# Patient Record
Sex: FEMALE | Race: WHITE | Employment: OTHER | URBAN - METROPOLITAN AREA
[De-identification: names, ages, dates, MRNs, and addresses within clinical notes are randomized per-mention and may not be internally consistent; named-entity substitution may affect disease eponyms.]

---

## 2022-05-13 ENCOUNTER — OFFICE VISIT (OUTPATIENT)
Dept: ORTHOPEDIC SURGERY | Age: 87
End: 2022-05-13
Payer: MEDICARE

## 2022-05-13 DIAGNOSIS — M25.561 RIGHT KNEE PAIN, UNSPECIFIED CHRONICITY: ICD-10-CM

## 2022-05-13 DIAGNOSIS — M25.562 LEFT KNEE PAIN, UNSPECIFIED CHRONICITY: ICD-10-CM

## 2022-05-13 DIAGNOSIS — M17.0 BILATERAL PRIMARY OSTEOARTHRITIS OF KNEE: Primary | ICD-10-CM

## 2022-05-13 PROCEDURE — 20610 DRAIN/INJ JOINT/BURSA W/O US: CPT | Performed by: ORTHOPAEDIC SURGERY

## 2022-05-13 RX ORDER — TRIAMCINOLONE ACETONIDE 40 MG/ML
40 INJECTION, SUSPENSION INTRA-ARTICULAR; INTRAMUSCULAR ONCE
Status: COMPLETED | OUTPATIENT
Start: 2022-05-13 | End: 2022-05-13

## 2022-05-13 RX ORDER — LIDOCAINE HYDROCHLORIDE 10 MG/ML
2 INJECTION INFILTRATION; PERINEURAL ONCE
Status: COMPLETED | OUTPATIENT
Start: 2022-05-13 | End: 2022-05-13

## 2022-05-13 RX ADMIN — LIDOCAINE HYDROCHLORIDE 2 ML: 10 INJECTION INFILTRATION; PERINEURAL at 15:00

## 2022-05-13 RX ADMIN — TRIAMCINOLONE ACETONIDE 40 MG: 40 INJECTION, SUSPENSION INTRA-ARTICULAR; INTRAMUSCULAR at 15:01

## 2022-05-13 RX ADMIN — TRIAMCINOLONE ACETONIDE 40 MG: 40 INJECTION, SUSPENSION INTRA-ARTICULAR; INTRAMUSCULAR at 15:00

## 2022-05-13 NOTE — PROGRESS NOTES
Preeti Kessler (: 1932) is a 80 y.o. female, patient, here for evaluation of the following chief complaint(s):  Knee Pain (bilateral knee pain)       HPI:    Here for DJD both knees bilateral knee injections    Not on File    No current outpatient medications on file. Current Facility-Administered Medications   Medication    hyaluronate sodium, cross-linked (GEL-ONE) injection syrg 30 mg    triamcinolone acetonide (KENALOG-40) 40 mg/mL injection 40 mg    lidocaine (XYLOCAINE) 10 mg/mL (1 %) injection 2 mL    hyaluronate sodium, cross-linked (GEL-ONE) injection syrg 30 mg    triamcinolone acetonide (KENALOG-40) 40 mg/mL injection 40 mg    lidocaine (XYLOCAINE) 10 mg/mL (1 %) injection 2 mL       History reviewed. No pertinent past medical history. History reviewed. No pertinent surgical history. History reviewed. No pertinent family history. Social History     Socioeconomic History    Marital status:      Spouse name: Not on file    Number of children: Not on file    Years of education: Not on file    Highest education level: Not on file   Occupational History    Not on file   Tobacco Use    Smoking status: Current Every Day Smoker    Smokeless tobacco: Never Used   Substance and Sexual Activity    Alcohol use: Not on file    Drug use: Not on file    Sexual activity: Not on file   Other Topics Concern    Not on file   Social History Narrative    Not on file     Social Determinants of Health     Financial Resource Strain:     Difficulty of Paying Living Expenses: Not on file   Food Insecurity:     Worried About Running Out of Food in the Last Year: Not on file    Radha of Food in the Last Year: Not on file   Transportation Needs:     Lack of Transportation (Medical): Not on file    Lack of Transportation (Non-Medical):  Not on file   Physical Activity:     Days of Exercise per Week: Not on file    Minutes of Exercise per Session: Not on file   Stress:     Feeling of Stress : Not on file   Social Connections:     Frequency of Communication with Friends and Family: Not on file    Frequency of Social Gatherings with Friends and Family: Not on file    Attends [employer] Services: Not on file    Active Member of Clubs or Organizations: Not on file    Attends Club or Organization Meetings: Not on file    Marital Status: Not on file   Intimate Partner Violence:     Fear of Current or Ex-Partner: Not on file    Emotionally Abused: Not on file    Physically Abused: Not on file    Sexually Abused: Not on file   Housing Stability:     Unable to Pay for Housing in the Last Year: Not on file    Number of [place] in the Last Year: Not on file    Unstable Housing in the Last Year: Not on file             Vitals: There were no vitals taken for this visit. There is no height or weight on file to calculate BMI. PHYSICAL EXAM:  Knees are benign-appearing though the right knee has a meniscal cyst medially in the left knee has 1 laterally. IMAGING:  None    ASSESSMENT/PLAN:  1. Bilateral primary osteoarthritis of knee  -     hyaluronate sodium, cross-linked (GEL-ONE) injection syrg 30 mg; 30 mg, Intra artICUlar, ONCE, 1 dose, On Fri 5/13/22 at 1500  -     triamcinolone acetonide (KENALOG-40) 40 mg/mL injection 40 mg; 40 mg, Intra artICUlar, ONCE, 1 dose, On Fri 5/13/22 at 1500  -     lidocaine (XYLOCAINE) 10 mg/mL (1 %) injection 2 mL; 2 mL, Intra artICUlar, ONCE, 1 dose, On Fri 5/13/22 at 1500  -     hyaluronate sodium, cross-linked (GEL-ONE) injection syrg 30 mg; 30 mg, Intra artICUlar, ONCE, 1 dose, On Fri 5/13/22 at 1500  -     triamcinolone acetonide (KENALOG-40) 40 mg/mL injection 40 mg; 40 mg, Intra artICUlar, ONCE, 1 dose, On Fri 5/13/22 at 1500  -     lidocaine (XYLOCAINE) 10 mg/mL (1 %) injection 2 mL; 2 mL, Intra artICUlar, ONCE, 1 dose, On Fri 5/13/22 at 1500  2.  Left knee pain, unspecified chronicity  -     hyaluronate sodium, cross-linked (GEL-ONE) injection syrg 30 mg; 30 mg, Intra artICUlar, ONCE, 1 dose, On Fri 5/13/22 at 1500  -     triamcinolone acetonide (KENALOG-40) 40 mg/mL injection 40 mg; 40 mg, Intra artICUlar, ONCE, 1 dose, On Fri 5/13/22 at 1500  -     lidocaine (XYLOCAINE) 10 mg/mL (1 %) injection 2 mL; 2 mL, Intra artICUlar, ONCE, 1 dose, On Fri 5/13/22 at 1500  -     hyaluronate sodium, cross-linked (GEL-ONE) injection syrg 30 mg; 30 mg, Intra artICUlar, ONCE, 1 dose, On Fri 5/13/22 at 1500  -     triamcinolone acetonide (KENALOG-40) 40 mg/mL injection 40 mg; 40 mg, Intra artICUlar, ONCE, 1 dose, On Fri 5/13/22 at 1500  -     lidocaine (XYLOCAINE) 10 mg/mL (1 %) injection 2 mL; 2 mL, Intra artICUlar, ONCE, 1 dose, On Fri 5/13/22 at 1500  3. Right knee pain, unspecified chronicity  -     hyaluronate sodium, cross-linked (GEL-ONE) injection syrg 30 mg; 30 mg, Intra artICUlar, ONCE, 1 dose, On Fri 5/13/22 at 1500  -     triamcinolone acetonide (KENALOG-40) 40 mg/mL injection 40 mg; 40 mg, Intra artICUlar, ONCE, 1 dose, On Fri 5/13/22 at 1500  -     lidocaine (XYLOCAINE) 10 mg/mL (1 %) injection 2 mL; 2 mL, Intra artICUlar, ONCE, 1 dose, On Fri 5/13/22 at 1500  -     hyaluronate sodium, cross-linked (GEL-ONE) injection syrg 30 mg; 30 mg, Intra artICUlar, ONCE, 1 dose, On Fri 5/13/22 at 1500  -     triamcinolone acetonide (KENALOG-40) 40 mg/mL injection 40 mg; 40 mg, Intra artICUlar, ONCE, 1 dose, On Fri 5/13/22 at 1500  -     lidocaine (XYLOCAINE) 10 mg/mL (1 %) injection 2 mL; 2 mL, Intra artICUlar, ONCE, 1 dose, On Fri 5/13/22 at 1500    Discussed risks/benefits of cortisone injection and patient gave verbal consent. Under sterile conditions, the bilateral knees were injected with  2cc 1% Lidocaine and 1 cc 40 mg/cc Kenalog and 1 unit of gel 1 intra-articularly, tolerated the procedure well. An electronic signature was used to authenticate this note.   --Bib Cochran MD

## 2022-07-20 ENCOUNTER — HOSPITAL ENCOUNTER (INPATIENT)
Age: 87
LOS: 14 days | Discharge: NURSING, REHAB OR LONG-TERM CARE | DRG: 193 | End: 2022-08-03
Attending: EMERGENCY MEDICINE | Admitting: FAMILY MEDICINE
Payer: MEDICARE

## 2022-07-20 ENCOUNTER — APPOINTMENT (OUTPATIENT)
Dept: NON INVASIVE DIAGNOSTICS | Age: 87
DRG: 193 | End: 2022-07-20
Attending: INTERNAL MEDICINE
Payer: MEDICARE

## 2022-07-20 ENCOUNTER — APPOINTMENT (OUTPATIENT)
Dept: GENERAL RADIOLOGY | Age: 87
DRG: 193 | End: 2022-07-20
Attending: EMERGENCY MEDICINE
Payer: MEDICARE

## 2022-07-20 ENCOUNTER — APPOINTMENT (OUTPATIENT)
Dept: CT IMAGING | Age: 87
DRG: 193 | End: 2022-07-20
Attending: FAMILY MEDICINE
Payer: MEDICARE

## 2022-07-20 DIAGNOSIS — Z71.89 GOALS OF CARE, COUNSELING/DISCUSSION: ICD-10-CM

## 2022-07-20 DIAGNOSIS — I48.0 PAF (PAROXYSMAL ATRIAL FIBRILLATION) (HCC): ICD-10-CM

## 2022-07-20 DIAGNOSIS — Z51.5 PALLIATIVE CARE ENCOUNTER: ICD-10-CM

## 2022-07-20 DIAGNOSIS — Z71.89 ADVANCED CARE PLANNING/COUNSELING DISCUSSION: ICD-10-CM

## 2022-07-20 DIAGNOSIS — I50.9 ACUTE HEART FAILURE, UNSPECIFIED HEART FAILURE TYPE (HCC): ICD-10-CM

## 2022-07-20 DIAGNOSIS — J18.9 PNEUMONIA DUE TO INFECTIOUS ORGANISM, UNSPECIFIED LATERALITY, UNSPECIFIED PART OF LUNG: ICD-10-CM

## 2022-07-20 DIAGNOSIS — A41.9 SEPSIS, DUE TO UNSPECIFIED ORGANISM, UNSPECIFIED WHETHER ACUTE ORGAN DYSFUNCTION PRESENT (HCC): Primary | ICD-10-CM

## 2022-07-20 DIAGNOSIS — I50.31 ACUTE DIASTOLIC CHF (CONGESTIVE HEART FAILURE) (HCC): ICD-10-CM

## 2022-07-20 DIAGNOSIS — R41.82 ALTERED MENTAL STATUS, UNSPECIFIED ALTERED MENTAL STATUS TYPE: ICD-10-CM

## 2022-07-20 DIAGNOSIS — D72.829 LEUKOCYTOSIS, UNSPECIFIED TYPE: ICD-10-CM

## 2022-07-20 DIAGNOSIS — R09.02 HYPOXIA: ICD-10-CM

## 2022-07-20 LAB
ALBUMIN SERPL-MCNC: 2.1 G/DL (ref 3.5–5)
ALBUMIN/GLOB SERPL: 0.4 {RATIO} (ref 1.1–2.2)
ALP SERPL-CCNC: 98 U/L (ref 45–117)
ALT SERPL-CCNC: 17 U/L (ref 12–78)
AMORPH CRY URNS QL MICRO: ABNORMAL
ANION GAP SERPL CALC-SCNC: 7 MMOL/L (ref 5–15)
APPEARANCE UR: ABNORMAL
ARTERIAL PATENCY WRIST A: POSITIVE
AST SERPL-CCNC: 10 U/L (ref 15–37)
BACTERIA URNS QL MICRO: NEGATIVE /HPF
BASE DEFICIT BLD-SCNC: 0.4 MMOL/L
BASOPHILS # BLD: 0 K/UL (ref 0–0.1)
BASOPHILS NFR BLD: 0 % (ref 0–1)
BDY SITE: ABNORMAL
BILIRUB SERPL-MCNC: 0.9 MG/DL (ref 0.2–1)
BILIRUB UR QL: NEGATIVE
BNP SERPL-MCNC: ABNORMAL PG/ML
BUN SERPL-MCNC: 28 MG/DL (ref 6–20)
BUN/CREAT SERPL: 20 (ref 12–20)
CALCIUM SERPL-MCNC: 10.1 MG/DL (ref 8.5–10.1)
CHLORIDE SERPL-SCNC: 99 MMOL/L (ref 97–108)
CO2 SERPL-SCNC: 29 MMOL/L (ref 21–32)
COLOR UR: ABNORMAL
COMMENT, HOLDF: NORMAL
COVID-19 RAPID TEST, COVR: NOT DETECTED
CREAT SERPL-MCNC: 1.42 MG/DL (ref 0.55–1.02)
DIFFERENTIAL METHOD BLD: ABNORMAL
ECHO AV AREA PEAK VELOCITY: 1.5 CM2
ECHO AV AREA/BSA PEAK VELOCITY: 0.9 CM2/M2
ECHO AV PEAK GRADIENT: 15 MMHG
ECHO AV PEAK VELOCITY: 1.9 M/S
ECHO AV VELOCITY RATIO: 0.68
ECHO LA DIAMETER INDEX: 1.94 CM/M2
ECHO LA DIAMETER: 3.3 CM
ECHO LV E' LATERAL VELOCITY: 5 CM/S
ECHO LV E' SEPTAL VELOCITY: 3 CM/S
ECHO LV FRACTIONAL SHORTENING: 28 % (ref 28–44)
ECHO LV INTERNAL DIMENSION DIASTOLE INDEX: 2.35 CM/M2
ECHO LV INTERNAL DIMENSION DIASTOLIC: 4 CM (ref 3.9–5.3)
ECHO LV INTERNAL DIMENSION SYSTOLIC INDEX: 1.71 CM/M2
ECHO LV INTERNAL DIMENSION SYSTOLIC: 2.9 CM
ECHO LV IVSD: 1.5 CM (ref 0.6–0.9)
ECHO LV MASS 2D: 186.5 G (ref 67–162)
ECHO LV MASS INDEX 2D: 109.7 G/M2 (ref 43–95)
ECHO LV POSTERIOR WALL DIASTOLIC: 1.1 CM (ref 0.6–0.9)
ECHO LV RELATIVE WALL THICKNESS RATIO: 0.55
ECHO LVOT AREA: 2.3 CM2
ECHO LVOT DIAM: 1.7 CM
ECHO LVOT MEAN GRADIENT: 3 MMHG
ECHO LVOT PEAK GRADIENT: 6 MMHG
ECHO LVOT PEAK VELOCITY: 1.3 M/S
ECHO LVOT STROKE VOLUME INDEX: 32.4 ML/M2
ECHO LVOT SV: 55.1 ML
ECHO LVOT VTI: 24.3 CM
ECHO MV A VELOCITY: 1.49 M/S
ECHO MV AREA PHT: 4.6 CM2
ECHO MV E DECELERATION TIME (DT): 164 MS
ECHO MV E VELOCITY: 1.13 M/S
ECHO MV E/A RATIO: 0.76
ECHO MV E/E' LATERAL: 22.6
ECHO MV E/E' RATIO (AVERAGED): 30.13
ECHO MV E/E' SEPTAL: 37.67
ECHO MV PRESSURE HALF TIME (PHT): 47.6 MS
ECHO PV MAX VELOCITY: 0.9 M/S
ECHO PV PEAK GRADIENT: 3 MMHG
ECHO RV FREE WALL PEAK S': 18 CM/S
ECHO RV INTERNAL DIMENSION: 3.1 CM
ECHO RV TAPSE: 1.7 CM (ref 1.7–?)
ECHO TV REGURGITANT MAX VELOCITY: 3.07 M/S
ECHO TV REGURGITANT PEAK GRADIENT: 38 MMHG
EOSINOPHIL # BLD: 0 K/UL (ref 0–0.4)
EOSINOPHIL NFR BLD: 0 % (ref 0–7)
EPITH CASTS URNS QL MICRO: ABNORMAL /LPF
ERYTHROCYTE [DISTWIDTH] IN BLOOD BY AUTOMATED COUNT: 15.4 % (ref 11.5–14.5)
FLUAV AG NPH QL IA: NEGATIVE
FLUBV AG NOSE QL IA: NEGATIVE
GAS FLOW.O2 O2 DELIVERY SYS: ABNORMAL L/MIN
GLOBULIN SER CALC-MCNC: 4.8 G/DL (ref 2–4)
GLUCOSE SERPL-MCNC: 85 MG/DL (ref 65–100)
GLUCOSE UR STRIP.AUTO-MCNC: NEGATIVE MG/DL
HCO3 BLD-SCNC: 24.1 MMOL/L (ref 22–26)
HCT VFR BLD AUTO: 36.1 % (ref 35–47)
HGB BLD-MCNC: 11.5 G/DL (ref 11.5–16)
HGB UR QL STRIP: ABNORMAL
IMM GRANULOCYTES # BLD AUTO: 0.8 K/UL (ref 0–0.04)
IMM GRANULOCYTES NFR BLD AUTO: 2 % (ref 0–0.5)
KETONES UR QL STRIP.AUTO: ABNORMAL MG/DL
LACTATE SERPL-SCNC: 1.7 MMOL/L (ref 0.4–2)
LEUKOCYTE ESTERASE UR QL STRIP.AUTO: ABNORMAL
LYMPHOCYTES # BLD: 1.1 K/UL (ref 0.8–3.5)
LYMPHOCYTES NFR BLD: 3 % (ref 12–49)
MCH RBC QN AUTO: 29.8 PG (ref 26–34)
MCHC RBC AUTO-ENTMCNC: 31.9 G/DL (ref 30–36.5)
MCV RBC AUTO: 93.5 FL (ref 80–99)
MONOCYTES # BLD: 1.1 K/UL (ref 0–1)
MONOCYTES NFR BLD: 3 % (ref 5–13)
NEUTS SEG # BLD: 35 K/UL (ref 1.8–8)
NEUTS SEG NFR BLD: 92 % (ref 32–75)
NITRITE UR QL STRIP.AUTO: NEGATIVE
NRBC # BLD: 0 K/UL (ref 0–0.01)
NRBC BLD-RTO: 0 PER 100 WBC
PCO2 BLD: 37.9 MMHG (ref 35–45)
PH BLD: 7.41 [PH] (ref 7.35–7.45)
PH UR STRIP: 5 [PH] (ref 5–8)
PLATELET # BLD AUTO: 391 K/UL (ref 150–400)
PMV BLD AUTO: 9.1 FL (ref 8.9–12.9)
PO2 BLD: 72 MMHG (ref 80–100)
POTASSIUM SERPL-SCNC: 4.6 MMOL/L (ref 3.5–5.1)
PROT SERPL-MCNC: 6.9 G/DL (ref 6.4–8.2)
PROT UR STRIP-MCNC: 30 MG/DL
RBC # BLD AUTO: 3.86 M/UL (ref 3.8–5.2)
RBC #/AREA URNS HPF: ABNORMAL /HPF (ref 0–5)
RBC MORPH BLD: ABNORMAL
SAMPLES BEING HELD,HOLD: NORMAL
SAO2 % BLD: 94.5 % (ref 92–97)
SODIUM SERPL-SCNC: 135 MMOL/L (ref 136–145)
SOURCE, COVRS: NORMAL
SP GR UR REFRACTOMETRY: 1.02 (ref 1–1.03)
SPECIMEN TYPE: ABNORMAL
TROPONIN-HIGH SENSITIVITY: 348 NG/L (ref 0–51)
TROPONIN-HIGH SENSITIVITY: 439 NG/L (ref 0–51)
TROPONIN-HIGH SENSITIVITY: 464 NG/L (ref 0–51)
UA: UC IF INDICATED,UAUC: ABNORMAL
UROBILINOGEN UR QL STRIP.AUTO: 0.2 EU/DL (ref 0.2–1)
WBC # BLD AUTO: 38 K/UL (ref 3.6–11)
WBC URNS QL MICRO: ABNORMAL /HPF (ref 0–4)

## 2022-07-20 PROCEDURE — 87635 SARS-COV-2 COVID-19 AMP PRB: CPT

## 2022-07-20 PROCEDURE — 99223 1ST HOSP IP/OBS HIGH 75: CPT | Performed by: INTERNAL MEDICINE

## 2022-07-20 PROCEDURE — 84484 ASSAY OF TROPONIN QUANT: CPT

## 2022-07-20 PROCEDURE — 36600 WITHDRAWAL OF ARTERIAL BLOOD: CPT

## 2022-07-20 PROCEDURE — 80053 COMPREHEN METABOLIC PANEL: CPT

## 2022-07-20 PROCEDURE — 71250 CT THORAX DX C-: CPT

## 2022-07-20 PROCEDURE — 74011000250 HC RX REV CODE- 250: Performed by: FAMILY MEDICINE

## 2022-07-20 PROCEDURE — 93005 ELECTROCARDIOGRAM TRACING: CPT

## 2022-07-20 PROCEDURE — 85025 COMPLETE CBC W/AUTO DIFF WBC: CPT

## 2022-07-20 PROCEDURE — 65660000001 HC RM ICU INTERMED STEPDOWN

## 2022-07-20 PROCEDURE — 96361 HYDRATE IV INFUSION ADD-ON: CPT

## 2022-07-20 PROCEDURE — 36415 COLL VENOUS BLD VENIPUNCTURE: CPT

## 2022-07-20 PROCEDURE — 94664 DEMO&/EVAL PT USE INHALER: CPT

## 2022-07-20 PROCEDURE — 96365 THER/PROPH/DIAG IV INF INIT: CPT

## 2022-07-20 PROCEDURE — 71045 X-RAY EXAM CHEST 1 VIEW: CPT

## 2022-07-20 PROCEDURE — C8929 TTE W OR WO FOL WCON,DOPPLER: HCPCS

## 2022-07-20 PROCEDURE — 83605 ASSAY OF LACTIC ACID: CPT

## 2022-07-20 PROCEDURE — 87040 BLOOD CULTURE FOR BACTERIA: CPT

## 2022-07-20 PROCEDURE — 99285 EMERGENCY DEPT VISIT HI MDM: CPT

## 2022-07-20 PROCEDURE — 94640 AIRWAY INHALATION TREATMENT: CPT

## 2022-07-20 PROCEDURE — 74011250636 HC RX REV CODE- 250/636: Performed by: FAMILY MEDICINE

## 2022-07-20 PROCEDURE — 82803 BLOOD GASES ANY COMBINATION: CPT

## 2022-07-20 PROCEDURE — 93306 TTE W/DOPPLER COMPLETE: CPT | Performed by: INTERNAL MEDICINE

## 2022-07-20 PROCEDURE — 83880 ASSAY OF NATRIURETIC PEPTIDE: CPT

## 2022-07-20 PROCEDURE — 96368 THER/DIAG CONCURRENT INF: CPT

## 2022-07-20 PROCEDURE — 74011000250 HC RX REV CODE- 250: Performed by: EMERGENCY MEDICINE

## 2022-07-20 PROCEDURE — 81001 URINALYSIS AUTO W/SCOPE: CPT

## 2022-07-20 PROCEDURE — 94762 N-INVAS EAR/PLS OXIMTRY CONT: CPT

## 2022-07-20 PROCEDURE — 87804 INFLUENZA ASSAY W/OPTIC: CPT

## 2022-07-20 PROCEDURE — 74011250636 HC RX REV CODE- 250/636: Performed by: EMERGENCY MEDICINE

## 2022-07-20 RX ORDER — SODIUM CHLORIDE 0.9 % (FLUSH) 0.9 %
5-10 SYRINGE (ML) INJECTION AS NEEDED
Status: DISCONTINUED | OUTPATIENT
Start: 2022-07-20 | End: 2022-08-03 | Stop reason: HOSPADM

## 2022-07-20 RX ORDER — POLYETHYLENE GLYCOL 3350 17 G/17G
17 POWDER, FOR SOLUTION ORAL DAILY
Status: DISCONTINUED | OUTPATIENT
Start: 2022-07-21 | End: 2022-08-03 | Stop reason: HOSPADM

## 2022-07-20 RX ORDER — NYSTATIN 100000 U/G
OINTMENT TOPICAL 2 TIMES DAILY
COMMUNITY

## 2022-07-20 RX ORDER — OXYCODONE HYDROCHLORIDE 5 MG/1
5 TABLET ORAL
COMMUNITY
End: 2022-08-03

## 2022-07-20 RX ORDER — SODIUM CHLORIDE 9 MG/ML
50 INJECTION, SOLUTION INTRAVENOUS CONTINUOUS
Status: DISCONTINUED | OUTPATIENT
Start: 2022-07-20 | End: 2022-07-22

## 2022-07-20 RX ORDER — CALCIUM CARBONATE 600 MG
600 TABLET ORAL 2 TIMES DAILY
COMMUNITY

## 2022-07-20 RX ORDER — IPRATROPIUM BROMIDE AND ALBUTEROL SULFATE 2.5; .5 MG/3ML; MG/3ML
3 SOLUTION RESPIRATORY (INHALATION)
Status: DISCONTINUED | OUTPATIENT
Start: 2022-07-20 | End: 2022-07-21

## 2022-07-20 RX ORDER — LEVOFLOXACIN 5 MG/ML
750 INJECTION, SOLUTION INTRAVENOUS ONCE
Status: COMPLETED | OUTPATIENT
Start: 2022-07-20 | End: 2022-07-20

## 2022-07-20 RX ORDER — ALBUTEROL SULFATE 90 UG/1
1 AEROSOL, METERED RESPIRATORY (INHALATION)
COMMUNITY

## 2022-07-20 RX ORDER — SIMETHICONE 80 MG
80 TABLET,CHEWABLE ORAL
Status: DISCONTINUED | OUTPATIENT
Start: 2022-07-20 | End: 2022-08-03 | Stop reason: HOSPADM

## 2022-07-20 RX ORDER — DEXTROMETHORPHAN HYDROBROMIDE, GUAIFENESIN 5; 100 MG/5ML; MG/5ML
650 LIQUID ORAL EVERY 8 HOURS
COMMUNITY

## 2022-07-20 RX ORDER — OXYCODONE HYDROCHLORIDE 5 MG/1
5 TABLET ORAL
Status: DISCONTINUED | OUTPATIENT
Start: 2022-07-20 | End: 2022-08-03 | Stop reason: HOSPADM

## 2022-07-20 RX ORDER — FACIAL-BODY WIPES
10 EACH TOPICAL
COMMUNITY

## 2022-07-20 RX ORDER — VANCOMYCIN 1.75 GRAM/500 ML IN 0.9 % SODIUM CHLORIDE INTRAVENOUS
1750
Status: COMPLETED | OUTPATIENT
Start: 2022-07-20 | End: 2022-07-20

## 2022-07-20 RX ORDER — GUAIFENESIN 100 MG/5ML
81 LIQUID (ML) ORAL DAILY
COMMUNITY
End: 2022-10-18

## 2022-07-20 RX ORDER — DILTIAZEM HYDROCHLORIDE 120 MG/1
120 CAPSULE, COATED, EXTENDED RELEASE ORAL DAILY
Status: DISCONTINUED | OUTPATIENT
Start: 2022-07-21 | End: 2022-07-23

## 2022-07-20 RX ORDER — HEPARIN SODIUM 5000 [USP'U]/ML
5000 INJECTION, SOLUTION INTRAVENOUS; SUBCUTANEOUS EVERY 8 HOURS
Status: DISCONTINUED | OUTPATIENT
Start: 2022-07-20 | End: 2022-07-20

## 2022-07-20 RX ORDER — LANOLIN ALCOHOL/MO/W.PET/CERES
3 CREAM (GRAM) TOPICAL
COMMUNITY

## 2022-07-20 RX ORDER — DILTIAZEM HYDROCHLORIDE 120 MG/1
120 CAPSULE, EXTENDED RELEASE ORAL DAILY
COMMUNITY
End: 2022-08-03

## 2022-07-20 RX ORDER — GUAIFENESIN 100 MG/5ML
81 LIQUID (ML) ORAL DAILY
Status: DISCONTINUED | OUTPATIENT
Start: 2022-07-21 | End: 2022-08-03 | Stop reason: HOSPADM

## 2022-07-20 RX ORDER — CEFDINIR 300 MG/1
300 CAPSULE ORAL 2 TIMES DAILY
COMMUNITY
End: 2022-08-03

## 2022-07-20 RX ORDER — SODIUM CHLORIDE 0.9 % (FLUSH) 0.9 %
5-40 SYRINGE (ML) INJECTION AS NEEDED
Status: DISCONTINUED | OUTPATIENT
Start: 2022-07-20 | End: 2022-08-03 | Stop reason: HOSPADM

## 2022-07-20 RX ORDER — ONDANSETRON 4 MG/1
4 TABLET, ORALLY DISINTEGRATING ORAL
COMMUNITY

## 2022-07-20 RX ORDER — IPRATROPIUM BROMIDE AND ALBUTEROL SULFATE 2.5; .5 MG/3ML; MG/3ML
3 SOLUTION RESPIRATORY (INHALATION) 2 TIMES DAILY
COMMUNITY

## 2022-07-20 RX ORDER — FUROSEMIDE 10 MG/ML
40 INJECTION INTRAMUSCULAR; INTRAVENOUS ONCE
Status: COMPLETED | OUTPATIENT
Start: 2022-07-20 | End: 2022-07-20

## 2022-07-20 RX ORDER — SIMETHICONE 125 MG
125 TABLET,CHEWABLE ORAL
COMMUNITY

## 2022-07-20 RX ORDER — ACETAMINOPHEN 325 MG/1
650 TABLET ORAL
Status: DISCONTINUED | OUTPATIENT
Start: 2022-07-20 | End: 2022-08-03 | Stop reason: HOSPADM

## 2022-07-20 RX ORDER — FAMOTIDINE 10 MG/1
10 TABLET ORAL 2 TIMES DAILY
COMMUNITY

## 2022-07-20 RX ORDER — FAMOTIDINE 20 MG/1
10 TABLET, FILM COATED ORAL 2 TIMES DAILY
Status: DISCONTINUED | OUTPATIENT
Start: 2022-07-20 | End: 2022-08-03 | Stop reason: HOSPADM

## 2022-07-20 RX ORDER — POLYETHYLENE GLYCOL 3350 17 G/17G
17 POWDER, FOR SOLUTION ORAL DAILY
COMMUNITY

## 2022-07-20 RX ORDER — GUAIFENESIN 200 MG/1
600 TABLET ORAL
COMMUNITY

## 2022-07-20 RX ORDER — SODIUM CHLORIDE 0.9 % (FLUSH) 0.9 %
5-40 SYRINGE (ML) INJECTION EVERY 8 HOURS
Status: DISCONTINUED | OUTPATIENT
Start: 2022-07-20 | End: 2022-08-03 | Stop reason: HOSPADM

## 2022-07-20 RX ADMIN — WATER 2 G: 1 INJECTION INTRAMUSCULAR; INTRAVENOUS; SUBCUTANEOUS at 10:39

## 2022-07-20 RX ADMIN — IPRATROPIUM BROMIDE AND ALBUTEROL SULFATE 3 ML: .5; 3 SOLUTION RESPIRATORY (INHALATION) at 15:55

## 2022-07-20 RX ADMIN — FUROSEMIDE 40 MG: 10 INJECTION, SOLUTION INTRAVENOUS at 13:54

## 2022-07-20 RX ADMIN — IPRATROPIUM BROMIDE AND ALBUTEROL SULFATE 3 ML: .5; 3 SOLUTION RESPIRATORY (INHALATION) at 21:35

## 2022-07-20 RX ADMIN — VANCOMYCIN HYDROCHLORIDE 1750 MG: 10 INJECTION, POWDER, LYOPHILIZED, FOR SOLUTION INTRAVENOUS at 13:59

## 2022-07-20 RX ADMIN — SODIUM CHLORIDE 75 ML/HR: 9 INJECTION, SOLUTION INTRAVENOUS at 17:05

## 2022-07-20 RX ADMIN — PERFLUTREN 1 ML: 6.52 INJECTION, SUSPENSION INTRAVENOUS at 17:56

## 2022-07-20 RX ADMIN — LEVOFLOXACIN 750 MG: 750 INJECTION, SOLUTION INTRAVENOUS at 10:45

## 2022-07-20 RX ADMIN — SODIUM CHLORIDE, PRESERVATIVE FREE 10 ML: 5 INJECTION INTRAVENOUS at 17:05

## 2022-07-20 RX ADMIN — SODIUM CHLORIDE 1000 ML: 9 INJECTION, SOLUTION INTRAVENOUS at 09:35

## 2022-07-20 NOTE — ED NOTES
Bedside shift change report given to [address] (oncoming nurse) by [facility] (offgoing nurse). Report included the following information SBAR, ED Summary and Intake/Output.

## 2022-07-20 NOTE — ED TRIAGE NOTES
Pt arrives from [facility]. Pt has persistent pneumonia Hospitalized previously on 7/7. Pt arrives coughing of brown/pink sputum that she is able to clear her self. Pt is oriented x4 per EMS vitals in route O2: 90% on 4L NC, BP: 105/64, HR: 112, BS: 95. Per EMS WBC has recently tripled at [facility].

## 2022-07-20 NOTE — PROGRESS NOTES
Apixaban Order Review  Indication: PEs  Dose: 5 mg  Current Labs:  Recent Labs     07/20/22  0923   CREA 1.42*   HGB 11.5        Est CrCL: 23.9 ml/min    Drug Interactions: ASA-increased risk of bleeding  Plan: Continue current regimen    [address] DOAC Dosing Guide

## 2022-07-20 NOTE — H&P
[address] [facility] Adult  Hospitalist Group  History and Physical    Date of Service:  7/20/2022  Primary Care Provider: Devan Negron MD  Source of information: The patient and Chart review    Chief Complaint: Shortness of Breath      History of Presenting Illness:   Leisa Sharma is a 80 y.o. female who presents with shortness of breath and cough    Patient is a poor historian, patient is at [facility] rehab, patient had a ground-level fall, was found to have PEs, was also found to have pneumonia, was treated with Flagyl and Rocephin, was transitioned to oral cephalosporins, has chronic hypoxic respiratory failure and uses 4 L of oxygen at baseline, was sent over here with concerns for impending sepsis, patient was found to have a white count of 38,000 and was found to have mild hypotension associated with her symptoms, came to the ER and was requested to be admitted to the hospitalist service, currently patient is resting in bed, alert x1, is coughing but denies any other complaints or problems           REVIEW OF SYSTEMS:  A comprehensive review of systems was negative except for that written in the History of Present Illness. History reviewed. No pertinent past medical history. No past surgical history on file. Prior to Admission medications    Not on File     Allergies   Allergen Reactions    Amoxicillin Unknown (comments)    Penicillins Unknown (comments)    Sulfa (Sulfonamide Antibiotics) Unknown (comments)      History reviewed. No pertinent family history. Social History:  reports that she has been smoking. She has never used smokeless tobacco.     Family and social history were personally reviewed, all pertinent and relevant details are outlined as above.   Reports family as well as social history could not be obtained as patient appears to be confused  Objective:   Visit Vitals  BP (!) 105/50   Pulse (!) 104   Temp 98.7 °F (37.1 °C)   Resp 27   SpO2 94%    O2 Flow Rate (L/min): 6 l/min O2 Device: Nasal cannula    PHYSICAL EXAM:   General: Alert x 1, slightly confused  HEENT: PEERL, moist mucus membranes  Neck: Supple,   Chest: Coarse breath sounds bilateral lung bases  CVS: RRR, S1 S2 heard, no murmurs/rubs/gallops  Abd: Soft, non-tender, non-distended, +bowel sounds   Ext: No clubbing, no cyanosis, no edema  Neuro/Psych: Limited exam, patient not very cooperative, sensory appears to be grossly within normal limits, moves all 4 but strength could not be tested  Cap refill: Brisk, less than 3 seconds  Pulses: 2+, symmetric in all extremities  Skin: Warm, dry, without rashes or lesions    Data Review: All diagnostic labs and studies have been reviewed. Abnormal Labs Reviewed   METABOLIC PANEL, COMPREHENSIVE - Abnormal; Notable for the following components:       Result Value    Sodium 135 (*)     BUN 28 (*)     Creatinine 1.42 (*)     GFR est AA 42 (*)     GFR est non-AA 35 (*)     AST (SGOT) 10 (*)     Albumin 2.1 (*)     Globulin 4.8 (*)     A-G Ratio 0.4 (*)     All other components within normal limits   CBC WITH AUTOMATED DIFF - Abnormal; Notable for the following components:    WBC 38.0 (*)     RDW 15.4 (*)     NEUTROPHILS 92 (*)     LYMPHOCYTES 3 (*)     MONOCYTES 3 (*)     IMMATURE GRANULOCYTES 2 (*)     ABS. NEUTROPHILS 35.0 (*)     ABS. MONOCYTES 1.1 (*)     ABS. IMM.  GRANS. 0.8 (*)     All other components within normal limits   TROPONIN-HIGH SENSITIVITY - Abnormal; Notable for the following components:    Troponin-High Sensitivity 348 (*)     All other components within normal limits       All Micro Results       Procedure Component Value Units Date/Time    CULTURE, BLOOD, PAIRED [[id number]]     Order Status: Sent Specimen: Blood     COVID-19 RAPID TEST [[id number]] Collected: 07/20/22 1049    Order Status: Completed Specimen: Nasopharyngeal Updated: 07/20/22 1126     Specimen source Nasopharyngeal        COVID-19 rapid test Not detected        Comment: Rapid Abbott ID Now       Rapid NAAT:  The specimen is NEGATIVE for SARS-CoV-2, the novel coronavirus associated with COVID-19. Negative results should be treated as presumptive and, if inconsistent with clinical signs and symptoms or necessary for patient management, should be tested with an alternative molecular assay. Negative results do not preclude SARS-CoV-2 infection and should not be used as the sole basis for patient management decisions. This test has been authorized by the FDA under an Emergency Use Authorization (EUA) for use by authorized laboratories. Fact sheet for Healthcare Providers: [employer]  Fact sheet for Patients: [employer]       Methodology: Isothermal Nucleic Acid Amplification         CULTURE, BLOOD [[id number]] Collected: 07/20/22 0923    Order Status: Sent Specimen: Blood Updated: 07/20/22 0949    CULTURE, BLOOD [[id number]] Collected: 07/20/22 0915    Order Status: Sent Specimen: Blood Updated: 07/20/22 0949    RESPIRATORY CULTURE [[id number]]     Order Status: Sent Specimen: Sputum             IMAGING:   XR CHEST PORT   Final Result   Interstitial edema or atypical pneumonia.            ECG/ECHO:    Results for orders placed or performed during the hospital encounter of 07/20/22   EKG, 12 LEAD, INITIAL   Result Value Ref Range    Ventricular Rate 111 BPM    Atrial Rate 111 BPM    P-R Interval 142 ms    QRS Duration 82 ms    Q-T Interval 326 ms    QTC Calculation (Bezet) 443 ms    Calculated P Axis 77 degrees    Calculated R Axis 40 degrees    Calculated T Axis 81 degrees    Diagnosis        Critical Test Result: STEMI  Sinus tachycardia  Cannot rule out Anterior infarct , new  Inferior injury pattern  ** ** ACUTE MI / STEMI ** **  Consider right ventricular involvement in acute inferior infarct  Abnormal ECG  When compared with ECG of 12-SEP-2006 11:48,  Previous ECG has undetermined rhythm, needs review  Acute Anterior infarct is now present          Assessment:   Given the patient's current clinical presentation, there is a high level of concern for decompensation if discharged from the emergency department. Complex decision making was performed, which includes reviewing the patient's available past medical records, laboratory results, and imaging studies. Acute on chronic hypoxic respiratory failure  SIRS (patient does not meet sepsis criteria)  Healthcare associated pneumonia  Acute kidney injury  Pulmonary edema  History of pulmonary embolism  Elevated troponin  Plan:     Patient will be admitted on a telemetry bed, hypoxic respiratory failure appears to be multifactorial, likely pulmonary edema with possible underlying pneumonia, broad-spectrum IV antibiotics, duo nebs, oxygen support, pulse ox monitoring, Lasix IV, echocardiogram, strict I's and O's, aspiration precautions, patient does not meet sepsis criteria thus sepsis protocol has been initiated, patient with lactate of 1.7 maintaining blood pressure, close monitoring and further intervention per hospital course  Avoid nephrotoxic medication, renally dose all medication, trend creatinine, nephrology consult, close monitoring, further intervention per hospital course  IV Lasix, strict I's and O's, CT of the chest, oxygen support, ABG, pulse ox monitoring, cycle troponins, echocardiogram, further intervention per hospital course  Continue anticoagulation  Reviewed EKG with ER physician, does not appear to be STEMI, likely demand, cycle troponins, on anticoagulation, cardiology consult, echocardiogram and continue to monitor      DIET: ADULT DIET Regular; 4 carb choices (60 gm/meal);  Low Fat/Low Chol/High Fiber/PARISH; Low Sodium (2 gm)   ISOLATION PRECAUTIONS: There are currently no Active Isolations  CODE STATUS: Full Code   DVT PROPHYLAXIS: Eliquis  FUNCTIONAL STATUS PRIOR TO HOSPITALIZATION: Capable of only limited self-care; confined to bed or chair likely more than 50% of waking hours. EARLY MOBILITY ASSESSMENT: Recommend a consult to the Mobility Team  ANTICIPATED DISCHARGE: Greater than 48 hours. CRITICAL CARE WAS PERFORMED FOR THIS ENCOUNTER: YES. I had a face to face encounter with the patient, reviewed and interpreted patient data including clinical events, labs, images, vital signs, I/O's, and examined patient. I have discussed the case and the plan and management of the patient's care with the consulting services, the bedside nurses and necessary ancillary providers. This patient has a high probability of imminent, clinically significant deterioration, which requires the highest level of preparedness to intervene urgently. I participated in the decision-making and personally managed or directed the management of the following life and organ supporting interventions that required my frequent assessment to treat or prevent imminent deterioration. I personally spent 50 minutes of critical care time. This is time spent at this critically ill patient's bedside actively involved in patient care as well as the coordination of care and discussions with the patient's family. This does not include any procedural time which has been billed separately. Signed By: Hellen Echols MD     July 20, 2022         Please note that this dictation may have been completed with Dragon, the [employer] voice recognition software. Quite often unanticipated grammatical, syntax, homophones, and other interpretive errors are inadvertently transcribed by the computer software. Please disregard these errors. Please excuse any errors that have escaped final proofreading.

## 2022-07-20 NOTE — CONSULTS
[facility]  Cardiology Care Note                  [x]Initial visit     []Established visit     Patient Name: Maribeth Martinez - XDM:[id number]  Primary Cardiologist: none  Consulting Cardiologist: Missael Forde MD     Reason for consult: elevated troponin    Patient seen and examined by me with the above nurse practitioner. I personally performed all components of the history, physical, and medical decision making and agree with the assessment and plan with minor modifications as noted. Today the patient presents with sepsis due to pneumonia, mild hypotension, and suspected component of interstitial edema on chest x-ray    General PE  Gen:  NAD, appears pale and slightly gray  Mental Status - Alert. General Appearance - Not in acute distress. HEENT:  PERRL, no carotid bruits or JVD  Chest and Lung Exam   Inspection: Accessory muscles - No use of accessory muscles in breathing. Auscultation:   Breath sounds: -Diminished bilaterally  Cardiovascular   Inspection: Jugular vein - Bilateral - Inspection Normal.   Palpation/Percussion:   Apical Impulse: - Normal.   Auscultation: Rhythm - Regular, tachycardic. Heart Sounds - S1 WNL and S2 WNL. No S3 or S4. Murmurs & Other Heart Sounds: Auscultation of the heart reveals - No Murmurs. Peripheral Vascular   Upper Extremity: Inspection - Bilateral - No Cyanotic nailbeds or Digital clubbing. Lower Extremity:   Palpation: Edema - Bilateral - No edema. Abdomen:   Soft, non-tender, bowel sounds are active. Neuro: A&O times 3, CN and motor grossly WNL    Agree with treatment of sepsis and pneumonia as you are doing. Received 1 dose of IV Lasix-we will monitor ins and outs and creatinine with diuresis, eventually repeat chest x-ray. Minimal troponin elevation expected in the setting of this severe illness, and not due to an ACS.     25 minutes spent speaking with the patient, reviewing records, history, physical, and medical decision making    HPI:   Patient is a 80year old with a past med hx of HTN, COPD, Atrial flutter some time ago on Diltiazem and ASA. She was recently at [facility] after GLF sustaining metatarsal fx. Diagnosed with PNA and PE while inpt. She was started on ABX and Eliquis. D/C to [facility] for rehab on 7/17. She apparently developed worsening SOB and was sent to hospital today via EMS. She was found to have a WBC 38.0, COVID/Flu neg, Creatinine 1.42, K 4.6. Troponin 348, ProBNP [id number]. EKG interpretation was STEMI, but on review was not an actual STEMI. She denies hx of CHF or CAD. She denies recent recurrence of aflutter. SUBJECTIVE/OBJECTIVE:    She is currently reporting SOB, denies CP, palpitations, or edema. She is mildly tachypneic on O2 at 6L via NC. She is mildly tachycardic with HR hovering around 100. BP trending lower ranging from 105/50 to 114/56. Assessment and Plan     Troponin elevation: presenting with worsening SOB. Recent PNA/ PE which may be contributing to elevation. Denies CP, no prior hx of CAD. Was on Diltiazem for hx of AFL. As BP allows could consider Metoprolol for both HR control and prevention of microvascular angina. Cont ASA. 2. Elevated ProBNP: may be related to recent PNA, CXR showing interstitial edema or atypical PNA. Agree with IV Diuresis at this time. Ordering stat echo. 3. HTN: see above  4. Atrial flutter: on Diltiazem and ASA at home, now on Eliquis.  Possible BB is BP allows         I have spent a total of 20 min on this encounter; reviewing results/tele/notes and time spent with patient.      ____________________________________________________________    Cardiac testing      Most recent HS troponins:  Recent Labs     07/20/22  1322 07/20/22  0923   TROPHS 439* 348*     Review of Systems    [x]All other systems reviewed and all negative except as written in HPI    [] Patient unable to provide secondary to condition         History reviewed. No pertinent past medical history. No past surgical history on file. Social Hx:  reports that she has been smoking. She has never used smokeless tobacco.  Family Hx: family history is not on file. Allergies   Allergen Reactions    Amoxicillin Unknown (comments)    Penicillins Unknown (comments)    Sulfa (Sulfonamide Antibiotics) Unknown (comments)          OBJECTIVE:  Wt Readings from Last 3 Encounters:   07/20/22 72 kg (158 lb 9.9 oz)       Intake/Output Summary (Last 24 hours) at 7/20/2022 1442  Last data filed at 7/20/2022 1333  Gross per 24 hour   Intake 1160 ml   Output --   Net 1160 ml         Physical Exam    Vitals:   Vitals:    07/20/22 1315 07/20/22 1330 07/20/22 1343 07/20/22 1345   BP: 109/62 (!) 116/55  (!) 114/56   Pulse: (!) 105 (!) 102  (!) 102   Resp: 25 28  30   Temp:       SpO2: 93% 91%  93%   Weight:   72 kg (158 lb 9.9 oz)    Height:    5' 1\" (1.549 m)     Telemetry:  sinus tachycardia      General:    Alert, cooperative, mild respiratory distress, appears stated age. Neck:   Supple, no carotid bruit and no JVD. Back:     Symmetric   Lungs:     diminished to auscultation bilaterally. O2 via NC at 6L   Heart[de-identified]     Tachycardic rate and rhythm, S1, S2 normal, no murmur, click, rub or gallop. Abdomen:     Soft, non-tender. Bowel sounds normal.    Extremities:   Extremities normal, atraumatic, no cyanosis or edema. Vascular:   Pulses - riddhi UE/LE equal   Skin:   Skin color normal. Bruising around eyes/nasal area. No rashes or lesions on visible areas   Neurologic:   Alert, Moves all extremities. Data Review:     Radiology:   XR Results (most recent):  Results from Hospital Encounter encounter on 07/20/22    XR CHEST PORT    Narrative  PORTABLE CHEST RADIOGRAPH/S: 7/20/2022 11:12 AM    INDICATION: Pneumonia. COMPARISON: 9/12/2006.     TECHNIQUE: Portable frontal upright radiograph/s of the chest.    FINDINGS:  Patchy interstitial opacity may represent pulmonary edema or atypical pneumonia. No airspace consolidation with bronchograms to clearly suggest bacterial  pneumonia. There is trace left pleural fluid or costophrenic angle blunting. The  central airways are patent. No pneumothorax. Impression  Interstitial edema or atypical pneumonia. CT Results (most recent):  Results from Hospital Encounter encounter on 06/19/09    CT ABDOMEN W/ CONTRAST    Narrative  *Final Report*      ICD Codes / Adm. Diagnosis:    /   RT LOWER QUAD PAIN  Examination:  Buddy Pascual  - [id number] - Jun 19 2009  9:12AM    Accession No:  [id number]  Reason:  RLQ ABD PAIN    REPORT:  INDICATION: Right lower quadrant pain. COMPARISON: None. CONTRAST: 92 mL of Optiray- 350. TECHNIQUE:  Multislice helical CT was performed from the diaphragm to the symphysis  pubis during uneventful rapid bolus intravenous contrast administration. Oral contrast was also administered. Delayed images of the abdomen were  acquired. Contiguous 5 mm axial images were reconstructed and lung and soft  tissue windows were generated. Coronal reformations were generated. CT dose  reduction was achieved through use of a standardized protocol tailored for  this examination and automatic exposure control for dose modulation. A  bismuth breast shield was used for this examination. FINDINGS:  There is a calcified granuloma near the right hemidiaphragm. The visualized  portions of the lung bases are clear. There is a small 8 to 9 mm left renal cyst. There are no focal abnormalities  within the liver, spleen, pancreas, adrenal glands or kidneys. The aorta is atherosclerotic and tapers without aneurysm. There is no  retroperitoneal adenopathy or mass. The bowel is normal.  The appendix is normal. There is no ascites or free  intraperitoneal air. The uterus is small and the bladder is normal.   There is no pelvic mass or  adenopathy.     The delayed images demonstrate good bilateral excretion of contrast into the  renal collecting systems. IMPRESSION:  1. Calcified right lower lobe granuloma. 2. Small left renal cyst.  3. Atherosclerotic aorta without aneurysm. 4. No acute abdominal or pelvic process identified. Interpreting/Reading Doctor: Merissa Vieira ([id number])  Transcribed: n/a on 06/19/2009  Approved: Merissa Vieira ([id number])  06/19/2009        Distribution:  Attending Doctor: Yuliana Sanchez Doctor: Josef George    MRI Results (most recent):  No results found for this or any previous visit. No results for input(s): CPK, TROIQ in the last 72 hours. No lab exists for component: CKQMB, CPKMB, BMPP  Recent Labs     07/20/22  0923   *   K 4.6   CL 99   CO2 29   BUN 28*   CREA 1.42*   GLU 85   CA 10.1     Recent Labs     07/20/22  0923   WBC 38.0*   HGB 11.5   HCT 36.1        Recent Labs     07/20/22  0923   AP 98     No results for input(s): CHOL, LDLC in the last 72 hours. No lab exists for component: TGL, HDLC,  HBA1C  No results for input(s): CRP, TSH, TSHEXT in the last 72 hours.     No lab exists for component: ESR        Current meds:    Current Facility-Administered Medications:     sodium chloride (NS) flush 5-10 mL, 5-10 mL, IntraVENous, PRN, Win Quinteros MD    sodium chloride (NS) flush 5-40 mL, 5-40 mL, IntraVENous, Q8H, Mony Marquez MD    sodium chloride (NS) flush 5-40 mL, 5-40 mL, IntraVENous, PRN, Kai Smart MD    0.9% sodium chloride infusion, 75 mL/hr, IntraVENous, CONTINUOUS, Mony Marquez MD    acetaminophen (TYLENOL) tablet 650 mg, 650 mg, Oral, Q4H PRN, Mony Dewitt MD    cefepime (MAXIPIME) 1 g in 0.9% sodium chloride (MBP/ADV) 50 mL MBP, 1 g, IntraVENous, Q12H, Mony Marquez MD    vancomycin (VANCOCIN) 1750 mg in  ml infusion, 1,750 mg, IntraVENous, NOW, Mony Marquez MD    albuterol-ipratropium (DUO-NEB) 2.5 MG-0.5 MG/3 ML, 3 mL, Nebulization, Q4H RT, Kai Smart MD    Vancomycin - pharmacy to dose, , Other, Rx Dosing/Monitoring, Kelsy Rocha MD    Current Outpatient Medications:     nystatin (MYCOSTATIN) 100,000 unit/gram ointment, Apply  to affected area two (2) times a day., Disp: , Rfl:     albuterol (PROVENTIL HFA, VENTOLIN HFA, PROAIR HFA) 90 mcg/actuation inhaler, Take 1 Puff by inhalation every four (4) hours as needed. , Disp: , Rfl:     albuterol-ipratropium (DUO-NEB) 2.5 mg-0.5 mg/3 ml nebu, 3 mL by Nebulization route two (2) times a day., Disp: , Rfl:     oxyCODONE IR (ROXICODONE) 5 mg immediate release tablet, Take 5 mg by mouth every four (4) hours as needed for Pain., Disp: , Rfl:     acetaminophen (Tylenol Arthritis Pain) 650 mg TbER, Take 650 mg by mouth every eight (8) hours. , Disp: , Rfl:     apixaban (ELIQUIS) 5 mg tablet, Take 5 mg by mouth two (2) times a day., Disp: , Rfl:     cefdinir (OMNICEF) 300 mg capsule, Take 300 mg by mouth two (2) times a day., Disp: , Rfl:     guaiFENesin (ORGANIDIN) 200 mg tablet, Take 600 mg by mouth every four (4) hours as needed for Congestion. , Disp: , Rfl:     dilTIAZem ER (DILACOR XR) 120 mg capsule, Take 120 mg by mouth in the morning., Disp: , Rfl:     calcium carbonate (CALTREX) 600 mg calcium (1,500 mg) tablet, Take 600 mg by mouth two (2) times a day., Disp: , Rfl:     aspirin 81 mg chewable tablet, Take 81 mg by mouth in the morning., Disp: , Rfl:     polyethylene glycol (Miralax) 17 gram packet, Take 17 g by mouth in the morning., Disp: , Rfl:     famotidine (PEPCID) 10 mg tablet, Take 10 mg by mouth two (2) times a day., Disp: , Rfl:     melatonin 3 mg tablet, Take 3 mg by mouth nightly as needed for Insomnia., Disp: , Rfl:     simethicone (GAS-X) 125 mg chewable tablet, Take 125 mg by mouth every six (6) hours as needed for Flatulence. , Disp: , Rfl:     ondansetron (ZOFRAN ODT) 4 mg disintegrating tablet, Take 4 mg by mouth every eight (8) hours as needed for Nausea or Vomiting., Disp: , Rfl:     bisacodyL (Dulcolax, bisacodyl,) 10 mg supp, Insert 10 mg into rectum daily as needed for Constipation. , Disp: , Rfl:     Rayna Goldmann, NP  [facility] [address] [address]  [address]  [phone]      CC: Anna Marie Jeffers MD

## 2022-07-20 NOTE — PROGRESS NOTES
Admission Medication Reconciliation:      PTA med list compiled from [address] transfer documents, which were located in the media section of the eMR. Administration times updated via bedside RN. Attempting to contact Dr. Diogo Aguayo via [address] text to update regarding completion of/changes to PTA med list. Provided recommendations re: heparin, which he accepted. Notes:  ASA: held while at facility but was home regimen  Apixaban: for PEs  Omnicef and Diflucan: recent therapy at facility    Medication changes (since last review): Added:  Tramadol  Simethicone  Dulcolax  Pepcid  Ondansetron  Melatonin    Revised:  Nebulizer treatments to include frequency  ASA: noted as taken in past month    Thank you for allowing me to participate in the care of your patient. Felix Kellogg PharmD, RN # [phone]       [facility] pharmacy benefit data reflects medications filled and processed through the patient's insurance, however   this data does NOT capture whether the medication was picked up or is currently being taken by the patient. Allergies:  Amoxicillin, Penicillins, and Sulfa (sulfonamide antibiotics)    Significant PMH/Disease States: History reviewed. No pertinent past medical history. Chief Complaint for this Admission:    Chief Complaint   Patient presents with    Shortness of Breath     Prior to Admission Medications:   Prior to Admission Medications   Prescriptions Last Dose Informant Taking?   acetaminophen (Tylenol Arthritis Pain) 650 mg TbER 7/13/2022  Yes   Sig: Take 650 mg by mouth every eight (8) hours. albuterol (PROVENTIL HFA, VENTOLIN HFA, PROAIR HFA) 90 mcg/actuation inhaler 7/13/2022  Yes   Sig: Take 1 Puff by inhalation every four (4) hours as needed. albuterol-ipratropium (DUO-NEB) 2.5 mg-0.5 mg/3 ml nebu 7/13/2022  Yes   Sig: 3 mL by Nebulization route two (2) times a day. apixaban (ELIQUIS) 5 mg tablet 7/19/2022  Yes   Sig: Take 5 mg by mouth two (2) times a day.    aspirin 81 mg chewable tablet 6/20/2022  Yes   Sig: Take 81 mg by mouth in the morning. bisacodyL (Dulcolax, bisacodyl,) 10 mg supp   Yes   Sig: Insert 10 mg into rectum daily as needed for Constipation. calcium carbonate (CALTREX) 600 mg calcium (1,500 mg) tablet 7/19/2022  Yes   Sig: Take 600 mg by mouth two (2) times a day. cefdinir (OMNICEF) 300 mg capsule 7/19/2022  Yes   Sig: Take 300 mg by mouth two (2) times a day. dilTIAZem ER (DILACOR XR) 120 mg capsule 7/19/2022  Yes   Sig: Take 120 mg by mouth in the morning. famotidine (PEPCID) 10 mg tablet   Yes   Sig: Take 10 mg by mouth two (2) times a day. guaiFENesin (ORGANIDIN) 200 mg tablet   Yes   Sig: Take 600 mg by mouth every four (4) hours as needed for Congestion. melatonin 3 mg tablet   Yes   Sig: Take 3 mg by mouth nightly as needed for Insomnia. nystatin (MYCOSTATIN) 100,000 unit/gram ointment 7/19/2022  Yes   Sig: Apply  to affected area two (2) times a day. ondansetron (ZOFRAN ODT) 4 mg disintegrating tablet   Yes   Sig: Take 4 mg by mouth every eight (8) hours as needed for Nausea or Vomiting. oxyCODONE IR (ROXICODONE) 5 mg immediate release tablet 7/13/2022  Yes   Sig: Take 5 mg by mouth every four (4) hours as needed for Pain.   polyethylene glycol (Miralax) 17 gram packet   Yes   Sig: Take 17 g by mouth in the morning. simethicone (GAS-X) 125 mg chewable tablet   Yes   Sig: Take 125 mg by mouth every six (6) hours as needed for Flatulence. Facility-Administered Medications: None     Please contact the main inpatient pharmacy with any questions or concerns at [phone] and we will direct you to the clinical pharmacist covering this patient's care while in-house.    Marlon Agustin [place]

## 2022-07-20 NOTE — ED PROVIDER NOTES
Patient is a 19-year-old female with past medical history significant for hypertension and COPD who [facility] emergency [facility] for white count of 38,000 and hypotension concerning for impending sepsis. Physician from rehab facility denies that patient has any history of congestive heart failure. EMS reports that she has documented allergies to penicillin, amoxicillin and sulfa. Per report patient had been treated with Flagyl and Rocephin at hospital where she had been admitted after a ground-level fall for PEs and pneumonia and then transition to oral cephalosporin. Patient on 4 L nasal cannula O2 at baseline and per report has not required increased amount of oxygen support. Patient awake alert oriented and coughing up profuse purulent sputum. Per report patient is DNR/DNI       No past medical history on file. No past surgical history on file. No family history on file. Social History     Socioeconomic History    Marital status:      Spouse name: Not on file    Number of children: Not on file    Years of education: Not on file    Highest education level: Not on file   Occupational History    Not on file   Tobacco Use    Smoking status: Every Day    Smokeless tobacco: Never   Substance and Sexual Activity    Alcohol use: Not on file    Drug use: Not on file    Sexual activity: Not on file   Other Topics Concern    Not on file   Social History Narrative    Not on file     Social Determinants of Health     Financial Resource Strain: Not on file   Food Insecurity: Not on file   Transportation Needs: Not on file   Physical Activity: Not on file   Stress: Not on file   Social Connections: Not on file   Intimate Partner Violence: Not on file   Housing Stability: Not on file         ALLERGIES: Patient has no allergy information on record. Review of Systems   Constitutional:  Positive for fatigue and fever. HENT:  Negative for drooling. Respiratory:  Positive for cough and shortness of breath. Cardiovascular:  Negative for leg swelling. Gastrointestinal:  Negative for abdominal pain. Musculoskeletal:  Positive for myalgias. Skin:  Negative for rash. Neurological:  Negative for seizures and syncope. Psychiatric/Behavioral: Negative. There were no vitals filed for this visit. Physical Exam  Vitals and nursing note reviewed. Constitutional:       Appearance: She is ill-appearing. HENT:      Head: Normocephalic. Comments: Ecchymosis noted to bilateral infraorbital areas and nose     Right Ear: External ear normal.      Left Ear: External ear normal.      Nose: Nose normal.      Mouth/Throat:      Mouth: Mucous membranes are dry. Eyes:      General: No scleral icterus. Cardiovascular:      Pulses: Normal pulses. Pulmonary:      Effort: Pulmonary effort is normal.      Breath sounds: Rhonchi present. Abdominal:      Palpations: Abdomen is soft. Musculoskeletal:      Cervical back: Neck supple. Skin:     General: Skin is warm and dry. Neurological:      Mental Status: She is alert and oriented to person, place, and time. Psychiatric:         Mood and Affect: Mood normal.         Behavior: Behavior normal.         Thought Content: Thought content normal.        [facility]         Procedures      Perfect Serve Consult for Admission  11:32 AM    ED Room Number: ER04/04  Patient Name and age:  Cesar Alegre 80 y.o.  female  Working Diagnosis:   1. Sepsis, due to unspecified organism, unspecified whether acute organ dysfunction present ([address]    2. Pneumonia due to infectious organism, unspecified laterality, unspecified part of lung    3.  Leukocytosis, unspecified type        COVID-19 Suspicion:  no  Sepsis present:  yes  Reassessment needed: no  Code Status:  Do Not Resuscitate  Readmission: no  Isolation Requirements:  no  Recommended Level of Care:  step down  Department:[facility] Adult ED - 21   Other: Patient is a 80-year-old female who is DNR/DNI from [facility] rehab for evaluation of current sepsis. Patient was sent there after a fall and treated at outside facility where she was found to have PEs and started on blood thinners. Was also treated for possible pneumonia and covered with Rocephin and Flagyl per report. Transition to oral antibiotics but then today with subjective fevers and white count in the 30,000 range. Also coughing up large amount of purulent sputum. Imaging shows atypical pneumonia versus pulmonary edema. Blood pressures improved with fluids. Patient and rehab facility denies history of congestive heart failure.   MAP is currently 72

## 2022-07-20 NOTE — CONSULTS
[facility] ([facility])         Nephrology and Hypertension         Patient seen and examined. Full consult [id number]    ASSESSMENT:  PAULO- likely pre-renal in the setting of SIRS/Low nl BP/Aflutter; UA is bland. Suspicion for obstruction is low    SIRS  Pulm edema  A on C Resp failure/COPD on home O2  Recent PE/PNA- Covid/flu negative  Aflutter    PLAN:  O2  Gentle IV hydration  IV lasix prn for worsening hypoxia; got 1 dose earlier  Nebs  Rate control as able  AC for Aflutter  Avoid nephrotoxins  Blood Cx  Empiric ABx      D/W pt  Thanks for Renal consult. We will follow patient with you.     Signed by:  Lisa Dickinson MD  Nephrology and HTN

## 2022-07-20 NOTE — PROGRESS NOTES
Pharmacist Note - Vancomycin Dosing    Consult provided for this [address] female for indication of pneumonia. Antibiotic regimen(s): Vanc + Cefepime  Patient on vancomycin PTA? NO     Recent Labs     22  0923   WBC 38.0*   CREA 1.42*   BUN 28*     Frequency of BMP: daily x 3  Height: 154.9 cm  Weight: 72 kg  Est CrCl: 23.9 ml/min; UO: -- ml/kg/hr  Temp (24hrs), Av.7 °F (37.1 °C), Min:98.7 °F (37.1 °C), Max:98.7 °F (37.1 °C)    Cultures:   blood - pending    MRSA Swab ordered (if applicable)? YES    The plan below is expected to result in a target range of Trough 10-15 mcg/mL    Therapy will be initiated with a loading dose of 1750 mg IV x 1 and await AM labs to determine regimen due to PAULO. Pharmacy to follow patient daily and order levels / make dose adjustments as appropriate.

## 2022-07-21 ENCOUNTER — APPOINTMENT (OUTPATIENT)
Dept: GENERAL RADIOLOGY | Age: 87
DRG: 193 | End: 2022-07-21
Attending: HOSPITALIST
Payer: MEDICARE

## 2022-07-21 LAB
ALBUMIN SERPL-MCNC: 1.7 G/DL (ref 3.5–5)
ALBUMIN/GLOB SERPL: 0.4 {RATIO} (ref 1.1–2.2)
ALP SERPL-CCNC: 95 U/L (ref 45–117)
ALT SERPL-CCNC: 14 U/L (ref 12–78)
ANION GAP SERPL CALC-SCNC: 14 MMOL/L (ref 5–15)
AST SERPL-CCNC: 13 U/L (ref 15–37)
BASOPHILS # BLD: 0 K/UL (ref 0–0.1)
BASOPHILS NFR BLD: 0 % (ref 0–1)
BILIRUB SERPL-MCNC: 0.4 MG/DL (ref 0.2–1)
BUN SERPL-MCNC: 37 MG/DL (ref 6–20)
BUN/CREAT SERPL: 28 (ref 12–20)
CALCIUM SERPL-MCNC: 9 MG/DL (ref 8.5–10.1)
CALCULATED R AXIS, ECG10: -60 DEGREES
CALCULATED T AXIS, ECG11: 101 DEGREES
CHLORIDE SERPL-SCNC: 104 MMOL/L (ref 97–108)
CO2 SERPL-SCNC: 20 MMOL/L (ref 21–32)
COMMENT, HOLDF: NORMAL
CREAT SERPL-MCNC: 1.3 MG/DL (ref 0.55–1.02)
DIAGNOSIS, 93000: NORMAL
DIFFERENTIAL METHOD BLD: ABNORMAL
EOSINOPHIL # BLD: 0 K/UL (ref 0–0.4)
EOSINOPHIL NFR BLD: 0 % (ref 0–7)
ERYTHROCYTE [DISTWIDTH] IN BLOOD BY AUTOMATED COUNT: 15.5 % (ref 11.5–14.5)
GLOBULIN SER CALC-MCNC: 4.7 G/DL (ref 2–4)
GLUCOSE SERPL-MCNC: 84 MG/DL (ref 65–100)
HCT VFR BLD AUTO: 31.4 % (ref 35–47)
HGB BLD-MCNC: 10.2 G/DL (ref 11.5–16)
IMM GRANULOCYTES # BLD AUTO: 0.5 K/UL (ref 0–0.04)
IMM GRANULOCYTES NFR BLD AUTO: 2 % (ref 0–0.5)
LYMPHOCYTES # BLD: 0.7 K/UL (ref 0.8–3.5)
LYMPHOCYTES NFR BLD: 3 % (ref 12–49)
MAGNESIUM SERPL-MCNC: 1.6 MG/DL (ref 1.6–2.4)
MCH RBC QN AUTO: 30.4 PG (ref 26–34)
MCHC RBC AUTO-ENTMCNC: 32.5 G/DL (ref 30–36.5)
MCV RBC AUTO: 93.7 FL (ref 80–99)
MONOCYTES # BLD: 0.7 K/UL (ref 0–1)
MONOCYTES NFR BLD: 3 % (ref 5–13)
NEUTS SEG # BLD: 20.7 K/UL (ref 1.8–8)
NEUTS SEG NFR BLD: 92 % (ref 32–75)
NRBC # BLD: 0 K/UL (ref 0–0.01)
NRBC BLD-RTO: 0 PER 100 WBC
PLATELET # BLD AUTO: 328 K/UL (ref 150–400)
PMV BLD AUTO: 9 FL (ref 8.9–12.9)
POTASSIUM SERPL-SCNC: 4.3 MMOL/L (ref 3.5–5.1)
PROT SERPL-MCNC: 6.4 G/DL (ref 6.4–8.2)
Q-T INTERVAL, ECG07: 334 MS
QRS DURATION, ECG06: 130 MS
QTC CALCULATION (BEZET), ECG08: 543 MS
RBC # BLD AUTO: 3.35 M/UL (ref 3.8–5.2)
RBC MORPH BLD: ABNORMAL
SAMPLES BEING HELD,HOLD: NORMAL
SODIUM SERPL-SCNC: 138 MMOL/L (ref 136–145)
TROPONIN-HIGH SENSITIVITY: 157 NG/L (ref 0–51)
VENTRICULAR RATE, ECG03: 159 BPM
WBC # BLD AUTO: 22.6 K/UL (ref 3.6–11)

## 2022-07-21 PROCEDURE — 74011250636 HC RX REV CODE- 250/636: Performed by: STUDENT IN AN ORGANIZED HEALTH CARE EDUCATION/TRAINING PROGRAM

## 2022-07-21 PROCEDURE — 72170 X-RAY EXAM OF PELVIS: CPT

## 2022-07-21 PROCEDURE — 74011000258 HC RX REV CODE- 258: Performed by: HOSPITALIST

## 2022-07-21 PROCEDURE — 74011000250 HC RX REV CODE- 250: Performed by: HOSPITALIST

## 2022-07-21 PROCEDURE — 97165 OT EVAL LOW COMPLEX 30 MIN: CPT

## 2022-07-21 PROCEDURE — 94640 AIRWAY INHALATION TREATMENT: CPT

## 2022-07-21 PROCEDURE — 74011000258 HC RX REV CODE- 258: Performed by: NURSE PRACTITIONER

## 2022-07-21 PROCEDURE — 84484 ASSAY OF TROPONIN QUANT: CPT

## 2022-07-21 PROCEDURE — 74011250636 HC RX REV CODE- 250/636: Performed by: NURSE PRACTITIONER

## 2022-07-21 PROCEDURE — 74011000258 HC RX REV CODE- 258: Performed by: STUDENT IN AN ORGANIZED HEALTH CARE EDUCATION/TRAINING PROGRAM

## 2022-07-21 PROCEDURE — 77010033678 HC OXYGEN DAILY

## 2022-07-21 PROCEDURE — P9047 ALBUMIN (HUMAN), 25%, 50ML: HCPCS | Performed by: STUDENT IN AN ORGANIZED HEALTH CARE EDUCATION/TRAINING PROGRAM

## 2022-07-21 PROCEDURE — 51798 US URINE CAPACITY MEASURE: CPT

## 2022-07-21 PROCEDURE — 71045 X-RAY EXAM CHEST 1 VIEW: CPT

## 2022-07-21 PROCEDURE — 65660000001 HC RM ICU INTERMED STEPDOWN

## 2022-07-21 PROCEDURE — 93005 ELECTROCARDIOGRAM TRACING: CPT

## 2022-07-21 PROCEDURE — 99233 SBSQ HOSP IP/OBS HIGH 50: CPT | Performed by: INTERNAL MEDICINE

## 2022-07-21 PROCEDURE — 99222 1ST HOSP IP/OBS MODERATE 55: CPT | Performed by: NURSE PRACTITIONER

## 2022-07-21 PROCEDURE — 97530 THERAPEUTIC ACTIVITIES: CPT

## 2022-07-21 PROCEDURE — 36415 COLL VENOUS BLD VENIPUNCTURE: CPT

## 2022-07-21 PROCEDURE — 92610 EVALUATE SWALLOWING FUNCTION: CPT

## 2022-07-21 PROCEDURE — 83735 ASSAY OF MAGNESIUM: CPT

## 2022-07-21 PROCEDURE — 74011250636 HC RX REV CODE- 250/636: Performed by: HOSPITALIST

## 2022-07-21 PROCEDURE — 85025 COMPLETE CBC W/AUTO DIFF WBC: CPT

## 2022-07-21 PROCEDURE — 80053 COMPREHEN METABOLIC PANEL: CPT

## 2022-07-21 PROCEDURE — 94760 N-INVAS EAR/PLS OXIMETRY 1: CPT

## 2022-07-21 PROCEDURE — 97161 PT EVAL LOW COMPLEX 20 MIN: CPT

## 2022-07-21 PROCEDURE — 74011000258 HC RX REV CODE- 258: Performed by: FAMILY MEDICINE

## 2022-07-21 PROCEDURE — 74011000250 HC RX REV CODE- 250: Performed by: FAMILY MEDICINE

## 2022-07-21 PROCEDURE — 74011250636 HC RX REV CODE- 250/636: Performed by: FAMILY MEDICINE

## 2022-07-21 RX ORDER — HYDROMORPHONE HYDROCHLORIDE 1 MG/ML
0.5 INJECTION, SOLUTION INTRAMUSCULAR; INTRAVENOUS; SUBCUTANEOUS
Status: DISCONTINUED | OUTPATIENT
Start: 2022-07-21 | End: 2022-08-03 | Stop reason: HOSPADM

## 2022-07-21 RX ORDER — METOPROLOL TARTRATE 5 MG/5ML
2.5 INJECTION INTRAVENOUS
Status: DISCONTINUED | OUTPATIENT
Start: 2022-07-21 | End: 2022-08-03 | Stop reason: HOSPADM

## 2022-07-21 RX ORDER — ALBUMIN HUMAN 250 G/1000ML
25 SOLUTION INTRAVENOUS EVERY 6 HOURS
Status: DISCONTINUED | OUTPATIENT
Start: 2022-07-21 | End: 2022-07-21

## 2022-07-21 RX ORDER — AMIODARONE HYDROCHLORIDE 150 MG/3ML
INJECTION, SOLUTION INTRAVENOUS
Status: DISCONTINUED
Start: 2022-07-21 | End: 2022-07-21

## 2022-07-21 RX ORDER — ALBUMIN HUMAN 250 G/1000ML
25 SOLUTION INTRAVENOUS EVERY 6 HOURS
Status: COMPLETED | OUTPATIENT
Start: 2022-07-21 | End: 2022-07-21

## 2022-07-21 RX ORDER — MIDODRINE HYDROCHLORIDE 5 MG/1
10 TABLET ORAL
Status: DISCONTINUED | OUTPATIENT
Start: 2022-07-21 | End: 2022-07-23

## 2022-07-21 RX ORDER — IPRATROPIUM BROMIDE AND ALBUTEROL SULFATE 2.5; .5 MG/3ML; MG/3ML
3 SOLUTION RESPIRATORY (INHALATION)
Status: DISCONTINUED | OUTPATIENT
Start: 2022-07-21 | End: 2022-07-23

## 2022-07-21 RX ORDER — HYDROMORPHONE HYDROCHLORIDE 1 MG/ML
0.2 INJECTION, SOLUTION INTRAMUSCULAR; INTRAVENOUS; SUBCUTANEOUS
Status: DISCONTINUED | OUTPATIENT
Start: 2022-07-21 | End: 2022-07-21

## 2022-07-21 RX ADMIN — VANCOMYCIN HYDROCHLORIDE 750 MG: 750 INJECTION, POWDER, LYOPHILIZED, FOR SOLUTION INTRAVENOUS at 13:41

## 2022-07-21 RX ADMIN — AMIODARONE HYDROCHLORIDE 1 MG/MIN: 50 INJECTION, SOLUTION INTRAVENOUS at 17:53

## 2022-07-21 RX ADMIN — SODIUM CHLORIDE, PRESERVATIVE FREE 10 ML: 5 INJECTION INTRAVENOUS at 13:55

## 2022-07-21 RX ADMIN — CEFEPIME HYDROCHLORIDE 1 G: 1 INJECTION, POWDER, FOR SOLUTION INTRAMUSCULAR; INTRAVENOUS at 01:00

## 2022-07-21 RX ADMIN — CEFEPIME HYDROCHLORIDE 1 G: 1 INJECTION, POWDER, FOR SOLUTION INTRAMUSCULAR; INTRAVENOUS at 13:41

## 2022-07-21 RX ADMIN — ALBUMIN (HUMAN) 25 G: 0.25 INJECTION, SOLUTION INTRAVENOUS at 13:41

## 2022-07-21 RX ADMIN — ALBUMIN (HUMAN) 25 G: 0.25 INJECTION, SOLUTION INTRAVENOUS at 04:09

## 2022-07-21 RX ADMIN — IPRATROPIUM BROMIDE AND ALBUTEROL SULFATE 3 ML: .5; 3 SOLUTION RESPIRATORY (INHALATION) at 20:14

## 2022-07-21 RX ADMIN — ALBUMIN (HUMAN) 25 G: 0.25 INJECTION, SOLUTION INTRAVENOUS at 17:02

## 2022-07-21 RX ADMIN — HYDROMORPHONE HYDROCHLORIDE 0.2 MG: 1 INJECTION, SOLUTION INTRAMUSCULAR; INTRAVENOUS; SUBCUTANEOUS at 17:02

## 2022-07-21 RX ADMIN — DEXTROSE MONOHYDRATE 150 MG: 5 INJECTION, SOLUTION INTRAVENOUS at 03:22

## 2022-07-21 RX ADMIN — IPRATROPIUM BROMIDE AND ALBUTEROL SULFATE 3 ML: .5; 3 SOLUTION RESPIRATORY (INHALATION) at 08:04

## 2022-07-21 RX ADMIN — SODIUM CHLORIDE, PRESERVATIVE FREE 10 ML: 5 INJECTION INTRAVENOUS at 05:53

## 2022-07-21 RX ADMIN — SODIUM CHLORIDE, PRESERVATIVE FREE 10 ML: 5 INJECTION INTRAVENOUS at 23:31

## 2022-07-21 RX ADMIN — IPRATROPIUM BROMIDE AND ALBUTEROL SULFATE 3 ML: .5; 3 SOLUTION RESPIRATORY (INHALATION) at 01:00

## 2022-07-21 RX ADMIN — AMIODARONE HYDROCHLORIDE 0.5 MG/MIN: 50 INJECTION, SOLUTION INTRAVENOUS at 23:27

## 2022-07-21 RX ADMIN — AMIODARONE HYDROCHLORIDE 1 MG/MIN: 50 INJECTION, SOLUTION INTRAVENOUS at 03:41

## 2022-07-21 RX ADMIN — SODIUM CHLORIDE, PRESERVATIVE FREE 10 ML: 5 INJECTION INTRAVENOUS at 04:11

## 2022-07-21 RX ADMIN — CEFEPIME HYDROCHLORIDE 1 G: 1 INJECTION, POWDER, FOR SOLUTION INTRAMUSCULAR; INTRAVENOUS at 23:27

## 2022-07-21 NOTE — PROGRESS NOTES
[facility] Adult  Hospitalist Group                                                                                          Hospitalist Progress Note  Frank Srivastava MD  Answering service: [phone] OR [phone] from in house phone        Date of Service:  2022  NAME:  Leisa Sharma  :  1932  MRN:  [id number]      Admission Summary:   Leisa Sharma is a 80 y.o. female who presents with shortness of breath and cough    Patient is a poor historian, patient is at [facility] rehab, patient had a ground-level fall, was found to have PEs, was also found to have pneumonia, was treated with Flagyl and Rocephin, was transitioned to oral cephalosporins, has chronic hypoxic respiratory failure and uses 4 L of oxygen at baseline, was sent over here with concerns for impending sepsis, patient was found to have a white count of 38,000 and was found to have mild hypotension associated with her symptoms, came to the ER and was requested to be admitted to the hospitalist service, currently patient is resting in bed, alert x1, is coughing but denies any other complaints or problems       Interval history / Subjective: Follow up SOB   Now on 4l NC (yesterday on 6l), now at baseline  Assessment & Plan:     Acute on chronic hypoxic respiratory failure, 4l NC at baseline  SIRS (patient does not meet sepsis criteria)  Healthcare associated pneumonia  Pulmonary edema  -I/O  -Daily weight  -Follow cultures  -Vanc/cefepime    Hypotension: started on midodrine    Elevated troponin-per cardiology sec to above, not ACS  Acute kidney injury : improving on IV fluids  History of pulmonary embolism: on Eliquis, continue  History of atrial flutter: on cardizem/ASA/Eliquis  Dysphagia:NPO, patient     PT/OT     Code status: DNR  Prophylaxis: Eliquis  PTA: [facility]    Plan: Continue antibiotics, awaiting palliative care  Care Plan discussed with: Patient  Anticipated Disposition: ?  [facility]  Date Reviewed: 5/13/2022            Codes Class Noted POA    Sepsis [facility]) ICD-10-CM: A41.9  ICD-9-CM: 038.9, 995.91  7/20/2022 Unknown             Review of Systems:   A comprehensive review of systems was negative except for that written in the HPI. Vital Signs:    Last 24hrs VS reviewed since prior progress note. Most recent are:  Visit Vitals  /65   Pulse 90   Temp 97.5 °F (36.4 °C)   Resp 28   Ht 5' 1\" (1.549 m)   Wt 71.2 kg (156 lb 14.4 oz)   SpO2 94%   BMI 29.65 kg/m²         Intake/Output Summary (Last 24 hours) at 7/21/2022 1423  Last data filed at 7/21/2022 0531  Gross per 24 hour   Intake 743.33 ml   Output 450 ml   Net 293.33 ml        Physical Examination:     I had a face to face encounter with this patient and independently examined them on 7/21/2022 as outlined below:          Constitutional:  No acute distress   ENT:  Oral mucosa moist, oropharynx benign. Resp:  CTA bilaterally. No wheezing/rhonchi/rales. No accessory muscle use. CV:  Regular rhythm, normal rate, no murmurs, gallops, rubs    GI:  Soft, non distended, non tender. normoactive bowel sounds, no hepatosplenomegaly     Musculoskeletal:  No edema, warm, 2+ pulses throughout    Neurologic:  Moves all extremities. AAOx3, CN II-XII reviewed            Data Review:    Review and/or order of clinical lab test      Labs:     Recent Labs     07/21/22 0319 07/20/22  0923   WBC 22.6* 38.0*   HGB 10.2* 11.5   HCT 31.4* 36.1    391     Recent Labs     07/21/22 0319 07/20/22  0923    135*   K 4.3 4.6    99   CO2 20* 29   BUN 37* 28*   CREA 1.30* 1.42*   GLU 84 85   CA 9.0 10.1     Recent Labs     07/21/22 0319 07/20/22  0923   ALT 14 17   AP 95 98   TBILI 0.4 0.9   TP 6.4 6.9   ALB 1.7* 2.1*   GLOB 4.7* 4.8*     No results for input(s): INR, PTP, APTT, INREXT in the last 72 hours. No results for input(s): FE, TIBC, PSAT, FERR in the last 72 hours.    No results found for: FOL, RBCF   No results for input(s): PH, PCO2, PO2 in the last 72 hours. No results for input(s): CPK, CKNDX, TROIQ in the last 72 hours.     No lab exists for component: CPKMB  No results found for: CHOL, CHOLX, CHLST, CHOLV, HDL, HDLP, LDL, LDLC, DLDLP, TGLX, TRIGL, TRIGP, CHHD, CHHDX  No results found for: [facility]  Lab Results   Component Value Date/Time    Color DARK YELLOW 07/20/2022 03:37 PM    Appearance CLOUDY (A) 07/20/2022 03:37 PM    Specific gravity 1.018 07/20/2022 03:37 PM    pH (UA) 5.0 07/20/2022 03:37 PM    Protein 30 (A) 07/20/2022 03:37 PM    Glucose Negative 07/20/2022 03:37 PM    Ketone TRACE (A) 07/20/2022 03:37 PM    Bilirubin Negative 07/20/2022 03:37 PM    Urobilinogen 0.2 07/20/2022 03:37 PM    Nitrites Negative 07/20/2022 03:37 PM    Leukocyte Esterase TRACE (A) 07/20/2022 03:37 PM    Epithelial cells FEW 07/20/2022 03:37 PM    Bacteria Negative 07/20/2022 03:37 PM    WBC 0-4 07/20/2022 03:37 PM    RBC 5-10 07/20/2022 03:37 PM         Medications Reviewed:     Current Facility-Administered Medications   Medication Dose Route Frequency    midodrine (PROAMATINE) tablet 10 mg  10 mg Oral TID WITH MEALS    albumin human 25% (BUMINATE) solution 25 g  25 g IntraVENous Q6H    albuterol-ipratropium (DUO-NEB) 2.5 MG-0.5 MG/3 ML  3 mL Nebulization BID RT    vancomycin (VANCOCIN) 750 mg in 0.9% sodium chloride 250 mL ([id number])  750 mg IntraVENous Q24H    metoprolol (LOPRESSOR) injection 2.5 mg  2.5 mg IntraVENous Q6H PRN    sodium chloride (NS) flush 5-10 mL  5-10 mL IntraVENous PRN    sodium chloride (NS) flush 5-40 mL  5-40 mL IntraVENous Q8H    sodium chloride (NS) flush 5-40 mL  5-40 mL IntraVENous PRN    0.9% sodium chloride infusion  50 mL/hr IntraVENous CONTINUOUS    acetaminophen (TYLENOL) tablet 650 mg  650 mg Oral Q4H PRN    cefepime (MAXIPIME) 1 g in 0.9% sodium chloride (MBP/ADV) 50 mL MBP  1 g IntraVENous Q12H    apixaban (ELIQUIS) tablet 5 mg  5 mg Oral BID    aspirin chewable tablet 81 mg  81 mg Oral DAILY famotidine (PEPCID) tablet 10 mg  10 mg Oral BID    oxyCODONE IR (ROXICODONE) tablet 5 mg  5 mg Oral Q4H PRN    polyethylene glycol (MIRALAX) packet 17 g  17 g Oral DAILY    simethicone (MYLICON) tablet 80 mg  80 mg Oral Q6H PRN    Vancomycin - pharmacy to dose   Other Rx Dosing/Monitoring    [Held by provider] dilTIAZem ER (CARDIZEM CD) capsule 120 mg  120 mg Oral DAILY     ______________________________________________________________________  EXPECTED LENGTH OF STAY: - - -  ACTUAL LENGTH OF STAY:          1                 Darrell Wooten MD

## 2022-07-21 NOTE — PROGRESS NOTES
Problem: Self Care Deficits Care Plan (Adult)  Goal: *Acute Goals and Plan of Care (Insert Text)  Description:   FUNCTIONAL STATUS PRIOR TO ADMISSION: Patient was active and independent without DME prior to fall ~July 2, 2022. Was admitted to [facility] for metatarsal fx and then to [facility] for rehab.  and patient reports not receiving much therapy there d/t decline in medical condition. HOME SUPPORT: Lives with     Occupational Therapy Goals  Initiated 7/21/2022  1. Patient will perform grooming with minimal assistance/contact guard assist within 7 day(s). 2.  Patient will perform upper body dressing with minimal assistance/contact guard assist within 7 day(s). 3.  Patient will perform lower body dressing with moderate assistance  within 7 day(s). 4.  Patient will perform all aspects of toileting with moderate assistance  within 7 day(s). 5.  Patient will utilize energy conservation techniques during functional activities with verbal cues within 7 day(s). Outcome: Not Met   OCCUPATIONAL THERAPY EVALUATION  Patient: Linda Daniel (16 y.o. female)  Date: 7/21/2022  Primary Diagnosis: Sepsis ([facility] [address] [A41.9]      Precautions:   Fall, DNR (baseline 4L/min)    ASSESSMENT  Based on the objective data described below, the patient presents with impaired balance, strength, and ability to complete ADL at baseline. Patietn received reclined in bed, amenable to session, A&Ox4. Patient with decline in function over the last month from hospitalizations, falls, and recent rehab stay. RR in 30s throughout session while on 6L o2 via NC, HR ~100s. Patient only able to tolerate rolling R<>L for pericare d/t pain. Repositioned for comfort and leftwith all needs in reach. Patient with potential for goals of care discussion with palliative care consult, at this time SNF likely more appropriate discharge recommendation.       Current Level of Function Impacting Discharge (ADLs/self-care): up to total A ADL    Functional Outcome Measure: The patient scored Total: 15/100 on the Barthel Index outcome measure which is indicative of 85% impaired ability to care for basic self needs/dependency on others; inferred 90% dependency on others for instrumental ADLs. Other factors to consider for discharge: below baseline, on 6L o2     Patient will benefit from skilled therapy intervention to address the above noted impairments. PLAN :  Recommendations and Planned Interventions: self care training, functional mobility training, therapeutic exercise, balance training, therapeutic activities, endurance activities, patient education, home safety training, and family training/education    Frequency/Duration: Patient will be followed by occupational therapy 3 times a week to address goals. Recommendation for discharge: (in order for the patient to meet his/her long term goals)  Return to [facility] vs SNF pending further progress    This discharge recommendation:  Has not yet been discussed the attending provider and/or case management    IF patient discharges home will need the following DME: TBD pending progress       SUBJECTIVE:   Patient stated I couldn't do much at rehab yet.     OBJECTIVE DATA SUMMARY:   HISTORY:   History reviewed. No pertinent past medical history. No past surgical history on file.     Expanded or extensive additional review of patient history:     Home Situation  Home Environment: Private residence  # Steps to Enter: 3  One/Two Story Residence: Split level (1 step from bedrooms and living spaces)  Living Alone: No  Support Systems: Spouse/Significant Other  Patient Expects to be Discharged to[de-identified] Rehab hospital/unit acute  Current DME Used/Available at Home: None  Tub or Shower Type: Shower    Hand dominance: Right    EXAMINATION OF PERFORMANCE DEFICITS:  Cognitive/Behavioral Status:  Neurologic State: Alert  Orientation Level: Appropriate for age  Cognition: Follows commands  Perception: Appears intact  Perseveration: No perseveration noted  Safety/Judgement: Awareness of environment    Skin: bruising throughout from prior falls    Edema: RUE, LLE ankle    Hearing:       Vision/Perceptual:                           Acuity: Impaired far vision; Impaired near vision    Corrective Lenses: Glasses    Range of Motion:  BUE  AROM: Grossly decreased, non-functional                         Strength:  BUE  Strength: Grossly decreased, non-functional                Coordination:  Coordination: Grossly decreased, non-functional  Fine Motor Skills-Upper: Left Impaired;Right Intact    Gross Motor Skills-Upper: Left Intact; Right Intact    Tone & Sensation:  BUE                            Balance:       Functional Mobility and Transfers for ADLs:  Bed Mobility:  Rolling: Maximum assistance;Assist x2  Supine to Sit: Total assistance  Scooting: Total assistance    Transfers:       ADL Assessment:  Feeding: Moderate assistance    Oral Facial Hygiene/Grooming: Moderate assistance    Bathing: Maximum assistance    Upper Body Dressing: Maximum assistance    Lower Body Dressing: Total assistance    Toileting: Maximum assistance                ADL Intervention and task modifications:                           Lower Body Dressing Assistance  Socks: Total assistance (dependent)  Position Performed: Supine    Toileting  Toileting Assistance: Maximum assistance  Bladder Hygiene: Maximum assistance  Cues: Verbal cues provided    Cognitive Retraining  Safety/Judgement: Awareness of environment    Functional Measure:  Barthel Index:    Bathin  Bladder: 5  Bowels: 5  Groomin  Dressin  Feedin  Mobility: 0  Stairs: 0  Toilet Use: 0  Transfer (Bed to Chair and Back): 0  Total: 15/100        The Barthel ADL Index: Guidelines  1. The index should be used as a record of what a patient does, not as a record of what a patient could do.   2. The main aim is to establish degree of independence from any help, physical or verbal, however minor and for whatever reason. 3. The need for supervision renders the patient not independent. 4. A patient's performance should be established using the best available evidence. Asking the patient, friends/relatives and nurses are the usual sources, but direct observation and common sense are also important. However direct testing is not needed. 5. Usually the patient's performance over the preceding 24-48 hours is important, but occasionally longer periods will be relevant. 6. Middle categories imply that the patient supplies over 50 per cent of the effort. 7. Use of aids to be independent is allowed. Fermin Umaña., Barthel, DLuis FernandoW. (9975). Functional evaluation: the Barthel Index. [facility] [address] [facility] (14)2. Jesus Shipman minnie XOCHILT Lund, Funmi Kamara., Lisandra Farah., [place], [address] [place] [address] (1999). Measuring the change indisability after inpatient rehabilitation; comparison of the responsiveness of the Barthel Index and Functional [place] Measure. Journal of Neurology, Neurosurgery, and Psychiatry, 66(4), 382-427. Annie Thompson, N.J.A, RYLEE Kenney, & Jose Leonard, M.A. (2004.) Assessment of post-stroke quality of life in cost-effectiveness studies: The usefulness of the Barthel Index and the EuroQoL-5D.  Quality of Life Research, 15, 145-93         Occupational Therapy Evaluation Charge Determination   History Examination Decision-Making   MEDIUM Complexity : Expanded review of history including physical, cognitive and psychosocial  history  MEDIUM Complexity : 3-5 performance deficits relating to physical, cognitive , or psychosocial skils that result in activity limitations and / or participation restrictions LOW Complexity : No comorbidities that affect functional and no verbal or physical assistance needed to complete eval tasks       Based on the above components, the patient evaluation is determined to be of the following complexity level: LOW   Pain Rating:  Generalized pain throughout, RN aware    Activity Tolerance:   Poor and requires rest breaks    After treatment patient left in no apparent distress:    Supine in bed, Heels elevated for pressure relief, Patient positioned in R sidelying for pressure relief, Call bell within reach, and Side rails x 3    COMMUNICATION/EDUCATION:   The patients plan of care was discussed with: Physical therapist and Registered nurse. Home safety education was provided and the patient/caregiver indicated understanding., Patient/family have participated as able in goal setting and plan of care. , and Patient/family agree to work toward stated goals and plan of care. This patients plan of care is appropriate for delegation to [place] [facility].     Thank you for this referral.  Rima Spicer OT  Time Calculation: 24 mins

## 2022-07-21 NOTE — PROGRESS NOTES
Problem: Dysphagia (Adult)  Goal: *Acute Goals and Plan of Care (Insert Text)  Description: Speech pathology goals initiated 7/21/2022   1. Patient will tolerate ice chips and bites of applesauce with meds crushed free of s/s aspiration  2. Patient will participate in instrumental testing pending goals of care  Outcome: Not Met   [address] EVALUATION  Patient: Jaimie Lancaster (15 y.o. female)  Date: 7/21/2022  Primary Diagnosis: Sepsis ([facility] [address] [A41.9]       Precautions: aspiration       ASSESSMENT :  Patient with COPD, currently on 3.5L NC with 02 sats in the low 90s. She denies being on 02 pta. Patient with fluctuating RR ranging from mid 20s- low 40s at rest; however, patient denies feeling short of breath and feels it is related to her frustration. WBC count 38 yesterday and 22.6 today and patient in with PNA. She denies dysphagia pta. SLP consult prompted by coughing with straw sips of thin in the ED. Based on the objective data described below, the patient presents with suspected at least mod-severe pharyngeal dysphagia likely directly related to respiratory status. Patient actively accepted ice chips and puree via spoon, cup sips with hand over hand assist and straw. Patient with suspected premature spillage of liquids. Mildly delayed bolus prep and posterior propulsion. Patient with overt cough and drop in 02 sats down to 88% with single straw sips. Inconsistent cough noted with small, single cup sips of thin and tsp amount of thin. No overt s/s aspiration following scant ice chips or puree. Discussed concern for aspiration based on bedside assessment. Patient understanding of this and is not interested in any further testing. She is frustrated and \"done\". Do not recommend thickening liquids unless there is objective data to support the need as they do not always reduce aspiration.     Patient will benefit from skilled intervention to address the above impairments. Patients rehabilitation potential is considered to be Guarded     PLAN :  Recommendations and Planned Interventions:  -- npo with scant ice chips, bites of applesauce and meds crushed when fully alert, upright and RR <30 vs comfort feedings pending GOC.  -- If opting for comfort feeds while in the hospital, suspect a full liquid diet would be most comfortable, at least initially given respiratory status   -- patient indicating she does not want any more tests. She is not interested in instrumental testing. Discussed concern for aspiration and patient refusal for testing with MD  -- Consider Palliative Care consult  -- slp to follow along to re-assess and assist as appropriate      Frequency/Duration: Patient will be followed by speech-language pathology 3 times a week to address goals. Discharge Recommendations: To Be Determined     SUBJECTIVE:   Patient stated I am so frustrated and done. OBJECTIVE:   History reviewed. No pertinent past medical history. No past surgical history on file. Prior Level of Function/Home Situation: =  Home Situation  Support Systems: Spouse/Significant Other  Patient Expects to be Discharged to[de-identified] Rehab hospital/unit acute  Diet prior to admission: reg/thin  Current Diet:  npo   Cognitive and Communication Status:  Neurologic State: Alert  Orientation Level: Appropriate for age  Cognition: Follows commands  Perception: Appears intact        Oral Assessment:  Oral Assessment  Labial: No impairment  Dentition: Natural  Oral Hygiene: dry  Lingual: No impairment  Velum: Unable to visualize  Mandible: No impairment  P.O. Trials:  Patient Position: up in bed  Vocal quality prior to P.O.: Fatigue  Consistency Presented: Thin liquid;Puree; Ice chips  How Presented: Self-fed/presented;SLP-fed/presented;Cup/sip;Straw;Spoon     Bolus Acceptance: No impairment  Bolus Formation/Control: Impaired  Type of Impairment: Delayed  Propulsion: Delayed (# of seconds)  Oral Residue: None  Initiation of Swallow:  (suspect delay)     Aspiration Signs/Symptoms: Strong cough; Increase in RR;Decrease in O2 saturations (after tsp, cup, straw of thins, 02 drop after straw)     Effective Modifications: None             Pharyngeal Phase Severity : Moderate-severe (suspected at least)    NOMS:   The NOMS functional outcome measure was used to quantify this patient's level of swallowing impairment. Based on the NOMS, the patient was determined to be at level 2 for swallow function       NOMS Swallowing Levels:  Level 1 (CN): NPO  Level 2 (CM): NPO but takes consistency in therapy  Level 3 (CL): Takes less than 50% of nutrition p.o. and continues with nonoral feedings; and/or safe with mod cues; and/or max diet restriction  Level 4 (CK): Safe swallow but needs mod cues; and/or mod diet restriction; and/or still requires some nonoral feeding/supplements  Level 5 (CJ): Safe swallow with min diet restriction; and/or needs min cues  Level 6 (CI): Independent with p.o.; rare cues; usually self cues; may need to avoid some foods or needs extra time  Level 7 ([address] [place] [address]): Independent for all p.o.  RUBA. (2003). National Outcomes Measurement System (NOMS): Adult Speech-Language Pathology User's Guide. Pain:  Pain Scale 1: Numeric (0 - 10)  Pain Intensity 1: 0       After treatment:   Patient left in no apparent distress in bed, Call bell within reach, and Nursing notified    COMMUNICATION/EDUCATION:     The patient's plan of care including recommendations, planned interventions, and recommended diet changes were discussed with: Registered nurse. Patient/family agree to work toward stated goals and plan of care.     Thank you for this referral.  Rafiq Vega M.S. CCC-SLP  Time Calculation: 23 mins

## 2022-07-21 NOTE — PROGRESS NOTES
Provided pastoral care visit to [facility] 5 patient. Did not include sacramental care    Fr. Сергей Toro

## 2022-07-21 NOTE — PROGRESS NOTES
1740: patient fluctuating between V-tach and a-flutter. Sustaining in v-tach between 2 beats and 30-40 seconds. Rapid response called. Patient awake and responsive during rapid. Doctor Gabriel Kim paged. 1888: Dr. Tunde Piña came to bedside. Advised starting amio drip, CXR, increasing dilaudid dosage, pelvic XR, and bladder scan. 2948: 622 ML shown on bladder scan.

## 2022-07-21 NOTE — PROGRESS NOTES
Rapid Response called overhead. RRT responding. RRT arrives to pt room to find pt with primary nurses and additional nurses. Pt presented with V-Tach that was shown on the monitor and EKG. Pt was A&Ox4 and blood pressure stable. Hospitalist at bedside. Amio bolus was given and amio drip was started after. Pt had a brief episode of 20 seconds conversion to sinus rhythm but returned back to V-Tach. Pt bp was soft, albumin administered. Pt remained A&Ox4 and stable condition in V-Tach on the amio drip. RRT will continue to follow.

## 2022-07-21 NOTE — PROGRESS NOTES
[facility]  Cardiology Care Note                  []Initial visit     [x]Established visit     Patient Name: Zulma Olivo - PYB:[id number]  Primary Cardiologist: none  Consulting Cardiologist: Richard Urbina MD     Reason for consult: elevated troponin    Patient seen and examined by me with the above nurse practitioner. I personally performed all components of the history, physical, and medical decision making and agree with the assessment and plan with minor modifications as noted. Today the patient appears more comfortable and less short of breath. Creatinine improved slightly even with Lasix given yesterday. General PE  Gen:  NAD lying flat, appears comfortable  Mental Status - Alert. General Appearance - Not in acute distress. HEENT:  PERRL, no carotid bruits or JVD  Chest and Lung Exam   Inspection: Accessory muscles - No use of accessory muscles in breathing. Auscultation:   Breath sounds: - Normal.   Cardiovascular   Inspection: Jugular vein - Bilateral - Inspection Normal.   Palpation/Percussion:   Apical Impulse: - Normal.   Auscultation: Rhythm - Regular. Heart Sounds - S1 WNL and S2 WNL. No S3 or S4. Murmurs & Other Heart Sounds: Auscultation of the heart reveals - No Murmurs. Peripheral Vascular   Upper Extremity: Inspection - Bilateral - No Cyanotic nailbeds or Digital clubbing. Lower Extremity:   Palpation: Edema - Bilateral - No edema. Abdomen:   Soft, non-tender, bowel sounds are active. Neuro: A&O times 3, CN and motor grossly WNL    Sepsis appears to be improving. White blood cell count down significantly. Breathing more comfortably, lying flat. Will not give any more Lasix today with borderline low blood pressure. Reassess tomorrow. No significant structural heart disease on echo.     20 minutes spent reviewing records and labs, seeing and examining patient, and performing all components of the history, physical, and medical decision making. HPI:   Patient is a 80year old with a past med hx of HTN, COPD, Atrial flutter some time ago on Diltiazem and ASA. She was recently at [facility] after GLF sustaining metatarsal fx. Diagnosed with PNA and PE while inpt. She was started on ABX and Eliquis. D/C to [facility] for rehab on 7/17. She apparently developed worsening SOB and was sent to hospital today via EMS. She was found to have a WBC 38.0, COVID/Flu neg, Creatinine 1.42, K 4.6. Troponin 348, ProBNP [id number]. EKG interpretation was STEMI, but on review was not an actual STEMI. She denies hx of CHF or CAD. She denies recent recurrence of aflutter. SUBJECTIVE/OBJECTIVE:    She denies worsening SOB, CP, edema, orthopnea, PND or palpitations. Assessment and Plan     Troponin elevation: presenting with worsening SOB. Recent PNA/ PE which may be contributing to elevation. Continues to deny CP, no prior hx of CAD. Troponin flat. Cont ASA. 2. Elevated ProBNP: may be related to recent PNA, CXR showing interstitial edema or atypical PNA. Echo showed EF 55-60%, no valvular disease of significance. She has received Lasix 40mg IV x1. Cont management of hypoxic respiratory failure/?PNA.  3. HTN: trending lower. Midodrine added 10mg TID. Diltiazem 120mg has been cont for her ST. Could consider changing to Lopressor which will not be as impactful on her BP.  4. Hx Atrial flutter distant past: on Diltiazem and ASA at home, now on Eliquis. Overnight given Amiodarone bolus and started on Amio gtt. No strips/EKG avail to indicate reason for initiation. All strips/EKG avail indicated ST. Stop Amiodarone. See recommendations above about possible HR management. Cont Eliquis. 5. Recent PE: cont Eliquis. Further plan per Dr Roopa Avendano       I have spent a total of 15 min on this encounter; reviewing results/tele/notes and time spent with patient. ____________________________________________________________    Cardiac testing      Most recent HS troponins:  Recent Labs     07/20/22  1918 07/20/22  1322 07/20/22  0923   TROPHS 464* 439* 348*     Review of Systems    [x]All other systems reviewed and all negative except as written in HPI    [] Patient unable to provide secondary to condition         History reviewed. No pertinent past medical history. No past surgical history on file. Social Hx:  reports that she has been smoking. She has never used smokeless tobacco.  Family Hx: family history is not on file. Allergies   Allergen Reactions    Amoxicillin Unknown (comments)    Penicillins Unknown (comments)    Sulfa (Sulfonamide Antibiotics) Unknown (comments)          OBJECTIVE:  Wt Readings from Last 3 Encounters:   07/21/22 71.2 kg (156 lb 14.4 oz)       Intake/Output Summary (Last 24 hours) at 7/21/2022 0910  Last data filed at 7/21/2022 0531  Gross per 24 hour   Intake 1903.33 ml   Output 450 ml   Net 1453.33 ml         Physical Exam    Vitals:   Vitals:    07/21/22 0531 07/21/22 0604 07/21/22 0736 07/21/22 0804   BP: (!) 112/57  112/60    Pulse: (!) 113 (!) 101 (!) 105    Resp: 20  (!) 42    Temp: 99.2 °F (37.3 °C)  98.6 °F (37 °C)    SpO2: 98%  94% 94%   Weight: 71.2 kg (156 lb 14.4 oz)      Height:         Telemetry:  sinus tachycardia      General:    Alert, cooperative, no acute distress, appears stated age. Neck:   Supple, no carotid bruit and no JVD. Back:     Symmetric   Lungs:     Faint course rhonchi to auscultation bilaterally. O2 via NC at 4L   Heart[de-identified]     Tachycardic rate and rhythm, S1, S2 normal, no murmur, click, rub or gallop. Abdomen:     Soft, non-tender. Bowel sounds normal.    Extremities:   Extremities normal, atraumatic, no cyanosis or edema. Vascular:   Pulses - riddhi UE/LE equal   Skin:   Skin color normal. Bruising around eyes/nasal area.  No rashes or lesions on visible areas   Neurologic:   Alert, Moves all extremities. Data Review:     Radiology:   XR Results (most recent):  Results from Hospital Encounter encounter on 07/20/22    XR CHEST PORT    Narrative  PORTABLE CHEST RADIOGRAPH/S: 7/20/2022 11:12 AM    INDICATION: Pneumonia. COMPARISON: 9/12/2006. TECHNIQUE: Portable frontal upright radiograph/s of the chest.    FINDINGS:  Patchy interstitial opacity may represent pulmonary edema or atypical pneumonia. No airspace consolidation with bronchograms to clearly suggest bacterial  pneumonia. There is trace left pleural fluid or costophrenic angle blunting. The  central airways are patent. No pneumothorax. Impression  Interstitial edema or atypical pneumonia. CT Results (most recent):  Results from Hospital Encounter encounter on 06/19/09    CT ABDOMEN W/ CONTRAST    Narrative  *Final Report*      ICD Codes / Adm. Diagnosis:    /   RT LOWER QUAD PAIN  Examination:  Georgia Blancas  - [id number] - Jun 19 2009  9:12AM    Accession No:  [id number]  Reason:  RLQ ABD PAIN    REPORT:  INDICATION: Right lower quadrant pain. COMPARISON: None. CONTRAST: 92 mL of Optiray- 350. TECHNIQUE:  Multislice helical CT was performed from the diaphragm to the symphysis  pubis during uneventful rapid bolus intravenous contrast administration. Oral contrast was also administered. Delayed images of the abdomen were  acquired. Contiguous 5 mm axial images were reconstructed and lung and soft  tissue windows were generated. Coronal reformations were generated. CT dose  reduction was achieved through use of a standardized protocol tailored for  this examination and automatic exposure control for dose modulation. A  bismuth breast shield was used for this examination. FINDINGS:  There is a calcified granuloma near the right hemidiaphragm. The visualized  portions of the lung bases are clear.     There is a small 8 to 9 mm left renal cyst. There are no focal abnormalities  within the liver, spleen, pancreas, adrenal glands or kidneys. The aorta is atherosclerotic and tapers without aneurysm. There is no  retroperitoneal adenopathy or mass. The bowel is normal.  The appendix is normal. There is no ascites or free  intraperitoneal air. The uterus is small and the bladder is normal.   There is no pelvic mass or  adenopathy. The delayed images demonstrate good bilateral excretion of contrast into the  renal collecting systems. IMPRESSION:  1. Calcified right lower lobe granuloma. 2. Small left renal cyst.  3. Atherosclerotic aorta without aneurysm. 4. No acute abdominal or pelvic process identified. Interpreting/Reading Doctor: Arin Hicks ([id number])  Transcribed: n/a on 06/19/2009  Approved: Arin Hicks ([id number])  06/19/2009        Distribution:  Attending Doctor: Shelby Desai Doctor: Cayetano Winters    MRI Results (most recent):  No results found for this or any previous visit. No results for input(s): CPK, TROIQ in the last 72 hours. No lab exists for component: CKQMB, CPKMB, BMPP  Recent Labs     07/21/22 0319 07/20/22  0923    135*   K 4.3 4.6    99   CO2 20* 29   BUN 37* 28*   CREA 1.30* 1.42*   GLU 84 85   CA 9.0 10.1     Recent Labs     07/21/22  0319 07/20/22  0923   WBC 22.6* 38.0*   HGB 10.2* 11.5   HCT 31.4* 36.1    391     Recent Labs     07/21/22  0319 07/20/22  0923   AP 95 98     No results for input(s): CHOL, LDLC in the last 72 hours. No lab exists for component: TGL, HDLC,  HBA1C  No results for input(s): CRP, TSH, TSHEXT, TSHEXT in the last 72 hours.     No lab exists for component: ESR        Current meds:    Current Facility-Administered Medications:     amiodarone (CORDARONE) 50 mg/mL injection, , , ,     midodrine (PROAMATINE) tablet 10 mg, 10 mg, Oral, TID WITH MEALS, Malu, Benson, DO    albumin human 25% (BUMINATE) solution 25 g, 25 g, IntraVENous, Q6H, Malu, Benson, DO, 25 g at 07/21/22 0409    sodium chloride (NS) flush 5-10 mL, 5-10 mL, IntraVENous, PRN, Micha Gomez MD    sodium chloride (NS) flush 5-40 mL, 5-40 mL, IntraVENous, Q8H, Mony Marquez MD, 10 mL at 07/21/22 0553    sodium chloride (NS) flush 5-40 mL, 5-40 mL, IntraVENous, PRN, Petey Pro MD    0.9% sodium chloride infusion, 75 mL/hr, IntraVENous, CONTINUOUS, Mony Marquez MD, Last Rate: 75 mL/hr at 07/20/22 1705, 75 mL/hr at 07/20/22 1705    acetaminophen (TYLENOL) tablet 650 mg, 650 mg, Oral, Q4H PRN, Petey Pro MD    cefepime (MAXIPIME) 1 g in 0.9% sodium chloride (MBP/ADV) 50 mL MBP, 1 g, IntraVENous, Q12H, Petey Pro MD, IV Completed at 07/21/22 0130    albuterol-ipratropium (DUO-NEB) 2.5 MG-0.5 MG/3 ML, 3 mL, Nebulization, Q4H RT, Mony Marquez MD, 3 mL at 07/21/22 0804    apixaban (ELIQUIS) tablet 5 mg, 5 mg, Oral, BID, Mony Marquez MD    aspirin chewable tablet 81 mg, 81 mg, Oral, DAILY, Mony Marquez MD    famotidine (PEPCID) tablet 10 mg, 10 mg, Oral, BID, Mony Marquez MD    oxyCODONE IR (ROXICODONE) tablet 5 mg, 5 mg, Oral, Q4H PRN, Teofilo AndreeMony MD    polyethylene glycol (MIRALAX) packet 17 g, 17 g, Oral, DAILY, Mony Marquez MD    simethicone (MYLICON) tablet 80 mg, 80 mg, Oral, Q6H PRN, Petey Pro MD    Vancomycin - pharmacy to dose, , Other, Rx Dosing/Monitoring, Petey Pro MD    dilTIAZem ER (CARDIZEM CD) capsule 120 mg, 120 mg, Oral, DAILY, MD Nora Medrano NP  [facility] [address] [place] [address] [address]  [facility], [facility]  [phone]      CC: Andre Hernández MD

## 2022-07-21 NOTE — PROGRESS NOTES
Name: Poonam Pugh   MRN: [id number]  : 1932          Assessment:  PAULO- likely pre-renal in the setting of SIRS/Low nl BP/Aflutter; UA is bland. Suspicion for obstruction is low     SIRS  Pulm edema  A on C Resp failure/COPD on home O2  Recent PE/PNA- Covid/flu negative  Aflutter    Cr down to 1.3. mild acidosis. Plan/Recommendations:  Reduce IVF rate to 50 ml/hr and d/c by tomm  O2  IV lasix prn for worsening hypoxia; got 1 dose in ED on   Nebs  Rate control as able  [facility] for Aflutter  Avoid nephrotoxins  Blood Cx  Empiric ABx    Subjective:  Resting.  No events overnight    ROS:   No nausea, no vomiting  No chest pain, +chronic  shortness of breath    Exam:  Visit Vitals  /60 (BP 1 Location: Right upper arm, BP Patient Position: At rest)   Pulse 93   Temp 98.6 °F (37 °C)   Resp (!) 42   Ht 5' 1\" (1.549 m)   Wt 71.2 kg (156 lb 14.4 oz)   SpO2 94%   BMI 29.65 kg/m²     NAD, frail  B/l Rhonchi+  Irregular, no tachy  Tr edema  Alert awake    Current Facility-Administered Medications   Medication Dose Route Frequency Last Admin    amiodarone (CORDARONE) 50 mg/mL injection          midodrine (PROAMATINE) tablet 10 mg  10 mg Oral TID WITH MEALS      albumin human 25% (BUMINATE) solution 25 g  25 g IntraVENous Q6H 25 g at 22 0409    albuterol-ipratropium (DUO-NEB) 2.5 MG-0.5 MG/3 ML  3 mL Nebulization BID RT      vancomycin (VANCOCIN) 750 mg in 0.9% sodium chloride 250 mL ([id number])  750 mg IntraVENous Q24H      sodium chloride (NS) flush 5-10 mL  5-10 mL IntraVENous PRN      sodium chloride (NS) flush 5-40 mL  5-40 mL IntraVENous Q8H 10 mL at 22 0553    sodium chloride (NS) flush 5-40 mL  5-40 mL IntraVENous PRN      0.9% sodium chloride infusion  50 mL/hr IntraVENous CONTINUOUS 75 mL/hr at 22 0941    acetaminophen (TYLENOL) tablet 650 mg  650 mg Oral Q4H PRN      cefepime (MAXIPIME) 1 g in 0.9% sodium chloride (MBP/ADV) 50 mL MBP  1 g IntraVENous Q12H IV Completed at 07/21/22 0130    apixaban (ELIQUIS) tablet 5 mg  5 mg Oral BID      aspirin chewable tablet 81 mg  81 mg Oral DAILY      famotidine (PEPCID) tablet 10 mg  10 mg Oral BID      oxyCODONE IR (ROXICODONE) tablet 5 mg  5 mg Oral Q4H PRN      polyethylene glycol (MIRALAX) packet 17 g  17 g Oral DAILY      simethicone (MYLICON) tablet 80 mg  80 mg Oral Q6H PRN      Vancomycin - pharmacy to dose   Other Rx Dosing/Monitoring      dilTIAZem ER (CARDIZEM CD) capsule 120 mg  120 mg Oral DAILY         Labs/Data:    Lab Results   Component Value Date/Time    WBC 22.6 (H) 07/21/2022 03:19 AM    HGB 10.2 (L) 07/21/2022 03:19 AM    HCT 31.4 (L) 07/21/2022 03:19 AM    PLATELET 237 91/08/3455 03:19 AM    MCV 93.7 07/21/2022 03:19 AM       Lab Results   Component Value Date/Time    Sodium 138 07/21/2022 03:19 AM    Potassium 4.3 07/21/2022 03:19 AM    Chloride 104 07/21/2022 03:19 AM    CO2 20 (L) 07/21/2022 03:19 AM    Anion gap 14 07/21/2022 03:19 AM    Glucose 84 07/21/2022 03:19 AM    BUN 37 (H) 07/21/2022 03:19 AM    Creatinine 1.30 (H) 07/21/2022 03:19 AM    BUN/Creatinine ratio 28 (H) 07/21/2022 03:19 AM    GFR est AA 47 (L) 07/21/2022 03:19 AM    GFR est non-AA 38 (L) 07/21/2022 03:19 AM    Calcium 9.0 07/21/2022 03:19 AM       Wt Readings from Last 3 Encounters:   07/21/22 71.2 kg (156 lb 14.4 oz)         Intake/Output Summary (Last 24 hours) at 7/21/2022 1048  Last data filed at 7/21/2022 0531  Gross per 24 hour   Intake 1903.33 ml   Output 450 ml   Net 1453.33 ml       Patient seen and examined. Chart reviewed. Labs, data and other pertinent notes reviewed in last 24 hrs.     PMH/SH/FH reviewed and unchanged compared to H&P    Discussed with pt/Dr Neto Bay MD

## 2022-07-21 NOTE — PROGRESS NOTES
Problem: Mobility Impaired (Adult and Pediatric)  Goal: *Acute Goals and Plan of Care (Insert Text)  Description: FUNCTIONAL STATUS PRIOR TO ADMISSION: Patient was independent without DME prior to July 3rd when she suffered a fall (sprained ankle). She was d/c'ed home with a CAM boot and had difficulty mobilizing within her home ( stated they were even calling the rescue squad to assist her to the bathroom) and then eventually suffered a fall again and led to admission at [facility] (PEs, PNA). DOES NOT wear O2 at home at baseline but was on O2 following her hospitalization due to PNA and PEs. Stated she had not participated with therapy much at [facility] (where she was d/c'ed after [facility] admission). HOME SUPPORT PRIOR TO ADMISSION: The patient lived with supportive  who is active and independent. Stated he rarely assisted her with care or mobility at baseline. Physical Therapy Goals  Initiated 7/21/2022  1. Patient will move from supine to sit and sit to supine , scoot up and down, and roll side to side in bed with moderate assistance within 7 day(s). 2.  Patient will tolerate bed in chair position 30 min BID within 7 days. 3.  Patient will sit EOB x5 min with max assist within 7 days. 4.  Patient will participate in supine HEP with AAROM (due to pain) within 7 days. Outcome: Progressing Towards Goal   PHYSICAL THERAPY EVALUATION  Patient: Xenia Jaeger (53 y.o. female)  Date: 7/21/2022  [facility] [A41.9]  Precautions:   Fall, DNR (baseline 4L/min)      ASSESSMENT  Based on the objective data described below, the patient presents with decreased endurance, increased WOB (RR up to 44 with very minimal activity), global grossly decreased AROM and strength, increased pain (B knees and R hand), facial bruising, and overall drastic decline from baseline over the last month following multiple falls, hospital admission/readmission and rehab stay.  Patient received supine in bed, RR in 30's and patient on 6L, SpO2 93% and  present. Overall total A-max A for rolling and repositioning in bed only. Unable to progress further due to pain and respiratory distress (RR in 40's). Patient A&O x4 but difficulty holding conversation due to fatigue, pain, and RR. Assisted to swab her mouth with wet sponge and apply vasaline to lips. Note palliative care consult placed and agree this is appropriate given her current medical complications, pain, and drastic decline over the last month. Current Level of Function Impacting Discharge (mobility/balance): max-total A    Functional Outcome Measure: The patient scored Total Score: 0/28 on the Tinetti outcome measure which is indicative of high fall risk. Other factors to consider for discharge: on 6l/min, from [facility]     Patient will benefit from skilled therapy intervention to address the above noted impairments. PLAN :  Recommendations and Planned Interventions: bed mobility training, transfer training, gait training, therapeutic exercises, neuromuscular re-education, edema management/control, patient and family training/education, and therapeutic activities      Frequency/Duration: Patient will be followed by physical therapy:  3 times a week to address goals. Recommendation for discharge: (in order for the patient to meet his/her long term goals)  To be determined: likely more appropriate for SNF-- will continue to assess and following along with GOC from palliative     This discharge recommendation:  Has not yet been discussed the attending provider and/or case management    IF patient discharges home will need the following DME: to be determined (TBD)         SUBJECTIVE:   Patient stated I hadn't been able to do much.  re: rehab    OBJECTIVE DATA SUMMARY:   HISTORY:    History reviewed. No pertinent past medical history. No past surgical history on file.     Personal factors and/or comorbidities impacting plan of care: [address] [place] [address] Dr TSE Situation  Home Environment: Private residence  # Steps to Enter: 3  One/Two Story Residence: Split level (1 step from bedrooms and living spaces)  Living Alone: No  Support Systems: Spouse/Significant Other  Patient Expects to be Discharged to[de-identified] Rehab hospital/unit acute  Current DME Used/Available at Home: None  Tub or Shower Type: Shower    EXAMINATION/PRESENTATION/DECISION MAKING:   Critical Behavior:  Neurologic State: Alert  Orientation Level: Appropriate for age  Cognition: Follows commands  Skin:  scattered bruising throughout including B knees, face, R hand thenar eminence   Range Of Motion:  AROM: Grossly decreased, non-functional  Strength:    Strength: Grossly decreased, non-functional   Coordination:  Coordination: Grossly decreased, non-functional  Functional Mobility:  Bed Mobility:  Rolling: Maximum assistance;Assist x2  Supine to Sit: Total assistance  Scooting: Total assistance  Functional Measure:  Tinetti test:    Sitting Balance: 0  Arises: 0  Attempts to Rise: 0  Immediate Standing Balance: 0  Standing Balance: 0  Nudged: 0  Eyes Closed: 0  Turn 360 Degrees - Continuous/Discontinuous: 0  Turn 360 Degrees - Steady/Unsteady: 0  Sitting Down: 0  Balance Score: 0 Balance total score  Indication of Gait: 0  R Step Length/Height: 0  L Step Length/Height: 0  R Foot Clearance: 0  L Foot Clearance: 0  Step Symmetry: 0  Step Continuity: 0  Path: 0  Trunk: 0  Walking Time: 0  Gait Score: 0 Gait total score  Total Score: 0/28 Overall total score         Tinetti Tool Score Risk of Falls  <19 = High Fall Risk  19-24 = Moderate Fall Risk  25-28 = Low Fall Risk  Tinetti ME. Performance-Oriented Assessment of Mobility Problems in Elderly Patients. Adams 66; [id number].  (Scoring Description: PT Bulletin Feb. 10, 1993)    Older adults: Ernesto Olmedo et al, 2009; n = [address] elderly evaluated with ABC, REJI, ADL, and IADL)  · Mean REJI score for males aged 69-68 years = 26.21(3.40)  · Mean REJI score for females age 65-79 years = 25.16(4.30)  · Mean REJI score for males over 80 years = 23.29(6.02)  · Mean REJI score for females over 80 years = 17.20(8.32)        Physical Therapy Evaluation Charge Determination   History Examination Presentation Decision-Making   HIGH Complexity :3+ comorbidities / personal factors will impact the outcome/ POC  HIGH Complexity : 4+ Standardized tests and measures addressing body structure, function, activity limitation and / or participation in recreation  LOW Complexity : Stable, uncomplicated  Other outcome measures Tinetti 0/28  HIGH       Based on the above components, the patient evaluation is determined to be of the following complexity level: LOW     Pain Rating:  Pain with all movement/rolling in bed    Activity Tolerance:   Poor, desaturates with exertion and requires oxygen, requires frequent rest breaks, and observed SOB with activity      After treatment patient left in no apparent distress:   Heels elevated for pressure relief, Patient positioned in left sidelying for pressure relief, Call bell within reach, and Side rails x 3    COMMUNICATION/EDUCATION:   The patients plan of care was discussed with: Occupational therapist and Registered nurse. Fall prevention education was provided and the patient/caregiver indicated understanding., Patient/family have participated as able in goal setting and plan of care. , and Patient/family agree to work toward stated goals and plan of care.     Thank you for this referral.  Isaura Logan, PT, DPT   Time Calculation: 27 mins

## 2022-07-21 NOTE — ED NOTES
Patient called out and stated she wanted something to drink, gave patient a couple sips of water through a straw and she immediately started to choke, explained to patient that she failed the swallow screening and she couldn't have water until evaluated by speech therapy and she requested to talked to a supervisor or doctor.

## 2022-07-21 NOTE — ED NOTES
Patient was noticed to be in V-Tach by another nurse, EKG confirmed, amiodarone bolus and drip started, patient converted to ST but then went back into V-Tach then back into ST, albumin administered

## 2022-07-21 NOTE — PROGRESS NOTES
Speech pathology  Consult received and appreciated, however order entered per protocol, which is a nursing order. SLP requires a physician's order to complete swallowing evaluation. If formal evaluation is desired, please re-consult with MD order. Note that RN gave patient \" a couple sips of water through a straw and she immediately started to choke\" in the ER. She was made npo. In this case of choking with straw use, would assess with cup use. Thanks.      Parveen Diop M.S. CCC-SLP

## 2022-07-21 NOTE — PROGRESS NOTES
Pharmacy consult note:  Day #2 of Vancomycin  Indication:  PNA  Current regimen:  per level  Abx regimen:  cefepime, vanc  ID Following ?: NO  Concomitant nephrotoxic drugs (requires more frequent monitoring): None  Frequency of BMP?: qd    Recent Labs     22  0319 22  0923   WBC 22.6* 38.0*   CREA 1.30* 1.42*   BUN 37* 28*     Est CrCl: 26 ml/min; UO: n/a ml/kg/hr  Temp (24hrs), Av.5 °F (36.9 °C), Min:97.6 °F (36.4 °C), Max:99.2 °F (37.3 °C)    Cultures:    blood x2: NGTD - pending   MRSA screen: pend    Goal target range AUC/TOMAS 400-600    Recent level history:  Date/Time Dose & Interval Measured Level (mcg/mL) Associated AUC/TOMAS Dose Adjustment                                                   Plan: begin 750 mg IV q24h. Random level with am labs 22. Pharmacy to follow patient daily and order levels / make dose adjustments as appropriate.

## 2022-07-21 NOTE — CONSULTS
Palliative Medicine Consult  [place]: [phone] ([phone])    Patient Name: Dmitry Keith  YOB: 1932    Date of Initial Consult: 7/21/2022  Reason for Consult: Care discussions  Requesting Provider: Stefani Gaston  Primary Care Physician: Tc Dozier MD     SUMMARY:   Dmitry Keith is a 80 y.o. with a past history of HTN, COPD, PE, OA bilateral knees, and tobacco use who presented from [facility] inpatient rehab due to concern for impending sepsis, with elevated WBC 38,000 and hypotension. Ms. Diego Lizarraga was admitted 7/20/2022  with a diagnosis of sepsis, PNA and PAULO. Mrs Diego Lizarraga had been discharged to [facility] rehab after recent hospitalization for GLF, PEs and PNA. Course of hospitalization: Initial CXR with interstitial edema or atypical PNA. CT chest significant for bibasilar PNA possibly due to aspiration given debris in trachea, small right and trace left pleural effusion and moderate emphysema. Cardiology consulted elevated troponin, agree treatment sepsis PNA, minimal troponin elevation expected with severe illness. SLP consulted, suspect at least moderate-severe pharyngeal dysphagia directly R/T respiratory status, patient refusing additional testing. Creatinine down to 1.3. Supplemental O2 back to baseline of 4 L nasal cannula, down from 6. Current medical issues leading to Palliative Medicine involvement include: Advanced age, confusion, multiple recent hospitalizations. PALLIATIVE DIAGNOSES:   Palliative care encounter  Altered mental status, unspecified  Advance care planning discussion  DNR discussion  Goals of care discussion       PLAN:   Prior to visit completed chart review and discussed with patient's nurse  I met with Ms. Galeano, no family at bedside. She is oriented x3, but with some confusion and has limited insight regarding hospitalization,   Advance care planning discussion-no AMD in the EMR.   Patient is ,  Karla Jones, is legal nok/primary medical decision maker. DNR discussion- She has a DNR code status  Goals of care discussion- Pending discussion with patients . Called , left message requesting call back. I was told that her  had been at bedside, but left shortly before I arrived. I will follow up tomorrow. Initial consult note routed to primary continuity provider and/or primary health care team members  Communicated plan of care with: Palliative IDTJessica 192 Team     GOALS OF CARE / TREATMENT PREFERENCES:     GOALS OF CARE: Pending discussion with patients        TREATMENT PREFERENCES:   Code Status: DNR    Patient and family's personal goals include:     Important upcoming milestones or family events: The patient identifies the following as important for living well:       Advance Care Planning:  [x] The [facility] Interdisciplinary Team has updated the ACP Navigator with [address] and Patient Capacity      Primary Decision Maker: Patricia Lyle - Spouse - [phone]  No flowsheet data found. Other:    As far as possible, the palliative care team has discussed with patient / health care proxy about goals of care / treatment preferences for patient.      HISTORY:     History obtained from: medical records/nurse/patient    CHIEF COMPLAINT: I dont feel good    HPI/SUBJECTIVE:    The patient is:   [] Verbal and participatory  [] Non-participatory due to:   Patient able to tell me that she does not feel good, but unable to provide details, she denies pain, denies shortness of breath    Clinical Pain Assessment (nonverbal scale for severity on nonverbal patients):              Duration: for how long has pt been experiencing pain (e.g., 2 days, 1 month, years)  Frequency: how often pain is an issue (e.g., several times per day, once every few days, constant)     FUNCTIONAL ASSESSMENT:     Palliative Performance Scale (PPS):30          PSYCHOSOCIAL/SPIRITUAL SCREENING:     Palliative IDT has assessed this patient for cultural preferences / practices and a referral made as appropriate to needs (Cultural Services, Patient Advocacy, Ethics, etc.)    Any spiritual / Baptism concerns:  [] Yes /  [x] No   If \"Yes\" to discuss with pastoral care during IDT     Does caregiver feel burdened by caring for their loved one:   [] Yes /  [] No /  [x] No Caregiver Present/Available [] No Caregiver [] Pt Lives at Facility  If \"Yes\" to discuss with social work during IDT    Anticipatory grief assessment:   [x] Normal  / [] Maladaptive     If \"Maladaptive\" to discuss with social work during IDT    ESAS Anxiety:      ESAS Depression:          REVIEW OF SYSTEMS:     Positive and pertinent negative findings in ROS are noted above in HPI. The following systems were [] reviewed / [x] unable to be reviewed as noted in HPI  Other findings are noted below. Systems: constitutional, ears/nose/mouth/throat, respiratory, gastrointestinal, genitourinary, musculoskeletal, integumentary, neurologic, psychiatric, endocrine. Positive findings noted below. Modified ESAS Completed by: provider                                   Stool Occurrence(s): 0        PHYSICAL EXAM:     From RN flowsheet:  Wt Readings from Last 3 Encounters:   07/21/22 156 lb 14.4 oz (71.2 kg)     Blood pressure 130/65, pulse 90, temperature 97.5 °F (36.4 °C), resp. rate 28, height 5' 1\" (1.549 m), weight 156 lb 14.4 oz (71.2 kg), SpO2 94 %.     Pain Scale 1: Numeric (0 - 10)  Pain Intensity 1: 0                 Last bowel movement, if known:     Constitutional: Appears stated age, frail, weak, sick appearing  Eyes: pupils equal, anicteric  ENMT: no nasal discharge, moist mucous membranes  Cardiovascular: regular rhythm, distal pulses intact  Respiratory: breathing mildly abored, symmetric  Gastrointestinal: soft non-tender, +bowel sounds  Musculoskeletal: no deformity, no tenderness to palpation  Skin: warm, dry  Neurologic: following simple commands, moving all extremities, confusion  Psychiatric: Appropriate affect, no hallucinations  Other:       HISTORY:     Active Problems:    Sepsis ([address] (7/20/2022)      History reviewed. No pertinent past medical history. No past surgical history on file. History reviewed. No pertinent family history. History reviewed, no pertinent family history.   Social History     Tobacco Use    Smoking status: Every Day    Smokeless tobacco: Never   Substance Use Topics    Alcohol use: Not on file     Allergies   Allergen Reactions    Amoxicillin Unknown (comments)    Penicillins Unknown (comments)    Sulfa (Sulfonamide Antibiotics) Unknown (comments)      Current Facility-Administered Medications   Medication Dose Route Frequency    midodrine (PROAMATINE) tablet 10 mg  10 mg Oral TID WITH MEALS    albumin human 25% (BUMINATE) solution 25 g  25 g IntraVENous Q6H    albuterol-ipratropium (DUO-NEB) 2.5 MG-0.5 MG/3 ML  3 mL Nebulization BID RT    vancomycin (VANCOCIN) 750 mg in 0.9% sodium chloride 250 mL ([id number])  750 mg IntraVENous Q24H    metoprolol (LOPRESSOR) injection 2.5 mg  2.5 mg IntraVENous Q6H PRN    sodium chloride (NS) flush 5-10 mL  5-10 mL IntraVENous PRN    sodium chloride (NS) flush 5-40 mL  5-40 mL IntraVENous Q8H    sodium chloride (NS) flush 5-40 mL  5-40 mL IntraVENous PRN    0.9% sodium chloride infusion  50 mL/hr IntraVENous CONTINUOUS    acetaminophen (TYLENOL) tablet 650 mg  650 mg Oral Q4H PRN    cefepime (MAXIPIME) 1 g in 0.9% sodium chloride (MBP/ADV) 50 mL MBP  1 g IntraVENous Q12H    apixaban (ELIQUIS) tablet 5 mg  5 mg Oral BID    aspirin chewable tablet 81 mg  81 mg Oral DAILY    famotidine (PEPCID) tablet 10 mg  10 mg Oral BID    oxyCODONE IR (ROXICODONE) tablet 5 mg  5 mg Oral Q4H PRN    polyethylene glycol (MIRALAX) packet 17 g  17 g Oral DAILY    simethicone (MYLICON) tablet 80 mg  80 mg Oral Q6H PRN    Vancomycin - pharmacy to dose   Other Rx Dosing/Monitoring [Held by provider] dilTIAZem ER (CARDIZEM CD) capsule 120 mg  120 mg Oral DAILY          LAB AND IMAGING FINDINGS:     Lab Results   Component Value Date/Time    WBC 22.6 (H) 07/21/2022 03:19 AM    HGB 10.2 (L) 07/21/2022 03:19 AM    PLATELET 794 68/75/0222 03:19 AM     Lab Results   Component Value Date/Time    Sodium 138 07/21/2022 03:19 AM    Potassium 4.3 07/21/2022 03:19 AM    Chloride 104 07/21/2022 03:19 AM    CO2 20 (L) 07/21/2022 03:19 AM    BUN 37 (H) 07/21/2022 03:19 AM    Creatinine 1.30 (H) 07/21/2022 03:19 AM    Calcium 9.0 07/21/2022 03:19 AM      Lab Results   Component Value Date/Time    Alk. phosphatase 95 07/21/2022 03:19 AM    Protein, total 6.4 07/21/2022 03:19 AM    Albumin 1.7 (L) 07/21/2022 03:19 AM    Globulin 4.7 (H) 07/21/2022 03:19 AM     No results found for: INR, PTMR, PTP, PT1, PT2, APTT, INREXT   No results found for: IRON, FE, TIBC, IBCT, PSAT, FERR   No results found for: PH, PCO2, PO2  No components found for: GLPOC   No results found for: CPK, CKMB             Total time: 50 minutes  Counseling / coordination time, spent as noted above: 30 minutes  > 50% counseling / coordination?:  Yes    Prolonged service was provided for  []30 min   []75 min in face to face time in the presence of the patient, spent as noted above. Time Start:   Time End:   Note: this can only be billed with 50956 (initial) or 82688 (follow up). If multiple start / stop times, list each separately.

## 2022-07-21 NOTE — PROGRESS NOTES
Transition of Care: Patient was admitted from [address] [place] [address] where she was in rehab prior to admission.  prefers patient return to [facility] for continued rehab on discharge. Referral pending with [facility] via [employer]. Will need PT/OT evals for [facility] to review. BLS transport at discharge. Noted nephrology and cardiology consults. Care Management Interventions  PCP Verified by CM: Yes  Mode of Transport at Discharge: BLS  Transition of Care Consult (CM Consult): Acute Rehab, Discharge Planning  MyChart Signup: No  Discharge Durable Medical Equipment: No  Physical Therapy Consult: No  Occupational Therapy Consult: No  Speech Therapy Consult: No  Support Systems: Spouse/Significant Other  Confirm Follow Up Transport: Other (see comment) (BLS)  The Patient and/or Patient Representative was Provided with a Choice of Provider and Agrees with the Discharge Plan?: Yes  Freedom of Choice List was Provided with Basic Dialogue that Supports the Patient's Individualized Plan of Care/Goals, Treatment Preferences and Shares the Quality Data Associated with the Providers?: Yes  Discharge Location  Patient Expects to be Discharged to[de-identified] Rehab hospital/unit acute      Reason for Admission:   Admitted from [facility] where she was in rehab prior to admission. Patient presented to ED on 7/20 for shortness of breath and cough                    RUR Score:     16%             PCP: First and Last name:   Rico Wilkes MD     Name of Practice:    Are you a current patient: Yes/No: yes   Approximate date of last visit: approx. 4 weeks ago    Can you participate in a virtual visit if needed:     Do you (patient/family) have any concerns for transition/discharge? Not at this time. Plan for utilizing home health:  family requesting patient return to [facility] on discharge.     Current Advanced Directive/Advance Care Plan:  DNR      Healthcare Decision Maker:   Click here to complete [address] including selection of the Healthcare Decision Maker Relationship (ie \"Primary\")            Primary Decision Maker: Don Palma - Spouse - [phone]    Transition of Care Plan:      return to [facility], referral pending. Chart reviewed. CM spoke with patient's  Mr. Ines Hauser #[phone].  stated that patient had a fall recently and was admitted to [facility]. Patient was then discharged to [address] [place] [address] for rehab. Prior to the fall,  stated patient was independent and did not use DME. There is one stair to enter the home.  is independent and drives.  stated they do not have any family that lives in the area. CM verified demographics on face sheet. Noted only insurance listed is Medicare A&B.  stated that they do have a secondary insurance, [address]. CM attempted to obtain secondary insurance info,  stated he is coming to the hospital later today and will provide a copy of the card. CM called [facility] liaison, Erik Amado [phone], who notified CM that the liaison with [facility] who is following this patient is [address] [place] [address] #[phone]. CM called [address] [place] [address] who provided CM with   Secondary insurance info:   Scar Ramos  ID: [id number]    CM sent e-mail to registration to update this information in the hospital system. CM notified attending of PT/OT orders needed for [facility] to review. Care management will continue to follow.     MARCO A Ham/LEATHA

## 2022-07-21 NOTE — PROGRESS NOTES
0530: Bedside and Verbal shift change report given to [address]  (oncoming nurse) by Elenita Hubbard RN (offgoing nurse). Report included the following information SBAR, Kardex, ED Summary, Procedure Summary, Intake/Output, MAR, Recent Results, and Cardiac Rhythm ST- v tach  .

## 2022-07-22 LAB
ANION GAP SERPL CALC-SCNC: 8 MMOL/L (ref 5–15)
ATRIAL RATE: 111 BPM
BACTERIA SPEC CULT: NORMAL
BACTERIA SPEC CULT: NORMAL
BASOPHILS # BLD: 0.1 K/UL (ref 0–0.1)
BASOPHILS NFR BLD: 0 % (ref 0–1)
BUN SERPL-MCNC: 41 MG/DL (ref 6–20)
BUN/CREAT SERPL: 38 (ref 12–20)
CALCIUM SERPL-MCNC: 8.9 MG/DL (ref 8.5–10.1)
CALCULATED P AXIS, ECG09: 77 DEGREES
CALCULATED R AXIS, ECG10: 40 DEGREES
CALCULATED T AXIS, ECG11: 81 DEGREES
CHLORIDE SERPL-SCNC: 107 MMOL/L (ref 97–108)
CO2 SERPL-SCNC: 23 MMOL/L (ref 21–32)
CREAT SERPL-MCNC: 1.09 MG/DL (ref 0.55–1.02)
DIAGNOSIS, 93000: NORMAL
DIFFERENTIAL METHOD BLD: ABNORMAL
EOSINOPHIL # BLD: 0.2 K/UL (ref 0–0.4)
EOSINOPHIL NFR BLD: 1 % (ref 0–7)
ERYTHROCYTE [DISTWIDTH] IN BLOOD BY AUTOMATED COUNT: 15.6 % (ref 11.5–14.5)
GLUCOSE SERPL-MCNC: 94 MG/DL (ref 65–100)
HCT VFR BLD AUTO: 30.2 % (ref 35–47)
HGB BLD-MCNC: 9.6 G/DL (ref 11.5–16)
IMM GRANULOCYTES # BLD AUTO: 0.2 K/UL (ref 0–0.04)
IMM GRANULOCYTES NFR BLD AUTO: 1 % (ref 0–0.5)
LYMPHOCYTES # BLD: 0.9 K/UL (ref 0.8–3.5)
LYMPHOCYTES NFR BLD: 6 % (ref 12–49)
MCH RBC QN AUTO: 29.7 PG (ref 26–34)
MCHC RBC AUTO-ENTMCNC: 31.8 G/DL (ref 30–36.5)
MCV RBC AUTO: 93.5 FL (ref 80–99)
MONOCYTES # BLD: 0.4 K/UL (ref 0–1)
MONOCYTES NFR BLD: 3 % (ref 5–13)
NEUTS SEG # BLD: 14.3 K/UL (ref 1.8–8)
NEUTS SEG NFR BLD: 89 % (ref 32–75)
NRBC # BLD: 0 K/UL (ref 0–0.01)
NRBC BLD-RTO: 0 PER 100 WBC
P-R INTERVAL, ECG05: 142 MS
PLATELET # BLD AUTO: 319 K/UL (ref 150–400)
PMV BLD AUTO: 9.4 FL (ref 8.9–12.9)
POTASSIUM SERPL-SCNC: 4 MMOL/L (ref 3.5–5.1)
Q-T INTERVAL, ECG07: 326 MS
QRS DURATION, ECG06: 82 MS
QTC CALCULATION (BEZET), ECG08: 443 MS
RBC # BLD AUTO: 3.23 M/UL (ref 3.8–5.2)
SERVICE CMNT-IMP: NORMAL
SODIUM SERPL-SCNC: 138 MMOL/L (ref 136–145)
VANCOMYCIN SERPL-MCNC: 15.8 UG/ML
VENTRICULAR RATE, ECG03: 111 BPM
WBC # BLD AUTO: 16 K/UL (ref 3.6–11)

## 2022-07-22 PROCEDURE — 94640 AIRWAY INHALATION TREATMENT: CPT

## 2022-07-22 PROCEDURE — 74011250637 HC RX REV CODE- 250/637: Performed by: STUDENT IN AN ORGANIZED HEALTH CARE EDUCATION/TRAINING PROGRAM

## 2022-07-22 PROCEDURE — 74011250637 HC RX REV CODE- 250/637: Performed by: NURSE PRACTITIONER

## 2022-07-22 PROCEDURE — 77030038269 HC DRN EXT URIN PURWCK BARD -A

## 2022-07-22 PROCEDURE — 74011250636 HC RX REV CODE- 250/636: Performed by: HOSPITALIST

## 2022-07-22 PROCEDURE — 65660000001 HC RM ICU INTERMED STEPDOWN

## 2022-07-22 PROCEDURE — 74011000250 HC RX REV CODE- 250: Performed by: HOSPITALIST

## 2022-07-22 PROCEDURE — 80202 ASSAY OF VANCOMYCIN: CPT

## 2022-07-22 PROCEDURE — 77010033678 HC OXYGEN DAILY

## 2022-07-22 PROCEDURE — 74011000250 HC RX REV CODE- 250: Performed by: FAMILY MEDICINE

## 2022-07-22 PROCEDURE — 74011250636 HC RX REV CODE- 250/636: Performed by: FAMILY MEDICINE

## 2022-07-22 PROCEDURE — 36415 COLL VENOUS BLD VENIPUNCTURE: CPT

## 2022-07-22 PROCEDURE — 74011250637 HC RX REV CODE- 250/637: Performed by: FAMILY MEDICINE

## 2022-07-22 PROCEDURE — 99233 SBSQ HOSP IP/OBS HIGH 50: CPT | Performed by: INTERNAL MEDICINE

## 2022-07-22 PROCEDURE — 92526 ORAL FUNCTION THERAPY: CPT

## 2022-07-22 PROCEDURE — 74011000258 HC RX REV CODE- 258: Performed by: HOSPITALIST

## 2022-07-22 PROCEDURE — 74011000258 HC RX REV CODE- 258: Performed by: FAMILY MEDICINE

## 2022-07-22 PROCEDURE — 80048 BASIC METABOLIC PNL TOTAL CA: CPT

## 2022-07-22 PROCEDURE — 85025 COMPLETE CBC W/AUTO DIFF WBC: CPT

## 2022-07-22 RX ORDER — FUROSEMIDE 40 MG/1
40 TABLET ORAL DAILY
Status: DISCONTINUED | OUTPATIENT
Start: 2022-07-22 | End: 2022-08-03 | Stop reason: HOSPADM

## 2022-07-22 RX ADMIN — FAMOTIDINE 10 MG: 20 TABLET ORAL at 09:22

## 2022-07-22 RX ADMIN — APIXABAN 5 MG: 5 TABLET, FILM COATED ORAL at 17:11

## 2022-07-22 RX ADMIN — IPRATROPIUM BROMIDE AND ALBUTEROL SULFATE 3 ML: .5; 3 SOLUTION RESPIRATORY (INHALATION) at 20:30

## 2022-07-22 RX ADMIN — ACETAMINOPHEN 650 MG: 325 TABLET ORAL at 23:30

## 2022-07-22 RX ADMIN — VANCOMYCIN HYDROCHLORIDE 1000 MG: 1 INJECTION, POWDER, LYOPHILIZED, FOR SOLUTION INTRAVENOUS at 17:07

## 2022-07-22 RX ADMIN — MIDODRINE HYDROCHLORIDE 10 MG: 5 TABLET ORAL at 09:22

## 2022-07-22 RX ADMIN — CEFEPIME HYDROCHLORIDE 1 G: 1 INJECTION, POWDER, FOR SOLUTION INTRAMUSCULAR; INTRAVENOUS at 11:41

## 2022-07-22 RX ADMIN — SODIUM CHLORIDE, PRESERVATIVE FREE 10 ML: 5 INJECTION INTRAVENOUS at 23:30

## 2022-07-22 RX ADMIN — HYDROMORPHONE HYDROCHLORIDE 0.5 MG: 1 INJECTION, SOLUTION INTRAMUSCULAR; INTRAVENOUS; SUBCUTANEOUS at 07:12

## 2022-07-22 RX ADMIN — POLYETHYLENE GLYCOL 3350 17 G: 17 POWDER, FOR SOLUTION ORAL at 09:22

## 2022-07-22 RX ADMIN — AMIODARONE HYDROCHLORIDE 0.5 MG/MIN: 50 INJECTION, SOLUTION INTRAVENOUS at 12:49

## 2022-07-22 RX ADMIN — SODIUM CHLORIDE, PRESERVATIVE FREE 10 ML: 5 INJECTION INTRAVENOUS at 05:54

## 2022-07-22 RX ADMIN — MIDODRINE HYDROCHLORIDE 10 MG: 5 TABLET ORAL at 17:10

## 2022-07-22 RX ADMIN — SODIUM CHLORIDE, PRESERVATIVE FREE 10 ML: 5 INJECTION INTRAVENOUS at 17:13

## 2022-07-22 RX ADMIN — APIXABAN 5 MG: 5 TABLET, FILM COATED ORAL at 09:22

## 2022-07-22 RX ADMIN — HYDROMORPHONE HYDROCHLORIDE 0.5 MG: 1 INJECTION, SOLUTION INTRAMUSCULAR; INTRAVENOUS; SUBCUTANEOUS at 00:11

## 2022-07-22 RX ADMIN — IPRATROPIUM BROMIDE AND ALBUTEROL SULFATE 3 ML: .5; 3 SOLUTION RESPIRATORY (INHALATION) at 08:53

## 2022-07-22 RX ADMIN — FUROSEMIDE 40 MG: 40 TABLET ORAL at 17:10

## 2022-07-22 RX ADMIN — ASPIRIN 81 MG CHEWABLE TABLET 81 MG: 81 TABLET CHEWABLE at 09:22

## 2022-07-22 RX ADMIN — MIDODRINE HYDROCHLORIDE 10 MG: 5 TABLET ORAL at 11:41

## 2022-07-22 RX ADMIN — FAMOTIDINE 10 MG: 20 TABLET ORAL at 17:11

## 2022-07-22 RX ADMIN — CEFEPIME HYDROCHLORIDE 1 G: 1 INJECTION, POWDER, FOR SOLUTION INTRAMUSCULAR; INTRAVENOUS at 23:30

## 2022-07-22 NOTE — PROGRESS NOTES
Name: Dimaond Koo   MRN: [id number]  : 1932          Assessment:  PAULO- likely pre-renal in the setting of SIRS/Low nl BP/Aflutter; UA is bland. Suspicion for obstruction is low     SIRS  A on C Resp failure/COPD on home O2  Recent PE/PNA- Covid/flu negative  Aflutter vs Vtach    Cr down to 1.3 to 1.09 today. mild acidosis. Plan/Recommendations:  D/C IVF  O2  Nebs  Rate control as able  AC for Aflutter  Avoid nephrotoxins  On midodrine    Will sign off at this point. Please call us back with any questions/concerns      Subjective:  Resting.  No acute c/o    ROS:   No nausea, no vomiting  No chest pain, +chronic  shortness of breath    Exam:  Visit Vitals  BP (!) 157/65 (BP 1 Location: Left upper arm, BP Patient Position: At rest)   Pulse 86   Temp 97.4 °F (36.3 °C)   Resp 26   Ht 5' 1\" (1.549 m)   Wt 71.2 kg (156 lb 14.4 oz)   SpO2 95%   BMI 29.65 kg/m²     NAD, frail  B/l Rhonchi+  Irregular, no tachy  Tr edema  Resting     Current Facility-Administered Medications   Medication Dose Route Frequency Last Admin    vancomycin (VANCOCIN) 1,000 mg in 0.9% sodium chloride 250 mL ([id number])  1,000 mg IntraVENous Q24H      midodrine (PROAMATINE) tablet 10 mg  10 mg Oral TID WITH MEALS 10 mg at 22 1141    albuterol-ipratropium (DUO-NEB) 2.5 MG-0.5 MG/3 ML  3 mL Nebulization BID RT 3 mL at 22 0853    metoprolol (LOPRESSOR) injection 2.5 mg  2.5 mg IntraVENous Q6H PRN      amiodarone (CORDARONE) 375 mg/250 mL D5W infusion  0.5-1 mg/min IntraVENous TITRATE 0.5 mg/min at 22 1249    HYDROmorphone (DILAUDID) injection 0.5 mg  0.5 mg IntraVENous Q4H PRN 0.5 mg at 22 5506    sodium chloride (NS) flush 5-10 mL  5-10 mL IntraVENous PRN      sodium chloride (NS) flush 5-40 mL  5-40 mL IntraVENous Q8H 10 mL at 22 0554    sodium chloride (NS) flush 5-40 mL 5-40 mL IntraVENous PRN      acetaminophen (TYLENOL) tablet 650 mg  650 mg Oral Q4H PRN      cefepime (MAXIPIME) 1 g in 0.9% sodium chloride (MBP/ADV) 50 mL MBP  1 g IntraVENous Q12H 1 g at 07/22/22 1141    apixaban (ELIQUIS) tablet 5 mg  5 mg Oral BID 5 mg at 07/22/22 5711    aspirin chewable tablet 81 mg  81 mg Oral DAILY 81 mg at 07/22/22 8017    famotidine (PEPCID) tablet 10 mg  10 mg Oral BID 10 mg at 07/22/22 4288    oxyCODONE IR (ROXICODONE) tablet 5 mg  5 mg Oral Q4H PRN      polyethylene glycol (MIRALAX) packet 17 g  17 g Oral DAILY 17 g at 07/22/22 2932    simethicone (MYLICON) tablet 80 mg  80 mg Oral Q6H PRN      Vancomycin - pharmacy to dose   Other Rx Dosing/Monitoring      [Held by provider] dilTIAZem ER (CARDIZEM CD) capsule 120 mg  120 mg Oral DAILY         Labs/Data:    Lab Results   Component Value Date/Time    WBC 16.0 (H) 07/22/2022 04:02 AM    HGB 9.6 (L) 07/22/2022 04:02 AM    HCT 30.2 (L) 07/22/2022 04:02 AM    PLATELET 283 64/89/0586 04:02 AM    MCV 93.5 07/22/2022 04:02 AM       Lab Results   Component Value Date/Time    Sodium 138 07/22/2022 04:02 AM    Potassium 4.0 07/22/2022 04:02 AM    Chloride 107 07/22/2022 04:02 AM    CO2 23 07/22/2022 04:02 AM    Anion gap 8 07/22/2022 04:02 AM    Glucose 94 07/22/2022 04:02 AM    BUN 41 (H) 07/22/2022 04:02 AM    Creatinine 1.09 (H) 07/22/2022 04:02 AM    BUN/Creatinine ratio 38 (H) 07/22/2022 04:02 AM    GFR est AA 57 (L) 07/22/2022 04:02 AM    GFR est non-AA 47 (L) 07/22/2022 04:02 AM    Calcium 8.9 07/22/2022 04:02 AM       Wt Readings from Last 3 Encounters:   07/21/22 71.2 kg (156 lb 14.4 oz)         Intake/Output Summary (Last 24 hours) at 7/22/2022 1529  Last data filed at 7/22/2022 1312  Gross per 24 hour   Intake 0 ml   Output 650 ml   Net -650 ml         Patient seen and examined. Chart reviewed. Labs, data and other pertinent notes reviewed in last 24 hrs.     PMH/SH/FH reviewed and unchanged compared to H&P    Discussed with pt/ Weston Turner MD

## 2022-07-22 NOTE — PROGRESS NOTES
Spiritual Care Assessment/Progress Note  [facility]      NAME: Duran White      MRN: [id number]  AGE: 80 y.o. SEX: female  Anglican Affiliation: Roman Catholic   Language: English     7/22/2022     Total Time (in minutes): 5     Spiritual Assessment begun in [facility] through conversation with:         []Patient        [] Family    [] Friend(s)        Reason for Consult: Palliative Care, Initial/Spiritual Assessment     Spiritual beliefs: (Please include comment if needed)     [x] Identifies with a chris tradition:    Roman Catholic     [] Supported by a chris community:            [] Claims no spiritual orientation:           [] Seeking spiritual identity:                [] Adheres to an individual form of spirituality:           [] Not able to assess:                           Identified resources for coping:      [] Prayer                               [] Music                  [] Guided Imagery     [] Family/friends                 [] Pet visits     [] Devotional reading                         [] Unknown     [x] Other:    Sacramental Support                                           Interventions offered during this visit: (See comments for more details)    Patient Interventions: Prayer (actual), Prayer (assurance of)           Plan of Care:     [] Support spiritual and/or cultural needs    [] Support AMD and/or advance care planning process      [] Support grieving process   [] Coordinate Rites and/or Rituals    [] Coordination with community clergy   [] No spiritual needs identified at this time   [] Detailed Plan of Care below (See Comments)  [] Make referral to Music Therapy  [] Make referral to Pet Therapy     [] Make referral to Addiction services  [] Make referral to [facility]  [] Make referral to Spiritual Care Partner  [] No future visits requested        [x] Contact Spiritual Care for further referrals     Comments: Attempted Initial Spiritual Assessment for this pt in [facility] 465. Reviewed pt's chart prior to this visit. Pt was resting and therefore was unable to be assessed at this time. No family/friends present at time of visit. Contact Spiritual Care Services for any spiritual or emotional support needs. Cristi Menchaca MDiv.  Staff   Request  Support/Spiritual Care Services on [phone])

## 2022-07-22 NOTE — PROGRESS NOTES
Physician Progress Note      PATIENT:               Alanis Bishop  CSN #:                  [id number]  :                       1932  ADMIT DATE:       2022 10:38 AM  DISCH DATE:  RESPONDING  PROVIDER #:        Booker Rendon MD          QUERY TEXT:      Dear attending,    Pt noted to have documentation of pulmonary edema. On , the patient?s ProBNP was 45591, echo with visually estimated EF of 55 - 60%. Left ventricle size is normal. Mild septal thickening. Normal wall motion. Normal diastolic function and a CT chest showed Small right and trace left pleural effusions. If possible, please document in the progress notes and discharge summary if you are evaluating and/or treating any of the following: The medical record reflects the following:  Risk Factors: Hx. COPD, atrial flutter  Clinical Indicators: -ProBNP 73758.  - Echo- Left Ventricle: Normal left ventricular systolic function with a visually estimated EF of 55 - 60%. Left ventricle size is normal. Mild septal thickening. Normal wall motion. Normal diastolic function  CT chest- IMPRESSION  1. Bibasilar pneumonia may be due to aspiration given the debris in the trachea. 2. Reactive mediastinal lymph nodes. 3. Small right and trace left pleural effusions. 4. Moderate emphysema. Per H&P- Patient will be admitted on a telemetry bed, hypoxic respiratory failure appears to be multifactorial, likely pulmonary edema with possible underlying pneumonia, broad-spectrum IV antibiotics, duo nebs, oxygen support, pulse ox monitoring, Lasix IV, echocardiogram, strict I's and O's, aspiration precautions  -Per cardiology- Elevated ProBNP: may be related to recent PNA, CXR showing interstitial edema or atypical PNA. Agree with IV Diuresis at this time.   -Per PN- Pulmonary edema  -I/O  Treatment: IV Lasix 40 mg x 1 on , strict I's and O's, CT of the chest, oxygen support, ABG, pulse ox monitoring, cycle troponins, echocardiogram , Daily weight          Thank you,    Katina Virk RN, BSN, [facility], [address]  Clinical Documentation Improvement  [phone] or via Perfect Serve  Options provided:  -- Acute pulmonary edema due to heart disease  -- Acute pulmonary edema due to heart failure, please specify type of heart failure  -- Noncardiogenic acute pulmonary edema due to, please specify cause . -- Other - I will add my own diagnosis  -- Disagree - Not applicable / Not valid  -- Disagree - Clinically unable to determine / Unknown  -- Refer to Clinical Documentation Reviewer    PROVIDER RESPONSE TEXT:    This patient has acute pulmonary edema due to heart disease.     Query created by: Cristhian Galvez on 7/22/2022 8:16 AM      Electronically signed by:  Any Jay MD 7/22/2022 9:14 AM

## 2022-07-22 NOTE — PROGRESS NOTES
[facility] Adult  Hospitalist Group                                                                                          Hospitalist Progress Note  Judah Mcrae MD  Answering service: [phone] or [phone] from in house phone        Date of Service:  2022  NAME:  Geno Thomas  :  1932  MRN:  [id number]      Admission Summary:   Geno Thomas is a 80 y.o. female who presents with shortness of breath and cough    Patient is a poor historian, patient is at [facility] rehab, patient had a ground-level fall, was found to have PEs, was also found to have pneumonia, was treated with Flagyl and Rocephin, was transitioned to oral cephalosporins, has chronic hypoxic respiratory failure and uses 4 L of oxygen at baseline, was sent over here with concerns for impending sepsis, patient was found to have a white count of 38,000 and was found to have mild hypotension associated with her symptoms, came to the ER and was requested to be admitted to the hospitalist service, currently patient is resting in bed, alert x1, is coughing but denies any other complaints or problems       Interval history / Subjective:    Follow up SOB   Continues on 4l NC (baseline)  No fever  Leukocytosis improving  Looks better  Assessment & Plan:     Acute on chronic hypoxic respiratory failure, 4l NC at baseline  SIRS (patient does not meet sepsis criteria)  Healthcare associated pneumonia  Pulmonary edema  -I/O  -Daily weight  -Follow cultures  -Vanc/cefepime    Hypotension: started on midodrine    History of atrial flutter  Probable V tach vs flutter:   -Continue on cardizem/ASA/Eliquis  -Was put on Amiodarone  evening  -Appreciate discussion with cardiology, continue for now    Elevated troponin-per cardiology sec to above, not ACS  Acute kidney injury : improving on IV fluids  History of pulmonary embolism: on Eliquis, continue  Dysphagia:NPO, patient     PT/OT     Code status: DNR  Prophylaxis: Eliquis  PTA: [facility]    Plan: Continue antibiotics, probable discharge tomorrow  Care Plan discussed with: Patient  Anticipated Disposition: ? [address] Problems  Date Reviewed: 5/13/2022            Codes Class Noted POA    Sepsis [facility]) ICD-10-CM: A41.9  ICD-9-CM: 038.9, 995.91  7/20/2022 Unknown           Review of Systems:   A comprehensive review of systems was negative except for that written in the HPI. Vital Signs:    Last 24hrs VS reviewed since prior progress note. Most recent are:  Visit Vitals  BP (!) 152/65 (BP 1 Location: Left arm, BP Patient Position: At rest)   Pulse 84   Temp 98.8 °F (37.1 °C)   Resp (!) 32   Ht 5' 1\" (1.549 m)   Wt 71.2 kg (156 lb 14.4 oz)   SpO2 93%   BMI 29.65 kg/m²         Intake/Output Summary (Last 24 hours) at 7/22/2022 1139  Last data filed at 7/22/2022 0931  Gross per 24 hour   Intake 0 ml   Output 600 ml   Net -600 ml          Physical Examination:     I had a face to face encounter with this patient and independently examined them on 7/22/2022 as outlined below:          Constitutional:  No acute distress   ENT:  Oral mucosa moist, oropharynx benign. Resp:  CTA bilaterally. No wheezing/rhonchi/rales. No accessory muscle use. CV:  Regular rhythm, normal rate, no murmurs, gallops, rubs    GI:  Soft, non distended, non tender. normoactive bowel sounds, no hepatosplenomegaly     Musculoskeletal:  No edema, warm, 2+ pulses throughout    Neurologic:  Moves all extremities.   AAOx3, CN II-XII reviewed            Data Review:    Review and/or order of clinical lab test      Labs:     Recent Labs     07/22/22  0402 07/21/22 0319   WBC 16.0* 22.6*   HGB 9.6* 10.2*   HCT 30.2* 31.4*    328       Recent Labs     07/22/22  0402 07/21/22 2047 07/21/22  0319 07/20/22  0923     --  138 135*   K 4.0  --  4.3 4.6     --  104 99   CO2 23  --  20* 29   BUN 41*  --  37* 28*   CREA 1.09*  --  1.30* 1.42*   GLU 94  --  84 85   CA 8.9  --  9.0 10.1   MG --  1.6  --   --        Recent Labs     07/21/22  0319 07/20/22  0923   ALT 14 17   AP 95 98   TBILI 0.4 0.9   TP 6.4 6.9   ALB 1.7* 2.1*   GLOB 4.7* 4.8*       No results for input(s): INR, PTP, APTT, INREXT, INREXT in the last 72 hours. No results for input(s): FE, TIBC, PSAT, FERR in the last 72 hours. No results found for: FOL, RBCF   No results for input(s): PH, PCO2, PO2 in the last 72 hours. No results for input(s): CPK, CKNDX, TROIQ in the last 72 hours.     No lab exists for component: CPKMB  No results found for: CHOL, CHOLX, CHLST, CHOLV, HDL, HDLP, LDL, LDLC, DLDLP, TGLX, TRIGL, TRIGP, CHHD, CHHDX  No results found for: [facility]  Lab Results   Component Value Date/Time    Color DARK YELLOW 07/20/2022 03:37 PM    Appearance CLOUDY (A) 07/20/2022 03:37 PM    Specific gravity 1.018 07/20/2022 03:37 PM    pH (UA) 5.0 07/20/2022 03:37 PM    Protein 30 (A) 07/20/2022 03:37 PM    Glucose Negative 07/20/2022 03:37 PM    Ketone TRACE (A) 07/20/2022 03:37 PM    Bilirubin Negative 07/20/2022 03:37 PM    Urobilinogen 0.2 07/20/2022 03:37 PM    Nitrites Negative 07/20/2022 03:37 PM    Leukocyte Esterase TRACE (A) 07/20/2022 03:37 PM    Epithelial cells FEW 07/20/2022 03:37 PM    Bacteria Negative 07/20/2022 03:37 PM    WBC 0-4 07/20/2022 03:37 PM    RBC 5-10 07/20/2022 03:37 PM         Medications Reviewed:     Current Facility-Administered Medications   Medication Dose Route Frequency    vancomycin (VANCOCIN) 1,000 mg in 0.9% sodium chloride 250 mL ([id number])  1,000 mg IntraVENous Q24H    midodrine (PROAMATINE) tablet 10 mg  10 mg Oral TID WITH MEALS    albuterol-ipratropium (DUO-NEB) 2.5 MG-0.5 MG/3 ML  3 mL Nebulization BID RT    metoprolol (LOPRESSOR) injection 2.5 mg  2.5 mg IntraVENous Q6H PRN    amiodarone (CORDARONE) 375 mg/250 mL D5W infusion  0.5-1 mg/min IntraVENous TITRATE    HYDROmorphone (DILAUDID) injection 0.5 mg  0.5 mg IntraVENous Q4H PRN    sodium chloride (NS) flush 5-10 mL  5-10 mL IntraVENous PRN    sodium chloride (NS) flush 5-40 mL  5-40 mL IntraVENous Q8H    sodium chloride (NS) flush 5-40 mL  5-40 mL IntraVENous PRN    0.9% sodium chloride infusion  50 mL/hr IntraVENous CONTINUOUS    acetaminophen (TYLENOL) tablet 650 mg  650 mg Oral Q4H PRN    cefepime (MAXIPIME) 1 g in 0.9% sodium chloride (MBP/ADV) 50 mL MBP  1 g IntraVENous Q12H    apixaban (ELIQUIS) tablet 5 mg  5 mg Oral BID    aspirin chewable tablet 81 mg  81 mg Oral DAILY    famotidine (PEPCID) tablet 10 mg  10 mg Oral BID    oxyCODONE IR (ROXICODONE) tablet 5 mg  5 mg Oral Q4H PRN    polyethylene glycol (MIRALAX) packet 17 g  17 g Oral DAILY    simethicone (MYLICON) tablet 80 mg  80 mg Oral Q6H PRN    Vancomycin - pharmacy to dose   Other Rx Dosing/Monitoring    [Held by provider] dilTIAZem ER (CARDIZEM CD) capsule 120 mg  120 mg Oral DAILY     ______________________________________________________________________  EXPECTED LENGTH OF STAY: 4d 2h  ACTUAL LENGTH OF STAY:          2                 Benito Alba MD

## 2022-07-22 NOTE — PROGRESS NOTES
Problem: Dysphagia (Adult)  Goal: *Acute Goals and Plan of Care (Insert Text)  Description: Speech pathology goals initiated 7/21/2022   1. Patient will tolerate ice chips and bites of applesauce with meds crushed free of s/s aspiration. MET 7/22/2022  2. Patient will participate in instrumental testing pending goals of care  Added 7/22/2022: 3. Patient will tolerate Soft&Bite-Sized/Thin Liquids without signs of aspiration within 7 days. Outcome: Progressing Towards Goal     SPEECH LANGUAGE PATHOLOGY DYSPHAGIA TREATMENT  Patient: Poonam Pugh (16 y.o. female)  Date: 7/22/2022  Diagnosis: Sepsis ([facility] [address] [A41.9] <principal problem not specified>      Precautions: Swallow, Fall, DNR (baseline 4L/min)    ASSESSMENT:  Patient continues to be followed by SLP and initial evaluation on 7/21 revealed fluctuating RR with and without PO intake and overt signs of aspiration with PO intake. Patient seen at bedside for swallow re-evaluation on this date. Patient with improved RR at rest on this date, remaining stable in the 25s. Patient tolerated ice chips, thin liquid, puree, and solid without overt difficulty or signs of aspiration on this date. RR observed to increase 24-30 with solid trial, suspect related to increased effort with mastication. At this juncture, cautiously initiate Soft&Bite-Sized/Thin Liquids with aspiration precautions outlined below. Patient with low threshold to hold PO. If any change in vital signs, respiratory status, etc., please hold PO intake and consider instrumental evaluation of swallowing to further evaluate swallow function. RN educated re: SLP recommendations.      PLAN:  Recommendations and Planned Interventions:  -- Soft&Bite-Sized/Thin Liquids  -- Medication as Tolerated  -- Slow Rate of Intake  -- Small Bites/Sips  -- Strict Oral Care  -- If any change in vital signs, respiratory status, etc., please hold PO intake    Patient continues to benefit from skilled intervention to address the above impairments. Continue treatment per established plan of care. Discharge Recommendations: To Be Determined     SUBJECTIVE:   Patient stated you can never be comfortable in a hospital bed. Patient on nasal cannula, 4 LPM.    OBJECTIVE:   Cognitive and Communication Status:  Neurologic State: Alert  Orientation Level: Oriented to person, Oriented to place, Oriented to time, Disoriented to situation  Cognition: Follows commands  Perception: Appears intact  Perseveration: No perseveration noted  Safety/Judgement: Awareness of environment    Dysphagia Treatment:    P.O. Trials:  Patient Position: Upright in bed  Vocal quality prior to P.O.: Low volume  Consistency Presented: Ice chips; Thin liquid;Puree; Solid  How Presented: SLP-fed/presented;Straw;Successive swallows;Spoon     Bolus Acceptance: No impairment  Bolus Formation/Control: Impaired  Type of Impairment: Delayed;Mastication (With solid trial)  Propulsion: Delayed (# of seconds)  Oral Residue: None        Aspiration Signs/Symptoms: None                Oral Phase Severity: Mild  Pharyngeal Phase Severity : Other (comment) (At risk for aspiration)      Pain:     Pain Intensity 1: 7       After treatment:   Patient left in no apparent distress in bed, Call bell within reach, and Nursing notified    COMMUNICATION/EDUCATION:   Patient was educated regarding role of SLP and safe swallowing strategies. She demonstrated good understanding as evidenced by verbalizing understanding. The patient's plan of care including recommendations, planned interventions, and recommended diet changes were discussed with: Registered nurse.      TIFFANY Melvin  Time Calculation: 15 mins

## 2022-07-22 NOTE — PROGRESS NOTES
[facility] [address] [place] [address]  Cardiology Care Note                  []Initial visit     [x]Established visit     Patient Name: Seth Wilkes - T:[id number]  Primary Cardiologist: none  Consulting Cardiologist: Laurier Boxer, MD     Reason for consult: elevated troponin    Patient seen and examined by me with the above nurse practitioner. I personally performed all components of the history, physical, and medical decision making and agree with the assessment and plan with minor modifications as noted. Today the patient continues to feel better, but had intermittent atrial fibrillation and flutter, with some aberrancy that the nurses thought was ventricular tachycardia. General PE  Gen:  NAD  Mental Status - Alert. General Appearance - Not in acute distress. HEENT:  PERRL, no carotid bruits or JVD  Chest and Lung Exam   Inspection: Accessory muscles - No use of accessory muscles in breathing. Auscultation:   Breath sounds: - Normal.   Cardiovascular   Inspection: Jugular vein - Bilateral - Inspection Normal.   Palpation/Percussion:   Apical Impulse: - Normal.   Auscultation: Rhythm - Regular. Heart Sounds - S1 WNL and S2 WNL. No S3 or S4. Murmurs & Other Heart Sounds: Auscultation of the heart reveals - No Murmurs. Peripheral Vascular   Upper Extremity: Inspection - Bilateral - No Cyanotic nailbeds or Digital clubbing. Lower Extremity:   Palpation: Edema - Bilateral - No edema. Abdomen:   Soft, non-tender, bowel sounds are active. Neuro: A&O times 3, CN and motor grossly WNL    Add amiodarone IV for 24 hours, change to p.o. tomorrow. At discharge, no more than 200 mg daily. Continue Eliquis. CXR 7/21/22 looks more wet- possible contribution from PAF resolved. Start lasix 40 mg daily with parameters.       20 minutes spent reviewing records and labs, seeing and examining patient, and performing all components of the history, physical, and medical decision making. HPI:   Patient is a 80year old with a past med hx of HTN, COPD, Atrial flutter some time ago on Diltiazem and ASA. She was recently at [facility] after GLF sustaining metatarsal fx. Diagnosed with PNA and PE while inpt. She was started on ABX and Eliquis. D/C to [facility] for rehab on 7/17. She apparently developed worsening SOB and was sent to hospital today via EMS. She was found to have a WBC 38.0, COVID/Flu neg, Creatinine 1.42, K 4.6. Troponin 348, ProBNP [id number]. EKG interpretation was STEMI, but on review was not an actual STEMI. She denies hx of CHF or CAD. She denies recent recurrence of aflutter. SUBJECTIVE/OBJECTIVE:    She denies worsening SOB, CP, edema, orthopnea, PND or palpitations. Noted overnight to have atrial flutter with rate dependent BBB. Amiodarone gtt started. She is already on Eliquis for PE. Assessment and Plan     Troponin elevation: presenting with worsening SOB. Recent PNA/ PE which may be contributing to elevation. Continues to deny anginal symptoms, no prior hx of CAD. Troponin flat. Cont ASA. 2. Elevated ProBNP: may be related to recent PNA, CXR showing interstitial edema or atypical PNA. Echo showed EF 55-60%, no valvular disease of significance. She has received Lasix 40mg IV x1. Net I/I +853. CXR yest showed mild pulm edema, small left pleural effusion. Improved Creatinine at 1.09, consider additional dose of Lasix IV or po. Cont management of hypoxic respiratory failure/?PNA.  3. HTN: was trending lower. Midodrine added 10mg TID. Diltiazem 120mg has been held. Now trending with higher SBP, cont to monitor, may start reducing Midodrine dose unless plans for Lasix. 4. Hx Atrial flutter: on Diltiazem and ASA at home, now on Eliquis. Overnight noted to have aflutter with rate responsive BBB/adherency. Amiodarone gtt started. Diltiazem currently held. Converted to SR around 830pm. This am SR in 80s. Cont Amiodarone IV for 24hrs, then change to Amiodarone 200mg tid for 3 days then 200mg daily. Cont Eliquis. 5. Recent PE: cont Eliquis. Further plan per Dr Alex Bland       I have spent a total of 15 min on this encounter; reviewing results/tele/notes and time spent with patient.      ____________________________________________________________    Cardiac testing      Most recent HS troponins:  Recent Labs     07/21/22  2047 07/20/22  1918 07/20/22  1322   TROPHS 157* 464* 439*     Review of Systems    [x]All other systems reviewed and all negative except as written in HPI    [] Patient unable to provide secondary to condition         History reviewed. No pertinent past medical history. No past surgical history on file. Social Hx:  reports that she has been smoking. She has never used smokeless tobacco.  Family Hx: family history is not on file. Allergies   Allergen Reactions    Amoxicillin Unknown (comments)    Penicillins Unknown (comments)    Sulfa (Sulfonamide Antibiotics) Unknown (comments)          OBJECTIVE:  Wt Readings from Last 3 Encounters:   07/21/22 71.2 kg (156 lb 14.4 oz)       Intake/Output Summary (Last 24 hours) at 7/22/2022 1149  Last data filed at 7/22/2022 0931  Gross per 24 hour   Intake 0 ml   Output 600 ml   Net -600 ml         Physical Exam    Vitals:   Vitals:    07/22/22 0708 07/22/22 0712 07/22/22 0853 07/22/22 1000   BP: (!) 152/65      Pulse: 94   84   Resp: (!) 41 (!) 32     Temp: 98.8 °F (37.1 °C)      SpO2: 93%  93%    Weight:       Height:               General:    Alert, cooperative, no acute distress, appears stated age. Neck:   Supple, no carotid bruit and no JVD. Back:     Symmetric   Lungs:     Diminished breath sounds, O2 via NC at 4L   Heart[de-identified]    Regular rate and rhythm, S1, S2 normal, no murmur, click, rub or gallop. Abdomen:     Soft, non-tender. Bowel sounds normal.    Extremities:   Extremities normal, atraumatic, no cyanosis or edema.    Vascular:   Pulses - riddhi UE/LE equal   Skin:   Skin color normal. Bruising around eyes/nasal area. No rashes or lesions on visible areas   Neurologic:   Alert, Moves all extremities. Data Review:     Radiology:   XR Results (most recent):  Results from Hospital Encounter encounter on 07/20/22    XR PELV 1 OR 2 V    Narrative  CLINICAL HISTORY: Right hip pain    Single AP view of the pelvis is negative for acute fracture. Degenerative  changes are seen in the hip joints bilaterally. Vascular calcification is noted. Impression  no acute process. CT Results (most recent):  Results from Hospital Encounter encounter on 06/19/09    CT ABDOMEN W/ CONTRAST    Narrative  *Final Report*      ICD Codes / Adm. Diagnosis:    /   RT LOWER QUAD PAIN  Examination:  Scarlett Espinoza  - [id number] - Jun 19 2009  9:12AM    Accession No:  [id number]  Reason:  RLQ ABD PAIN    REPORT:  INDICATION: Right lower quadrant pain. COMPARISON: None. CONTRAST: 92 mL of Optiray- 350. TECHNIQUE:  Multislice helical CT was performed from the diaphragm to the symphysis  pubis during uneventful rapid bolus intravenous contrast administration. Oral contrast was also administered. Delayed images of the abdomen were  acquired. Contiguous 5 mm axial images were reconstructed and lung and soft  tissue windows were generated. Coronal reformations were generated. CT dose  reduction was achieved through use of a standardized protocol tailored for  this examination and automatic exposure control for dose modulation. A  bismuth breast shield was used for this examination. FINDINGS:  There is a calcified granuloma near the right hemidiaphragm. The visualized  portions of the lung bases are clear. There is a small 8 to 9 mm left renal cyst. There are no focal abnormalities  within the liver, spleen, pancreas, adrenal glands or kidneys. The aorta is atherosclerotic and tapers without aneurysm. There is no  retroperitoneal adenopathy or mass.     The bowel is normal. The appendix is normal. There is no ascites or free  intraperitoneal air. The uterus is small and the bladder is normal.   There is no pelvic mass or  adenopathy. The delayed images demonstrate good bilateral excretion of contrast into the  renal collecting systems. IMPRESSION:  1. Calcified right lower lobe granuloma. 2. Small left renal cyst.  3. Atherosclerotic aorta without aneurysm. 4. No acute abdominal or pelvic process identified. Interpreting/Reading Doctor: Janneth Slater ([id number])  Transcribed: n/a on 06/19/2009  Approved: Janneth Slater ([id number])  06/19/2009        Distribution:  Attending Doctor: Neptali Montenegro Doctor: Kelby Dumont    MRI Results (most recent):  No results found for this or any previous visit. No results for input(s): CPK, TROIQ in the last 72 hours. No lab exists for component: CKQMB, CPKMB, BMPP  Recent Labs     07/22/22  0402 07/21/22  0319    138   K 4.0 4.3    104   CO2 23 20*   BUN 41* 37*   CREA 1.09* 1.30*   GLU 94 84   CA 8.9 9.0     Recent Labs     07/22/22  0402 07/21/22  0319   WBC 16.0* 22.6*   HGB 9.6* 10.2*   HCT 30.2* 31.4*    328     Recent Labs     07/21/22  0319 07/20/22  0923   AP 95 98     No results for input(s): CHOL, LDLC in the last 72 hours. No lab exists for component: TGL, HDLC,  HBA1C  No results for input(s): CRP, TSH, TSHEXT, TSHEXT in the last 72 hours.     No lab exists for component: ESR        Current meds:    Current Facility-Administered Medications:     vancomycin (VANCOCIN) 1,000 mg in 0.9% sodium chloride 250 mL (Moux5Pnt), 1,000 mg, IntraVENous, Q24H, Jose Belle MD    midodrine (PROAMATINE) tablet 10 mg, 10 mg, Oral, TID WITH MEALS, Malu, Benson, DO, 10 mg at 07/22/22 1141    albuterol-ipratropium (DUO-NEB) 2.5 MG-0.5 MG/3 ML, 3 mL, Nebulization, BID RT, Yoshi, Jose, MD, 3 mL at 07/22/22 0853    metoprolol (LOPRESSOR) injection 2.5 mg, 2.5 mg, IntraVENous, Q6H PRN, Iban Ramos, Emelyn Pittman NP    amiodarone (CORDARONE) 375 mg/250 mL D5W infusion, 0.5-1 mg/min, IntraVENous, TITRATE, Jose Zapien MD, Last Rate: 20 mL/hr at 07/21/22 2327, 0.5 mg/min at 07/21/22 2327    HYDROmorphone (DILAUDID) injection 0.5 mg, 0.5 mg, IntraVENous, Q4H PRN, Asia Quezada MD, 0.5 mg at 07/22/22 3281    sodium chloride (NS) flush 5-10 mL, 5-10 mL, IntraVENous, PRN, Kisha Ca MD    sodium chloride (NS) flush 5-40 mL, 5-40 mL, IntraVENous, Q8H, Mony Marquez MD, 10 mL at 07/22/22 0554    sodium chloride (NS) flush 5-40 mL, 5-40 mL, IntraVENous, PRN, Alvarez Airas MD    0.9% sodium chloride infusion, 50 mL/hr, IntraVENous, CONTINUOUS, Ean Sherwood MD, Last Rate: 50 mL/hr at 07/21/22 1149, 50 mL/hr at 07/21/22 1149    acetaminophen (TYLENOL) tablet 650 mg, 650 mg, Oral, Q4H PRN, Alvarez Arias MD    cefepime (MAXIPIME) 1 g in 0.9% sodium chloride (MBP/ADV) 50 mL MBP, 1 g, IntraVENous, Q12H, Mony Marquez MD, Last Rate: 12.5 mL/hr at 07/22/22 1141, 1 g at 07/22/22 1141    apixaban (ELIQUIS) tablet 5 mg, 5 mg, Oral, BID, Alvarez Arias MD, 5 mg at 07/22/22 3232    aspirin chewable tablet 81 mg, 81 mg, Oral, DAILY, Mony Marquez MD, 81 mg at 07/22/22 8291    famotidine (PEPCID) tablet 10 mg, 10 mg, Oral, BID, Alvarez Arias MD, 10 mg at 07/22/22 3968    oxyCODONE IR (ROXICODONE) tablet 5 mg, 5 mg, Oral, Q4H PRN, Mony Li MD    polyethylene glycol (MIRALAX) packet 17 g, 17 g, Oral, DAILY, Mony Li MD, 17 g at 07/22/22 0064    simethicone (MYLICON) tablet 80 mg, 80 mg, Oral, Q6H PRN, Alvarez Arias MD    Vancomycin - pharmacy to dose, , Other, Rx Dosing/Monitoring, Alvarez Arias MD    [Held by provider] dilTIAZem ER (CARDIZEM CD) capsule 120 mg, 120 mg, Oral, DAILY, MD Christiane Serna NP  [facility] [address] [address]  [address] [facility]  [phone]      CC: Corrie Patricia MD

## 2022-07-22 NOTE — PROGRESS NOTES
Pharmacy consult note:   Day #3 of Vancomycin  Indication:  PNA (possible aspiration)  -transfer from [facility], recent PNA  Current regimen: 750 mg q24h  Abx regimen:  cefepime, vanc  ID Following ?: NO  Concomitant nephrotoxic drugs (requires more frequent monitoring): None  Frequency of BMP?: every day through     Recent Labs     22  0402 22  0319 22  0923   WBC 16.0* 22.6* 38.0*   CREA 1.09* 1.30* 1.42*   BUN 41* 37* 28*     Est CrCl: 31 ml/min; UO: n/a ml/kg/hr  Temp (24hrs), Av.3 °F (36.8 °C), Min:97.5 °F (36.4 °C), Max:99 °F (37.2 °C)    Cultures:    COVID rapid: neg   blood x2: NGTD - pending   MRSA screen: Neg    Goal target range AUC/TOMAS 400-600    Recent level history:  Date/Time Dose & Interval Measured Level (mcg/mL) Associated AUC/TOMAS Dose Adjustment     @ 0402 750 mg q24h 15.8 ~ 14.5 hrs p dose 411 1000 mg q24h                                           Plan: While the AUC is within goal range the Pauc is only 55% and her renal function continues to improve. Will adjust dose to 1000 mg IV q24h for a predicted AUC pf 535 w/Pauc of 92%. Pharmacy to follow patient daily and order levels / make dose adjustments as appropriate.

## 2022-07-23 LAB
ANION GAP SERPL CALC-SCNC: 6 MMOL/L (ref 5–15)
BASOPHILS # BLD: 0.1 K/UL (ref 0–0.1)
BASOPHILS NFR BLD: 1 % (ref 0–1)
BUN SERPL-MCNC: 40 MG/DL (ref 6–20)
BUN/CREAT SERPL: 38 (ref 12–20)
CALCIUM SERPL-MCNC: 9.2 MG/DL (ref 8.5–10.1)
CHLORIDE SERPL-SCNC: 107 MMOL/L (ref 97–108)
CO2 SERPL-SCNC: 25 MMOL/L (ref 21–32)
CREAT SERPL-MCNC: 1.04 MG/DL (ref 0.55–1.02)
DIFFERENTIAL METHOD BLD: ABNORMAL
EOSINOPHIL # BLD: 0.4 K/UL (ref 0–0.4)
EOSINOPHIL NFR BLD: 4 % (ref 0–7)
ERYTHROCYTE [DISTWIDTH] IN BLOOD BY AUTOMATED COUNT: 15.5 % (ref 11.5–14.5)
GLUCOSE SERPL-MCNC: 110 MG/DL (ref 65–100)
HCT VFR BLD AUTO: 29.9 % (ref 35–47)
HGB BLD-MCNC: 9.8 G/DL (ref 11.5–16)
IMM GRANULOCYTES # BLD AUTO: 0.2 K/UL (ref 0–0.04)
IMM GRANULOCYTES NFR BLD AUTO: 2 % (ref 0–0.5)
LYMPHOCYTES # BLD: 1.3 K/UL (ref 0.8–3.5)
LYMPHOCYTES NFR BLD: 12 % (ref 12–49)
MCH RBC QN AUTO: 29.6 PG (ref 26–34)
MCHC RBC AUTO-ENTMCNC: 32.8 G/DL (ref 30–36.5)
MCV RBC AUTO: 90.3 FL (ref 80–99)
MONOCYTES # BLD: 0.5 K/UL (ref 0–1)
MONOCYTES NFR BLD: 5 % (ref 5–13)
NEUTS SEG # BLD: 8.4 K/UL (ref 1.8–8)
NEUTS SEG NFR BLD: 76 % (ref 32–75)
NRBC # BLD: 0 K/UL (ref 0–0.01)
NRBC BLD-RTO: 0 PER 100 WBC
PLATELET # BLD AUTO: 357 K/UL (ref 150–400)
PMV BLD AUTO: 8.9 FL (ref 8.9–12.9)
POTASSIUM SERPL-SCNC: 3.5 MMOL/L (ref 3.5–5.1)
RBC # BLD AUTO: 3.31 M/UL (ref 3.8–5.2)
SODIUM SERPL-SCNC: 138 MMOL/L (ref 136–145)
WBC # BLD AUTO: 10.9 K/UL (ref 3.6–11)

## 2022-07-23 PROCEDURE — 74011000250 HC RX REV CODE- 250: Performed by: HOSPITALIST

## 2022-07-23 PROCEDURE — 74011000250 HC RX REV CODE- 250: Performed by: FAMILY MEDICINE

## 2022-07-23 PROCEDURE — 94640 AIRWAY INHALATION TREATMENT: CPT

## 2022-07-23 PROCEDURE — 74011250637 HC RX REV CODE- 250/637: Performed by: FAMILY MEDICINE

## 2022-07-23 PROCEDURE — 74011000258 HC RX REV CODE- 258: Performed by: HOSPITALIST

## 2022-07-23 PROCEDURE — 65660000001 HC RM ICU INTERMED STEPDOWN

## 2022-07-23 PROCEDURE — 85025 COMPLETE CBC W/AUTO DIFF WBC: CPT

## 2022-07-23 PROCEDURE — 99233 SBSQ HOSP IP/OBS HIGH 50: CPT | Performed by: INTERNAL MEDICINE

## 2022-07-23 PROCEDURE — 36415 COLL VENOUS BLD VENIPUNCTURE: CPT

## 2022-07-23 PROCEDURE — 74011250636 HC RX REV CODE- 250/636: Performed by: HOSPITALIST

## 2022-07-23 PROCEDURE — 74011250637 HC RX REV CODE- 250/637: Performed by: NURSE PRACTITIONER

## 2022-07-23 PROCEDURE — 80048 BASIC METABOLIC PNL TOTAL CA: CPT

## 2022-07-23 PROCEDURE — 74011250637 HC RX REV CODE- 250/637: Performed by: INTERNAL MEDICINE

## 2022-07-23 RX ORDER — AMIODARONE HYDROCHLORIDE 200 MG/1
200 TABLET ORAL 3 TIMES DAILY
Status: DISCONTINUED | OUTPATIENT
Start: 2022-07-23 | End: 2022-07-27

## 2022-07-23 RX ORDER — MIDODRINE HYDROCHLORIDE 5 MG/1
5 TABLET ORAL 2 TIMES DAILY WITH MEALS
Status: DISCONTINUED | OUTPATIENT
Start: 2022-07-23 | End: 2022-07-24

## 2022-07-23 RX ORDER — IPRATROPIUM BROMIDE AND ALBUTEROL SULFATE 2.5; .5 MG/3ML; MG/3ML
3 SOLUTION RESPIRATORY (INHALATION)
Status: DISCONTINUED | OUTPATIENT
Start: 2022-07-23 | End: 2022-08-01

## 2022-07-23 RX ADMIN — SODIUM CHLORIDE, PRESERVATIVE FREE 10 ML: 5 INJECTION INTRAVENOUS at 07:20

## 2022-07-23 RX ADMIN — FAMOTIDINE 10 MG: 20 TABLET ORAL at 08:49

## 2022-07-23 RX ADMIN — AMIODARONE HYDROCHLORIDE 0.5 MG/MIN: 50 INJECTION, SOLUTION INTRAVENOUS at 01:08

## 2022-07-23 RX ADMIN — FUROSEMIDE 40 MG: 40 TABLET ORAL at 08:50

## 2022-07-23 RX ADMIN — CEFEPIME 2 G: 2 INJECTION, POWDER, FOR SOLUTION INTRAVENOUS at 13:11

## 2022-07-23 RX ADMIN — APIXABAN 5 MG: 5 TABLET, FILM COATED ORAL at 08:50

## 2022-07-23 RX ADMIN — IPRATROPIUM BROMIDE AND ALBUTEROL SULFATE 3 ML: .5; 3 SOLUTION RESPIRATORY (INHALATION) at 07:30

## 2022-07-23 RX ADMIN — SODIUM CHLORIDE, PRESERVATIVE FREE 10 ML: 5 INJECTION INTRAVENOUS at 14:15

## 2022-07-23 RX ADMIN — VANCOMYCIN HYDROCHLORIDE 1000 MG: 1 INJECTION, POWDER, LYOPHILIZED, FOR SOLUTION INTRAVENOUS at 14:15

## 2022-07-23 RX ADMIN — AMIODARONE HYDROCHLORIDE 200 MG: 200 TABLET ORAL at 21:52

## 2022-07-23 RX ADMIN — MIDODRINE HYDROCHLORIDE 5 MG: 5 TABLET ORAL at 17:42

## 2022-07-23 RX ADMIN — CEFEPIME 2 G: 2 INJECTION, POWDER, FOR SOLUTION INTRAVENOUS at 22:01

## 2022-07-23 RX ADMIN — AMIODARONE HYDROCHLORIDE 0.5 MG/MIN: 50 INJECTION, SOLUTION INTRAVENOUS at 22:01

## 2022-07-23 RX ADMIN — FAMOTIDINE 10 MG: 20 TABLET ORAL at 17:42

## 2022-07-23 RX ADMIN — AMIODARONE HYDROCHLORIDE 200 MG: 200 TABLET ORAL at 17:42

## 2022-07-23 RX ADMIN — SODIUM CHLORIDE, PRESERVATIVE FREE 10 ML: 5 INJECTION INTRAVENOUS at 21:52

## 2022-07-23 RX ADMIN — APIXABAN 5 MG: 5 TABLET, FILM COATED ORAL at 17:42

## 2022-07-23 RX ADMIN — ASPIRIN 81 MG CHEWABLE TABLET 81 MG: 81 TABLET CHEWABLE at 08:49

## 2022-07-23 NOTE — PROGRESS NOTES
[facility]  Cardiology Care Note                  []Initial visit     []Established visit     Patient Name: Annamarie Corona - JON:[id number]     Reason for consult:  AFIB    HPI:  80year old with a past med hx of HTN, COPD, Atrial flutter some time ago on Diltiazem and ASA. She was recently at [facility] EMERGENCY [facility] after GLF sustaining metatarsal fx. Diagnosed with PNA and PE while inpt. She was started on ABX and Eliquis. D/C to [facility] for rehab on 7/17. She apparently developed worsening SOB and was sent to hospital today via EMS. She was found to have a WBC 38.0, COVID/Flu neg, Creatinine 1.42, K 4.6. Troponin 348, ProBNP [id number]. EKG interpretation was STEMI, but on review was not an actual STEMI. She denies hx of CHF or CAD. She denies recent recurrence of aflutter. SUBJECTIVE:  Amio gtt to PO  NSR, independently reviewed tele     Assessment and Plan     Elevated troponin - recent PNA/PE, no prior hx of CAD. Troponin flat. Cont ASA. Echo as below. No further plans for cath/testing    2. Elevated ProBNP:Lasix 40mg po every day. .    3. HTN: Now hypotensive in setting of PNA. Midodrine 10mg TID, I decreased to 5 BID. Diltiazem 120mg has been held. Monitor. 4. FIB/FLUTTER: on Diltiazem and ASA at home, now on Eliquis. Overnight 7/21-7/22 noted to have aflutter with rate responsive BBB/adherency. Amiodarone gtt started. Diltiazem currently held. Converted to SR. This am SR in 80s. Cont Amiodarone IV for 24hrs, then change to Amiodarone 200mg tid for 3 days then 200mg daily. Cont Eliquis. 5. Recent PE: cont Eliquis. 6. PNA - still with leuko + cough. Amio taper as above + Eliquis. Call if questions. Will see PRN.                ____________________________________________________________    Cardiac testing  07/20/22    ECHO ADULT COMPLETE 07/20/2022 7/20/2022    Interpretation Summary  Formatting of this result is different from the original.      Technical qualifiers: Echo study was technically difficult with poor endocardial visualization and technically difficult due to patient's body habitus. Contrast used: Definity. Left Ventricle: Normal left ventricular systolic function with a visually estimated EF of 55 - 60%. Left ventricle size is normal. Mild septal thickening. Normal wall motion. Normal diastolic function. Left Atrium: Left atrium is mildly dilated. Aortic Valve: Tricuspid valve. Mildly thickened cusp. Mitral Valve: Moderate annular calcification of the mitral valve. Signed by: Dylan Dotson MD on 7/20/2022 10:21 PM                Most recent HS troponins:  Recent Labs     07/21/22 2047 07/20/22  1918 07/20/22  1322   TROPHS 157* 464* 439*     ECG: normal EKG, normal sinus rhythm, unchanged from previous tracings  Review of Systems    [x]All other systems reviewed and all negative except as written in HPI    [] Patient unable to provide secondary to condition         History reviewed. No pertinent past medical history. No past surgical history on file. Social Hx:  reports that she has been smoking. She has never used smokeless tobacco.  Family Hx: family history is not on file.   Allergies   Allergen Reactions    Amoxicillin Unknown (comments)    Penicillins Unknown (comments)    Sulfa (Sulfonamide Antibiotics) Unknown (comments)          OBJECTIVE:  Wt Readings from Last 3 Encounters:   07/22/22 160 lb 4.8 oz (72.7 kg)       Intake/Output Summary (Last 24 hours) at 7/23/2022 0838  Last data filed at 7/22/2022 1600  Gross per 24 hour   Intake 780 ml   Output 650 ml   Net 130 ml         Physical Exam    Vitals:   Vitals:    07/23/22 0430 07/23/22 0600 07/23/22 0721 07/23/22 0731   BP: (!) 162/54  (!) 166/77    Pulse: 78 82 83    Resp: 29  18    Temp: 97.9 °F (36.6 °C)  97.4 °F (36.3 °C)    SpO2: 92%  94% 95%   Weight:       Height:         Telemetry: normal sinus rhythm    Visit Vitals  BP (!) 166/77 (BP 1 Location: Right arm, BP Patient Position: Supine)   Pulse 83   Temp 97.4 °F (36.3 °C)   Resp 18   Ht 5' 1\" (1.549 m)   Wt 160 lb 4.8 oz (72.7 kg)   SpO2 95%   BMI 30.29 kg/m²     General Appearance:  Well developed, well nourished,alert and oriented x 3, and individual in no acute distress. Ears/Nose/Mouth/Throat:   Hearing grossly normal.         Neck: Supple. Chest:   Lungs clear to auscultation bilaterally. Cardiovascular:  Regular rate and rhythm, S1, S2 normal, no murmur. Abdomen:   Soft, non-tender, bowel sounds are active. Extremities: No edema bilaterally. Skin: Warm and dry. Data Review:     Radiology:   XR Results (most recent):  Results from Hospital Encounter encounter on 07/20/22    XR PELV 1 OR 2 V    Narrative  CLINICAL HISTORY: Right hip pain    Single AP view of the pelvis is negative for acute fracture. Degenerative  changes are seen in the hip joints bilaterally. Vascular calcification is noted. Impression  no acute process. CT Results (most recent):  Results from Hospital Encounter encounter on 07/20/22    CT CHEST WO CONT    Narrative  INDICATION: Shortness of breath. Leukocytosis and elevated BNP. COMPARISON: Portable chest earlier today and on 9/12/2006    CONTRAST: None. TECHNIQUE:  5 mm axial images were obtained through the chest. Coronal and  sagittal reformats were generated. CT dose reduction was achieved through use  of a standardized protocol tailored for this examination and automatic exposure  control for dose modulation. The absence of intravenous contrast reduces the sensitivity for evaluation of  the mediastinum, pebbles, vasculature, and upper abdominal organs. FINDINGS:    CHEST WALL: Breast tissues are dense. No axillary lymphadenopathy. THYROID: No nodule. MEDIASTINUM: Precarinal lymph node measures 1.3 cm in short axis. Subcarinal  lymph node measures 1.2 cm in short axis.   PEBBLES: No measurable mass.  THORACIC AORTA: Atherosclerosis. Distal aortic arch saccular diameter measures  2.8 cm. MAIN PULMONARY ARTERY: Normal in caliber. TRACHEA/BRONCHI: Debris is in the trachea and left main bronchus. ESOPHAGUS: Small sliding-type hiatal hernia. HEART: Normal cardiac size. Extensive coronary artery calcific atherosclerosis. No effusion. PLEURA: Small right and trace left pleural effusions. No pneumothorax. LUNGS: Moderate centrilobular emphysema. Bilateral lower lobe airspace opacities  with air bronchograms. Posterior lingular left upper lobe airspace opacity. No  lung mass or suspicious nodule. INCIDENTALLY IMAGED UPPER ABDOMEN: No significant abnormality in the  incidentally imaged upper abdomen. BONES: No destructive bone lesion. Impression  1. Bibasilar pneumonia may be due to aspiration given the debris in the trachea. 2. Reactive mediastinal lymph nodes. 3. Small right and trace left pleural effusions. 4. Moderate emphysema. MRI Results (most recent):  No results found for this or any previous visit. No results for input(s): CPK, TROIQ in the last 72 hours. No lab exists for component: CKQMB, CPKMB, BMPP  Recent Labs     07/23/22  0436 07/22/22  0402    138   K 3.5 4.0    107   CO2 25 23   BUN 40* 41*   CREA 1.04* 1.09*   * 94   CA 9.2 8.9     Recent Labs     07/23/22  0436 07/22/22  0402   WBC 10.9 16.0*   HGB 9.8* 9.6*   HCT 29.9* 30.2*    319     Recent Labs     07/21/22  0319 07/20/22  0923   AP 95 98     No results for input(s): CHOL, LDLC in the last 72 hours. No lab exists for component: TGL, HDLC,  HBA1C  No results for input(s): CRP, TSH, TSHEXT in the last 72 hours.     No lab exists for component: ESR        Current meds:    Current Facility-Administered Medications:     vancomycin (VANCOCIN) 1,000 mg in 0.9% sodium chloride 250 mL ([id number]), 1,000 mg, IntraVENous, Q24H, Jose Belle MD, Last Rate: 250 mL/hr at 07/22/22 1707, 1,000 mg at 07/22/22 1707    furosemide (LASIX) tablet 40 mg, 40 mg, Oral, DAILY, Bryn BLAKELY NP, 40 mg at 07/22/22 1710    midodrine (PROAMATINE) tablet 10 mg, 10 mg, Oral, TID WITH MEALS, Dinora Toribioun, DO, 10 mg at 07/22/22 1710    albuterol-ipratropium (DUO-NEB) 2.5 MG-0.5 MG/3 ML, 3 mL, Nebulization, BID RT, Jose Belle MD, 3 mL at 07/23/22 0730    metoprolol (LOPRESSOR) injection 2.5 mg, 2.5 mg, IntraVENous, Q6H PRN, Bryn BLAKELY NP    amiodarone (CORDARONE) 375 mg/250 mL D5W infusion, 0.5-1 mg/min, IntraVENous, TITRATE, Fouzia Mazariegos MD, Last Rate: 20 mL/hr at 07/23/22 0108, 0.5 mg/min at 07/23/22 0108    HYDROmorphone (DILAUDID) injection 0.5 mg, 0.5 mg, IntraVENous, Q4H PRN, Asia Quezada MD, 0.5 mg at 07/22/22 9886    sodium chloride (NS) flush 5-10 mL, 5-10 mL, IntraVENous, PRN, Ally Og MD    sodium chloride (NS) flush 5-40 mL, 5-40 mL, IntraVENous, Q8H, Mony Marquez MD, 10 mL at 07/23/22 0720    sodium chloride (NS) flush 5-40 mL, 5-40 mL, IntraVENous, PRN, Eric Romeo MD    acetaminophen (TYLENOL) tablet 650 mg, 650 mg, Oral, Q4H PRN, Eric Romeo MD, 650 mg at 07/22/22 2330    cefepime (MAXIPIME) 1 g in 0.9% sodium chloride (MBP/ADV) 50 mL MBP, 1 g, IntraVENous, Q12H, Mony Marquez MD, Last Rate: 12.5 mL/hr at 07/22/22 2330, 1 g at 07/22/22 2330    apixaban (ELIQUIS) tablet 5 mg, 5 mg, Oral, BID, Eric Romeo MD, 5 mg at 07/22/22 1711    aspirin chewable tablet 81 mg, 81 mg, Oral, DAILY, Mony Marquez MD, 81 mg at 07/22/22 3015    famotidine (PEPCID) tablet 10 mg, 10 mg, Oral, BID, Eric Romeo MD, 10 mg at 07/22/22 1711    oxyCODONE IR (ROXICODONE) tablet 5 mg, 5 mg, Oral, Q4H PRN, Campos Officer, MD Mony    polyethylene glycol (MIRALAX) packet 17 g, 17 g, Oral, DAILY, Mony Marquez MD, 17 g at 07/22/22 3170    simethicone (MYLICON) tablet 80 mg, 80 mg, Oral, Q6H PRN, Eric Romeo MD    Vancomycin - pharmacy to dose, , Other, Rx Dosing/Monitoring, Eric Romeo MD    [Held by provider] dilTIAZem ER (CARDIZEM CD) capsule 120 mg, 120 mg, Oral, DAILY, MD Leni Bowens MD  [facility] [address] [address]  [place]  [phone]      CC: Marc Wu MD

## 2022-07-23 NOTE — PROGRESS NOTES
[address] [facility] Adult  Hospitalist Group                                                                                          Hospitalist Progress Note  Denona Griffith MD  Answering service: [phone] OR [phone] from in house phone        Date of Service:  2022  NAME:  Rehana Stoner  :  1932  MRN:  [id number]      Admission Summary:   Rehana Stoner is a 80 y.o. female who presents with shortness of breath and cough    Patient is a poor historian, patient is at [facility] rehab, patient had a ground-level fall, was found to have PEs, was also found to have pneumonia, was treated with Flagyl and Rocephin, was transitioned to oral cephalosporins, has chronic hypoxic respiratory failure and uses 4 L of oxygen at baseline, was sent over here with concerns for impending sepsis, patient was found to have a white count of 38,000 and was found to have mild hypotension associated with her symptoms, came to the ER and was requested to be admitted to the hospitalist service, currently patient is resting in bed, alert x1, is coughing but denies any other complaints or problems       Interval history / Subjective:    Follow up SOB   Continues on 4l NC (baseline)  No fever  Leukocytosis improving  Looks better  Assessment & Plan:     Acute on chronic hypoxic respiratory failure, 4l NC at baseline  SIRS (patient does not meet sepsis criteria)  Healthcare associated pneumonia  Pulmonary edema  -I/O  -Daily weight  -Cultures no growth   -Vanc/cefepime, stop vanc, continue Cefepime    Hypotension: started on midodrine    History of atrial flutter  Probable V tach vs flutter:   -Continue on cardizem/ASA/Eliquis  -Was put on Amiodarone  evening, now switching to oral amiodarone  -Appreciate discussion with cardiology, continue for now    Elevated troponin-per cardiology sec to above, not ACS  Acute kidney injury : improving on IV fluids  History of pulmonary embolism: on Eliquis, continue  Dysphagia:dysphagia diet    PT/OT home vs SNF     Code status: DNR  Prophylaxis: Eliquis  PTA: [facility]    Plan: Continue antibiotics, probable discharge tomorrow  Care Plan discussed with: Patient  Anticipated Disposition: ? [address] [facility] Problems  Date Reviewed: 5/13/2022            Codes Class Noted POA    Sepsis [facility]) ICD-10-CM: A41.9  ICD-9-CM: 038.9, 995.91  7/20/2022 Unknown         Review of Systems:   A comprehensive review of systems was negative except for that written in the HPI. Vital Signs:    Last 24hrs VS reviewed since prior progress note. Most recent are:  Visit Vitals  BP (!) 144/72   Pulse 89   Temp 97.6 °F (36.4 °C)   Resp 20   Ht 5' 1\" (1.549 m)   Wt 72.7 kg (160 lb 4.8 oz)   SpO2 93%   BMI 30.29 kg/m²         Intake/Output Summary (Last 24 hours) at 7/23/2022 1457  Last data filed at 7/23/2022 0846  Gross per 24 hour   Intake 806 ml   Output 850 ml   Net -44 ml          Physical Examination:     I had a face to face encounter with this patient and independently examined them on 7/23/2022 as outlined below:          Constitutional:  No acute distress   ENT:  Oral mucosa moist, oropharynx benign. Resp:  CTA bilaterally. No wheezing/rhonchi/rales. No accessory muscle use. CV:  Regular rhythm, normal rate, no murmurs, gallops, rubs    GI:  Soft, non distended, non tender. normoactive bowel sounds, no hepatosplenomegaly     Musculoskeletal:  No edema, warm, 2+ pulses throughout    Neurologic:  Moves all extremities.   AAOx3, CN II-XII reviewed            Data Review:    Review and/or order of clinical lab test      Labs:     Recent Labs     07/23/22  0436 07/22/22  0402   WBC 10.9 16.0*   HGB 9.8* 9.6*   HCT 29.9* 30.2*    319       Recent Labs     07/23/22  0436 07/22/22  0402 07/21/22  2047 07/21/22  0319    138  --  138   K 3.5 4.0  --  4.3    107  --  104   CO2 25 23  --  20*   BUN 40* 41*  --  37*   CREA 1.04* 1.09*  --  1.30*   GLU 110* 94  --  84   CA 9.2 8.9  --  9.0   MG  --   --  1.6  --        Recent Labs     07/21/22  0319   ALT 14   AP 95   TBILI 0.4   TP 6.4   ALB 1.7*   GLOB 4.7*       No results for input(s): INR, PTP, APTT, INREXT, INREXT in the last 72 hours. No results for input(s): FE, TIBC, PSAT, FERR in the last 72 hours. No results found for: FOL, RBCF   No results for input(s): PH, PCO2, PO2 in the last 72 hours. No results for input(s): CPK, CKNDX, TROIQ in the last 72 hours.     No lab exists for component: CPKMB  No results found for: CHOL, CHOLX, CHLST, CHOLV, HDL, HDLP, LDL, LDLC, DLDLP, TGLX, TRIGL, TRIGP, CHHD, CHHDX  No results found for: [facility]  Lab Results   Component Value Date/Time    Color DARK YELLOW 07/20/2022 03:37 PM    Appearance CLOUDY (A) 07/20/2022 03:37 PM    Specific gravity 1.018 07/20/2022 03:37 PM    pH (UA) 5.0 07/20/2022 03:37 PM    Protein 30 (A) 07/20/2022 03:37 PM    Glucose Negative 07/20/2022 03:37 PM    Ketone TRACE (A) 07/20/2022 03:37 PM    Bilirubin Negative 07/20/2022 03:37 PM    Urobilinogen 0.2 07/20/2022 03:37 PM    Nitrites Negative 07/20/2022 03:37 PM    Leukocyte Esterase TRACE (A) 07/20/2022 03:37 PM    Epithelial cells FEW 07/20/2022 03:37 PM    Bacteria Negative 07/20/2022 03:37 PM    WBC 0-4 07/20/2022 03:37 PM    RBC 5-10 07/20/2022 03:37 PM         Medications Reviewed:     Current Facility-Administered Medications   Medication Dose Route Frequency    cefepime (MAXIPIME) 2 g in 0.9% sodium chloride (MBP/ADV) 100 mL MBP  2 g IntraVENous Q12H    midodrine (PROAMATINE) tablet 5 mg  5 mg Oral BID WITH MEALS    amiodarone (CORDARONE) tablet 200 mg  200 mg Oral TID    vancomycin (VANCOCIN) 1,000 mg in 0.9% sodium chloride 250 mL ([id number])  1,000 mg IntraVENous Q24H    furosemide (LASIX) tablet 40 mg  40 mg Oral DAILY    albuterol-ipratropium (DUO-NEB) 2.5 MG-0.5 MG/3 ML  3 mL Nebulization BID RT    metoprolol (LOPRESSOR) injection 2.5 mg  2.5 mg IntraVENous Q6H PRN amiodarone (CORDARONE) 375 mg/250 mL D5W infusion  0.5-1 mg/min IntraVENous TITRATE    HYDROmorphone (DILAUDID) injection 0.5 mg  0.5 mg IntraVENous Q4H PRN    sodium chloride (NS) flush 5-10 mL  5-10 mL IntraVENous PRN    sodium chloride (NS) flush 5-40 mL  5-40 mL IntraVENous Q8H    sodium chloride (NS) flush 5-40 mL  5-40 mL IntraVENous PRN    acetaminophen (TYLENOL) tablet 650 mg  650 mg Oral Q4H PRN    apixaban (ELIQUIS) tablet 5 mg  5 mg Oral BID    aspirin chewable tablet 81 mg  81 mg Oral DAILY    famotidine (PEPCID) tablet 10 mg  10 mg Oral BID    oxyCODONE IR (ROXICODONE) tablet 5 mg  5 mg Oral Q4H PRN    polyethylene glycol (MIRALAX) packet 17 g  17 g Oral DAILY    simethicone (MYLICON) tablet 80 mg  80 mg Oral Q6H PRN    Vancomycin - pharmacy to dose   Other Rx Dosing/Monitoring     ______________________________________________________________________  EXPECTED LENGTH OF STAY: 4d 2h  ACTUAL LENGTH OF STAY:          Ute Moreno MD

## 2022-07-23 NOTE — PROGRESS NOTES
Renal Dosing/Monitoring  Medication: cefepime   Current regimen:  1 gram IV every 12 hr  Recent Labs     07/23/22  0436 07/22/22  0402 07/21/22  0319   CREA 1.04* 1.09* 1.30*   BUN 40* 41* 37*     Estimated CrCl:  32.8 ml/min  Plan: Change to 2 grams IV every 12 hours  per [facility] P&T Committee Protocol with respect to renal function. Pharmacy will continue to monitor patient daily and will make dosage adjustments based upon changing renal function.

## 2022-07-23 NOTE — PROGRESS NOTES
0730: Bedside and Verbal shift change report given to Aries Rojo RN (oncoming nurse) by Chidi Valentine RN (offgoing nurse). Report included the following information SBAR, Kardex, Intake/Output, MAR, Recent Results, and Cardiac Rhythm Sinus Rhythm . 5436Suzanne Boston MD updated about current BP of 178/73 and morning dose of midodrine being held. No new orders received. 1606: Per Gabriel Kim MD verbal order place orders for oral nutrition supplement of standard high calorie/high protein. 1930: Bedside and Verbal shift change report given to Chau Myles RN (oncoming nurse) by Aries Rojo RN (offgoing nurse). Report included the following information SBAR, Kardex, Intake/Output, MAR, Recent Results, and Cardiac Rhythm Sinus Rhythm . Patient refused Q2 turns throughout shift    Care Plans:   Problem: Aspiration - Risk of  Goal: *Absence of aspiration  Outcome: Progressing Towards Goal     Problem: Patient Education: Go to Patient Education Activity  Goal: Patient/Family Education  Outcome: Progressing Towards Goal     Problem: Pressure Injury - Risk of  Goal: *Prevention of pressure injury  Description: Document Fly Scale and appropriate interventions in the flowsheet.   Outcome: Progressing Towards Goal  Note: Pressure Injury Interventions:  Sensory Interventions: Assess changes in LOC, Assess need for specialty bed, Avoid rigorous massage over bony prominences, Float heels, Keep linens dry and wrinkle-free, Minimize linen layers, Maintain/enhance activity level    Moisture Interventions: Absorbent underpads, Apply protective barrier, creams and emollients, Assess need for specialty bed, Internal/External urinary devices, Limit adult briefs, Maintain skin hydration (lotion/cream), Minimize layers, Moisture barrier    Activity Interventions: Assess need for specialty bed, Increase time out of bed, Pressure redistribution bed/mattress(bed type)    Mobility Interventions: Assess need for specialty bed, Pressure redistribution bed/mattress (bed type), Turn and reposition approx. every two hours(pillow and wedges)    Nutrition Interventions: Document food/fluid/supplement intake    Friction and Shear Interventions: Lift sheet, Minimize layers                Problem: Patient Education: Go to Patient Education Activity  Goal: Patient/Family Education  Outcome: Progressing Towards Goal     Problem: Falls - Risk of  Goal: *Absence of Falls  Description: Document Sharri Fall Risk and appropriate interventions in the flowsheet.   Outcome: Progressing Towards Goal  Note: Fall Risk Interventions:  Mobility Interventions: Bed/chair exit alarm, Communicate number of staff needed for ambulation/transfer, Patient to call before getting OOB    Mentation Interventions: Adequate sleep, hydration, pain control, Bed/chair exit alarm, Door open when patient unattended, Increase mobility, More frequent rounding, Reorient patient, Self-releasing belt, Room close to nurse's station    Medication Interventions: Assess postural VS orthostatic hypotension, Bed/chair exit alarm, Patient to call before getting OOB, Teach patient to arise slowly    Elimination Interventions: Bed/chair exit alarm, Call light in reach, Patient to call for help with toileting needs, Stay With Me (per policy), Toilet paper/wipes in reach, Toileting schedule/hourly rounds    History of Falls Interventions: Bed/chair exit alarm, Door open when patient unattended, Investigate reason for fall         Problem: Patient Education: Go to Patient Education Activity  Goal: Patient/Family Education  Outcome: Progressing Towards Goal     Problem: Patient Education: Go to Patient Education Activity  Goal: Patient/Family Education  Outcome: Progressing Towards Goal     Problem: Patient Education: Go to Patient Education Activity  Goal: Patient/Family Education  Outcome: Progressing Towards Goal     Problem: Patient Education: Go to Patient Education Activity  Goal: Patient/Family Education  Outcome: Progressing Towards Goal

## 2022-07-24 LAB
ANION GAP SERPL CALC-SCNC: 5 MMOL/L (ref 5–15)
BASOPHILS # BLD: 0.1 K/UL (ref 0–0.1)
BASOPHILS NFR BLD: 1 % (ref 0–1)
BUN SERPL-MCNC: 37 MG/DL (ref 6–20)
BUN/CREAT SERPL: 37 (ref 12–20)
CALCIUM SERPL-MCNC: 9.3 MG/DL (ref 8.5–10.1)
CHLORIDE SERPL-SCNC: 106 MMOL/L (ref 97–108)
CO2 SERPL-SCNC: 28 MMOL/L (ref 21–32)
CREAT SERPL-MCNC: 0.99 MG/DL (ref 0.55–1.02)
DIFFERENTIAL METHOD BLD: ABNORMAL
EOSINOPHIL # BLD: 0.8 K/UL (ref 0–0.4)
EOSINOPHIL NFR BLD: 8 % (ref 0–7)
ERYTHROCYTE [DISTWIDTH] IN BLOOD BY AUTOMATED COUNT: 15.4 % (ref 11.5–14.5)
GLUCOSE SERPL-MCNC: 105 MG/DL (ref 65–100)
HCT VFR BLD AUTO: 31.2 % (ref 35–47)
HGB BLD-MCNC: 10.2 G/DL (ref 11.5–16)
IMM GRANULOCYTES # BLD AUTO: 0 K/UL
IMM GRANULOCYTES NFR BLD AUTO: 0 %
LYMPHOCYTES # BLD: 1.6 K/UL (ref 0.8–3.5)
LYMPHOCYTES NFR BLD: 17 % (ref 12–49)
MCH RBC QN AUTO: 29.6 PG (ref 26–34)
MCHC RBC AUTO-ENTMCNC: 32.7 G/DL (ref 30–36.5)
MCV RBC AUTO: 90.4 FL (ref 80–99)
MONOCYTES # BLD: 0.6 K/UL (ref 0–1)
MONOCYTES NFR BLD: 6 % (ref 5–13)
MYELOCYTES NFR BLD MANUAL: 1 %
NEUTS SEG # BLD: 6.4 K/UL (ref 1.8–8)
NEUTS SEG NFR BLD: 67 % (ref 32–75)
NRBC # BLD: 0 K/UL (ref 0–0.01)
NRBC BLD-RTO: 0 PER 100 WBC
PLATELET # BLD AUTO: 359 K/UL (ref 150–400)
PMV BLD AUTO: 9.1 FL (ref 8.9–12.9)
POTASSIUM SERPL-SCNC: 3.6 MMOL/L (ref 3.5–5.1)
RBC # BLD AUTO: 3.45 M/UL (ref 3.8–5.2)
RBC MORPH BLD: ABNORMAL
SODIUM SERPL-SCNC: 139 MMOL/L (ref 136–145)
WBC # BLD AUTO: 9.5 K/UL (ref 3.6–11)

## 2022-07-24 PROCEDURE — 74011250636 HC RX REV CODE- 250/636: Performed by: HOSPITALIST

## 2022-07-24 PROCEDURE — 74011250637 HC RX REV CODE- 250/637: Performed by: FAMILY MEDICINE

## 2022-07-24 PROCEDURE — 74011250637 HC RX REV CODE- 250/637: Performed by: NURSE PRACTITIONER

## 2022-07-24 PROCEDURE — 85025 COMPLETE CBC W/AUTO DIFF WBC: CPT

## 2022-07-24 PROCEDURE — 74011250637 HC RX REV CODE- 250/637: Performed by: INTERNAL MEDICINE

## 2022-07-24 PROCEDURE — 65660000001 HC RM ICU INTERMED STEPDOWN

## 2022-07-24 PROCEDURE — 80048 BASIC METABOLIC PNL TOTAL CA: CPT

## 2022-07-24 PROCEDURE — 74011000250 HC RX REV CODE- 250: Performed by: FAMILY MEDICINE

## 2022-07-24 PROCEDURE — 74011000258 HC RX REV CODE- 258: Performed by: HOSPITALIST

## 2022-07-24 PROCEDURE — 36415 COLL VENOUS BLD VENIPUNCTURE: CPT

## 2022-07-24 RX ADMIN — ACETAMINOPHEN 650 MG: 325 TABLET ORAL at 07:48

## 2022-07-24 RX ADMIN — AMIODARONE HYDROCHLORIDE 0.5 MG/MIN: 50 INJECTION, SOLUTION INTRAVENOUS at 23:30

## 2022-07-24 RX ADMIN — CEFEPIME 2 G: 2 INJECTION, POWDER, FOR SOLUTION INTRAVENOUS at 10:51

## 2022-07-24 RX ADMIN — AMIODARONE HYDROCHLORIDE 0.5 MG/MIN: 50 INJECTION, SOLUTION INTRAVENOUS at 10:52

## 2022-07-24 RX ADMIN — CEFEPIME 2 G: 2 INJECTION, POWDER, FOR SOLUTION INTRAVENOUS at 23:30

## 2022-07-24 RX ADMIN — FAMOTIDINE 10 MG: 20 TABLET ORAL at 08:29

## 2022-07-24 RX ADMIN — ACETAMINOPHEN 650 MG: 325 TABLET ORAL at 21:53

## 2022-07-24 RX ADMIN — SODIUM CHLORIDE, PRESERVATIVE FREE 10 ML: 5 INJECTION INTRAVENOUS at 17:23

## 2022-07-24 RX ADMIN — AMIODARONE HYDROCHLORIDE 200 MG: 200 TABLET ORAL at 08:29

## 2022-07-24 RX ADMIN — AMIODARONE HYDROCHLORIDE 200 MG: 200 TABLET ORAL at 17:23

## 2022-07-24 RX ADMIN — APIXABAN 5 MG: 5 TABLET, FILM COATED ORAL at 17:23

## 2022-07-24 RX ADMIN — SODIUM CHLORIDE, PRESERVATIVE FREE 10 ML: 5 INJECTION INTRAVENOUS at 21:49

## 2022-07-24 RX ADMIN — FAMOTIDINE 10 MG: 20 TABLET ORAL at 17:23

## 2022-07-24 RX ADMIN — FUROSEMIDE 40 MG: 40 TABLET ORAL at 08:29

## 2022-07-24 RX ADMIN — APIXABAN 5 MG: 5 TABLET, FILM COATED ORAL at 08:29

## 2022-07-24 RX ADMIN — ASPIRIN 81 MG CHEWABLE TABLET 81 MG: 81 TABLET CHEWABLE at 08:29

## 2022-07-24 RX ADMIN — AMIODARONE HYDROCHLORIDE 200 MG: 200 TABLET ORAL at 21:48

## 2022-07-24 NOTE — PROGRESS NOTES
0730: Bedside and Verbal shift change report given to Radha Shore RN (oncoming nurse) by Karen Mei RN (offgoing nurse). Report included the following information SBAR, Kardex, Intake/Output, MAR, Recent Results, and Cardiac Rhythm Sinus Rhythm . 1015Michelle Boyer MD updated about/shown on monitor patient having runs of a sinus rhythm with a wide QRS complex then returning to a regular sinus rhythm. MD Catherine requesting EKG of Sinus Rhythm with wide QRS. Patient currently in a regular sinus rhythm. 1150: Attempted EKG of Sinus rhythm with wide QRS. Patient returned to normal before EKG could be done. 1930:Bedside and Verbal shift change report given to ERIKA Lynne (oncoming nurse) by Radha Shore RN (offgoing nurse). Report included the following information SBAR, Kardex, Intake/Output, MAR, Recent Results, and Cardiac Rhythm Sinus Rhythm . Patient refused Q 2 turns throughout shift. Unsuccessful in obtaining EKG of wide QRS    Care Plans:   Problem: Aspiration - Risk of  Goal: *Absence of aspiration  Outcome: Progressing Towards Goal     Problem: Patient Education: Go to Patient Education Activity  Goal: Patient/Family Education  Outcome: Progressing Towards Goal     Problem: Pressure Injury - Risk of  Goal: *Prevention of pressure injury  Description: Document Fly Scale and appropriate interventions in the flowsheet.   Outcome: Progressing Towards Goal  Note: Pressure Injury Interventions:  Sensory Interventions: Assess changes in LOC    Moisture Interventions: Maintain skin hydration (lotion/cream)    Activity Interventions: Pressure redistribution bed/mattress(bed type)    Mobility Interventions: PT/OT evaluation    Nutrition Interventions: Document food/fluid/supplement intake    Friction and Shear Interventions: Minimize layers                Problem: Patient Education: Go to Patient Education Activity  Goal: Patient/Family Education  Outcome: Progressing Towards Goal     Problem: Falls - Risk of  Goal: *Absence of Falls  Description: Document Gene Higgins Fall Risk and appropriate interventions in the flowsheet.   Outcome: Progressing Towards Goal  Note: Fall Risk Interventions:  Mobility Interventions: PT Consult for mobility concerns    Mentation Interventions: Adequate sleep, hydration, pain control    Medication Interventions: Evaluate medications/consider consulting pharmacy    Elimination Interventions: Call light in reach    History of Falls Interventions: Bed/chair exit alarm, Door open when patient unattended, Evaluate medications/consider consulting pharmacy         Problem: Patient Education: Go to Patient Education Activity  Goal: Patient/Family Education  Outcome: Progressing Towards Goal     Problem: Patient Education: Go to Patient Education Activity  Goal: Patient/Family Education  Outcome: Progressing Towards Goal     Problem: Patient Education: Go to Patient Education Activity  Goal: Patient/Family Education  Outcome: Progressing Towards Goal     Problem: Patient Education: Go to Patient Education Activity  Goal: Patient/Family Education  Outcome: Progressing Towards Goal

## 2022-07-24 NOTE — PROGRESS NOTES
[address] [facility] Adult  Hospitalist Group                                                                                          Hospitalist Progress Note  Ambar Nur MD  Answering service: [phone] OR [phone] from in house phone        Date of Service:  2022  NAME:  Kassidy Duarte  :  1932  MRN:  [id number]      Admission Summary:   Kassidy Duarte is a 80 y.o. female who presents with shortness of breath and cough    Patient is a poor historian, patient is at [facility] rehab, patient had a ground-level fall, was found to have PEs, was also found to have pneumonia, was treated with Flagyl and Rocephin, was transitioned to oral cephalosporins, has chronic hypoxic respiratory failure and uses 4 L of oxygen at baseline, was sent over here with concerns for impending sepsis, patient was found to have a white count of 38,000 and was found to have mild hypotension associated with her symptoms, came to the ER and was requested to be admitted to the hospitalist service, currently patient is resting in bed, alert x1, is coughing but denies any other complaints or problems       Interval history / Subjective:    Follow up SOB   Continues on 4l NC (baseline)  No fever  Leukocytosis improving  Says her breathing is better  Noted her coughing during my visit but says \"it's alright\"  Assessment & Plan:     Acute on chronic hypoxic respiratory failure, 4l NC at baseline  SIRS (patient does not meet sepsis criteria)-now resolved  Healthcare associated pneumonia  Pulmonary edema  -I/O  -Daily weight  -Cultures no growth   -Vanc/cefepime, stopped vanc, continue Cefepime    Hypotension: started on midodrine, will stop as hypotension resolved  History of atrial flutter  Probable V tach vs flutter:   -Continue on cardizem/ASA/Eliquis  -Was put on Amiodarone  evening, now switched to oral amiodarone  -Appreciate discussion with cardiology, continue for now    Elevated troponin-per cardiology sec to above, not ACS  Acute kidney injury : now resolved  History of pulmonary embolism: on Eliquis, continue  Dysphagia:dysphagia diet    PT/OT home vs SNF     Code status: DNR  Prophylaxis: Eliquis  PTA: [facility]    Plan: Continue antibiotics, probable discharge tomorrow  Care Plan discussed with: Patient  Anticipated Disposition: ? [facility] Problems  Date Reviewed: 7/24/2022            Codes Class Noted POA    * (Principal) Sepsis ([facility] [address] ICD-10-CM: A41.9  ICD-9-CM: 038.9, 995.91  7/20/2022 Yes         Review of Systems:   A comprehensive review of systems was negative except for that written in the HPI. Vital Signs:    Last 24hrs VS reviewed since prior progress note. Most recent are:  Visit Vitals  BP (!) 145/49 (BP 1 Location: Right upper arm, BP Patient Position: At rest)   Pulse 76   Temp 98.3 °F (36.8 °C)   Resp 18   Ht 5' 1\" (1.549 m)   Wt 72.3 kg (159 lb 8 oz)   SpO2 97%   BMI 30.14 kg/m²         Intake/Output Summary (Last 24 hours) at 7/24/2022 1212  Last data filed at 7/24/2022 1100  Gross per 24 hour   Intake 1644.67 ml   Output 1650 ml   Net -5.33 ml          Physical Examination:     I had a face to face encounter with this patient and independently examined them on 7/24/2022 as outlined below:          Constitutional:  No acute distress   ENT:  Oral mucosa moist, oropharynx benign. Resp:  CTA bilaterally. No wheezing/rhonchi/rales. No accessory muscle use. CV:  Regular rhythm, normal rate, no murmurs, gallops, rubs    GI:  Soft, non distended, non tender. normoactive bowel sounds, no hepatosplenomegaly     Musculoskeletal:  No edema, warm, 2+ pulses throughout    Neurologic:  Moves all extremities.   AAOx3, CN II-XII reviewed            Data Review:    Review and/or order of clinical lab test      Labs:     Recent Labs     07/24/22  0553 07/23/22 0436   WBC 9.5 10.9   HGB 10.2* 9.8*   HCT 31.2* 29.9*    357       Recent Labs     07/24/22  0553 07/23/22 0436 07/22/22 0402 07/21/22 2047    138 138  --    K 3.6 3.5 4.0  --     107 107  --    CO2 28 25 23  --    BUN 37* 40* 41*  --    CREA 0.99 1.04* 1.09*  --    * 110* 94  --    CA 9.3 9.2 8.9  --    MG  --   --   --  1.6       No results for input(s): ALT, AP, TBIL, TBILI, TP, ALB, GLOB, GGT, AML, LPSE in the last 72 hours. No lab exists for component: SGOT, GPT, AMYP, HLPSE    No results for input(s): INR, PTP, APTT, INREXT, INREXT in the last 72 hours. No results for input(s): FE, TIBC, PSAT, FERR in the last 72 hours. No results found for: FOL, RBCF   No results for input(s): PH, PCO2, PO2 in the last 72 hours. No results for input(s): CPK, CKNDX, TROIQ in the last 72 hours.     No lab exists for component: CPKMB  No results found for: CHOL, CHOLX, CHLST, CHOLV, HDL, HDLP, LDL, LDLC, DLDLP, TGLX, TRIGL, TRIGP, CHHD, CHHDX  No results found for: [facility]  Lab Results   Component Value Date/Time    Color DARK YELLOW 07/20/2022 03:37 PM    Appearance CLOUDY (A) 07/20/2022 03:37 PM    Specific gravity 1.018 07/20/2022 03:37 PM    pH (UA) 5.0 07/20/2022 03:37 PM    Protein 30 (A) 07/20/2022 03:37 PM    Glucose Negative 07/20/2022 03:37 PM    Ketone TRACE (A) 07/20/2022 03:37 PM    Bilirubin Negative 07/20/2022 03:37 PM    Urobilinogen 0.2 07/20/2022 03:37 PM    Nitrites Negative 07/20/2022 03:37 PM    Leukocyte Esterase TRACE (A) 07/20/2022 03:37 PM    Epithelial cells FEW 07/20/2022 03:37 PM    Bacteria Negative 07/20/2022 03:37 PM    WBC 0-4 07/20/2022 03:37 PM    RBC 5-10 07/20/2022 03:37 PM         Medications Reviewed:     Current Facility-Administered Medications   Medication Dose Route Frequency    cefepime (MAXIPIME) 2 g in 0.9% sodium chloride (MBP/ADV) 100 mL MBP  2 g IntraVENous Q12H    midodrine (PROAMATINE) tablet 5 mg  5 mg Oral BID WITH MEALS    amiodarone (CORDARONE) tablet 200 mg  200 mg Oral TID    albuterol-ipratropium (DUO-NEB) 2.5 MG-0.5 MG/3 ML  3 mL Nebulization Q4H PRN furosemide (LASIX) tablet 40 mg  40 mg Oral DAILY    metoprolol (LOPRESSOR) injection 2.5 mg  2.5 mg IntraVENous Q6H PRN    amiodarone (CORDARONE) 375 mg/250 mL D5W infusion  0.5-1 mg/min IntraVENous TITRATE    HYDROmorphone (DILAUDID) injection 0.5 mg  0.5 mg IntraVENous Q4H PRN    sodium chloride (NS) flush 5-10 mL  5-10 mL IntraVENous PRN    sodium chloride (NS) flush 5-40 mL  5-40 mL IntraVENous Q8H    sodium chloride (NS) flush 5-40 mL  5-40 mL IntraVENous PRN    acetaminophen (TYLENOL) tablet 650 mg  650 mg Oral Q4H PRN    apixaban (ELIQUIS) tablet 5 mg  5 mg Oral BID    aspirin chewable tablet 81 mg  81 mg Oral DAILY    famotidine (PEPCID) tablet 10 mg  10 mg Oral BID    oxyCODONE IR (ROXICODONE) tablet 5 mg  5 mg Oral Q4H PRN    polyethylene glycol (MIRALAX) packet 17 g  17 g Oral DAILY    simethicone (MYLICON) tablet 80 mg  80 mg Oral Q6H PRN     ______________________________________________________________________  EXPECTED LENGTH OF STAY: 4d 2h  ACTUAL LENGTH OF STAY:          4                 Claude Dobbs MD

## 2022-07-25 LAB
ANION GAP SERPL CALC-SCNC: 5 MMOL/L (ref 5–15)
BACTERIA SPEC CULT: NORMAL
BACTERIA SPEC CULT: NORMAL
BASOPHILS # BLD: 0.2 K/UL (ref 0–0.1)
BASOPHILS NFR BLD: 2 % (ref 0–1)
BUN SERPL-MCNC: 37 MG/DL (ref 6–20)
BUN/CREAT SERPL: 32 (ref 12–20)
CALCIUM SERPL-MCNC: 9.3 MG/DL (ref 8.5–10.1)
CHLORIDE SERPL-SCNC: 104 MMOL/L (ref 97–108)
CO2 SERPL-SCNC: 29 MMOL/L (ref 21–32)
CREAT SERPL-MCNC: 1.15 MG/DL (ref 0.55–1.02)
DIFFERENTIAL METHOD BLD: ABNORMAL
EOSINOPHIL # BLD: 0.5 K/UL (ref 0–0.4)
EOSINOPHIL NFR BLD: 6 % (ref 0–7)
ERYTHROCYTE [DISTWIDTH] IN BLOOD BY AUTOMATED COUNT: 15.4 % (ref 11.5–14.5)
GLUCOSE SERPL-MCNC: 119 MG/DL (ref 65–100)
HCT VFR BLD AUTO: 33.1 % (ref 35–47)
HGB BLD-MCNC: 10.6 G/DL (ref 11.5–16)
IMM GRANULOCYTES # BLD AUTO: 0 K/UL
IMM GRANULOCYTES NFR BLD AUTO: 0 %
LYMPHOCYTES # BLD: 2.3 K/UL (ref 0.8–3.5)
LYMPHOCYTES NFR BLD: 26 % (ref 12–49)
MCH RBC QN AUTO: 29 PG (ref 26–34)
MCHC RBC AUTO-ENTMCNC: 32 G/DL (ref 30–36.5)
MCV RBC AUTO: 90.7 FL (ref 80–99)
METAMYELOCYTES NFR BLD MANUAL: 4 %
MONOCYTES # BLD: 0.5 K/UL (ref 0–1)
MONOCYTES NFR BLD: 6 % (ref 5–13)
NEUTS SEG # BLD: 5 K/UL (ref 1.8–8)
NEUTS SEG NFR BLD: 56 % (ref 32–75)
NRBC # BLD: 0 K/UL (ref 0–0.01)
NRBC BLD-RTO: 0 PER 100 WBC
PLATELET # BLD AUTO: 367 K/UL (ref 150–400)
PLATELET COMMENTS,PCOM: ABNORMAL
PMV BLD AUTO: 8.7 FL (ref 8.9–12.9)
POTASSIUM SERPL-SCNC: 3.4 MMOL/L (ref 3.5–5.1)
RBC # BLD AUTO: 3.65 M/UL (ref 3.8–5.2)
RBC MORPH BLD: ABNORMAL
SERVICE CMNT-IMP: NORMAL
SERVICE CMNT-IMP: NORMAL
SODIUM SERPL-SCNC: 138 MMOL/L (ref 136–145)
TOTAL CELLS COUNTED SPEC: 50
WBC # BLD AUTO: 8.9 K/UL (ref 3.6–11)

## 2022-07-25 PROCEDURE — 74011000258 HC RX REV CODE- 258: Performed by: HOSPITALIST

## 2022-07-25 PROCEDURE — 36415 COLL VENOUS BLD VENIPUNCTURE: CPT

## 2022-07-25 PROCEDURE — 74011250637 HC RX REV CODE- 250/637: Performed by: NURSE PRACTITIONER

## 2022-07-25 PROCEDURE — 85025 COMPLETE CBC W/AUTO DIFF WBC: CPT

## 2022-07-25 PROCEDURE — 97530 THERAPEUTIC ACTIVITIES: CPT

## 2022-07-25 PROCEDURE — 74011000250 HC RX REV CODE- 250: Performed by: FAMILY MEDICINE

## 2022-07-25 PROCEDURE — 65660000001 HC RM ICU INTERMED STEPDOWN

## 2022-07-25 PROCEDURE — 74011250637 HC RX REV CODE- 250/637: Performed by: INTERNAL MEDICINE

## 2022-07-25 PROCEDURE — 77010033678 HC OXYGEN DAILY

## 2022-07-25 PROCEDURE — 80048 BASIC METABOLIC PNL TOTAL CA: CPT

## 2022-07-25 PROCEDURE — 74011250637 HC RX REV CODE- 250/637: Performed by: FAMILY MEDICINE

## 2022-07-25 PROCEDURE — 92526 ORAL FUNCTION THERAPY: CPT | Performed by: SPEECH-LANGUAGE PATHOLOGIST

## 2022-07-25 PROCEDURE — 74011250636 HC RX REV CODE- 250/636: Performed by: HOSPITALIST

## 2022-07-25 RX ORDER — ONDANSETRON 2 MG/ML
4 INJECTION INTRAMUSCULAR; INTRAVENOUS
Status: DISCONTINUED | OUTPATIENT
Start: 2022-07-25 | End: 2022-08-03 | Stop reason: HOSPADM

## 2022-07-25 RX ADMIN — ONDANSETRON HYDROCHLORIDE 4 MG: 2 SOLUTION INTRAMUSCULAR; INTRAVENOUS at 10:39

## 2022-07-25 RX ADMIN — AMIODARONE HYDROCHLORIDE 0.5 MG/MIN: 50 INJECTION, SOLUTION INTRAVENOUS at 12:33

## 2022-07-25 RX ADMIN — FUROSEMIDE 40 MG: 40 TABLET ORAL at 08:39

## 2022-07-25 RX ADMIN — APIXABAN 5 MG: 5 TABLET, FILM COATED ORAL at 08:39

## 2022-07-25 RX ADMIN — CEFEPIME 2 G: 2 INJECTION, POWDER, FOR SOLUTION INTRAVENOUS at 22:13

## 2022-07-25 RX ADMIN — AMIODARONE HYDROCHLORIDE 200 MG: 200 TABLET ORAL at 22:12

## 2022-07-25 RX ADMIN — FAMOTIDINE 10 MG: 20 TABLET ORAL at 18:42

## 2022-07-25 RX ADMIN — SODIUM CHLORIDE, PRESERVATIVE FREE 10 ML: 5 INJECTION INTRAVENOUS at 18:43

## 2022-07-25 RX ADMIN — OXYCODONE 5 MG: 5 TABLET ORAL at 01:37

## 2022-07-25 RX ADMIN — SODIUM CHLORIDE, PRESERVATIVE FREE 10 ML: 5 INJECTION INTRAVENOUS at 06:00

## 2022-07-25 RX ADMIN — CEFEPIME 2 G: 2 INJECTION, POWDER, FOR SOLUTION INTRAVENOUS at 10:39

## 2022-07-25 RX ADMIN — APIXABAN 5 MG: 5 TABLET, FILM COATED ORAL at 18:42

## 2022-07-25 RX ADMIN — AMIODARONE HYDROCHLORIDE 200 MG: 200 TABLET ORAL at 18:41

## 2022-07-25 RX ADMIN — ASPIRIN 81 MG CHEWABLE TABLET 81 MG: 81 TABLET CHEWABLE at 08:38

## 2022-07-25 RX ADMIN — SODIUM CHLORIDE, PRESERVATIVE FREE 10 ML: 5 INJECTION INTRAVENOUS at 22:13

## 2022-07-25 RX ADMIN — AMIODARONE HYDROCHLORIDE 200 MG: 200 TABLET ORAL at 08:39

## 2022-07-25 RX ADMIN — HYDROMORPHONE HYDROCHLORIDE 0.5 MG: 1 INJECTION, SOLUTION INTRAMUSCULAR; INTRAVENOUS; SUBCUTANEOUS at 03:15

## 2022-07-25 RX ADMIN — FAMOTIDINE 10 MG: 20 TABLET ORAL at 08:38

## 2022-07-25 NOTE — PROGRESS NOTES
[address] [facility] Adult  Hospitalist Group                                                                                          Hospitalist Progress Note  Olga Lidia Sánchez MD  Answering service: [phone] OR [phone] from in house phone        Date of Service:  2022  NAME:  Anneliese Roy  :  1932  MRN:  [id number]      Admission Summary:   Anneliese Roy is a 80 y.o. female who presents with shortness of breath and cough    Patient is a poor historian, patient is at [facility] rehab, patient had a ground-level fall, was found to have PEs, was also found to have pneumonia, was treated with Flagyl and Rocephin, was transitioned to oral cephalosporins, has chronic hypoxic respiratory failure and uses 4 L of oxygen at baseline, was sent over here with concerns for impending sepsis, patient was found to have a white count of 38,000 and was found to have mild hypotension associated with her symptoms, came to the ER and was requested to be admitted to the hospitalist service, currently patient is resting in bed, alert x1, is coughing but denies any other complaints or problems       Interval history / Subjective:    Follow up SOB   Continues on 4l NC (baseline)  No fever  Leukocytosis resolved  Says her breathing is better  Says she had vomiting overnight twice but per RN spitted phlegm just once   Assessment & Plan:     Acute on chronic hypoxic respiratory failure, 4l NC at baseline  SIRS (patient does not meet sepsis criteria)-now resolved  Healthcare associated pneumonia  Pulmonary edema  -I/O  -Daily weight  -Cultures no growth   -Vanc/cefepime, stopped vanc, continue Cefepime (--)    Hypotension: now resolved  History of atrial flutter  Probable V tach vs flutter:   -Continue on cardizem/ASA/Eliquis/oral amiodarone  -Was put on Amiodarone  evening, now switched to oral amiodarone  -Appreciate cardiology    Elevated troponin-per cardiology sec to above, not ACS  Acute kidney injury : now resolved  History of pulmonary embolism: on Eliquis, continue  Dysphagia:dysphagia diet    PT/OT home vs SNF     Code status: DNR  Prophylaxis: Eliquis  PTA: [facility]    Plan: Continue antibiotics, probable discharge tomorrow  Care Plan discussed with: Patient  Anticipated Disposition: ? [address] [facility] Problems  Date Reviewed: 7/24/2022            Codes Class Noted POA    * (Principal) Sepsis ([address] ICD-10-CM: A41.9  ICD-9-CM: 038.9, 995.91  7/20/2022 Yes       Review of Systems:   A comprehensive review of systems was negative except for that written in the HPI. Vital Signs:    Last 24hrs VS reviewed since prior progress note. Most recent are:  Visit Vitals  BP (!) 139/47 (BP 1 Location: Right upper arm, BP Patient Position: At rest)   Pulse 79   Temp 98.1 °F (36.7 °C)   Resp 18   Ht 5' 1\" (1.549 m)   Wt 72.3 kg (159 lb 8 oz)   SpO2 94%   BMI 30.14 kg/m²         Intake/Output Summary (Last 24 hours) at 7/25/2022 1025  Last data filed at 7/25/2022 0400  Gross per 24 hour   Intake 1280.67 ml   Output 300 ml   Net 980.67 ml          Physical Examination:     I had a face to face encounter with this patient and independently examined them on 7/25/2022 as outlined below:          Constitutional:  No acute distress   ENT:  Oral mucosa moist, oropharynx benign. Resp:  CTA bilaterally. No wheezing/rhonchi/rales. No accessory muscle use. CV:  Regular rhythm, normal rate, no murmurs, gallops, rubs    GI:  Soft, non distended, non tender. normoactive bowel sounds, no hepatosplenomegaly     Musculoskeletal:  No edema, warm, 2+ pulses throughout    Neurologic:  Moves all extremities.   AAOx3, CN II-XII reviewed            Data Review:    Review and/or order of clinical lab test      Labs:     Recent Labs     07/25/22  0100 07/24/22  0553   WBC 8.9 9.5   HGB 10.6* 10.2*   HCT 33.1* 31.2*    359       Recent Labs     07/25/22  0100 07/24/22  0553 07/23/22  0436    139 138   K 3.4* 3.6 3.5    106 107   CO2 29 28 25   BUN 37* 37* 40*   CREA 1.15* 0.99 1.04*   * 105* 110*   CA 9.3 9.3 9.2       No results for input(s): ALT, AP, TBIL, TBILI, TP, ALB, GLOB, GGT, AML, LPSE in the last 72 hours. No lab exists for component: SGOT, GPT, AMYP, HLPSE    No results for input(s): INR, PTP, APTT, INREXT, INREXT in the last 72 hours. No results for input(s): FE, TIBC, PSAT, FERR in the last 72 hours. No results found for: FOL, RBCF   No results for input(s): PH, PCO2, PO2 in the last 72 hours. No results for input(s): CPK, CKNDX, TROIQ in the last 72 hours.     No lab exists for component: CPKMB  No results found for: CHOL, CHOLX, CHLST, CHOLV, HDL, HDLP, LDL, LDLC, DLDLP, TGLX, TRIGL, TRIGP, CHHD, CHHDX  No results found for: [facility]  Lab Results   Component Value Date/Time    Color DARK YELLOW 07/20/2022 03:37 PM    Appearance CLOUDY (A) 07/20/2022 03:37 PM    Specific gravity 1.018 07/20/2022 03:37 PM    pH (UA) 5.0 07/20/2022 03:37 PM    Protein 30 (A) 07/20/2022 03:37 PM    Glucose Negative 07/20/2022 03:37 PM    Ketone TRACE (A) 07/20/2022 03:37 PM    Bilirubin Negative 07/20/2022 03:37 PM    Urobilinogen 0.2 07/20/2022 03:37 PM    Nitrites Negative 07/20/2022 03:37 PM    Leukocyte Esterase TRACE (A) 07/20/2022 03:37 PM    Epithelial cells FEW 07/20/2022 03:37 PM    Bacteria Negative 07/20/2022 03:37 PM    WBC 0-4 07/20/2022 03:37 PM    RBC 5-10 07/20/2022 03:37 PM         Medications Reviewed:     Current Facility-Administered Medications   Medication Dose Route Frequency    cefepime (MAXIPIME) 2 g in 0.9% sodium chloride (MBP/ADV) 100 mL MBP  2 g IntraVENous Q12H    amiodarone (CORDARONE) tablet 200 mg  200 mg Oral TID    albuterol-ipratropium (DUO-NEB) 2.5 MG-0.5 MG/3 ML  3 mL Nebulization Q4H PRN    furosemide (LASIX) tablet 40 mg  40 mg Oral DAILY    metoprolol (LOPRESSOR) injection 2.5 mg  2.5 mg IntraVENous Q6H PRN    amiodarone (CORDARONE) 375 mg/250 mL D5W infusion  0.5-1 mg/min IntraVENous TITRATE    HYDROmorphone (DILAUDID) injection 0.5 mg  0.5 mg IntraVENous Q4H PRN    sodium chloride (NS) flush 5-10 mL  5-10 mL IntraVENous PRN    sodium chloride (NS) flush 5-40 mL  5-40 mL IntraVENous Q8H    sodium chloride (NS) flush 5-40 mL  5-40 mL IntraVENous PRN    acetaminophen (TYLENOL) tablet 650 mg  650 mg Oral Q4H PRN    apixaban (ELIQUIS) tablet 5 mg  5 mg Oral BID    aspirin chewable tablet 81 mg  81 mg Oral DAILY    famotidine (PEPCID) tablet 10 mg  10 mg Oral BID    oxyCODONE IR (ROXICODONE) tablet 5 mg  5 mg Oral Q4H PRN    polyethylene glycol (MIRALAX) packet 17 g  17 g Oral DAILY    simethicone (MYLICON) tablet 80 mg  80 mg Oral Q6H PRN     ______________________________________________________________________  EXPECTED LENGTH OF STAY: 4d 2h  ACTUAL LENGTH OF STAY:          Adela Tompkins MD

## 2022-07-25 NOTE — PROGRESS NOTES
Problem: Aspiration - Risk of  Goal: *Absence of aspiration  Outcome: Progressing Towards Goal     Problem: Pressure Injury - Risk of  Goal: *Prevention of pressure injury  Description: Document Fly Scale and appropriate interventions in the flowsheet. Outcome: Progressing Towards Goal  Note: Pressure Injury Interventions:  Sensory Interventions: Assess changes in LOC, Check visual cues for pain, Keep linens dry and wrinkle-free, Minimize linen layers    Moisture Interventions: Absorbent underpads, Apply protective barrier, creams and emollients, Check for incontinence Q2 hours and as needed, Internal/External urinary devices, Minimize layers    Activity Interventions: Pressure redistribution bed/mattress(bed type), PT/OT evaluation    Mobility Interventions: HOB 30 degrees or less, Pressure redistribution bed/mattress (bed type), PT/OT evaluation    Nutrition Interventions: Document food/fluid/supplement intake    Friction and Shear Interventions: Minimize layers                Problem: Falls - Risk of  Goal: *Absence of Falls  Description: Document Sharri Fall Risk and appropriate interventions in the flowsheet.   Outcome: Progressing Towards Goal  Note: Fall Risk Interventions:  Mobility Interventions: Bed/chair exit alarm, Communicate number of staff needed for ambulation/transfer, Patient to call before getting OOB    Mentation Interventions: Adequate sleep, hydration, pain control, Bed/chair exit alarm, Door open when patient unattended, More frequent rounding    Medication Interventions: Assess postural VS orthostatic hypotension, Bed/chair exit alarm, Evaluate medications/consider consulting pharmacy, Patient to call before getting OOB, Teach patient to arise slowly    Elimination Interventions: Bed/chair exit alarm, Call light in reach, Patient to call for help with toileting needs, Toileting schedule/hourly rounds    History of Falls Interventions: Bed/chair exit alarm, Investigate reason for fall, Room close to nurse's station, Vital signs minimum Q4HRs X 24 hrs (comment for end date)

## 2022-07-25 NOTE — PROGRESS NOTES
Transition of Care:    RUR - 14%    Disposition: return to [facility] - updated PT/OT notes pending vs SNF    Transport: BLS      Cm spoke with Bk Daniel with [facility] and she is following patient. CM provided listing of SNF to  to review as backup plan (likely prefers [facility]). CM will follow. PABLO Lovell        Spoke with Mr. Sesar Lopes,  cell# [phone]- agreeable to SNF backup plan and has chosen the following facilites:    [address]- referral pending in [facility]- referral pending in [facility]- referral pending in [facility]    Transition of Care Plan:     The Plan for Transition of Care is related to the following treatment goals: SNF    The Patient and/or patient representative  was provided with a choice of provider and agrees  with the discharge plan. Yes [x] No []    A Freedom of choice list was provided with basic dialogue that supports the patient's individualized plan of care/goals and shares the quality data associated with the providers.        Yes [x] No []

## 2022-07-25 NOTE — PROGRESS NOTES
Problem: Mobility Impaired (Adult and Pediatric)  Goal: *Acute Goals and Plan of Care (Insert Text)  Description: FUNCTIONAL STATUS PRIOR TO ADMISSION: Patient was independent without DME prior to July 3rd when she suffered a fall (sprained ankle). She was d/c'ed home with a CAM boot and had difficulty mobilizing within her home ( stated they were even calling the rescue squad to assist her to the bathroom) and then eventually suffered a fall again and led to admission at [address] (PEs, PNA). DOES NOT wear O2 at home at baseline but was on O2 following her hospitalization due to PNA and PEs. Stated she had not participated with therapy much at [facility] (where she was d/c'ed after [address] admission). HOME SUPPORT PRIOR TO ADMISSION: The patient lived with supportive  who is active and independent. Stated he rarely assisted her with care or mobility at baseline. Physical Therapy Goals  Initiated 7/21/2022  1. Patient will move from supine to sit and sit to supine , scoot up and down, and roll side to side in bed with moderate assistance within 7 day(s). 2.  Patient will tolerate bed in chair position 30 min BID within 7 days. 3.  Patient will sit EOB x5 min with max assist within 7 days. 4.  Patient will participate in supine HEP with AAROM (due to pain) within 7 days. Outcome: Progressing Towards Goal   PHYSICAL THERAPY TREATMENT  Patient: Cecilia Su (27 y.o. female)  Date: 7/25/2022  Diagnosis: Sepsis ([facility] [address] [A41.9] Sepsis ([facility] [address]      Precautions: Fall, DNR (baseline 4L/min)  Chart, physical therapy assessment, plan of care and goals were reviewed. ASSESSMENT  Patient continues with skilled PT services and is progressing towards goals. Patient demonstrates improvements in activity tolerance and sitting balance. Pt performed rolling for toileting needs then transferred to side of bed. Pt tolerated sitting on side of bed with supervision.  Pt progressed to transfer training demonstrating narrow KRYSTINA. Pt attempted to sidestep then returned to side of bed and scooted laterally. Pt positioned in modified chair position in bed. Pt working toward tolerating IPR upon discharge. Current Level of Function Impacting Discharge (mobility/balance): mod A supine to sit    Other factors to consider for discharge: fall risk, from [facility]         PLAN :  Patient continues to benefit from skilled intervention to address the above impairments. Continue treatment per established plan of care. to address goals. Recommendation for discharge: (in order for the patient to meet his/her long term goals)  Therapy 3 hours per day 5-7 days per week  If not then SNF  This discharge recommendation:  Has been made in collaboration with the attending provider and/or case management    IF patient discharges home will need the following DME: wheelchair       SUBJECTIVE:   Patient stated Ap Pineda say yes.     OBJECTIVE DATA SUMMARY:   Critical Behavior:  Neurologic State: Alert  Orientation Level: Oriented X4  Cognition: Appropriate decision making, Appropriate for age attention/concentration, Appropriate safety awareness, Follows commands  Safety/Judgement: Awareness of environment  Functional Mobility Training:  Bed Mobility:  Rolling: Moderate assistance;Maximum assistance  Supine to Sit: Moderate assistance  Sit to Supine: Maximum assistance;Assist x2  Scooting: Maximum assistance        Transfers:  Sit to Stand: Maximum assistance;Assist x2  Stand to Sit: Maximum assistance;Assist x2    Balance:  Sitting: Impaired; Without support  Sitting - Static: Good (unsupported)  Sitting - Dynamic: Fair (occasional)  Standing: Impaired; With support  Standing - Static: Fair;Constant support  Standing - Dynamic : Poor      Activity Tolerance:   Fair and requires rest breaks    After treatment patient left in no apparent distress:   Supine in bed, Call bell within reach, Caregiver / family present, and Side rails x 3    COMMUNICATION/COLLABORATION: The patients plan of care was discussed with: Occupational therapist, Registered nurse, and Case management.      Giulia Hill, PT   Time Calculation: 19 mins

## 2022-07-25 NOTE — PROGRESS NOTES
Problem: Dysphagia (Adult)  Goal: *Acute Goals and Plan of Care (Insert Text)  Description: Speech pathology goals initiated 7/21/2022   1. Patient will tolerate ice chips and bites of applesauce with meds crushed free of s/s aspiration. MET 7/22/2022  2. Patient will participate in instrumental testing pending goals of care  Added 7/22/2022: 3. Patient will tolerate Soft&Bite-Sized/Thin Liquids without signs of aspiration within 7 days. Outcome: Progressing Towards Goal     SPEECH LANGUAGE PATHOLOGY DYSPHAGIA TREATMENT  Patient: Nav Decker (8 y.o. female)  Date: 7/25/2022  Diagnosis: Sepsis ([facility] [address] [A41.9] Sepsis ([facility] [address]      Precautions:  Fall, DNR (baseline 4L/min)    ASSESSMENT:  Patient reports nausea this morning and agreed to sips of ginger ale only. Patient tolerated thin liquids with double swallow x 1 and belching after most PO trials. Vital signs remained stable throughout session. Patient demonstrated cough with expectoration x 1 while talking. Limited PO trials this date however patient appears to be tolerating thin liquids. Nausea could indicate increased risk for post prandial aspiration. PLAN:  Recommendations and Planned Interventions:  Soft and bite sized diet  Thin liquids in single sips  Sit up for all PO  Patient continues to benefit from skilled intervention to address the above impairments. Continue treatment per established plan of care. Discharge Recommendations: To Be Determined     SUBJECTIVE:   Patient stated I can't live like this however patient unable to elaborate. Patient and RN report nausea this morning. 4 L via NC, SpO2 97%, RR 26.     OBJECTIVE:   Cognitive and Communication Status:  Neurologic State: Alert  Orientation Level: Oriented X4  Cognition: Appropriate decision making, Appropriate for age attention/concentration, Appropriate safety awareness, Follows commands  Perception: Appears intact  Perseveration: No perseveration noted  Safety/Judgement: Awareness of environment  Dysphagia Treatment:  Oral Assessment:     P.O. Trials:  Patient Position: Upright in bed  Vocal quality prior to P.O.: No impairment  Consistency Presented: Thin liquid  How Presented: Self-fed/presented;Straw     Bolus Acceptance: No impairment  Bolus Formation/Control: No impairment     Propulsion: No impairment  Oral Residue: None        Aspiration Signs/Symptoms: None  Pharyngeal Phase Characteristics: Double swallow                   After treatment:   Patient left in no apparent distress in bed, Call bell within reach, and Nursing notified    COMMUNICATION/EDUCATION:   Patient was educated regarding role of SLP and POC. Patient with off topic. Further education warranted. The patient's plan of care including recommendations, planned interventions, and recommended diet changes were discussed with: Registered nurse.      TIFFANY Soriano  Time Calculation: 15 mins

## 2022-07-25 NOTE — PROGRESS NOTES
1930: Bedside and Verbal shift change report given to ERIKA Lynne (oncoming nurse) by Pam Ware RN (offgoing nurse).  Report included the following information SBAR, Kardex, Intake/Output, MAR, Recent Results, and Cardiac Rhythm SR .

## 2022-07-26 ENCOUNTER — APPOINTMENT (OUTPATIENT)
Dept: GENERAL RADIOLOGY | Age: 87
DRG: 193 | End: 2022-07-26
Attending: HOSPITALIST
Payer: MEDICARE

## 2022-07-26 LAB
ANION GAP SERPL CALC-SCNC: 6 MMOL/L (ref 5–15)
BUN SERPL-MCNC: 36 MG/DL (ref 6–20)
BUN/CREAT SERPL: 32 (ref 12–20)
CALCIUM SERPL-MCNC: 9.2 MG/DL (ref 8.5–10.1)
CHLORIDE SERPL-SCNC: 101 MMOL/L (ref 97–108)
CO2 SERPL-SCNC: 31 MMOL/L (ref 21–32)
COMMENT, HOLDF: NORMAL
CREAT SERPL-MCNC: 1.13 MG/DL (ref 0.55–1.02)
GLUCOSE SERPL-MCNC: 112 MG/DL (ref 65–100)
POTASSIUM SERPL-SCNC: 3.4 MMOL/L (ref 3.5–5.1)
SAMPLES BEING HELD,HOLD: NORMAL
SODIUM SERPL-SCNC: 138 MMOL/L (ref 136–145)

## 2022-07-26 PROCEDURE — 97530 THERAPEUTIC ACTIVITIES: CPT

## 2022-07-26 PROCEDURE — 92526 ORAL FUNCTION THERAPY: CPT | Performed by: SPEECH-LANGUAGE PATHOLOGIST

## 2022-07-26 PROCEDURE — 74011250637 HC RX REV CODE- 250/637: Performed by: NURSE PRACTITIONER

## 2022-07-26 PROCEDURE — 80048 BASIC METABOLIC PNL TOTAL CA: CPT

## 2022-07-26 PROCEDURE — 65660000001 HC RM ICU INTERMED STEPDOWN

## 2022-07-26 PROCEDURE — 74011250637 HC RX REV CODE- 250/637: Performed by: INTERNAL MEDICINE

## 2022-07-26 PROCEDURE — 71045 X-RAY EXAM CHEST 1 VIEW: CPT

## 2022-07-26 PROCEDURE — 77030038269 HC DRN EXT URIN PURWCK BARD -A

## 2022-07-26 PROCEDURE — 74011000250 HC RX REV CODE- 250: Performed by: FAMILY MEDICINE

## 2022-07-26 PROCEDURE — 74011250636 HC RX REV CODE- 250/636: Performed by: HOSPITALIST

## 2022-07-26 PROCEDURE — 74011250637 HC RX REV CODE- 250/637: Performed by: FAMILY MEDICINE

## 2022-07-26 PROCEDURE — 74011000250 HC RX REV CODE- 250: Performed by: HOSPITALIST

## 2022-07-26 PROCEDURE — 74011000258 HC RX REV CODE- 258: Performed by: HOSPITALIST

## 2022-07-26 PROCEDURE — 74011250637 HC RX REV CODE- 250/637: Performed by: HOSPITALIST

## 2022-07-26 PROCEDURE — 77010033678 HC OXYGEN DAILY

## 2022-07-26 PROCEDURE — 94640 AIRWAY INHALATION TREATMENT: CPT

## 2022-07-26 PROCEDURE — 36415 COLL VENOUS BLD VENIPUNCTURE: CPT

## 2022-07-26 RX ORDER — IPRATROPIUM BROMIDE AND ALBUTEROL SULFATE 2.5; .5 MG/3ML; MG/3ML
3 SOLUTION RESPIRATORY (INHALATION) 2 TIMES DAILY
Status: DISCONTINUED | OUTPATIENT
Start: 2022-07-26 | End: 2022-07-26

## 2022-07-26 RX ORDER — LANOLIN ALCOHOL/MO/W.PET/CERES
3 CREAM (GRAM) TOPICAL
Status: DISCONTINUED | OUTPATIENT
Start: 2022-07-26 | End: 2022-08-03 | Stop reason: HOSPADM

## 2022-07-26 RX ORDER — POTASSIUM CHLORIDE 750 MG/1
40 TABLET, FILM COATED, EXTENDED RELEASE ORAL
Status: COMPLETED | OUTPATIENT
Start: 2022-07-26 | End: 2022-07-26

## 2022-07-26 RX ORDER — FUROSEMIDE 10 MG/ML
40 INJECTION INTRAMUSCULAR; INTRAVENOUS ONCE
Status: DISCONTINUED | OUTPATIENT
Start: 2022-07-26 | End: 2022-07-26

## 2022-07-26 RX ORDER — CALCIUM CARBONATE 500(1250)
500 TABLET ORAL 2 TIMES DAILY
Status: DISCONTINUED | OUTPATIENT
Start: 2022-07-26 | End: 2022-08-02

## 2022-07-26 RX ORDER — IPRATROPIUM BROMIDE AND ALBUTEROL SULFATE 2.5; .5 MG/3ML; MG/3ML
3 SOLUTION RESPIRATORY (INHALATION)
Status: DISCONTINUED | OUTPATIENT
Start: 2022-07-26 | End: 2022-08-01

## 2022-07-26 RX ORDER — GUAIFENESIN 200 MG/1
600 TABLET ORAL
Status: DISCONTINUED | OUTPATIENT
Start: 2022-07-26 | End: 2022-08-03 | Stop reason: HOSPADM

## 2022-07-26 RX ADMIN — HYDROMORPHONE HYDROCHLORIDE 0.5 MG: 1 INJECTION, SOLUTION INTRAMUSCULAR; INTRAVENOUS; SUBCUTANEOUS at 03:36

## 2022-07-26 RX ADMIN — POTASSIUM CHLORIDE 40 MEQ: 750 TABLET, FILM COATED, EXTENDED RELEASE ORAL at 13:20

## 2022-07-26 RX ADMIN — ONDANSETRON HYDROCHLORIDE 4 MG: 2 SOLUTION INTRAMUSCULAR; INTRAVENOUS at 08:05

## 2022-07-26 RX ADMIN — AMIODARONE HYDROCHLORIDE 200 MG: 200 TABLET ORAL at 09:22

## 2022-07-26 RX ADMIN — AMIODARONE HYDROCHLORIDE 0.5 MG/MIN: 50 INJECTION, SOLUTION INTRAVENOUS at 01:25

## 2022-07-26 RX ADMIN — AMIODARONE HYDROCHLORIDE 200 MG: 200 TABLET ORAL at 22:30

## 2022-07-26 RX ADMIN — FAMOTIDINE 10 MG: 20 TABLET ORAL at 18:20

## 2022-07-26 RX ADMIN — FUROSEMIDE 40 MG: 40 TABLET ORAL at 09:22

## 2022-07-26 RX ADMIN — SODIUM CHLORIDE, PRESERVATIVE FREE 10 ML: 5 INJECTION INTRAVENOUS at 22:31

## 2022-07-26 RX ADMIN — IPRATROPIUM BROMIDE AND ALBUTEROL SULFATE 3 ML: .5; 3 SOLUTION RESPIRATORY (INHALATION) at 19:46

## 2022-07-26 RX ADMIN — CALCIUM 500 MG: 500 TABLET ORAL at 18:20

## 2022-07-26 RX ADMIN — GUAIFENESIN 600 MG: 200 TABLET ORAL at 15:43

## 2022-07-26 RX ADMIN — SODIUM CHLORIDE, PRESERVATIVE FREE 10 ML: 5 INJECTION INTRAVENOUS at 15:35

## 2022-07-26 RX ADMIN — SODIUM CHLORIDE, PRESERVATIVE FREE 10 ML: 5 INJECTION INTRAVENOUS at 05:08

## 2022-07-26 RX ADMIN — FAMOTIDINE 10 MG: 20 TABLET ORAL at 09:22

## 2022-07-26 RX ADMIN — APIXABAN 5 MG: 5 TABLET, FILM COATED ORAL at 18:20

## 2022-07-26 RX ADMIN — AMIODARONE HYDROCHLORIDE 200 MG: 200 TABLET ORAL at 15:32

## 2022-07-26 RX ADMIN — CEFEPIME 2 G: 2 INJECTION, POWDER, FOR SOLUTION INTRAVENOUS at 22:31

## 2022-07-26 RX ADMIN — CEFEPIME 2 G: 2 INJECTION, POWDER, FOR SOLUTION INTRAVENOUS at 10:13

## 2022-07-26 RX ADMIN — CALCIUM 500 MG: 500 TABLET ORAL at 13:18

## 2022-07-26 RX ADMIN — ASPIRIN 81 MG CHEWABLE TABLET 81 MG: 81 TABLET CHEWABLE at 09:22

## 2022-07-26 RX ADMIN — APIXABAN 5 MG: 5 TABLET, FILM COATED ORAL at 09:22

## 2022-07-26 RX ADMIN — POLYETHYLENE GLYCOL 3350 17 G: 17 POWDER, FOR SOLUTION ORAL at 09:22

## 2022-07-26 NOTE — PROGRESS NOTES
Rounded on [facility] patients and provided Anointing of the Sick at request of patient.     Jose Roberto Kaur

## 2022-07-26 NOTE — PROGRESS NOTES
Problem: Dysphagia (Adult)  Goal: *Acute Goals and Plan of Care (Insert Text)  Description: Speech pathology goals initiated 7/21/2022   1. Patient will tolerate ice chips and bites of applesauce with meds crushed free of s/s aspiration. MET 7/22/2022  2. Patient will participate in instrumental testing pending goals of care  Added 7/22/2022: 3. Patient will tolerate Soft&Bite-Sized/Thin Liquids without signs of aspiration within 7 days. Outcome: Progressing Towards Goal     SPEECH LANGUAGE PATHOLOGY DYSPHAGIA TREATMENT  Patient: Anneliese Roy (89 y.o. female)  Date: 7/26/2022  Diagnosis: Sepsis ([facility] [address] [A41.9] Sepsis ([facility] [address]      Precautions:  Fall, DNR (baseline 4L/min)    ASSESSMENT:  Patient reports nausea however agreed to PO trials. Patient with wet cough prior to PO trials but unable to clear. Patient demonstrated strong cough with expectoration of phlegm after initial sip of thin liquids that was not repeated with subsequent trials. Patient tolerated purees and single bite of solids without overt s/s aspiration. Occasional multiple swallows noted through out session. Patient demonstrated multiple loud belches with heaving after all PO trials. Patient at risk for aspiration given respiratory status and overall weakness. Further patient at risk for post prandial aspiration given belching and heaving with reported nausea. PLAN:  Recommendations and Planned Interventions:  Soft and bite sized diet  Thin liquids in single sip  Sit up for all PO trials  Medication whole in puree  Consider GI consult for belching, nausea  Consider palliative care consult  Patient continues to benefit from skilled intervention to address the above impairments. Continue treatment per established plan of care. Discharge Recommendations: To Be Determined     SUBJECTIVE:   Patient stated Thanks for helping me. .  RN reports patient feeling nauseous this morning.   Patient reports limited PO intake    OBJECTIVE:   Cognitive and Communication Status:  Neurologic State: Alert  Orientation Level: Oriented X4  Cognition: Follows commands  Perception: Appears intact  Perseveration: No perseveration noted  Safety/Judgement: Awareness of environment  Dysphagia Treatment:  Oral Assessment:     P.O. Trials:  Patient Position: Upright in bed  Vocal quality prior to P.O.: No impairment  Consistency Presented: Thin liquid;Puree; Solid  How Presented: Self-fed/presented;SLP-fed/presented;Spoon;Straw     Bolus Acceptance: No impairment     Type of Impairment:  (Slow mastication)  Propulsion: Delayed (# of seconds)  Oral Residue: None        Aspiration Signs/Symptoms: Strong cough  Pharyngeal Phase Characteristics: Double swallow                     After treatment:   Patient left in no apparent distress in bed, Call bell within reach, and Nursing notified    COMMUNICATION/EDUCATION:   Patient was educated regarding role of SLP and POC. Patient nodded. The patient's plan of care including recommendations, planned interventions, and recommended diet changes were discussed with: Registered nurse.      TIFFANY Gunn  Time Calculation: 18 mins

## 2022-07-26 NOTE — PROGRESS NOTES
[facility] Adult  Hospitalist Group                                                                                          Hospitalist Progress Note  Frank Srivastava MD  Answering service: [phone] OR [phone] from in house phone        Date of Service:  2022  NAME:  Leisa Sharma  :  1932  MRN:  [id number]      Admission Summary:   Leisa Sharma is a 80 y.o. female who presents with shortness of breath and cough    Patient is a poor historian, patient is at [facility] rehab, patient had a ground-level fall, was found to have PEs, was also found to have pneumonia, was treated with Flagyl and Rocephin, was transitioned to oral cephalosporins, has chronic hypoxic respiratory failure and uses 4 L of oxygen at baseline, was sent over here with concerns for impending sepsis, patient was found to have a white count of 38,000 and was found to have mild hypotension associated with her symptoms, came to the ER and was requested to be admitted to the hospitalist service, currently patient is resting in bed, alert x1, is coughing but denies any other complaints or problems       Interval history / Subjective:    Follow up SOB   Continues on 4l NC (baseline)  No fever  Leukocytosis resolved  Feels better  Assessment & Plan:     Acute on chronic hypoxic respiratory failure, 4l NC at baseline  SIRS (patient does not meet sepsis criteria)-now resolved  Healthcare associated pneumonia  Pulmonary edema  -I/O  -Daily weight  -Cultures no growth   -Vanc/cefepime, stopped vanc, continue Cefepime (--)  -Will give IV lasix today    Hypotension: now resolved  History of atrial flutter  Probable V tach vs flutter:   -Continue on cardizem/ASA/Eliquis/oral amiodarone  -Was put on Amiodarone  evening, now switched to oral amiodarone  -Appreciate cardiology    Elevated troponin-per cardiology sec to above, not ACS  Acute kidney injury : now resolved  History of pulmonary embolism: on Eliquis, continue  Dysphagia:dysphagia diet    PT/OT home vs SNF     Code status: DNR  Prophylaxis: Eliquis  PTA: [facility]    Plan: Continue antibiotics, probable discharge tomorrow  Care Plan discussed with: Patient  Anticipated Disposition: ? [facility] Problems  Date Reviewed: 7/24/2022            Codes Class Noted POA    * (Principal) Sepsis ([facility] [address] ICD-10-CM: A41.9  ICD-9-CM: 038.9, 995.91  7/20/2022 Yes       Review of Systems:   A comprehensive review of systems was negative except for that written in the HPI. Vital Signs:    Last 24hrs VS reviewed since prior progress note. Most recent are:  Visit Vitals  BP (!) 146/47   Pulse 81   Temp 97.9 °F (36.6 °C)   Resp 22   Ht 5' 1\" (1.549 m)   Wt 67.1 kg (147 lb 14.9 oz)   SpO2 98%   BMI 27.95 kg/m²         Intake/Output Summary (Last 24 hours) at 7/26/2022 1021  Last data filed at 7/26/2022 0559  Gross per 24 hour   Intake 1509.67 ml   Output 300 ml   Net 1209.67 ml          Physical Examination:     I had a face to face encounter with this patient and independently examined them on 7/26/2022 as outlined below:          Constitutional:  No acute distress   ENT:  Oral mucosa moist, oropharynx benign. Resp:  CTA bilaterally. No wheezing/rhonchi/rales. No accessory muscle use. CV:  Regular rhythm, normal rate, no murmurs, gallops, rubs    GI:  Soft, non distended, non tender. normoactive bowel sounds, no hepatosplenomegaly     Musculoskeletal:  No edema, warm, 2+ pulses throughout    Neurologic:  Moves all extremities.   AAOx3, CN II-XII reviewed            Data Review:    Review and/or order of clinical lab test      Labs:     Recent Labs     07/25/22  0100 07/24/22  0553   WBC 8.9 9.5   HGB 10.6* 10.2*   HCT 33.1* 31.2*    359       Recent Labs     07/26/22  0348 07/25/22  0100 07/24/22  0553    138 139   K 3.4* 3.4* 3.6    104 106   CO2 31 29 28   BUN 36* 37* 37*   CREA 1.13* 1.15* 0.99   * 119* 105*   CA 9.2 9.3 9.3       No results for input(s): ALT, AP, TBIL, TBILI, TP, ALB, GLOB, GGT, AML, LPSE in the last 72 hours. No lab exists for component: SGOT, GPT, AMYP, HLPSE    No results for input(s): INR, PTP, APTT, INREXT, INREXT in the last 72 hours. No results for input(s): FE, TIBC, PSAT, FERR in the last 72 hours. No results found for: FOL, RBCF   No results for input(s): PH, PCO2, PO2 in the last 72 hours. No results for input(s): CPK, CKNDX, TROIQ in the last 72 hours.     No lab exists for component: CPKMB  No results found for: CHOL, CHOLX, CHLST, CHOLV, HDL, HDLP, LDL, LDLC, DLDLP, TGLX, TRIGL, TRIGP, CHHD, CHHDX  No results found for: [facility]  Lab Results   Component Value Date/Time    Color DARK YELLOW 07/20/2022 03:37 PM    Appearance CLOUDY (A) 07/20/2022 03:37 PM    Specific gravity 1.018 07/20/2022 03:37 PM    pH (UA) 5.0 07/20/2022 03:37 PM    Protein 30 (A) 07/20/2022 03:37 PM    Glucose Negative 07/20/2022 03:37 PM    Ketone TRACE (A) 07/20/2022 03:37 PM    Bilirubin Negative 07/20/2022 03:37 PM    Urobilinogen 0.2 07/20/2022 03:37 PM    Nitrites Negative 07/20/2022 03:37 PM    Leukocyte Esterase TRACE (A) 07/20/2022 03:37 PM    Epithelial cells FEW 07/20/2022 03:37 PM    Bacteria Negative 07/20/2022 03:37 PM    WBC 0-4 07/20/2022 03:37 PM    RBC 5-10 07/20/2022 03:37 PM         Medications Reviewed:     Current Facility-Administered Medications   Medication Dose Route Frequency    ondansetron (ZOFRAN) injection 4 mg  4 mg IntraVENous Q6H PRN    cefepime (MAXIPIME) 2 g in 0.9% sodium chloride (MBP/ADV) 100 mL MBP  2 g IntraVENous Q12H    amiodarone (CORDARONE) tablet 200 mg  200 mg Oral TID    albuterol-ipratropium (DUO-NEB) 2.5 MG-0.5 MG/3 ML  3 mL Nebulization Q4H PRN    furosemide (LASIX) tablet 40 mg  40 mg Oral DAILY    metoprolol (LOPRESSOR) injection 2.5 mg  2.5 mg IntraVENous Q6H PRN    amiodarone (CORDARONE) 375 mg/250 mL D5W infusion  0.5-1 mg/min IntraVENous TITRATE    HYDROmorphone (DILAUDID) injection 0.5 mg  0.5 mg IntraVENous Q4H PRN    sodium chloride (NS) flush 5-10 mL  5-10 mL IntraVENous PRN    sodium chloride (NS) flush 5-40 mL  5-40 mL IntraVENous Q8H    sodium chloride (NS) flush 5-40 mL  5-40 mL IntraVENous PRN    acetaminophen (TYLENOL) tablet 650 mg  650 mg Oral Q4H PRN    apixaban (ELIQUIS) tablet 5 mg  5 mg Oral BID    aspirin chewable tablet 81 mg  81 mg Oral DAILY    famotidine (PEPCID) tablet 10 mg  10 mg Oral BID    oxyCODONE IR (ROXICODONE) tablet 5 mg  5 mg Oral Q4H PRN    polyethylene glycol (MIRALAX) packet 17 g  17 g Oral DAILY    simethicone (MYLICON) tablet 80 mg  80 mg Oral Q6H PRN     ______________________________________________________________________  EXPECTED LENGTH OF STAY: 4d 2h  ACTUAL LENGTH OF STAY:          6                 Debbie Koo MD

## 2022-07-26 NOTE — PROGRESS NOTES
Problem: Mobility Impaired (Adult and Pediatric)  Goal: *Acute Goals and Plan of Care (Insert Text)  Description: FUNCTIONAL STATUS PRIOR TO ADMISSION: Patient was independent without DME prior to July 3rd when she suffered a fall (sprained ankle). She was d/c'ed home with a CAM boot and had difficulty mobilizing within her home ( stated they were even calling the rescue squad to assist her to the bathroom) and then eventually suffered a fall again and led to admission at [facility] (PEs, PNA). DOES NOT wear O2 at home at baseline but was on O2 following her hospitalization due to PNA and PEs. Stated she had not participated with therapy much at [facility] (where she was d/c'ed after [facility] admission). HOME SUPPORT PRIOR TO ADMISSION: The patient lived with supportive  who is active and independent. Stated he rarely assisted her with care or mobility at baseline. Physical Therapy Goals  Initiated 7/21/2022  1. Patient will move from supine to sit and sit to supine , scoot up and down, and roll side to side in bed with moderate assistance within 7 day(s). 2.  Patient will tolerate bed in chair position 30 min BID within 7 days. 3.  Patient will sit EOB x5 min with max assist within 7 days. 4.  Patient will participate in supine HEP with AAROM (due to pain) within 7 days. Outcome: Progressing Towards Goal    PHYSICAL THERAPY TREATMENT  Patient: Rakel Thomas (47 y.o. female)  Date: 7/26/2022  Diagnosis: Sepsis ([facility] [address] [A41.9] Sepsis ([facility] [address]      Precautions: Fall, DNR (baseline 4L/min)  Chart, physical therapy assessment, plan of care and goals were reviewed. ASSESSMENT  Patient continues with skilled PT services and is progressing towards goals. Pt agreeable to therapy. Pt asking for help with c/o dizziness. VSS during session. Pt express fear of falling with mobility. Pt unable to ambulate but able to transfer to chair with max A x2.  Pt is below baseline and will need rehab at discharge to progress Current Level of Function Impacting Discharge (mobility/balance): Ax2    Other factors to consider for discharge: decrease strength and activity tolerance affecting functional mobility          PLAN :  Patient continues to benefit from skilled intervention to address the above impairments. Continue treatment per established plan of care. to address goals. Recommendation for discharge: (in order for the patient to meet his/her long term goals)  Inpt rehab vs SNF    This discharge recommendation:  Has not yet been discussed the attending provider and/or case management    IF patient discharges home will need the following DME: to be determined (TBD)       SUBJECTIVE:   Patient stated help me I am dizzy.     OBJECTIVE DATA SUMMARY:   Critical Behavior:  Neurologic State: Alert  Orientation Level: Oriented X4  Cognition: Follows commands  Safety/Judgement: Awareness of environment  Functional Mobility Training:  Bed Mobility:  Rolling: Maximum assistance  Supine to Sit: Maximum assistance              Transfers:  Sit to Stand: Maximum assistance;Assist x2  Stand to Sit: Maximum assistance;Assist x2                             Balance:  Sitting: Impaired  Sitting - Static: Fair (occasional)  Sitting - Dynamic: Fair (occasional)  Standing: Impaired  Standing - Static: Poor  Standing - Dynamic : Poor  Ambulation/Gait Training:                      Stairs: Therapeutic Exercises:     Pain Rating:  No complaints     Activity Tolerance:   Poor    After treatment patient left in no apparent distress:   Sitting in chair, Call bell within reach, and Bed / chair alarm activated    COMMUNICATION/COLLABORATION:   The patients plan of care was discussed with: Registered nurse and Case management.      Ludmila Barrera PTA   Time Calculation: 32 mins

## 2022-07-27 LAB
ANION GAP SERPL CALC-SCNC: 4 MMOL/L (ref 5–15)
BASOPHILS # BLD: 0.1 K/UL (ref 0–0.1)
BASOPHILS NFR BLD: 1 % (ref 0–1)
BUN SERPL-MCNC: 46 MG/DL (ref 6–20)
BUN/CREAT SERPL: 33 (ref 12–20)
CALCIUM SERPL-MCNC: 9.7 MG/DL (ref 8.5–10.1)
CHLORIDE SERPL-SCNC: 102 MMOL/L (ref 97–108)
CO2 SERPL-SCNC: 31 MMOL/L (ref 21–32)
COVID-19 RAPID TEST, COVR: NOT DETECTED
CREAT SERPL-MCNC: 1.38 MG/DL (ref 0.55–1.02)
DIFFERENTIAL METHOD BLD: ABNORMAL
EOSINOPHIL # BLD: 0.3 K/UL (ref 0–0.4)
EOSINOPHIL NFR BLD: 3 % (ref 0–7)
ERYTHROCYTE [DISTWIDTH] IN BLOOD BY AUTOMATED COUNT: 15.5 % (ref 11.5–14.5)
GLUCOSE SERPL-MCNC: 119 MG/DL (ref 65–100)
HCT VFR BLD AUTO: 32.1 % (ref 35–47)
HGB BLD-MCNC: 10.2 G/DL (ref 11.5–16)
IMM GRANULOCYTES # BLD AUTO: 0 K/UL
IMM GRANULOCYTES NFR BLD AUTO: 0 %
LYMPHOCYTES # BLD: 1.6 K/UL (ref 0.8–3.5)
LYMPHOCYTES NFR BLD: 15 % (ref 12–49)
MCH RBC QN AUTO: 29.9 PG (ref 26–34)
MCHC RBC AUTO-ENTMCNC: 31.8 G/DL (ref 30–36.5)
MCV RBC AUTO: 94.1 FL (ref 80–99)
METAMYELOCYTES NFR BLD MANUAL: 2 %
MONOCYTES # BLD: 1.2 K/UL (ref 0–1)
MONOCYTES NFR BLD: 11 % (ref 5–13)
MYELOCYTES NFR BLD MANUAL: 1 %
NEUTS BAND NFR BLD MANUAL: 1 % (ref 0–6)
NEUTS SEG # BLD: 7 K/UL (ref 1.8–8)
NEUTS SEG NFR BLD: 66 % (ref 32–75)
NRBC # BLD: 0 K/UL (ref 0–0.01)
NRBC BLD-RTO: 0 PER 100 WBC
PLATELET # BLD AUTO: 355 K/UL (ref 150–400)
PMV BLD AUTO: 8.8 FL (ref 8.9–12.9)
POTASSIUM SERPL-SCNC: 4.1 MMOL/L (ref 3.5–5.1)
RBC # BLD AUTO: 3.41 M/UL (ref 3.8–5.2)
RBC MORPH BLD: ABNORMAL
RBC MORPH BLD: ABNORMAL
SARS-COV-2, COV2: NORMAL
SODIUM SERPL-SCNC: 137 MMOL/L (ref 136–145)
SOURCE, COVRS: NORMAL
WBC # BLD AUTO: 10.5 K/UL (ref 3.6–11)
WBC MORPH BLD: ABNORMAL

## 2022-07-27 PROCEDURE — 94640 AIRWAY INHALATION TREATMENT: CPT

## 2022-07-27 PROCEDURE — 74011250637 HC RX REV CODE- 250/637: Performed by: NURSE PRACTITIONER

## 2022-07-27 PROCEDURE — 74011000250 HC RX REV CODE- 250: Performed by: HOSPITALIST

## 2022-07-27 PROCEDURE — 74011250637 HC RX REV CODE- 250/637: Performed by: FAMILY MEDICINE

## 2022-07-27 PROCEDURE — 74011250637 HC RX REV CODE- 250/637: Performed by: HOSPITALIST

## 2022-07-27 PROCEDURE — 85025 COMPLETE CBC W/AUTO DIFF WBC: CPT

## 2022-07-27 PROCEDURE — 97535 SELF CARE MNGMENT TRAINING: CPT

## 2022-07-27 PROCEDURE — 65270000046 HC RM TELEMETRY

## 2022-07-27 PROCEDURE — 36415 COLL VENOUS BLD VENIPUNCTURE: CPT

## 2022-07-27 PROCEDURE — 80048 BASIC METABOLIC PNL TOTAL CA: CPT

## 2022-07-27 PROCEDURE — 74011000250 HC RX REV CODE- 250: Performed by: EMERGENCY MEDICINE

## 2022-07-27 PROCEDURE — 97530 THERAPEUTIC ACTIVITIES: CPT

## 2022-07-27 PROCEDURE — 74011000258 HC RX REV CODE- 258: Performed by: HOSPITALIST

## 2022-07-27 PROCEDURE — 87635 SARS-COV-2 COVID-19 AMP PRB: CPT

## 2022-07-27 PROCEDURE — 74011250637 HC RX REV CODE- 250/637: Performed by: INTERNAL MEDICINE

## 2022-07-27 PROCEDURE — 74011000250 HC RX REV CODE- 250: Performed by: FAMILY MEDICINE

## 2022-07-27 PROCEDURE — 74011250636 HC RX REV CODE- 250/636: Performed by: HOSPITALIST

## 2022-07-27 RX ORDER — AMIODARONE HYDROCHLORIDE 200 MG/1
200 TABLET ORAL DAILY
Qty: 30 TABLET | Refills: 0 | Status: SHIPPED | OUTPATIENT
Start: 2022-07-28 | End: 2022-10-01 | Stop reason: SINTOL

## 2022-07-27 RX ORDER — FUROSEMIDE 40 MG/1
TABLET ORAL
Qty: 30 TABLET | Refills: 0 | Status: SHIPPED | OUTPATIENT
Start: 2022-07-28 | End: 2022-08-03

## 2022-07-27 RX ORDER — AMIODARONE HYDROCHLORIDE 200 MG/1
200 TABLET ORAL DAILY
Status: DISCONTINUED | OUTPATIENT
Start: 2022-07-28 | End: 2022-08-03 | Stop reason: HOSPADM

## 2022-07-27 RX ADMIN — SODIUM CHLORIDE, PRESERVATIVE FREE 10 ML: 5 INJECTION INTRAVENOUS at 07:24

## 2022-07-27 RX ADMIN — CALCIUM 500 MG: 500 TABLET ORAL at 08:16

## 2022-07-27 RX ADMIN — ASPIRIN 81 MG CHEWABLE TABLET 81 MG: 81 TABLET CHEWABLE at 08:16

## 2022-07-27 RX ADMIN — ACETAMINOPHEN 650 MG: 325 TABLET ORAL at 08:16

## 2022-07-27 RX ADMIN — OXYCODONE 5 MG: 5 TABLET ORAL at 20:47

## 2022-07-27 RX ADMIN — AMIODARONE HYDROCHLORIDE 200 MG: 200 TABLET ORAL at 08:16

## 2022-07-27 RX ADMIN — Medication 3 MG: at 02:35

## 2022-07-27 RX ADMIN — FAMOTIDINE 10 MG: 20 TABLET ORAL at 18:32

## 2022-07-27 RX ADMIN — CALCIUM 500 MG: 500 TABLET ORAL at 18:32

## 2022-07-27 RX ADMIN — IPRATROPIUM BROMIDE AND ALBUTEROL SULFATE 3 ML: .5; 3 SOLUTION RESPIRATORY (INHALATION) at 07:18

## 2022-07-27 RX ADMIN — APIXABAN 5 MG: 5 TABLET, FILM COATED ORAL at 18:32

## 2022-07-27 RX ADMIN — CEFEPIME 2 G: 2 INJECTION, POWDER, FOR SOLUTION INTRAVENOUS at 10:43

## 2022-07-27 RX ADMIN — APIXABAN 5 MG: 5 TABLET, FILM COATED ORAL at 08:16

## 2022-07-27 RX ADMIN — IPRATROPIUM BROMIDE AND ALBUTEROL SULFATE 3 ML: .5; 3 SOLUTION RESPIRATORY (INHALATION) at 19:13

## 2022-07-27 RX ADMIN — FUROSEMIDE 40 MG: 40 TABLET ORAL at 08:16

## 2022-07-27 RX ADMIN — SODIUM CHLORIDE, PRESERVATIVE FREE 10 ML: 5 INJECTION INTRAVENOUS at 18:31

## 2022-07-27 RX ADMIN — ACETAMINOPHEN 650 MG: 325 TABLET ORAL at 19:22

## 2022-07-27 RX ADMIN — FAMOTIDINE 10 MG: 20 TABLET ORAL at 08:16

## 2022-07-27 RX ADMIN — ONDANSETRON HYDROCHLORIDE 4 MG: 2 SOLUTION INTRAMUSCULAR; INTRAVENOUS at 01:48

## 2022-07-27 NOTE — DISCHARGE INSTRUCTIONS
Discharge SNF/Rehab Instructions/LTAC       PATIENT ID: Danny Diop  MRN: [id number]   YOB: 1932    DATE OF ADMISSION: [unfilled]    DATE OF DISCHARGE: 7/27/2022    PRIMARY CARE PROVIDER: @PCP@       ATTENDING PHYSICIAN: [unfilled]  DISCHARGING PROVIDER: Radha Jaeger MD     To contact this individual call [phone] and ask the  to page. If unavailable ask to be transferred the Adult Hospitalist Department. CONSULTATIONS: [unfilled]    PROCEDURES/SURGERIES: * No surgery found *    ADMITTING DIAGNOSES & HOSPITAL COURSE:   Acute on chronic hypoxic respiratory failure, 4l NC at baseline  SIRS (patient does not meet sepsis criteria)-now resolved  Healthcare associated pneumonia  Pulmonary edema  -I/O  -Daily weight  -Cultures no growth  -finished the course of Cefepime (7/23--7/27)  -Responded with occasional IV diuretic as well     Hypotension: now resolved  History of atrial flutter  Probable V tach vs flutter:   -Continue on cardizem/ASA/Eliquis/oral amiodarone  -Was put on Amiodarone gtt 7/21 evening, now switched to oral amiodarone  -Appreciate cardiology     Elevated troponin-per cardiology sec to above, not ACS  Acute kidney injury : now resolved  History of pulmonary embolism: on Eliquis, continue  Dysphagia:dysphagia diet      PENDING TEST RESULTS:   At the time of discharge the following test results are still pending: none    FOLLOW UP APPOINTMENTS:    Follow up with PMD  Follow up with Cardiology    ADDITIONAL CARE RECOMMENDATIONS:   As above    DIET: Cardiac Diet    ACTIVITY: Activity as tolerated      DISCHARGE MEDICATIONS:   See Medication Reconciliation Form      NOTIFY YOUR PHYSICIAN FOR ANY OF THE FOLLOWING:   Fever over 101 degrees for 24 hours. Chest pain, shortness of breath, fever, chills, nausea, vomiting, diarrhea, change in mentation, falling, weakness, bleeding. Severe pain or pain not relieved by medications.   Or, any other signs or symptoms that you may have questions about.     DISPOSITION:    Home With:   OT  PT  HH  RN      x SNF/Inpatient Rehab/LTAC    Independent/assisted living    Hospice    Other:       PATIENT CONDITION AT DISCHARGE:     Functional status    Poor    x Deconditioned     Independent      Cognition    x Lucid     Forgetful     Dementia      Catheters/lines (plus indication)    Mares     PICC     PEG    x None      Code status     Full code     DNR      PHYSICAL EXAMINATION AT DISCHARGE:  Please see progress note      CHRONIC MEDICAL DIAGNOSES:  [unfilled]      [facility] Checked:   Yes x     PROBLEM LIST Updated:  Yes x         Signed:   Daniel Chung MD  7/27/2022  11:34 AM

## 2022-07-27 NOTE — PROGRESS NOTES
0730: Bedside shift change report given to TERESA Amaya RN (oncoming nurse) by Migue Hebert RN (offgoing nurse). Report included the following information SBAR, MAR, and Recent Results.

## 2022-07-27 NOTE — PROGRESS NOTES
JULIOCESAR: anticipate return to [facility] IPR vs SNF pending pt/ot recommendations    [facility] IPR following, however they have concerns about pt being able to tolerate IPR level of rehab  [facility] has accepted and they will have an available bed tomorrow, 7/27    Negative Covid test 7/27    [facility] Transport    RUR: 13%  -1130-CM reviewed pt chart & was informed by Attending that pt is medically stable for d/c. CM spoke with Bk Daniel of [facility] [phone] who advised that they need today's pt/ot notes as well as a PCR Covid test. CM informed therapy team and notified Nurse of need for Covid test. CM to follow. -1500-CM spoke with Bk Daniel of [facility] I[facility] and she advised they are concerned about pt being able to tolerate IPR level of rehab. ANGELA spoke with Ole Harmon of [facility] [phone] who advised they can accept pt and will have an available bed tomorrow, 7/27. CM to follow.    Harish Zuniga RN BSN CCM

## 2022-07-27 NOTE — PROGRESS NOTES
Problem: Mobility Impaired (Adult and Pediatric)  Goal: *Acute Goals and Plan of Care (Insert Text)  Description: FUNCTIONAL STATUS PRIOR TO ADMISSION: Patient was independent without DME prior to July 3rd when she suffered a fall (sprained ankle). She was d/c'ed home with a CAM boot and had difficulty mobilizing within her home ( stated they were even calling the rescue squad to assist her to the bathroom) and then eventually suffered a fall again and led to admission at [facility] (PEs, PNA). DOES NOT wear O2 at home at baseline but was on O2 following her hospitalization due to PNA and PEs. Stated she had not participated with therapy much at [facility] (where she was d/c'ed after [facility] admission). HOME SUPPORT PRIOR TO ADMISSION: The patient lived with supportive  who is active and independent. Stated he rarely assisted her with care or mobility at baseline. Physical Therapy Goals  Initiated 7/21/2022  1. Patient will move from supine to sit and sit to supine , scoot up and down, and roll side to side in bed with moderate assistance within 7 day(s). 2.  Patient will tolerate bed in chair position 30 min BID within 7 days. 3.  Patient will sit EOB x5 min with max assist within 7 days. 4.  Patient will participate in supine HEP with AAROM (due to pain) within 7 days. Outcome: Progressing Towards Goal   PHYSICAL THERAPY TREATMENT  Patient: Cesar Alegre (84 y.o. female)  Date: 7/27/2022  Diagnosis: Sepsis ([facility] [address] [A41.9] Sepsis ([facility] [address]      Precautions: Fall, DNR  Chart, physical therapy assessment, plan of care and goals were reviewed. ASSESSMENT  Patient continues with skilled PT services and is progressing towards goals. Patient received supine in bed, agreeable to therapy despite pain. Overall min-max Ax2 for all mobility, able to stand to RW with improvement in transfers but unable to stand erect and significantly forward flexed (completely forward over RW). C/o knee pain throughout.  Able to step towards chair but required max A to shift hips for pivot and lower slowly to chair. VSS despite c/o dizziness throughout. Suspect decreased activity tolerance given increased time in bed lately with readmission. Recommend rehab at d/c-- currently working towards tolerating therapy 3 hrs/day. Current Level of Function Impacting Discharge (mobility/balance): max Ax2 for stand pivot and RW    Other factors to consider for discharge: dizziness but VSS         PLAN :  Patient continues to benefit from skilled intervention to address the above impairments. Continue treatment per established plan of care. to address goals. Recommendation for discharge: (in order for the patient to meet his/her long term goals)  Therapy 3 hours per day 5-7 days per week (working towards tolerating)-- if not IPR then SNF    This discharge recommendation:  Has been made in collaboration with the attending provider and/or case management    IF patient discharges home will need the following DME: to be determined (TBD)       SUBJECTIVE:   Patient stated Oh, oh I'm sorry.     OBJECTIVE DATA SUMMARY:   Critical Behavior:  Neurologic State: Alert  Orientation Level: Oriented X4  Cognition: Follows commands, Decreased attention/concentration  Safety/Judgement: Awareness of environment  Functional Mobility Training:  Bed Mobility:  Supine to Sit: Moderate assistance  Sit to Supine:  (pt remained in bedside chair)  Scooting: Contact guard assistance (to EOB)  Transfers:  Sit to Stand: Minimum assistance;Assist x2;Adaptive equipment (RW)  Stand to Sit: Minimum assistance;Assist x2;Adaptive equipment (RW)  Bed to Chair: Moderate assistance;Maximum assistance;Assist x2 (RW)  Balance:  Sitting: Impaired  Sitting - Static: Good (unsupported)  Sitting - Dynamic: Good (unsupported); Fair (occasional)  Standing: Impaired; With support  Standing - Static: Fair;Poor;Constant support  Standing - Dynamic : Poor;Constant support    Pain Rating:  10/10 RN aware     Activity Tolerance:   Fair and VSS but patient dizzy    After treatment patient left in no apparent distress:   Sitting in chair, Call bell within reach, and Bed / chair alarm activated    COMMUNICATION/COLLABORATION:   The patients plan of care was discussed with: Occupational therapist and Registered nurse.      Villa Arm, PT, DPT   Time Calculation: 14 mins

## 2022-07-27 NOTE — PROGRESS NOTES
[address] visit. Mrs. Miriam Mendoza declined communion today. Prayer offered for her.     DAV Webb, RN, ACSW, LCSW   Page:  [phone]

## 2022-07-27 NOTE — PROGRESS NOTES
Problem: Aspiration - Risk of  Goal: *Absence of aspiration  Outcome: Progressing Towards Goal     Problem: Pressure Injury - Risk of  Goal: *Prevention of pressure injury  Description: Document Fly Scale and appropriate interventions in the flowsheet. Outcome: Progressing Towards Goal  Note: Pressure Injury Interventions:  Sensory Interventions: Chair cushion, Minimize linen layers    Moisture Interventions: Absorbent underpads, Limit adult briefs    Activity Interventions: Increase time out of bed, PT/OT evaluation    Mobility Interventions: Float heels, HOB 30 degrees or less, PT/OT evaluation    Nutrition Interventions: Document food/fluid/supplement intake, Offer support with meals,snacks and hydration    Friction and Shear Interventions: HOB 30 degrees or less, Lift sheet, Minimize layers      Problem: Falls - Risk of  Goal: *Absence of Falls  Description: Document Sharri Fall Risk and appropriate interventions in the flowsheet.   Outcome: Progressing Towards Goal  Note: Fall Risk Interventions:  Mobility Interventions: Bed/chair exit alarm, Patient to call before getting OOB, Communicate number of staff needed for ambulation/transfer    Mentation Interventions: Adequate sleep, hydration, pain control, Bed/chair exit alarm, Door open when patient unattended    Medication Interventions: Bed/chair exit alarm, Patient to call before getting OOB, Teach patient to arise slowly    Elimination Interventions: Bed/chair exit alarm, Call light in reach, Patient to call for help with toileting needs    History of Falls Interventions: Bed/chair exit alarm, Door open when patient unattended

## 2022-07-27 NOTE — PROGRESS NOTES
[facility] Adult  Hospitalist Group                                                                                          Hospitalist Progress Note  Ana Moore MD  Answering service: [phone] OR [phone] from in house phone        Date of Service:  2022  NAME:  Flakita Mcintosh  :  1932  MRN:  [id number]      Admission Summary:   Flakita Mcintosh is a 80 y.o. female who presents with shortness of breath and cough    Patient is a poor historian, patient is at [facility] rehab, patient had a ground-level fall, was found to have PEs, was also found to have pneumonia, was treated with Flagyl and Rocephin, was transitioned to oral cephalosporins, has chronic hypoxic respiratory failure and uses 4 L of oxygen at baseline, was sent over here with concerns for impending sepsis, patient was found to have a white count of 38,000 and was found to have mild hypotension associated with her symptoms, came to the ER and was requested to be admitted to the hospitalist service, currently patient is resting in bed, alert x1, is coughing but denies any other complaints or problems       Interval history / Subjective:    Follow up SOB   Continues on 4l NC (baseline)  No fever  Leukocytosis resolved  Feels better  Comfortably sitting in chair  Assessment & Plan:     Acute on chronic hypoxic respiratory failure, 4l NC at baseline  SIRS (patient does not meet sepsis criteria)-now resolved  Healthcare associated pneumonia  Pulmonary edema  -I/O  -Daily weight  -Cultures no growth   -finished the course of Cefepime (--)  -Responded with occasional IV diuretic as well    Hypotension: now resolved  History of atrial flutter  Probable V tach vs flutter:   -Continue on cardizem/ASA/Eliquis/oral amiodarone  -Was put on Amiodarone  evening, now switched to oral amiodarone  -Appreciate cardiology    Elevated troponin-per cardiology sec to above, not ACS  Acute kidney injury : now resolved  History of pulmonary embolism: on Eliquis, continue  Dysphagia:dysphagia diet  Spoke to  at bedside    PT/OT IPR vs SNF     Code status: DNR  Prophylaxis: Eliquis  PTA: [facility]    Plan: Finished the course of antibiotics, the patient is medically stable, awaiting placement  Care Plan discussed with: Patient  Anticipated Disposition: ? [facility] Problems  Date Reviewed: 7/24/2022            Codes Class Noted POA    * (Principal) Sepsis ([facility] [address] ICD-10-CM: A41.9  ICD-9-CM: 038.9, 995.91  7/20/2022 Yes       Review of Systems:   A comprehensive review of systems was negative except for that written in the HPI. Vital Signs:    Last 24hrs VS reviewed since prior progress note. Most recent are:  Visit Vitals  BP (!) 140/45 (BP 1 Location: Right upper arm, BP Patient Position: At rest)   Pulse 91   Temp 98.3 °F (36.8 °C)   Resp 22   Ht 5' 1\" (1.549 m)   Wt 67.1 kg (147 lb 14.9 oz)   SpO2 97%   BMI 27.95 kg/m²         Intake/Output Summary (Last 24 hours) at 7/27/2022 1123  Last data filed at 7/27/2022 7644  Gross per 24 hour   Intake 320 ml   Output 300 ml   Net 20 ml          Physical Examination:     I had a face to face encounter with this patient and independently examined them on 7/27/2022 as outlined below:          Constitutional:  No acute distress   ENT:  Oral mucosa moist, oropharynx benign. Resp:  CTA bilaterally. No wheezing/rhonchi/rales. No accessory muscle use. CV:  Regular rhythm, normal rate, no murmurs, gallops, rubs    GI:  Soft, non distended, non tender. normoactive bowel sounds, no hepatosplenomegaly     Musculoskeletal:  No edema, warm, 2+ pulses throughout    Neurologic:  Moves all extremities.   AAOx3, CN II-XII reviewed            Data Review:    Review and/or order of clinical lab test      Labs:     Recent Labs     07/27/22  0232 07/25/22  0100   WBC 10.5 8.9   HGB 10.2* 10.6*   HCT 32.1* 33.1*    367       Recent Labs     07/27/22  0232 07/26/22  0348 07/25/22  0100    138 138   K 4.1 3.4* 3.4*    101 104   CO2 31 31 29   BUN 46* 36* 37*   CREA 1.38* 1.13* 1.15*   * 112* 119*   CA 9.7 9.2 9.3       No results for input(s): ALT, AP, TBIL, TBILI, TP, ALB, GLOB, GGT, AML, LPSE in the last 72 hours. No lab exists for component: SGOT, GPT, AMYP, HLPSE    No results for input(s): INR, PTP, APTT, INREXT, INREXT in the last 72 hours. No results for input(s): FE, TIBC, PSAT, FERR in the last 72 hours. No results found for: FOL, RBCF   No results for input(s): PH, PCO2, PO2 in the last 72 hours. No results for input(s): CPK, CKNDX, TROIQ in the last 72 hours.     No lab exists for component: CPKMB  No results found for: CHOL, CHOLX, CHLST, CHOLV, HDL, HDLP, LDL, LDLC, DLDLP, TGLX, TRIGL, TRIGP, CHHD, CHHDX  No results found for: [facility]  Lab Results   Component Value Date/Time    Color DARK YELLOW 07/20/2022 03:37 PM    Appearance CLOUDY (A) 07/20/2022 03:37 PM    Specific gravity 1.018 07/20/2022 03:37 PM    pH (UA) 5.0 07/20/2022 03:37 PM    Protein 30 (A) 07/20/2022 03:37 PM    Glucose Negative 07/20/2022 03:37 PM    Ketone TRACE (A) 07/20/2022 03:37 PM    Bilirubin Negative 07/20/2022 03:37 PM    Urobilinogen 0.2 07/20/2022 03:37 PM    Nitrites Negative 07/20/2022 03:37 PM    Leukocyte Esterase TRACE (A) 07/20/2022 03:37 PM    Epithelial cells FEW 07/20/2022 03:37 PM    Bacteria Negative 07/20/2022 03:37 PM    WBC 0-4 07/20/2022 03:37 PM    RBC 5-10 07/20/2022 03:37 PM         Medications Reviewed:     Current Facility-Administered Medications   Medication Dose Route Frequency    calcium carbonate (OS-WANDA) tablet 500 mg [elemental]  500 mg Oral BID    guaiFENesin (ORGANIDIN) tablet 600 mg  600 mg Oral Q4H PRN    melatonin tablet 3 mg  3 mg Oral QHS PRN    albuterol-ipratropium (DUO-NEB) 2.5 MG-0.5 MG/3 ML  3 mL Nebulization BID RT    ondansetron (ZOFRAN) injection 4 mg  4 mg IntraVENous Q6H PRN    cefepime (MAXIPIME) 2 g in 0.9% sodium chloride (MBP/ADV) 100 mL MBP  2 g IntraVENous Q12H    amiodarone (CORDARONE) tablet 200 mg  200 mg Oral TID    albuterol-ipratropium (DUO-NEB) 2.5 MG-0.5 MG/3 ML  3 mL Nebulization Q4H PRN    furosemide (LASIX) tablet 40 mg  40 mg Oral DAILY    metoprolol (LOPRESSOR) injection 2.5 mg  2.5 mg IntraVENous Q6H PRN    HYDROmorphone (DILAUDID) injection 0.5 mg  0.5 mg IntraVENous Q4H PRN    sodium chloride (NS) flush 5-10 mL  5-10 mL IntraVENous PRN    sodium chloride (NS) flush 5-40 mL  5-40 mL IntraVENous Q8H    sodium chloride (NS) flush 5-40 mL  5-40 mL IntraVENous PRN    acetaminophen (TYLENOL) tablet 650 mg  650 mg Oral Q4H PRN    apixaban (ELIQUIS) tablet 5 mg  5 mg Oral BID    aspirin chewable tablet 81 mg  81 mg Oral DAILY    famotidine (PEPCID) tablet 10 mg  10 mg Oral BID    oxyCODONE IR (ROXICODONE) tablet 5 mg  5 mg Oral Q4H PRN    polyethylene glycol (MIRALAX) packet 17 g  17 g Oral DAILY    simethicone (MYLICON) tablet 80 mg  80 mg Oral Q6H PRN     ______________________________________________________________________  EXPECTED LENGTH OF STAY: 4d 2h  ACTUAL LENGTH OF STAY:          7                 Carl Lucas MD

## 2022-07-27 NOTE — PROGRESS NOTES
Speech Therapy    Chart reviewed and spoke with patient. She denies nausea currently but was not interested in PO intake. Per RN, no concerns related to diet tolerance. Will continue to follow. Rosie Magdaleno M.S., CCC-SLP

## 2022-07-27 NOTE — PROGRESS NOTES
Problem: Self Care Deficits Care Plan (Adult)  Goal: *Acute Goals and Plan of Care (Insert Text)  Description:   FUNCTIONAL STATUS PRIOR TO ADMISSION: Patient was active and independent without DME prior to fall ~July 2, 2022. Was admitted to [facility] for metatarsal fx and then to [facility] for rehab.  and patient reports not receiving much therapy there d/t decline in medical condition. HOME SUPPORT: Lives with     Occupational Therapy Goals  Initiated 7/21/2022  1. Patient will perform grooming with minimal assistance/contact guard assist within 7 day(s). 2.  Patient will perform upper body dressing with minimal assistance/contact guard assist within 7 day(s). 3.  Patient will perform lower body dressing with moderate assistance  within 7 day(s). 4.  Patient will perform all aspects of toileting with moderate assistance  within 7 day(s). 5.  Patient will utilize energy conservation techniques during functional activities with verbal cues within 7 day(s). Outcome: Progressing Towards Goal   OCCUPATIONAL THERAPY TREATMENT  Patient: Donald Joyner (86 y.o. female)  Date: 7/27/2022  Diagnosis: Sepsis ([facility] [address] [A41.9] Sepsis ([address]      Precautions: Fall, DNR (baseline 4L/min)  Chart, occupational therapy assessment, plan of care, and goals were reviewed. ASSESSMENT  Patient continues with skilled OT services and is progressing towards goals. Pt's performance of ADL/IADL tasks continues to be limited at this time by impaired balance, generalized weakness, BLE pain, decreased activity tolerance, and anxiety/fear of falling. Pt received semi-supine and agreeable to participation in therapy session, however reporting 10/10 pain in BLE (RN notified). She demonstrated increasing strength/independence with bed mobility, requiring mod A x1 for supine<>sit. She completed sit<>stand and bed to chair transfer (stand pivot) with min to mod A x2 and use of RW.  She continues to report dizziness with all OOB mobility and ADL tasks, however VSS. Pt left in bedside chair, eating breakfast, and with all needs met. Will continue to follow. Current Level of Function Impacting Discharge (ADLs): min-mod A x1-2 functional transfers in prep for ADLs, max A toileting, min A self-feeding (increased assist for container mgt)    Other factors to consider for discharge: PLOF         PLAN :  Patient continues to benefit from skilled intervention to address the above impairments. Continue treatment per established plan of care to address goals. Recommendation for discharge: (in order for the patient to meet his/her long term goals)  Therapy 3 hours per day 5-7 days per week, working towards tolerating 3 hours. Pt reports preference to return to [facility]. If unable then SNF. This discharge recommendation:  Has been made in collaboration with the attending provider and/or case management    IF patient discharges home will need the following DME: TBD       SUBJECTIVE:   Patient stated I feel so dizzy.     OBJECTIVE DATA SUMMARY:   Cognitive/Behavioral Status:  Neurologic State: Alert  Orientation Level: Oriented X4  Cognition: Follows commands;Decreased attention/concentration  Perception: Appears intact  Perseveration: No perseveration noted  Safety/Judgement: Awareness of environment    Functional Mobility and Transfers for ADLs:  Bed Mobility:  Supine to Sit: Moderate assistance  Sit to Supine:  (pt remained in bedside chair)  Scooting: Contact guard assistance (to EOB)    Transfers:  Sit to Stand: Minimum assistance;Assist x2;Adaptive equipment (RW)     Bed to Chair: Moderate assistance;Assist x1;Assist x2;Adaptive equipment (stand pivot, with RW)    Balance:  Sitting: Impaired  Sitting - Static: Good (unsupported)  Sitting - Dynamic: Good (unsupported); Fair (occasional)  Standing: Impaired; With support  Standing - Static: Fair;Poor;Constant support  Standing - Dynamic : Poor;Constant support    ADL Intervention:  Feeding  Feeding Assistance: Minimum assistance  Container Management: Maximum assistance  Utensil Management: Independent  Food to Mouth: Independent  Drink to Mouth: Independent  Cues: Verbal cues provided                             Lower Body Dressing Assistance  Underpants: Maximum assistance (brief)  Position Performed: Seated in chair    Toileting  Toileting Assistance: Maximum assistance  Bladder Hygiene: Maximum assistance  Clothing Management: Maximum assistance  Cues: Verbal cues provided    Cognitive Retraining  Safety/Judgement: Awareness of environment      Pain:  10/10 BLE pain reported, RN notified and aware     Activity Tolerance:   Fair and requires rest breaks    After treatment patient left in no apparent distress:   Sitting in chair, Call bell within reach, and Bed / chair alarm activated    COMMUNICATION/COLLABORATION:   The patients plan of care was discussed with: Physical therapist and Registered nurse.      Norma Nuñez, OT  Time Calculation: 25 mins

## 2022-07-28 PROCEDURE — 74011000250 HC RX REV CODE- 250: Performed by: FAMILY MEDICINE

## 2022-07-28 PROCEDURE — 74011250637 HC RX REV CODE- 250/637: Performed by: NURSE PRACTITIONER

## 2022-07-28 PROCEDURE — 65270000029 HC RM PRIVATE

## 2022-07-28 PROCEDURE — 97530 THERAPEUTIC ACTIVITIES: CPT

## 2022-07-28 PROCEDURE — 74011250637 HC RX REV CODE- 250/637: Performed by: HOSPITALIST

## 2022-07-28 PROCEDURE — 94640 AIRWAY INHALATION TREATMENT: CPT

## 2022-07-28 PROCEDURE — 74011000250 HC RX REV CODE- 250: Performed by: HOSPITALIST

## 2022-07-28 PROCEDURE — 74011250636 HC RX REV CODE- 250/636: Performed by: HOSPITALIST

## 2022-07-28 PROCEDURE — 94760 N-INVAS EAR/PLS OXIMETRY 1: CPT

## 2022-07-28 PROCEDURE — 74011250637 HC RX REV CODE- 250/637: Performed by: FAMILY MEDICINE

## 2022-07-28 RX ADMIN — CALCIUM 500 MG: 500 TABLET ORAL at 09:32

## 2022-07-28 RX ADMIN — OXYCODONE 5 MG: 5 TABLET ORAL at 18:45

## 2022-07-28 RX ADMIN — APIXABAN 5 MG: 5 TABLET, FILM COATED ORAL at 09:32

## 2022-07-28 RX ADMIN — FAMOTIDINE 10 MG: 20 TABLET ORAL at 18:34

## 2022-07-28 RX ADMIN — FUROSEMIDE 40 MG: 40 TABLET ORAL at 09:32

## 2022-07-28 RX ADMIN — CALCIUM 500 MG: 500 TABLET ORAL at 18:35

## 2022-07-28 RX ADMIN — SODIUM CHLORIDE, PRESERVATIVE FREE 10 ML: 5 INJECTION INTRAVENOUS at 21:04

## 2022-07-28 RX ADMIN — ONDANSETRON HYDROCHLORIDE 4 MG: 2 SOLUTION INTRAMUSCULAR; INTRAVENOUS at 00:34

## 2022-07-28 RX ADMIN — IPRATROPIUM BROMIDE AND ALBUTEROL SULFATE 3 ML: .5; 3 SOLUTION RESPIRATORY (INHALATION) at 19:40

## 2022-07-28 RX ADMIN — ASPIRIN 81 MG CHEWABLE TABLET 81 MG: 81 TABLET CHEWABLE at 09:32

## 2022-07-28 RX ADMIN — AMIODARONE HYDROCHLORIDE 200 MG: 200 TABLET ORAL at 10:51

## 2022-07-28 RX ADMIN — SODIUM CHLORIDE, PRESERVATIVE FREE 10 ML: 5 INJECTION INTRAVENOUS at 11:08

## 2022-07-28 RX ADMIN — IPRATROPIUM BROMIDE AND ALBUTEROL SULFATE 3 ML: .5; 3 SOLUTION RESPIRATORY (INHALATION) at 07:59

## 2022-07-28 RX ADMIN — APIXABAN 5 MG: 5 TABLET, FILM COATED ORAL at 18:34

## 2022-07-28 RX ADMIN — FAMOTIDINE 10 MG: 20 TABLET ORAL at 09:32

## 2022-07-28 NOTE — PROGRESS NOTES
Problem: Mobility Impaired (Adult and Pediatric)  Goal: *Acute Goals and Plan of Care (Insert Text)  Description: FUNCTIONAL STATUS PRIOR TO ADMISSION: Patient was independent without DME prior to July 3rd when she suffered a fall (sprained ankle). She was d/c'ed home with a CAM boot and had difficulty mobilizing within her home ( stated they were even calling the rescue squad to assist her to the bathroom) and then eventually suffered a fall again and led to admission at [address] (PEs, PNA). DOES NOT wear O2 at home at baseline but was on O2 following her hospitalization due to PNA and PEs. Stated she had not participated with therapy much at [facility] (where she was d/c'ed after [address] admission). HOME SUPPORT PRIOR TO ADMISSION: The patient lived with supportive  who is active and independent. Stated he rarely assisted her with care or mobility at baseline. Physical Therapy Goals  Re-assessed 7/28/2022  1. Patient will move from supine to sit and sit to supine, scoot up and down, and roll side to side in bed with moderate assistance within 7 day(s). 2.  Patient will tolerate bed in chair position 30 min BID within 7 days. 3.  Patient will sit EOB x5 min with stand-by- assist within 7 days. 4.  Patient will perform sit to stand with moderate assist within 7 days. Initiated 7/21/2022  1. Patient will move from supine to sit and sit to supine , scoot up and down, and roll side to side in bed with moderate assistance within 7 day(s). 2.  Patient will tolerate bed in chair position 30 min BID within 7 days. 3.  Patient will sit EOB x5 min with max assist within 7 days. 4.  Patient will participate in supine HEP with AAROM (due to pain) within 7 days.      Outcome: Progressing Towards Goal     PHYSICAL THERAPY TREATMENT: WEEKLY REASSESSMENT  Patient: Flakita Mcintosh (28 y.o. female)  Date: 7/28/2022  Primary Diagnosis: Sepsis ([facility] [address] [A41.9]       Precautions:  Fall, DNR      ASSESSMENT  Patient continues with skilled PT services and is progressing towards goals that were established. She is demonstrating improved mobility from initial evaluation but decline in function and cognition from previous session. Patient is drowsy, confused and only oriented to self with decreased command following and initiation of tasks this date. Required therapist assist to initiate transition from supine to sit but then able to initiate trunk movement to upright at edge of bed. Requires re-direction and appears to fatigue quickly with activity. At this time, recommend SNF. Acute PT will continue to follow and progress as able. Patient's progression toward goals since last assessment: progressing towards goals and goals updated but decline from previous session    Current Level of Function Impacting Discharge (mobility/balance): maximal assist sit to supine    Other factors to consider for discharge: impaired cognition this date, came from [facility] but minimal progress         PLAN :  Goals have been updated based on progression since last assessment. Patient continues to benefit from skilled intervention to address the above impairments. Recommendations and Planned Interventions: bed mobility training, transfer training, gait training, therapeutic exercises, neuromuscular re-education, patient and family training/education, and therapeutic activities      Frequency/Duration: Patient will be followed by physical therapy:  3 times a week to address goals. Recommendation for discharge: (in order for the patient to meet his/her long term goals)  Therapy up to 5 days/week in SNF setting    This discharge recommendation:  Has been made in collaboration with the attending provider and/or case management    IF patient discharges home will need the following DME: to be determined (TBD)         SUBJECTIVE:   Patient stated Adelaida Ill is going on?    OBJECTIVE DATA SUMMARY:   HISTORY:    History reviewed.  No pertinent past medical history. No past surgical history on file. Personal factors and/or comorbidities impacting plan of care: impaired cognition    Home Situation  Home Environment: Private residence  # Steps to Enter: 3  One/Two Story Residence: Split level (1 step from bedrooms and living spaces)  Living Alone: No  Support Systems: Spouse/Significant Other  Patient Expects to be Discharged to[de-identified] Skilled nursing facility  Current DME Used/Available at Home: None  Tub or Shower Type: Shower    EXAMINATION/PRESENTATION/DECISION MAKING:   Critical Behavior:  Neurologic State: Drowsy, Confused, Eyes open to voice  Orientation Level: Oriented to person, Disoriented to situation, Disoriented to time, Disoriented to place  Cognition: Memory loss, Impaired decision making, Decreased attention/concentration, Decreased command following  Safety/Judgement: Decreased awareness of environment, Decreased insight into deficits  Hearing:   WFL  Skin:  not formally assessed but followed by wound care  Functional Mobility:  Bed Mobility:  Rolling: Maximum assistance  Supine to Sit: Maximum assistance (required therapist assist to initiate movement)  Sit to Supine: Maximum assistance;Assist x2  Scooting: Total assistance;Assist x2 (up in bed)  Balance:   Sitting: Impaired  Sitting - Static: Fair (occasional)  Sitting - Dynamic: Fair (occasional)    Pain Rating:  Pain L knee and appeared to be in pain when therapist progressed head of bed    Activity Tolerance:   Poor    After treatment patient left in no apparent distress:   Supine in bed, Call bell within reach, Bed / chair alarm activated, and Side rails x 3    COMMUNICATION/EDUCATION:   The patients plan of care was discussed with: Occupational therapist and Registered nurse. Patient is unable to participate in goal setting and plan of care.     Thank you for this referral.  Rashid Macdonald, PT   Time Calculation: 15 mins

## 2022-07-28 NOTE — PROGRESS NOTES
[facility] Adult  Hospitalist Group                                                                                          Hospitalist Progress Note  Iwona Sagastume MD  Answering service: [phone] from in house phone        Date of Service:  2022  NAME:  Darion Pink  :  1932  MRN:  [id number]      Admission Summary:   Darion Pink is a 80 y.o. female who presents with shortness of breath and cough    Patient is a poor historian, patient is at [facility] rehab, patient had a ground-level fall, was found to have PEs, was also found to have pneumonia, was treated with Flagyl and Rocephin, was transitioned to oral cephalosporins, has chronic hypoxic respiratory failure and uses 4 L of oxygen at baseline, was sent over here with concerns for impending sepsis, patient was found to have a white count of 38,000 and was found to have mild hypotension associated with her symptoms, came to the ER and was requested to be admitted to the hospitalist service, currently patient is resting in bed, alert x1, is coughing but denies any other complaints or problems       Interval history / Subjective: Follow up SOB   Continues on 3L NC  No fever  Was sleeping comfortably, when I awoke her she became very anxious and asked where she was and then asked me to \"help\" her and get her better but denied any symptoms including CP, SARBJIT, SOB several times  She could not tell me why she needed help, she said \"I don't know, you tell me. \"  When I left she said \"you're just going to leave me here alone? \"  Very anxious but no specific signs or symptoms and vitals stable  Assessment & Plan:     Acute on chronic hypoxic respiratory failure, 4l NC at baseline  SIRS -now resolved  Healthcare associated pneumonia  Pulmonary edema  -I/O  -Daily weight  -Cultures no growth   -finished the course of Cefepime (--)  -Responded with occasional IV diuretic as well    Hypotension: resolved  History of atrial flutter  Probable V tach vs flutter:   -Continue on cardizem/ASA/Eliquis/oral amiodarone  -Was put on Amiodarone 7/21 evening, now PO  -Appreciate cardiology    Elevated troponin-per cardiology sec to above, not ACS  Acute kidney injury : had improved, hold lasix and repeat labs in AM  History of pulmonary embolism: on Eliquis, continue  Dysphagia:dysphagia diet    Anxiety vs delirium  monitor    PT/OT SNF     Code status: DNR  Prophylaxis: Eliquis  Care Plan discussed with: Patient, RN  Anticipated Disposition: [facility] Problems  Date Reviewed: 7/24/2022            Codes Class Noted POA    * (Principal) Sepsis ([facility] [address] ICD-10-CM: A41.9  ICD-9-CM: 038.9, 995.91  7/20/2022 Yes       Review of Systems:   A comprehensive review of systems was negative except for that written in the HPI. Vital Signs:    Last 24hrs VS reviewed since prior progress note. Most recent are:  Visit Vitals  BP (!) 141/72 (BP 1 Location: Left upper arm, BP Patient Position: At rest)   Pulse 93   Temp 97.6 °F (36.4 °C)   Resp 18   Ht 5' 1\" (1.549 m)   Wt 67.1 kg (147 lb 14.9 oz)   SpO2 95%   BMI 27.95 kg/m²         Intake/Output Summary (Last 24 hours) at 7/28/2022 1543  Last data filed at 7/28/2022 1121  Gross per 24 hour   Intake 240 ml   Output 1000 ml   Net -760 ml          Physical Examination:     I had a face to face encounter with this patient and independently examined them on 7/28/2022 as outlined below:          Constitutional:  Awake, alert, anxious, mild distress   ENT:  Oral mucosa moist, oropharynx benign   Resp:  CTA bilaterally. No wheezing/rhonchi/rales. No accessory muscle use. CV:  Regular rhythm, normal rate, no murmurs, gallops, rubs    GI:  Soft, non distended, non tender   Musculoskeletal:  No edema, warm    Neurologic:      Psychiatric:  Moves all extremities. AAOx2-3, CN II-XII reviewed. Some confusion initially on location.  Non-focal  Very anxious, not agitated            Data Review: Review and/or order of clinical lab test      Labs:     Recent Labs     07/27/22 0232   WBC 10.5   HGB 10.2*   HCT 32.1*          Recent Labs     07/27/22 0232 07/26/22  0348    138   K 4.1 3.4*    101   CO2 31 31   BUN 46* 36*   CREA 1.38* 1.13*   * 112*   CA 9.7 9.2       No results for input(s): ALT, AP, TBIL, TBILI, TP, ALB, GLOB, GGT, AML, LPSE in the last 72 hours. No lab exists for component: SGOT, GPT, AMYP, HLPSE    No results for input(s): INR, PTP, APTT, INREXT, INREXT in the last 72 hours. No results for input(s): FE, TIBC, PSAT, FERR in the last 72 hours. No results found for: FOL, RBCF   No results for input(s): PH, PCO2, PO2 in the last 72 hours. No results for input(s): CPK, CKNDX, TROIQ in the last 72 hours.     No lab exists for component: CPKMB  No results found for: CHOL, CHOLX, CHLST, CHOLV, HDL, HDLP, LDL, LDLC, DLDLP, TGLX, TRIGL, TRIGP, CHHD, CHHDX  No results found for: GLUCPOC  Lab Results   Component Value Date/Time    Color DARK YELLOW 07/20/2022 03:37 PM    Appearance CLOUDY (A) 07/20/2022 03:37 PM    Specific gravity 1.018 07/20/2022 03:37 PM    pH (UA) 5.0 07/20/2022 03:37 PM    Protein 30 (A) 07/20/2022 03:37 PM    Glucose Negative 07/20/2022 03:37 PM    Ketone TRACE (A) 07/20/2022 03:37 PM    Bilirubin Negative 07/20/2022 03:37 PM    Urobilinogen 0.2 07/20/2022 03:37 PM    Nitrites Negative 07/20/2022 03:37 PM    Leukocyte Esterase TRACE (A) 07/20/2022 03:37 PM    Epithelial cells FEW 07/20/2022 03:37 PM    Bacteria Negative 07/20/2022 03:37 PM    WBC 0-4 07/20/2022 03:37 PM    RBC 5-10 07/20/2022 03:37 PM         Medications Reviewed:     Current Facility-Administered Medications   Medication Dose Route Frequency    miconazole (MICONTIN) 2 % ointment   Topical BID    amiodarone (CORDARONE) tablet 200 mg  200 mg Oral DAILY    calcium carbonate (OS-WANDA) tablet 500 mg [elemental]  500 mg Oral BID    guaiFENesin (ORGANIDIN) tablet 600 mg  600 mg Oral Q4H PRN    melatonin tablet 3 mg  3 mg Oral QHS PRN    albuterol-ipratropium (DUO-NEB) 2.5 MG-0.5 MG/3 ML  3 mL Nebulization BID RT    ondansetron (ZOFRAN) injection 4 mg  4 mg IntraVENous Q6H PRN    albuterol-ipratropium (DUO-NEB) 2.5 MG-0.5 MG/3 ML  3 mL Nebulization Q4H PRN    [Held by provider] furosemide (LASIX) tablet 40 mg  40 mg Oral DAILY    metoprolol (LOPRESSOR) injection 2.5 mg  2.5 mg IntraVENous Q6H PRN    HYDROmorphone (DILAUDID) injection 0.5 mg  0.5 mg IntraVENous Q4H PRN    sodium chloride (NS) flush 5-10 mL  5-10 mL IntraVENous PRN    sodium chloride (NS) flush 5-40 mL  5-40 mL IntraVENous Q8H    sodium chloride (NS) flush 5-40 mL  5-40 mL IntraVENous PRN    acetaminophen (TYLENOL) tablet 650 mg  650 mg Oral Q4H PRN    apixaban (ELIQUIS) tablet 5 mg  5 mg Oral BID    aspirin chewable tablet 81 mg  81 mg Oral DAILY    famotidine (PEPCID) tablet 10 mg  10 mg Oral BID    oxyCODONE IR (ROXICODONE) tablet 5 mg  5 mg Oral Q4H PRN    polyethylene glycol (MIRALAX) packet 17 g  17 g Oral DAILY    simethicone (MYLICON) tablet 80 mg  80 mg Oral Q6H PRN     ______________________________________________________________________  EXPECTED LENGTH OF STAY: 4d 2h  ACTUAL LENGTH OF STAY:          8                 Tiffanie Lopes MD

## 2022-07-28 NOTE — PROGRESS NOTES
SLP Contact Note    Noted pt is tolerating diet, although intake has been poor. WBC is stable. Recommend pt continue diet and consider GI consult. Can advance diet per GI. No further SLP needs. Will sign off. Please reconsult should SLP be of any assistance.       Thank you,  OLGA Linares.Ed, [address] [facility]  Speech-Language Pathologist

## 2022-07-28 NOTE — PROGRESS NOTES
Provided pastoral care visit to [facility] 5 patient. Did not include sacramental care.

## 2022-07-28 NOTE — PROGRESS NOTES
Problem: Pressure Injury - Risk of  Goal: *Prevention of pressure injury  Description: Document Fly Scale and appropriate interventions in the flowsheet. Outcome: Progressing Towards Goal  Note: Pressure Injury Interventions:  Sensory Interventions: Assess changes in LOC, Float heels, Keep linens dry and wrinkle-free    Moisture Interventions: Internal/External urinary devices    Activity Interventions: Pressure redistribution bed/mattress(bed type), Increase time out of bed, PT/OT evaluation    Mobility Interventions: HOB 30 degrees or less, Float heels    Nutrition Interventions: Document food/fluid/supplement intake    Friction and Shear Interventions: HOB 30 degrees or less, Lift sheet                Problem: Patient Education: Go to Patient Education Activity  Goal: Patient/Family Education  Outcome: Progressing Towards Goal     Problem: Falls - Risk of  Goal: *Absence of Falls  Description: Document Sharri Fall Risk and appropriate interventions in the flowsheet.   Outcome: Progressing Towards Goal  Note: Fall Risk Interventions:  Mobility Interventions: Bed/chair exit alarm, Communicate number of staff needed for ambulation/transfer    Mentation Interventions: Bed/chair exit alarm    Medication Interventions: Evaluate medications/consider consulting pharmacy    Elimination Interventions: Bed/chair exit alarm, Call light in reach    History of Falls Interventions: Bed/chair exit alarm         Problem: Patient Education: Go to Patient Education Activity  Goal: Patient/Family Education  Outcome: Progressing Towards Goal

## 2022-07-28 NOTE — WOUND CARE
WOCN Note:     New wound care consult placed for buttocks  Assessed in room 620    Chart shows:  Patient admitted on 7/20/22 with Sepsis  History reviewed. No pertinent past medical history. Admitted from [address]:   Alert, confused  Mobility: total assistance with turning and repositioning  Incontinent of urine and stool, purewick in place  Last Fly Score: 15  Surface: foam mattress    1) Gluteal cleft, buttocks, labia majora with red rash that has peeling skin    Wound Recommendations:      Remedy antifungal ointment to buttocks, gluteal cleft, labia majora BID     PI Prevention:  Turn/reposition approximately every 2 hours  Offload heels with pillows at all times in bed. Sacral Foam dressing: lift to assess regularly; change as needed. Discontinue if incontinent. Keep HOB 30 degrees or less to decrease shearing and pressure unless medically contraindicated. If HOB is to be over 30 degrees, raise knees first then [facility] to prevent sliding   Minimize layers of linen/pads under patient to optimize support surface to one flat sheet and one incontinence pad   Specialty bed:  Prius ordered today. Please call Counts include 234 beds at the [facility] at [phone] for bed to be picked up at discharge     Orders received from Dr. Hemant Flores  Discussed with RN, Jose Young     Transition of Care: Plan to follow weekly and as needed while admitted to hospital.      Yessy Thomson MSN, RN, [facility]  Certified Wound and Ostomy Nurse  office [phone]  Can be reached through Chanel Deras

## 2022-07-28 NOTE — PROGRESS NOTES
Problem: Self Care Deficits Care Plan (Adult)  Goal: *Acute Goals and Plan of Care (Insert Text)  Description:   FUNCTIONAL STATUS PRIOR TO ADMISSION: Patient was active and independent without DME prior to fall ~July 2, 2022. Was admitted to [facility] for metatarsal fx and then to [facility] for rehab.  and patient reports not receiving much therapy there d/t decline in medical condition. HOME SUPPORT: Lives with     Occupational Therapy Goals  Downgraded 7/28/2022  1. Patient will perform grooming with moderate assistance within 7 day(s). 2.  Patient will perform upper body dressing with moderate assistance within 7 day(s). 3.  Patient will perform lower body dressing with maximum assistance  within 7 day(s). 4.  Patient will perform all aspects of toileting with maximum assistance  within 7 day(s). 5.  Patient will utilize energy conservation techniques during functional activities with verbal cues within 7 day(s). Initiated 7/21/2022  1. Patient will perform grooming with minimal assistance/contact guard assist within 7 day(s). 2.  Patient will perform upper body dressing with minimal assistance/contact guard assist within 7 day(s). 3.  Patient will perform lower body dressing with moderate assistance  within 7 day(s). 4.  Patient will perform all aspects of toileting with moderate assistance  within 7 day(s). 5.  Patient will utilize energy conservation techniques during functional activities with verbal cues within 7 day(s). Outcome: Not Progressing Towards Goal    OCCUPATIONAL THERAPY TREATMENT/WEEKLY RE-ASSESSMENT  Patient: Rehana Stoner (62 y.o. female)  Date: 7/28/2022  Diagnosis: Sepsis ([address] [A41.9] Sepsis ([address]      Precautions: Fall, DNR  Chart, occupational therapy assessment, plan of care, and goals were reviewed. ASSESSMENT  Patient is not progressing in OT goals (downgraded for weekly reassessment). RN reports patient has been increasingly delirious and uncooperative. She presents today for OT A&Ox1, highly confused, uncooperative, and followed very few commands (likely behavioral). She also remains limited by poor functional task engagement, general weakness, impaired functional reach for ADLs, impaired activity tolerance, and impaired balance. Refused grooming task despite encouragement and required maximum assistance x2 for supine<>sit with very poor motor initiation. Maintained static sitting balance at EOB with CGA. Recommend SNF at d/c. Current Level of Function Impacting Discharge (ADLs): total assistance ADLs         PLAN :  Goals have been updated based on progression since last assessment. Patient continues to benefit from skilled intervention to address the above impairments. Continue to follow patient 3 times a week to address goals. Recommendation for discharge: (in order for the patient to meet his/her long term goals)  Therapy up to 5 days/week in SNF setting    This discharge recommendation:  Has been made in collaboration with the attending provider and/or case management       SUBJECTIVE:   Patient stated Patrice Rosie are you doing to me?     OBJECTIVE DATA SUMMARY:   Cognitive/Behavioral Status:  Neurologic State: Drowsy; Confused; Eyes open to voice  Orientation Level: Oriented to person;Disoriented to situation;Disoriented to time;Disoriented to place  Cognition: Memory loss; Impaired decision making;Decreased attention/concentration;Decreased command following     Perseveration: Perseverates during mobility  Safety/Judgement: Decreased awareness of environment;Decreased insight into deficits    Functional Mobility and Transfers for ADLs:  Bed Mobility:  Rolling: Maximum assistance  Supine to Sit: Maximum assistance (required therapist assist to initiate movement)  Sit to Supine: Maximum assistance;Assist x2  Scooting:  Total assistance;Assist x2 (up in bed)         Balance:  Sitting: Impaired  Sitting - Static: Fair (occasional)  Sitting - Dynamic: Fair (occasional)    ADL Intervention:          Cognitive Retraining  Safety/Judgement: Decreased awareness of environment;Decreased insight into deficits    Pain:  Patient did not indicate pain    Activity Tolerance:   Fair    After treatment patient left in no apparent distress:   Supine in bed, Call bell within reach, and Bed / chair alarm activated    COMMUNICATION/COLLABORATION:   The patients plan of care was discussed with: Physical therapist and Registered nurse.      Aliya Carpenter OT  Time Calculation: 16 mins

## 2022-07-28 NOTE — PROGRESS NOTES
JULIOCESAR:    RUR 14%    Disposition: SNF rehab-[facility] accepted. There is no bed available today. Per Sandra Correa in [facility],[phone], they will accept after 1:30 tomorrow, 7/29.     Neg Covid test 7/27    Transportation: [facility]    Follow up: PCP/Specialist    Primary contact: Jasmina Howard [phone]    Estrella Bay RN/CRM  [phone]

## 2022-07-28 NOTE — PROGRESS NOTES
Provided pastoral care visit to [facility] 5 patient. Did not include sacramental care.      Papi Knight

## 2022-07-28 NOTE — PROGRESS NOTES
Problem: Aspiration - Risk of  Goal: *Absence of aspiration  Outcome: Progressing Towards Goal     Problem: Patient Education: Go to Patient Education Activity  Goal: Patient/Family Education  Outcome: Progressing Towards Goal     Problem: Pressure Injury - Risk of  Goal: *Prevention of pressure injury  Description: Document Fly Scale and appropriate interventions in the flowsheet. Outcome: Progressing Towards Goal  Note: Pressure Injury Interventions:  Sensory Interventions: Assess changes in LOC, Keep linens dry and wrinkle-free, Float heels    Moisture Interventions: Internal/External urinary devices, Absorbent underpads    Activity Interventions: Pressure redistribution bed/mattress(bed type), PT/OT evaluation    Mobility Interventions: Float heels, HOB 30 degrees or less    Nutrition Interventions: Document food/fluid/supplement intake    Friction and Shear Interventions: HOB 30 degrees or less, Lift sheet     Problem: Patient Education: Go to Patient Education Activity  Goal: Patient/Family Education  Outcome: Progressing Towards Goal     Problem: Falls - Risk of  Goal: *Absence of Falls  Description: Document Sharri Fall Risk and appropriate interventions in the flowsheet.   Outcome: Progressing Towards Goal  Note: Fall Risk Interventions:  Mobility Interventions: Bed/chair exit alarm, Communicate number of staff needed for ambulation/transfer    Mentation Interventions: Adequate sleep, hydration, pain control, Bed/chair exit alarm, Door open when patient unattended, Evaluate medications/consider consulting pharmacy    Medication Interventions: Evaluate medications/consider consulting pharmacy    Elimination Interventions: Call light in reach, Bed/chair exit alarm    History of Falls Interventions: Evaluate medications/consider consulting pharmacy, Door open when patient unattended

## 2022-07-28 NOTE — PROGRESS NOTES
Comprehensive Nutrition Assessment    Type and Reason for Visit: Initial    Nutrition Recommendations/Plan:   Continue dysphagia soft/bite sized diet  Continue ensure enlive TID, will add magic cup once daily       Malnutrition Assessment:  Malnutrition Status:  Insufficient data (07/28/22 1507)           Nutrition Assessment:    PMHx: h/o PE, chronic resp failure    80 y.o. female admitted with acute on chronic hypoxic respiratory failure, HAP, pulm edema, PAULO. Pt currently on soft and bite sized diet and is tolerating well per SLP. Visited pt at bedside- she was very confused today and repeatedly asked for help- notified RN. Per RN her appetite was good yesterday and she likes ensure supplements- will continue to send TID along with magic cup once daily for documented disinterested in po. Intakes documented typically ~50% of meals and 0% of ons. No wt history on file- unsure of recent wt loss. Pt with intermittent nausea. Recent Labs     07/27/22  0232 07/26/22  0348   * 112*   BUN 46* 36*   CREA 1.38* 1.13*    138   K 4.1 3.4*    101   CO2 31 31   CA 9.7 9.2       Nutritionally Significant Medications:  Os-pierre, lasix, miralax, zofran      Estimated Daily Nutrient Needs:  Energy Requirements Based On: Formula  Weight Used for Energy Requirements: Current  Energy (kcal/day): 1439 (MSJx1. 4)  Weight Used for Protein Requirements: Current  Protein (g/day): 67  Method Used for Fluid Requirements: 1 ml/kcal  Fluid (ml/day): 1400    Nutrition Related Findings:   Edema: LLE: 1+ (7/27/2022  8:08 AM)  LUE: Trace (7/27/2022  8:08 AM)  RLE: 1+ (7/27/2022  8:08 AM)  RUE: Trace (7/27/2022  8:08 AM)    Last BM: 07/25/22, Loose, Soft    Wounds: None      Current Nutrition Therapies:  Diet: dysphagia soft/bite sized  Supplements: ensure enlive TID  Meal intake: Patient Vitals for the past 168 hrs:   % Diet Eaten   07/27/22 1922 26 - 50%   07/27/22 1136 26 - 50%   07/25/22 1550 26 - 50%   07/25/22 0900 51 - 75%   07/24/22 1522 76 - 100%   07/24/22 1100 76 - 100%   07/24/22 0828 76 - 100%   07/23/22 1555 51 - 75%   07/23/22 1115 1 - 25%   07/23/22 0846 26 - 50%   07/22/22 1600 26 - 50%   07/21/22 1547 0%     Supplement intake: Patient Vitals for the past 168 hrs:   Supplement intake %   07/24/22 1522 0%   07/24/22 1100 0%   07/24/22 0828 0%   07/23/22 1555 76 - 100%   07/23/22 1115 0%   07/23/22 0846 0%     Nutrition Support: none      Anthropometric Measures:  Height: 5' 1\" (154.9 cm)  Ideal Body Weight (IBW): 105 lbs (48 kg)     Current Body Wt:  67.1 kg (147 lb 14.9 oz), 140.9 % IBW. Bed scale  Current BMI (kg/m2): 28     BMI Category: Overweight (BMI 25.0-29. 9)    Wt Readings from Last 10 Encounters:   07/25/22 67.1 kg (147 lb 14.9 oz)           Nutrition Diagnosis:   Inadequate oral intake related to inadequate protein-energy intake as evidenced by intake 26-50%    Nutrition Interventions:   Food and/or Nutrient Delivery: Continue current diet, Modify oral nutrition supplement  Nutrition Education/Counseling: No recommendations at this time  Coordination of Nutrition Care: Continue to monitor while inpatient       Goals:     Goals: PO intake 50% or greater, by next RD assessment       Nutrition Monitoring and Evaluation:   Behavioral-Environmental Outcomes: None identified  Food/Nutrient Intake Outcomes: Food and nutrient intake, Supplement intake  Physical Signs/Symptoms Outcomes: Biochemical data, Meal time behavior, Weight    Discharge Planning:    Continue oral nutrition supplement, Continue current diet    Moose Richards  Available via [address] [place] [address]

## 2022-07-29 LAB
ANION GAP SERPL CALC-SCNC: 7 MMOL/L (ref 5–15)
BUN SERPL-MCNC: 52 MG/DL (ref 6–20)
BUN/CREAT SERPL: 24 (ref 12–20)
CALCIUM SERPL-MCNC: 10.8 MG/DL (ref 8.5–10.1)
CHLORIDE SERPL-SCNC: 100 MMOL/L (ref 97–108)
CO2 SERPL-SCNC: 33 MMOL/L (ref 21–32)
CREAT SERPL-MCNC: 2.13 MG/DL (ref 0.55–1.02)
GLUCOSE SERPL-MCNC: 114 MG/DL (ref 65–100)
MAGNESIUM SERPL-MCNC: 1.7 MG/DL (ref 1.6–2.4)
PHOSPHATE SERPL-MCNC: 3.3 MG/DL (ref 2.6–4.7)
POTASSIUM SERPL-SCNC: 3.3 MMOL/L (ref 3.5–5.1)
SODIUM SERPL-SCNC: 140 MMOL/L (ref 136–145)

## 2022-07-29 PROCEDURE — 74011000250 HC RX REV CODE- 250: Performed by: HOSPITALIST

## 2022-07-29 PROCEDURE — 83735 ASSAY OF MAGNESIUM: CPT

## 2022-07-29 PROCEDURE — 94640 AIRWAY INHALATION TREATMENT: CPT

## 2022-07-29 PROCEDURE — 74011000250 HC RX REV CODE- 250: Performed by: EMERGENCY MEDICINE

## 2022-07-29 PROCEDURE — 74011250637 HC RX REV CODE- 250/637: Performed by: INTERNAL MEDICINE

## 2022-07-29 PROCEDURE — 84100 ASSAY OF PHOSPHORUS: CPT

## 2022-07-29 PROCEDURE — 74011250636 HC RX REV CODE- 250/636: Performed by: INTERNAL MEDICINE

## 2022-07-29 PROCEDURE — 74011250636 HC RX REV CODE- 250/636: Performed by: HOSPITALIST

## 2022-07-29 PROCEDURE — 74011250637 HC RX REV CODE- 250/637: Performed by: FAMILY MEDICINE

## 2022-07-29 PROCEDURE — 80048 BASIC METABOLIC PNL TOTAL CA: CPT

## 2022-07-29 PROCEDURE — 65270000029 HC RM PRIVATE

## 2022-07-29 PROCEDURE — 74011000250 HC RX REV CODE- 250: Performed by: FAMILY MEDICINE

## 2022-07-29 PROCEDURE — 74011250637 HC RX REV CODE- 250/637: Performed by: HOSPITALIST

## 2022-07-29 PROCEDURE — 36415 COLL VENOUS BLD VENIPUNCTURE: CPT

## 2022-07-29 PROCEDURE — 77010033678 HC OXYGEN DAILY

## 2022-07-29 RX ORDER — POTASSIUM CHLORIDE 7.45 MG/ML
10 INJECTION INTRAVENOUS
Status: COMPLETED | OUTPATIENT
Start: 2022-07-29 | End: 2022-07-29

## 2022-07-29 RX ORDER — POTASSIUM CHLORIDE 750 MG/1
20 TABLET, FILM COATED, EXTENDED RELEASE ORAL
Status: DISCONTINUED | OUTPATIENT
Start: 2022-07-29 | End: 2022-07-29

## 2022-07-29 RX ORDER — MAGNESIUM SULFATE HEPTAHYDRATE 40 MG/ML
2 INJECTION, SOLUTION INTRAVENOUS ONCE
Status: COMPLETED | OUTPATIENT
Start: 2022-07-29 | End: 2022-07-29

## 2022-07-29 RX ORDER — HALOPERIDOL 5 MG/ML
1 INJECTION INTRAMUSCULAR
Status: DISCONTINUED | OUTPATIENT
Start: 2022-07-29 | End: 2022-08-03 | Stop reason: HOSPADM

## 2022-07-29 RX ADMIN — ASPIRIN 81 MG CHEWABLE TABLET 81 MG: 81 TABLET CHEWABLE at 09:31

## 2022-07-29 RX ADMIN — SODIUM CHLORIDE, PRESERVATIVE FREE 10 ML: 5 INJECTION INTRAVENOUS at 06:04

## 2022-07-29 RX ADMIN — IPRATROPIUM BROMIDE AND ALBUTEROL SULFATE 3 ML: .5; 3 SOLUTION RESPIRATORY (INHALATION) at 08:28

## 2022-07-29 RX ADMIN — SODIUM CHLORIDE, PRESERVATIVE FREE 10 ML: 5 INJECTION INTRAVENOUS at 01:34

## 2022-07-29 RX ADMIN — MAGNESIUM SULFATE HEPTAHYDRATE 2 G: 40 INJECTION, SOLUTION INTRAVENOUS at 07:25

## 2022-07-29 RX ADMIN — AMIODARONE HYDROCHLORIDE 200 MG: 200 TABLET ORAL at 09:31

## 2022-07-29 RX ADMIN — POTASSIUM CHLORIDE 10 MEQ: 7.46 INJECTION, SOLUTION INTRAVENOUS at 09:29

## 2022-07-29 RX ADMIN — APIXABAN 5 MG: 5 TABLET, FILM COATED ORAL at 09:31

## 2022-07-29 RX ADMIN — Medication: at 19:09

## 2022-07-29 RX ADMIN — FAMOTIDINE 10 MG: 20 TABLET ORAL at 17:39

## 2022-07-29 RX ADMIN — ACETAMINOPHEN 650 MG: 325 TABLET ORAL at 09:37

## 2022-07-29 RX ADMIN — HALOPERIDOL LACTATE 1 MG: 5 INJECTION, SOLUTION INTRAMUSCULAR at 07:30

## 2022-07-29 RX ADMIN — POTASSIUM CHLORIDE 10 MEQ: 7.46 INJECTION, SOLUTION INTRAVENOUS at 11:06

## 2022-07-29 RX ADMIN — Medication: at 09:33

## 2022-07-29 RX ADMIN — IPRATROPIUM BROMIDE AND ALBUTEROL SULFATE 3 ML: .5; 3 SOLUTION RESPIRATORY (INHALATION) at 20:31

## 2022-07-29 RX ADMIN — SODIUM CHLORIDE, POTASSIUM CHLORIDE, SODIUM LACTATE AND CALCIUM CHLORIDE 500 ML: 600; 310; 30; 20 INJECTION, SOLUTION INTRAVENOUS at 07:24

## 2022-07-29 RX ADMIN — HYDROMORPHONE HYDROCHLORIDE 0.5 MG: 1 INJECTION, SOLUTION INTRAMUSCULAR; INTRAVENOUS; SUBCUTANEOUS at 01:34

## 2022-07-29 RX ADMIN — CALCIUM 500 MG: 500 TABLET ORAL at 17:39

## 2022-07-29 RX ADMIN — SODIUM CHLORIDE, PRESERVATIVE FREE 10 ML: 5 INJECTION INTRAVENOUS at 14:34

## 2022-07-29 RX ADMIN — FAMOTIDINE 10 MG: 20 TABLET ORAL at 09:31

## 2022-07-29 NOTE — PROGRESS NOTES
Problem: Aspiration - Risk of  Goal: *Absence of aspiration  Outcome: Progressing Towards Goal     Problem: Pressure Injury - Risk of  Goal: *Prevention of pressure injury  Description: Document Fly Scale and appropriate interventions in the flowsheet. Outcome: Progressing Towards Goal  Note: Pressure Injury Interventions:  Sensory Interventions: Assess changes in LOC, Keep linens dry and wrinkle-free, Float heels    Moisture Interventions: Internal/External urinary devices, Absorbent underpads    Activity Interventions: Pressure redistribution bed/mattress(bed type), PT/OT evaluation    Mobility Interventions: Float heels, HOB 30 degrees or less    Nutrition Interventions: Document food/fluid/supplement intake    Friction and Shear Interventions: HOB 30 degrees or less, Lift sheet                Problem: Falls - Risk of  Goal: *Absence of Falls  Description: Document Sharri Fall Risk and appropriate interventions in the flowsheet. Outcome: Progressing Towards Goal  Note: Fall Risk Interventions:  Mobility Interventions: Bed/chair exit alarm, Communicate number of staff needed for ambulation/transfer    Mentation Interventions: Adequate sleep, hydration, pain control, Bed/chair exit alarm, Door open when patient unattended, Evaluate medications/consider consulting pharmacy    Medication Interventions: Evaluate medications/consider consulting pharmacy    Elimination Interventions: Call light in reach, Bed/chair exit alarm    History of Falls Interventions: Evaluate medications/consider consulting pharmacy, Door open when patient unattended         Problem: Patient Education: Go to Patient Education Activity  Goal: Patient/Family Education  Outcome: Progressing Towards Goal     Problem: Pressure Injury - Risk of  Goal: *Prevention of pressure injury  Description: Document Fly Scale and appropriate interventions in the flowsheet.   Outcome: Progressing Towards Goal  Note: Pressure Injury Interventions:  Sensory Interventions: Assess changes in LOC, Keep linens dry and wrinkle-free, Float heels    Moisture Interventions: Internal/External urinary devices, Absorbent underpads    Activity Interventions: Pressure redistribution bed/mattress(bed type), PT/OT evaluation    Mobility Interventions: Float heels, HOB 30 degrees or less    Nutrition Interventions: Document food/fluid/supplement intake    Friction and Shear Interventions: HOB 30 degrees or less, Lift sheet                Problem: Falls - Risk of  Goal: *Absence of Falls  Description: Document Sharri Fall Risk and appropriate interventions in the flowsheet.   Outcome: Progressing Towards Goal  Note: Fall Risk Interventions:  Mobility Interventions: Bed/chair exit alarm, Communicate number of staff needed for ambulation/transfer    Mentation Interventions: Adequate sleep, hydration, pain control, Bed/chair exit alarm, Door open when patient unattended, Evaluate medications/consider consulting pharmacy    Medication Interventions: Evaluate medications/consider consulting pharmacy    Elimination Interventions: Call light in reach, Bed/chair exit alarm    History of Falls Interventions: Evaluate medications/consider consulting pharmacy, Door open when patient unattended

## 2022-07-29 NOTE — PROGRESS NOTES
JULIOCESAR:    RUR 14%    Disposition: SN[facility] accepted. Patient not medically ready today. Per Ilana Lopez at [facility] ([phone]), they probably will not have a bed available until Monday.     Transportation: [facility]    Follow up: PCP/Specialist    Primary contact: Kristy Whitt [phone]    Vernon Whalen RN/CRM  [phone]

## 2022-07-29 NOTE — PROGRESS NOTES
[facility]  Hospitalist Group                                                                                          Hospitalist Progress Note  Marcie Mallory MD  Answering service: [phone] OR [phone] from in house phone        Date of Service:  2022  NAME:  Diamond Koo  :  1932  MRN:  [id number]      Admission Summary:   Diamond Koo is a 80 y.o. female who presents with shortness of breath and cough    Patient is a poor historian, patient is at [facility] rehab, patient had a ground-level fall, was found to have PEs, was also found to have pneumonia, was treated with Flagyl and Rocephin, was transitioned to oral cephalosporins, has chronic hypoxic respiratory failure and uses 4 L of oxygen at baseline, was sent over here with concerns for impending sepsis, patient was found to have a white count of 38,000 and was found to have mild hypotension associated with her symptoms, came to the ER and was requested to be admitted to the hospitalist service, currently patient is resting in bed, alert x1, is coughing but denies any other complaints or problems       Interval history / Subjective:      Continues on 3L NC  Per RN slept well last night  No fever  Pt more delirious today, A&Ox0  Does not answer any questions  PACK    Assessment & Plan:     Acute on chronic hypoxic respiratory failure, 4l NC at baseline  SIRS -now resolved  Healthcare associated pneumonia  Pulmonary edema  -Cultures no growth   -finished the course of Cefepime (--)  -Responded with occasional IV diuretic as well    Hypotension: resolved  History of atrial flutter  Probable V tach vs flutter:   -Continue on cardizem/ASA/Eliquis/oral amiodarone  -Was put on Amiodarone , now PO  -Appreciate cardiology    Elevated troponin-per cardiology sec to above, not ACS    Acute kidney injury : had improved, now returned likely d/t poor PO intake d/t delirium  Cont to hold lasix, give IVF bolus and repeat labs in AM  Encourage PO fluids    History of pulmonary embolism: on Eliquis, continue    Dysphagia:dysphagia diet    Delirium  supportive care, haldol prn    Hypokalemia, replete  Replete mag    PT/OT SNF    Tried to call  [phone], no answer     Code status: DNR  Prophylaxis: Eliquis  Care Plan discussed with: RNANGELA  Anticipated Disposition: [facility], bed available but now not medically ready     Hospital Problems  Date Reviewed: 7/24/2022            Codes Class Noted POA    * (Principal) Sepsis ([facility] [address] ICD-10-CM: A41.9  ICD-9-CM: 038.9, 995.91  7/20/2022 Yes       Review of Systems:   A comprehensive review of systems was negative except for that written in the HPI. Vital Signs:    Last 24hrs VS reviewed since prior progress note. Most recent are:  Visit Vitals  /79 (BP 1 Location: Right arm, BP Patient Position: At rest)   Pulse 89   Temp 97.9 °F (36.6 °C)   Resp 17   Ht 5' 1\" (1.549 m)   Wt 67.1 kg (147 lb 14.9 oz)   SpO2 91%   BMI 27.95 kg/m²         Intake/Output Summary (Last 24 hours) at 7/29/2022 1806  Last data filed at 7/29/2022 0143  Gross per 24 hour   Intake --   Output 425 ml   Net -425 ml          Physical Examination:     I had a face to face encounter with this patient and independently examined them on 7/29/2022 as outlined below:          Constitutional:  Awake, not following commands   ENT:  Oral mucosa moist, oropharynx benign   Resp:  CTA bilaterally. No wheezing/rhonchi/rales. No accessory muscle use. CV:  Regular rhythm, normal rate, no murmurs, gallops, rubs    GI:  Soft, non distended, non tender   Musculoskeletal:  No edema, warm    Neurologic:    Moves all extremities. AAOx0, CN II-XII reviewed. Not following any commands.               Data Review:    Review and/or order of clinical lab test      Labs:     Recent Labs     07/27/22  0232   WBC 10.5   HGB 10.2*   HCT 32.1*          Recent Labs     07/29/22  0151 07/27/22  0232    137   K 3.3* 4.1    102   CO2 33* 31   BUN 52* 46*   CREA 2.13* 1.38*   * 119*   CA 10.8* 9.7   MG 1.7  --    PHOS 3.3  --        No results for input(s): ALT, AP, TBIL, TBILI, TP, ALB, GLOB, GGT, AML, LPSE in the last 72 hours. No lab exists for component: SGOT, GPT, AMYP, HLPSE    No results for input(s): INR, PTP, APTT, INREXT, INREXT in the last 72 hours. No results for input(s): FE, TIBC, PSAT, FERR in the last 72 hours. No results found for: FOL, RBCF   No results for input(s): PH, PCO2, PO2 in the last 72 hours. No results for input(s): CPK, CKNDX, TROIQ in the last 72 hours.     No lab exists for component: CPKMB  No results found for: CHOL, CHOLX, CHLST, CHOLV, HDL, HDLP, LDL, LDLC, DLDLP, TGLX, TRIGL, TRIGP, CHHD, CHHDX  No results found for: [facility]  Lab Results   Component Value Date/Time    Color DARK YELLOW 07/20/2022 03:37 PM    Appearance CLOUDY (A) 07/20/2022 03:37 PM    Specific gravity 1.018 07/20/2022 03:37 PM    pH (UA) 5.0 07/20/2022 03:37 PM    Protein 30 (A) 07/20/2022 03:37 PM    Glucose Negative 07/20/2022 03:37 PM    Ketone TRACE (A) 07/20/2022 03:37 PM    Bilirubin Negative 07/20/2022 03:37 PM    Urobilinogen 0.2 07/20/2022 03:37 PM    Nitrites Negative 07/20/2022 03:37 PM    Leukocyte Esterase TRACE (A) 07/20/2022 03:37 PM    Epithelial cells FEW 07/20/2022 03:37 PM    Bacteria Negative 07/20/2022 03:37 PM    WBC 0-4 07/20/2022 03:37 PM    RBC 5-10 07/20/2022 03:37 PM         Medications Reviewed:     Current Facility-Administered Medications   Medication Dose Route Frequency    haloperidol lactate (HALDOL) injection 1 mg  1 mg IntraMUSCular Q6H PRN    [START ON 7/30/2022] apixaban (ELIQUIS) tablet 2.5 mg  2.5 mg Oral Q12H    miconazole (MICONTIN) 2 % ointment   Topical BID    amiodarone (CORDARONE) tablet 200 mg  200 mg Oral DAILY    calcium carbonate (OS-WANDA) tablet 500 mg [elemental]  500 mg Oral BID    guaiFENesin (ORGANIDIN) tablet 600 mg  600 mg Oral Q4H PRN    melatonin tablet 3 mg  3 mg Oral QHS PRN    albuterol-ipratropium (DUO-NEB) 2.5 MG-0.5 MG/3 ML  3 mL Nebulization BID RT    ondansetron (ZOFRAN) injection 4 mg  4 mg IntraVENous Q6H PRN    albuterol-ipratropium (DUO-NEB) 2.5 MG-0.5 MG/3 ML  3 mL Nebulization Q4H PRN    [Held by provider] furosemide (LASIX) tablet 40 mg  40 mg Oral DAILY    metoprolol (LOPRESSOR) injection 2.5 mg  2.5 mg IntraVENous Q6H PRN    HYDROmorphone (DILAUDID) injection 0.5 mg  0.5 mg IntraVENous Q4H PRN    sodium chloride (NS) flush 5-10 mL  5-10 mL IntraVENous PRN    sodium chloride (NS) flush 5-40 mL  5-40 mL IntraVENous Q8H    sodium chloride (NS) flush 5-40 mL  5-40 mL IntraVENous PRN    acetaminophen (TYLENOL) tablet 650 mg  650 mg Oral Q4H PRN    aspirin chewable tablet 81 mg  81 mg Oral DAILY    famotidine (PEPCID) tablet 10 mg  10 mg Oral BID    oxyCODONE IR (ROXICODONE) tablet 5 mg  5 mg Oral Q4H PRN    polyethylene glycol (MIRALAX) packet 17 g  17 g Oral DAILY    simethicone (MYLICON) tablet 80 mg  80 mg Oral Q6H PRN     ______________________________________________________________________  EXPECTED LENGTH OF STAY: 4d 2h  ACTUAL LENGTH OF STAY:          9                 Madeline Henson MD

## 2022-07-30 LAB
ANION GAP SERPL CALC-SCNC: 9 MMOL/L (ref 5–15)
APPEARANCE UR: ABNORMAL
BACTERIA URNS QL MICRO: NEGATIVE /HPF
BILIRUB UR QL: NEGATIVE
BUN SERPL-MCNC: 64 MG/DL (ref 6–20)
BUN/CREAT SERPL: 27 (ref 12–20)
CALCIUM SERPL-MCNC: 11.1 MG/DL (ref 8.5–10.1)
CHLORIDE SERPL-SCNC: 99 MMOL/L (ref 97–108)
CO2 SERPL-SCNC: 33 MMOL/L (ref 21–32)
COLOR UR: ABNORMAL
CREAT SERPL-MCNC: 2.41 MG/DL (ref 0.55–1.02)
EPITH CASTS URNS QL MICRO: ABNORMAL /LPF
GLUCOSE SERPL-MCNC: 97 MG/DL (ref 65–100)
GLUCOSE UR STRIP.AUTO-MCNC: NEGATIVE MG/DL
HGB UR QL STRIP: ABNORMAL
HYALINE CASTS URNS QL MICRO: ABNORMAL /LPF (ref 0–5)
KETONES UR QL STRIP.AUTO: NEGATIVE MG/DL
LEUKOCYTE ESTERASE UR QL STRIP.AUTO: ABNORMAL
NITRITE UR QL STRIP.AUTO: NEGATIVE
PH UR STRIP: 5.5 [PH] (ref 5–8)
POTASSIUM SERPL-SCNC: 4 MMOL/L (ref 3.5–5.1)
PROT UR STRIP-MCNC: 30 MG/DL
RBC #/AREA URNS HPF: ABNORMAL /HPF (ref 0–5)
SODIUM SERPL-SCNC: 141 MMOL/L (ref 136–145)
SP GR UR REFRACTOMETRY: 1.01 (ref 1–1.03)
UROBILINOGEN UR QL STRIP.AUTO: 0.2 EU/DL (ref 0.2–1)
WBC URNS QL MICRO: ABNORMAL /HPF (ref 0–4)

## 2022-07-30 PROCEDURE — 74011250636 HC RX REV CODE- 250/636: Performed by: INTERNAL MEDICINE

## 2022-07-30 PROCEDURE — 74011000250 HC RX REV CODE- 250: Performed by: HOSPITALIST

## 2022-07-30 PROCEDURE — 77030019905 HC CATH URETH INTMIT MDII -A

## 2022-07-30 PROCEDURE — 65270000029 HC RM PRIVATE

## 2022-07-30 PROCEDURE — 36415 COLL VENOUS BLD VENIPUNCTURE: CPT

## 2022-07-30 PROCEDURE — 74011000250 HC RX REV CODE- 250: Performed by: EMERGENCY MEDICINE

## 2022-07-30 PROCEDURE — 74011000250 HC RX REV CODE- 250: Performed by: FAMILY MEDICINE

## 2022-07-30 PROCEDURE — 51798 US URINE CAPACITY MEASURE: CPT

## 2022-07-30 PROCEDURE — 81001 URINALYSIS AUTO W/SCOPE: CPT

## 2022-07-30 PROCEDURE — 94640 AIRWAY INHALATION TREATMENT: CPT

## 2022-07-30 PROCEDURE — 80048 BASIC METABOLIC PNL TOTAL CA: CPT

## 2022-07-30 PROCEDURE — 77010033678 HC OXYGEN DAILY

## 2022-07-30 PROCEDURE — 74011250637 HC RX REV CODE- 250/637: Performed by: HOSPITALIST

## 2022-07-30 PROCEDURE — 74011250637 HC RX REV CODE- 250/637: Performed by: FAMILY MEDICINE

## 2022-07-30 PROCEDURE — 74011250637 HC RX REV CODE- 250/637: Performed by: INTERNAL MEDICINE

## 2022-07-30 RX ORDER — SODIUM CHLORIDE 9 MG/ML
75 INJECTION, SOLUTION INTRAVENOUS CONTINUOUS
Status: DISCONTINUED | OUTPATIENT
Start: 2022-07-30 | End: 2022-08-01

## 2022-07-30 RX ADMIN — FAMOTIDINE 10 MG: 20 TABLET ORAL at 18:52

## 2022-07-30 RX ADMIN — Medication: at 18:51

## 2022-07-30 RX ADMIN — POLYETHYLENE GLYCOL 3350 17 G: 17 POWDER, FOR SOLUTION ORAL at 10:00

## 2022-07-30 RX ADMIN — APIXABAN 2.5 MG: 2.5 TABLET, FILM COATED ORAL at 09:59

## 2022-07-30 RX ADMIN — AMIODARONE HYDROCHLORIDE 200 MG: 200 TABLET ORAL at 09:59

## 2022-07-30 RX ADMIN — APIXABAN 2.5 MG: 2.5 TABLET, FILM COATED ORAL at 21:55

## 2022-07-30 RX ADMIN — ASPIRIN 81 MG CHEWABLE TABLET 81 MG: 81 TABLET CHEWABLE at 09:59

## 2022-07-30 RX ADMIN — CALCIUM 500 MG: 500 TABLET ORAL at 09:59

## 2022-07-30 RX ADMIN — IPRATROPIUM BROMIDE AND ALBUTEROL SULFATE 3 ML: .5; 3 SOLUTION RESPIRATORY (INHALATION) at 07:43

## 2022-07-30 RX ADMIN — CALCIUM 500 MG: 500 TABLET ORAL at 18:52

## 2022-07-30 RX ADMIN — SODIUM CHLORIDE 75 ML/HR: 9 INJECTION, SOLUTION INTRAVENOUS at 09:45

## 2022-07-30 RX ADMIN — SODIUM CHLORIDE, PRESERVATIVE FREE 10 ML: 5 INJECTION INTRAVENOUS at 09:46

## 2022-07-30 RX ADMIN — SODIUM CHLORIDE, PRESERVATIVE FREE 10 ML: 5 INJECTION INTRAVENOUS at 21:55

## 2022-07-30 RX ADMIN — IPRATROPIUM BROMIDE AND ALBUTEROL SULFATE 3 ML: .5; 3 SOLUTION RESPIRATORY (INHALATION) at 21:15

## 2022-07-30 RX ADMIN — Medication: at 10:03

## 2022-07-30 RX ADMIN — FAMOTIDINE 10 MG: 20 TABLET ORAL at 09:58

## 2022-07-30 RX ADMIN — SODIUM CHLORIDE, PRESERVATIVE FREE 10 ML: 5 INJECTION INTRAVENOUS at 18:14

## 2022-07-30 NOTE — PROGRESS NOTES
[facility] Adult  Hospitalist Group                                                                                          Hospitalist Progress Note  Pilo Anna MD  Answering service: [phone] OR [phone] from in house phone        Date of Service:  2022  NAME:  Linda Daniel  :  1932  MRN:  [id number]      Admission Summary:   Linda Daniel is a 80 y.o. female who presents with shortness of breath and cough    Patient is a poor historian, patient is at [facility] rehab, patient had a ground-level fall, was found to have PEs, was also found to have pneumonia, was treated with Flagyl and Rocephin, was transitioned to oral cephalosporins, has chronic hypoxic respiratory failure and uses 4 L of oxygen at baseline, was sent over here with concerns for impending sepsis, patient was found to have a white count of 38,000 and was found to have mild hypotension associated with her symptoms, came to the ER and was requested to be admitted to the hospitalist service, currently patient is resting in bed, alert x1, is coughing but denies any other complaints or problems       Interval history / Subjective: On RA, still very somnolent. PAULO worsening with worsening uremia. Moaning, arousable. Not speaking much. Assessment & Plan:     Acute hypoxic respiratory failure, - 4LNC at [facility], but on RA prior to admission to [address] [place] [address] in July   SIRS -now resolved  Healthcare associated pneumonia  Pulmonary edema  -Cultures no growth   -finished the course of Cefepime (--)  -Responded with occasional IV diuretic as well  -Weaned to RA  and doing well from respiratory standpoint.      Metabolic encephalopathy   Acute kidney injury : had improved, now returned likely d/t poor PO intake d/t delirium  - Hold lasix  - Start IV fluids  - Consult nephrology again   - I and O, avoid nephrotoxins     Hypotension: resolved  History of atrial flutter  Probable V tach vs flutter:   -Continue on cardizem/ASA/Eliquis/oral amiodarone  -Was put on Amiodarone 7/21, now PO  -Appreciate cardiology     Elevated troponin-per cardiology sec to above, not ACS    History of pulmonary embolism: on Eliquis, continue    Dysphagia:dysphagia diet    Delirium- Supportive care, haldol prn    Hypokalemia, replete - Replete mag    PT/OT SNF    Code status: DNR  Prophylaxis: Eliquis  Care Plan discussed with: RN, CM,    Anticipated Disposition: [facility], bed available but now not medically ready     Hospital Problems  Date Reviewed: 7/24/2022            Codes Class Noted POA    * (Principal) Sepsis ([facility] [address] ICD-10-CM: A41.9  ICD-9-CM: 038.9, 995.91  7/20/2022 Yes       Review of Systems:   A comprehensive review of systems was negative except for that written in the HPI. Vital Signs:    Last 24hrs VS reviewed since prior progress note. Most recent are:  Visit Vitals  BP (!) 180/73 (BP 1 Location: Left arm, BP Patient Position: At rest)   Pulse 98   Temp 98.9 °F (37.2 °C)   Resp 18   Ht 5' 1\" (1.549 m)   Wt 67.1 kg (147 lb 14.9 oz)   SpO2 97%   BMI 27.95 kg/m²         Intake/Output Summary (Last 24 hours) at 7/30/2022 0931  Last data filed at 7/29/2022 2302  Gross per 24 hour   Intake --   Output 400 ml   Net -400 ml          Physical Examination:     I had a face to face encounter with this patient and independently examined them on 7/30/2022 as outlined below:          Constitutional:  Asleep but arousable, not following commands   ENT:  Oral mucosa moist, oropharynx benign   Resp:  CTA bilaterally. No wheezing/rhonchi/rales. No accessory muscle use. CV:  Regular rhythm, normal rate, no murmurs, gallops, rubs    GI:  Soft, non distended, non tender   Musculoskeletal:  No edema, warm    Neurologic:    Moves all extremities. AAOx0, CN II-XII reviewed. Not following any commands.               Data Review:    Review and/or order of clinical lab test      Labs:     No results for input(s): WBC, HGB, HCT, PLT, HGBEXT, HCTEXT, PLTEXT, HGBEXT, HCTEXT, PLTEXT in the last 72 hours. Recent Labs     07/30/22  0307 07/29/22  0151    140   K 4.0 3.3*   CL 99 100   CO2 33* 33*   BUN 64* 52*   CREA 2.41* 2.13*   GLU 97 114*   CA 11.1* 10.8*   MG  --  1.7   PHOS  --  3.3       No results for input(s): ALT, AP, TBIL, TBILI, TP, ALB, GLOB, GGT, AML, LPSE in the last 72 hours. No lab exists for component: SGOT, GPT, AMYP, HLPSE    No results for input(s): INR, PTP, APTT, INREXT, INREXT in the last 72 hours. No results for input(s): FE, TIBC, PSAT, FERR in the last 72 hours. No results found for: FOL, RBCF   No results for input(s): PH, PCO2, PO2 in the last 72 hours. No results for input(s): CPK, CKNDX, TROIQ in the last 72 hours.     No lab exists for component: CPKMB  No results found for: CHOL, CHOLX, CHLST, CHOLV, HDL, HDLP, LDL, LDLC, DLDLP, TGLX, TRIGL, TRIGP, CHHD, CHHDX  No results found for: GLUCPOC  Lab Results   Component Value Date/Time    Color DARK YELLOW 07/20/2022 03:37 PM    Appearance CLOUDY (A) 07/20/2022 03:37 PM    Specific gravity 1.018 07/20/2022 03:37 PM    pH (UA) 5.0 07/20/2022 03:37 PM    Protein 30 (A) 07/20/2022 03:37 PM    Glucose Negative 07/20/2022 03:37 PM    Ketone TRACE (A) 07/20/2022 03:37 PM    Bilirubin Negative 07/20/2022 03:37 PM    Urobilinogen 0.2 07/20/2022 03:37 PM    Nitrites Negative 07/20/2022 03:37 PM    Leukocyte Esterase TRACE (A) 07/20/2022 03:37 PM    Epithelial cells FEW 07/20/2022 03:37 PM    Bacteria Negative 07/20/2022 03:37 PM    WBC 0-4 07/20/2022 03:37 PM    RBC 5-10 07/20/2022 03:37 PM         Medications Reviewed:     Current Facility-Administered Medications   Medication Dose Route Frequency    0.9% sodium chloride infusion  75 mL/hr IntraVENous CONTINUOUS    haloperidol lactate (HALDOL) injection 1 mg  1 mg IntraMUSCular Q6H PRN    apixaban (ELIQUIS) tablet 2.5 mg  2.5 mg Oral Q12H    miconazole (MICONTIN) 2 % ointment   Topical BID amiodarone (CORDARONE) tablet 200 mg  200 mg Oral DAILY    calcium carbonate (OS-WANDA) tablet 500 mg [elemental]  500 mg Oral BID    guaiFENesin (ORGANIDIN) tablet 600 mg  600 mg Oral Q4H PRN    melatonin tablet 3 mg  3 mg Oral QHS PRN    albuterol-ipratropium (DUO-NEB) 2.5 MG-0.5 MG/3 ML  3 mL Nebulization BID RT    ondansetron (ZOFRAN) injection 4 mg  4 mg IntraVENous Q6H PRN    albuterol-ipratropium (DUO-NEB) 2.5 MG-0.5 MG/3 ML  3 mL Nebulization Q4H PRN    [Held by provider] furosemide (LASIX) tablet 40 mg  40 mg Oral DAILY    metoprolol (LOPRESSOR) injection 2.5 mg  2.5 mg IntraVENous Q6H PRN    HYDROmorphone (DILAUDID) injection 0.5 mg  0.5 mg IntraVENous Q4H PRN    sodium chloride (NS) flush 5-10 mL  5-10 mL IntraVENous PRN    sodium chloride (NS) flush 5-40 mL  5-40 mL IntraVENous Q8H    sodium chloride (NS) flush 5-40 mL  5-40 mL IntraVENous PRN    acetaminophen (TYLENOL) tablet 650 mg  650 mg Oral Q4H PRN    aspirin chewable tablet 81 mg  81 mg Oral DAILY    famotidine (PEPCID) tablet 10 mg  10 mg Oral BID    oxyCODONE IR (ROXICODONE) tablet 5 mg  5 mg Oral Q4H PRN    polyethylene glycol (MIRALAX) packet 17 g  17 g Oral DAILY    simethicone (MYLICON) tablet 80 mg  80 mg Oral Q6H PRN     ______________________________________________________________________  EXPECTED LENGTH OF STAY: 4d 2h  ACTUAL LENGTH OF STAY:          10                 Laly Carpio MD

## 2022-07-30 NOTE — CONSULTS
Consultation Note    NAME: Jaimie Lancaster   :  1932   MRN:  [id number]     Date/Time:  2022 3:01 PM    I have been asked to see this patient by Dr. Aylin Oates  for advice/opinion re: PAULO. Assessment :    Plan: PAULO  AMS  Prior SIRS     We saw the patient earlier in her stay. Unclear why her creatinine has increased again. Perhaps she is on the dry side. She has a Mares. I'll check a bladder scan. Repeat UA. Hold lasix. Continue IVF. D/W . Prognosis is quite guarded. Subjective:   CHIEF COMPLAINT:  UTO    HISTORY OF PRESENT ILLNESS:     Yara Ram is a 80 y.o.   female who has PAULO. We saw her earlier in her hospital stay. Her creatinine improved and we signed off. Her creatinine had remained fairly stable around 101.1 until  when it increased to 1.3. Her Mares was pulled that day. He creatinine has continued to increase to 2.41. Her  says that she has been more lethargic the past several days. He says that her po intake has been quite poor. History reviewed. No pertinent past medical history. No past surgical history on file. Social History     Tobacco Use    Smoking status: Every Day    Smokeless tobacco: Never   Substance Use Topics    Alcohol use: Not on file      History reviewed. No pertinent family history. Allergies   Allergen Reactions    Amoxicillin Unknown (comments)    Penicillins Unknown (comments)    Sulfa (Sulfonamide Antibiotics) Unknown (comments)      Prior to Admission medications    Medication Sig Start Date End Date Taking? Authorizing Provider   furosemide (LASIX) 40 mg tablet 1 tab PO daily 22  Yes Lupe Bond MD   amiodarone (CORDARONE) 200 mg tablet Take 1 Tablet by mouth in the morning. 22  Yes Lupe Bond MD   nystatin (MYCOSTATIN) 100,000 unit/gram ointment Apply  to affected area two (2) times a day.    Yes Other, MD Madhavi   albuterol (PROVENTIL HFA, VENTOLIN HFA, PROAIR HFA) 90 mcg/actuation inhaler Take 1 Puff by inhalation every four (4) hours as needed. Yes Other, MD Madhavi   albuterol-ipratropium (DUO-NEB) 2.5 mg-0.5 mg/3 ml nebu 3 mL by Nebulization route two (2) times a day. Yes Other, MD Madhavi   oxyCODONE IR (ROXICODONE) 5 mg immediate release tablet Take 5 mg by mouth every four (4) hours as needed for Pain. Yes Other, MD Madhavi   acetaminophen (Tylenol Arthritis Pain) 650 mg TbER Take 650 mg by mouth every eight (8) hours. Yes Other, MD Madhavi   apixaban (ELIQUIS) 5 mg tablet Take 5 mg by mouth two (2) times a day. Yes Madhavi Elena MD   cefdinir (OMNICEF) 300 mg capsule Take 300 mg by mouth two (2) times a day. Yes Other, MD Madhavi   guaiFENesin (ORGANIDIN) 200 mg tablet Take 600 mg by mouth every four (4) hours as needed for Congestion. Yes Other, MD Madhavi   dilTIAZem ER (DILACOR XR) 120 mg capsule Take 120 mg by mouth in the morning. Yes Other, MD Madhavi   calcium carbonate (CALTREX) 600 mg calcium (1,500 mg) tablet Take 600 mg by mouth two (2) times a day. Yes Other, MD Madhavi   aspirin 81 mg chewable tablet Take 81 mg by mouth in the morning. Yes Other, MD Madhavi   polyethylene glycol (Miralax) 17 gram packet Take 17 g by mouth in the morning. Yes Provider, Historical   famotidine (PEPCID) 10 mg tablet Take 10 mg by mouth two (2) times a day. Yes Provider, Historical   melatonin 3 mg tablet Take 3 mg by mouth nightly as needed for Insomnia. Yes Provider, Historical   simethicone (GAS-X) 125 mg chewable tablet Take 125 mg by mouth every six (6) hours as needed for Flatulence. Yes Provider, Historical   ondansetron (ZOFRAN ODT) 4 mg disintegrating tablet Take 4 mg by mouth every eight (8) hours as needed for Nausea or Vomiting. Yes Provider, Historical   bisacodyL (Dulcolax, bisacodyl,) 10 mg supp Insert 10 mg into rectum daily as needed for Constipation.    Yes Provider, Historical     REVIEW OF SYSTEMS:     [x]  Unable to obtain reliable ROS due to  [x] mental status  [] sedated   [] intubated   [] Total of 12 systems reviewed as follows:  Constitutional: negative fever, negative chills, negative weight loss  Eyes:   negative visual changes  ENT:   negative sore throat, tongue or lip swelling  Respiratory:  negative cough, negative dyspnea  Cards:  negative for chest pain, palpitations, lower extremity edema  GI:   negative for nausea, vomiting, diarrhea, and abdominal pain  :  negative for frequency, dysuria  Integument:  negative for rash and pruritus  Heme:  negative for easy bruising and gum/nose bleeding  Musculoskel: negative for myalgias,  back pain and muscle weakness  Neuro:  negative for headaches, dizziness, vertigo  Psych:  negative for feelings of anxiety, depression   Travel?: none    Objective:   VITALS:    Visit Vitals  BP (!) 154/76 (BP 1 Location: Left upper arm, BP Patient Position: At rest)   Pulse 87   Temp 98.2 °F (36.8 °C)   Resp 17   Ht 5' 1\" (1.549 m)   Wt 67.1 kg (147 lb 14.9 oz)   SpO2 94%   BMI 27.95 kg/m²     PHYSICAL EXAM:  Gen:  []  WD []  WN  [] cachectic []  thin []  obese []  disheveled             []  ill apearing  []   Critical  [x]   Chronic    [x]  No acute distress    HEENT:   [] NC/AT/PERRL    [] pink conjunctivae      [] pale conjunctivae                  PERRL  [] yes  [] no      [] moist mucosa    [] dry mucosa    hearing intact to voice [] yes  [] No                 NECK:   supple [x] yes  [] no        masses [] yes  [x] No               thyroid  []  non tender  []  tender    RESP:   [] CTA bilaterally/no wheezing/rhonchi/rales/crackles    [x] rhonchi bilaterally - no dullness  [] wheezing   [] rhonchi   [] crackles     use of accessory muscles [] yes [] no    CARD:   [x]  regular rate and rhythm/No murmurs/rubs/gallops    murmur  [] yes ()  [] no      Rubs  [] yes  [] no       Gallops [] yes  [] no    Rate []  regular  []  irregular        carotid bruits  [] Right  []  Left                 LE edema [x] yes  [] no           JVP  []  yes   [] no    ABD:    [x] soft/non distended/non tender/+bowel sounds/no HSM    []  Rigid    tenderness [] yes [] no   Liver enlargement  []  yes []  no                Spleen enlargement  []  yes []  no     distended []  yes [] no     bowel sound  [] hypoactive   [] hyperactive    LYMPH:    Neck []  yes [x]  no       Axillae []  yes [x]  no    SKIN:   Rashes []  yes   [x]  no    Ulcers []  yes   [x]  no               [] tight to palpitation    skin turgor []  good  [] poor  [] decreased               Cyanosis/clubbing []  yes []  no    NEUR:   [] cranial nerves II-XII grossly intact       [] Cranial nerves deficit                 []  facial droop    []  slurred speech   [] aphasic     [] Strength normal     []  weakness  []  LUE  []   RUE/ []  LLE  []   RLE    follows commands  []  yes [x]  no           PSYCH:   insight [x] poor [] good   Alert and Oriented to  [] person  [] place  []  time                    [] depressed [] anxious [] agitated  [] lethargic [] stuporous  [] sedated     LAB DATA REVIEWED:    No results for input(s): WBC, HGB, HCT, PLT, HGBEXT, HCTEXT, PLTEXT in the last 72 hours. Recent Labs     07/30/22  0307 07/29/22  0151    140   K 4.0 3.3*   CL 99 100   CO2 33* 33*   BUN 64* 52*   CREA 2.41* 2.13*   GLU 97 114*   CA 11.1* 10.8*   MG  --  1.7   PHOS  --  3.3     No results for input(s): ALT, AP, TBIL, TBILI, ALB, GLOB, GGT, AML, LPSE in the last 72 hours. No lab exists for component: SGOT, GPT, AMYP, HLPSE  No results for input(s): INR, PTP, APTT, INREXT in the last 72 hours. No results for input(s): FE, TIBC, PSAT, FERR in the last 72 hours. No results for input(s): PH, PCO2, PO2 in the last 72 hours. No results for input(s): CPK, CKMB in the last 72 hours.     No lab exists for component: TROPONINI  No results found for: GLUCPOC    Procedures: see electronic medical records for all procedures/Xrays and details which were not copied into this note but were reviewed prior to creation of Plan.    ________________________________________________________________________       ___________________________________________________  Consulting Physician: Arvel Sero, MD

## 2022-07-31 LAB
ANION GAP SERPL CALC-SCNC: 4 MMOL/L (ref 5–15)
BASOPHILS # BLD: 0.1 K/UL (ref 0–0.1)
BASOPHILS NFR BLD: 1 % (ref 0–1)
BUN SERPL-MCNC: 66 MG/DL (ref 6–20)
BUN/CREAT SERPL: 25 (ref 12–20)
CALCIUM SERPL-MCNC: 10.7 MG/DL (ref 8.5–10.1)
CHLORIDE SERPL-SCNC: 105 MMOL/L (ref 97–108)
CK SERPL-CCNC: 16 U/L (ref 26–192)
CO2 SERPL-SCNC: 36 MMOL/L (ref 21–32)
CREAT SERPL-MCNC: 2.64 MG/DL (ref 0.55–1.02)
DIFFERENTIAL METHOD BLD: ABNORMAL
EOSINOPHIL # BLD: 0.3 K/UL (ref 0–0.4)
EOSINOPHIL NFR BLD: 3 % (ref 0–7)
ERYTHROCYTE [DISTWIDTH] IN BLOOD BY AUTOMATED COUNT: 15.9 % (ref 11.5–14.5)
GLUCOSE SERPL-MCNC: 111 MG/DL (ref 65–100)
HCT VFR BLD AUTO: 28.7 % (ref 35–47)
HGB BLD-MCNC: 8.8 G/DL (ref 11.5–16)
IMM GRANULOCYTES # BLD AUTO: 0.2 K/UL (ref 0–0.04)
IMM GRANULOCYTES NFR BLD AUTO: 2 % (ref 0–0.5)
LYMPHOCYTES # BLD: 1.5 K/UL (ref 0.8–3.5)
LYMPHOCYTES NFR BLD: 16 % (ref 12–49)
MAGNESIUM SERPL-MCNC: 2.2 MG/DL (ref 1.6–2.4)
MCH RBC QN AUTO: 29.3 PG (ref 26–34)
MCHC RBC AUTO-ENTMCNC: 30.7 G/DL (ref 30–36.5)
MCV RBC AUTO: 95.7 FL (ref 80–99)
MONOCYTES # BLD: 0.8 K/UL (ref 0–1)
MONOCYTES NFR BLD: 8 % (ref 5–13)
NEUTS SEG # BLD: 7 K/UL (ref 1.8–8)
NEUTS SEG NFR BLD: 70 % (ref 32–75)
NRBC # BLD: 0 K/UL (ref 0–0.01)
NRBC BLD-RTO: 0 PER 100 WBC
PHOSPHATE SERPL-MCNC: 3.4 MG/DL (ref 2.6–4.7)
PLATELET # BLD AUTO: 421 K/UL (ref 150–400)
PMV BLD AUTO: 9 FL (ref 8.9–12.9)
POTASSIUM SERPL-SCNC: 3.7 MMOL/L (ref 3.5–5.1)
RBC # BLD AUTO: 3 M/UL (ref 3.8–5.2)
SODIUM SERPL-SCNC: 145 MMOL/L (ref 136–145)
WBC # BLD AUTO: 9.8 K/UL (ref 3.6–11)

## 2022-07-31 PROCEDURE — 74011000250 HC RX REV CODE- 250: Performed by: HOSPITALIST

## 2022-07-31 PROCEDURE — 51798 US URINE CAPACITY MEASURE: CPT

## 2022-07-31 PROCEDURE — 74011250637 HC RX REV CODE- 250/637: Performed by: INTERNAL MEDICINE

## 2022-07-31 PROCEDURE — 77030005513 HC CATH URETH FOL11 MDII -B

## 2022-07-31 PROCEDURE — 36415 COLL VENOUS BLD VENIPUNCTURE: CPT

## 2022-07-31 PROCEDURE — 74011250636 HC RX REV CODE- 250/636: Performed by: HOSPITALIST

## 2022-07-31 PROCEDURE — 84100 ASSAY OF PHOSPHORUS: CPT

## 2022-07-31 PROCEDURE — 74011000250 HC RX REV CODE- 250: Performed by: FAMILY MEDICINE

## 2022-07-31 PROCEDURE — 83735 ASSAY OF MAGNESIUM: CPT

## 2022-07-31 PROCEDURE — 74011250636 HC RX REV CODE- 250/636: Performed by: INTERNAL MEDICINE

## 2022-07-31 PROCEDURE — 85025 COMPLETE CBC W/AUTO DIFF WBC: CPT

## 2022-07-31 PROCEDURE — 80048 BASIC METABOLIC PNL TOTAL CA: CPT

## 2022-07-31 PROCEDURE — 65270000029 HC RM PRIVATE

## 2022-07-31 PROCEDURE — 74011250637 HC RX REV CODE- 250/637: Performed by: HOSPITALIST

## 2022-07-31 PROCEDURE — 82550 ASSAY OF CK (CPK): CPT

## 2022-07-31 PROCEDURE — 94640 AIRWAY INHALATION TREATMENT: CPT

## 2022-07-31 PROCEDURE — 77010033678 HC OXYGEN DAILY

## 2022-07-31 PROCEDURE — 74011250637 HC RX REV CODE- 250/637: Performed by: FAMILY MEDICINE

## 2022-07-31 RX ADMIN — ASPIRIN 81 MG CHEWABLE TABLET 81 MG: 81 TABLET CHEWABLE at 08:33

## 2022-07-31 RX ADMIN — APIXABAN 2.5 MG: 2.5 TABLET, FILM COATED ORAL at 08:34

## 2022-07-31 RX ADMIN — IPRATROPIUM BROMIDE AND ALBUTEROL SULFATE 3 ML: .5; 3 SOLUTION RESPIRATORY (INHALATION) at 07:50

## 2022-07-31 RX ADMIN — CALCIUM 500 MG: 500 TABLET ORAL at 08:33

## 2022-07-31 RX ADMIN — POLYETHYLENE GLYCOL 3350 17 G: 17 POWDER, FOR SOLUTION ORAL at 08:33

## 2022-07-31 RX ADMIN — CALCIUM 500 MG: 500 TABLET ORAL at 16:26

## 2022-07-31 RX ADMIN — SODIUM CHLORIDE 75 ML/HR: 9 INJECTION, SOLUTION INTRAVENOUS at 08:38

## 2022-07-31 RX ADMIN — IPRATROPIUM BROMIDE AND ALBUTEROL SULFATE 3 ML: .5; 3 SOLUTION RESPIRATORY (INHALATION) at 20:41

## 2022-07-31 RX ADMIN — FAMOTIDINE 10 MG: 20 TABLET ORAL at 08:33

## 2022-07-31 RX ADMIN — FAMOTIDINE 10 MG: 20 TABLET ORAL at 16:25

## 2022-07-31 RX ADMIN — Medication: at 16:27

## 2022-07-31 RX ADMIN — AMIODARONE HYDROCHLORIDE 200 MG: 200 TABLET ORAL at 08:33

## 2022-07-31 RX ADMIN — ONDANSETRON HYDROCHLORIDE 4 MG: 2 SOLUTION INTRAMUSCULAR; INTRAVENOUS at 20:16

## 2022-07-31 RX ADMIN — Medication: at 08:34

## 2022-07-31 RX ADMIN — SODIUM CHLORIDE, PRESERVATIVE FREE 10 ML: 5 INJECTION INTRAVENOUS at 20:16

## 2022-07-31 NOTE — PROGRESS NOTES
NAME: Phani Ballesteros        :  1932        MRN:  [id number]                    Assessment   :                                               Plan: PAULO  AMS  Prior SIRS       We saw the patient earlier in her stay. Unclear why her creatinine has increased again. Perhaps she is on the dry side. She has a Mares. UA with 1+ protein, lg WBC, mod blood, 5-10 RBC. Check CK today and in AM.  Hold lasix. Continue IVF. Prognosis is quite guarded. Subjective:     Chief Complaint:  More alert today. \"I'm OK. \"  No N/V.  Weak. No dyspnea. Review of Systems:    Symptom Y/N Comments  Symptom Y/N Comments   Fever/Chills    Chest Pain     Poor Appetite    Edema     Cough    Abdominal Pain     Sputum    Joint Pain     SOB/HORNE    Pruritis/Rash     Nausea/vomit    Tolerating PT/OT     Diarrhea    Tolerating Diet     Constipation    Other       Could not obtain due to:      Objective:     VITALS:   Last 24hrs VS reviewed since prior progress note.  Most recent are:  Visit Vitals  BP (!) 151/66 (BP 1 Location: Left arm, BP Patient Position: At rest)   Pulse 84   Temp 97.5 °F (36.4 °C)   Resp 18   Ht 5' 1\" (1.549 m)   Wt 67.7 kg (149 lb 4 oz)   SpO2 93%   BMI 28.20 kg/m²       Intake/Output Summary (Last 24 hours) at 2022 1243  Last data filed at 2022 0845  Gross per 24 hour   Intake 480 ml   Output 1500 ml   Net -1020 ml      Telemetry Reviewed:     PHYSICAL EXAM:  General: NAD  No edema      Lab Data Reviewed: (see below)    Medications Reviewed: (see below)    PMH/SH reviewed - no change compared to H&P  ________________________________________________________________________  Care Plan discussed with:  Patient     Family      RN     Care Manager                    Consultant:          Comments   >50% of visit spent in counseling and coordination of care ________________________________________________________________________  Magui Ng MD     Procedures: see electronic medical records for all procedures/Xrays and details which  were not copied into this note but were reviewed prior to creation of Plan. LABS:  Recent Labs     07/31/22 0311   WBC 9.8   HGB 8.8*   HCT 28.7*   *     Recent Labs     07/31/22 0311 07/30/22  0307 07/29/22  0151    141 140   K 3.7 4.0 3.3*    99 100   CO2 36* 33* 33*   BUN 66* 64* 52*   CREA 2.64* 2.41* 2.13*   * 97 114*   CA 10.7* 11.1* 10.8*   MG 2.2  --  1.7   PHOS 3.4  --  3.3     No results for input(s): AP, TBIL, TP, ALB, GLOB, GGT, AML, LPSE in the last 72 hours. No lab exists for component: SGOT, GPT, AMYP, HLPSE  No results for input(s): INR, PTP, APTT, INREXT in the last 72 hours. No results for input(s): FE, TIBC, PSAT, FERR in the last 72 hours. No results found for: FOL, RBCF   No results for input(s): PH, PCO2, PO2 in the last 72 hours. No results for input(s): CPK, CKMB in the last 72 hours.     No lab exists for component: TROPONINI  No components found for: [place]  Lab Results   Component Value Date/Time    Color YELLOW/STRAW 07/30/2022 05:58 PM    Appearance CLOUDY (A) 07/30/2022 05:58 PM    Specific gravity 1.009 07/30/2022 05:58 PM    pH (UA) 5.5 07/30/2022 05:58 PM    Protein 30 (A) 07/30/2022 05:58 PM    Glucose Negative 07/30/2022 05:58 PM    Ketone Negative 07/30/2022 05:58 PM    Bilirubin Negative 07/30/2022 05:58 PM    Urobilinogen 0.2 07/30/2022 05:58 PM    Nitrites Negative 07/30/2022 05:58 PM    Leukocyte Esterase LARGE (A) 07/30/2022 05:58 PM    Epithelial cells MODERATE (A) 07/30/2022 05:58 PM    Bacteria Negative 07/30/2022 05:58 PM    WBC 20-50 07/30/2022 05:58 PM    RBC 5-10 07/30/2022 05:58 PM       MEDICATIONS:  Current Facility-Administered Medications   Medication Dose Route Frequency    0.9% sodium chloride infusion  75 mL/hr IntraVENous CONTINUOUS    [Held by provider] haloperidol lactate (HALDOL) injection 1 mg  1 mg IntraMUSCular Q6H PRN    apixaban (ELIQUIS) tablet 2.5 mg  2.5 mg Oral Q12H    miconazole (MICONTIN) 2 % ointment   Topical BID    amiodarone (CORDARONE) tablet 200 mg  200 mg Oral DAILY    calcium carbonate (OS-WANDA) tablet 500 mg [elemental]  500 mg Oral BID    guaiFENesin (ORGANIDIN) tablet 600 mg  600 mg Oral Q4H PRN    melatonin tablet 3 mg  3 mg Oral QHS PRN    albuterol-ipratropium (DUO-NEB) 2.5 MG-0.5 MG/3 ML  3 mL Nebulization BID RT    ondansetron (ZOFRAN) injection 4 mg  4 mg IntraVENous Q6H PRN    albuterol-ipratropium (DUO-NEB) 2.5 MG-0.5 MG/3 ML  3 mL Nebulization Q4H PRN    [Held by provider] furosemide (LASIX) tablet 40 mg  40 mg Oral DAILY    metoprolol (LOPRESSOR) injection 2.5 mg  2.5 mg IntraVENous Q6H PRN    [Held by provider] HYDROmorphone (DILAUDID) injection 0.5 mg  0.5 mg IntraVENous Q4H PRN    sodium chloride (NS) flush 5-10 mL  5-10 mL IntraVENous PRN    sodium chloride (NS) flush 5-40 mL  5-40 mL IntraVENous Q8H    sodium chloride (NS) flush 5-40 mL  5-40 mL IntraVENous PRN    acetaminophen (TYLENOL) tablet 650 mg  650 mg Oral Q4H PRN    aspirin chewable tablet 81 mg  81 mg Oral DAILY    famotidine (PEPCID) tablet 10 mg  10 mg Oral BID    [Held by provider] oxyCODONE IR (ROXICODONE) tablet 5 mg  5 mg Oral Q4H PRN    polyethylene glycol (MIRALAX) packet 17 g  17 g Oral DAILY    simethicone (MYLICON) tablet 80 mg  80 mg Oral Q6H PRN

## 2022-07-31 NOTE — PROGRESS NOTES
[facility]  Hospitalist Group                                                                                          Hospitalist Progress Note  Pilo Anna MD  Answering service: [phone] OR [phone] from in house phone        Date of Service:  2022  NAME:  Linda Daniel  :  1932  MRN:  [id number]      Admission Summary:   Linda Daniel is a 80 y.o. female who presents with shortness of breath and cough    Patient is a poor historian, patient is at [facility] rehab, patient had a ground-level fall, was found to have PEs, was also found to have pneumonia, was treated with Flagyl and Rocephin, was transitioned to oral cephalosporins, has chronic hypoxic respiratory failure and uses 4 L of oxygen at baseline, was sent over here with concerns for impending sepsis, patient was found to have a white count of 38,000 and was found to have mild hypotension associated with her symptoms, came to the ER and was requested to be admitted to the hospitalist service, currently patient is resting in bed, alert x1, is coughing but denies any other complaints or problems       Interval history / Subjective:      Much more alert and awake this am, able to tell me her name that she is in the hospital and that the year is 22'. Cr worsened slightly. Mares placed for retention, needed to be straight cath's 3 times overnight. Pt without specific complaints. Asking to be able to get out of the hospital.   Assessment & Plan:     Acute hypoxic respiratory failure, - 4LNC at [facility], but on RA prior to admission to [facility] in July   SIRS -now resolved  Healthcare associated pneumonia  Pulmonary edema  -Cultures no growth   -finished the course of Cefepime (--)  -Responded with occasional IV diuretic as well  -Weaned to RA  and doing well from respiratory, standpoint. Placed back on [address] [place] [address]. Will clarify with nursing and wean as able.      Metabolic encephalopathy Acute kidney injury : had improved, now returned likely d/t poor PO intake d/t delirium  Urinary retention   - Hold lasix  - Start IV fluids  - Consult nephrology again   - I and O, avoid nephrotoxins   - Mares placed    Hypotension: resolved  History of atrial flutter  Probable V tach vs flutter:   -Continue on cardizem/ASA/Eliquis/oral amiodarone  -Was put on Amiodarone 7/21, now PO  -Appreciate cardiology     Elevated troponin-per cardiology sec to above, not ACS    History of pulmonary embolism: on Eliquis, continue    Dysphagia:dysphagia diet    Delirium- Supportive care, haldol prn    Hypokalemia, replete - Replete mag    PT/OT SNF    Code status: DNR  Prophylaxis: Eliquis  Care Plan discussed with: RN, CM,    Anticipated Disposition: [facility], bed available but now not medically ready     Hospital Problems  Date Reviewed: 7/24/2022            Codes Class Noted POA    * (Principal) Sepsis ([facility] [address] ICD-10-CM: A41.9  ICD-9-CM: 038.9, 995.91  7/20/2022 Yes       Review of Systems:   A comprehensive review of systems was negative except for that written in the HPI. Vital Signs:    Last 24hrs VS reviewed since prior progress note. Most recent are:  Visit Vitals  BP (!) 151/66 (BP 1 Location: Left arm, BP Patient Position: At rest)   Pulse 84   Temp 97.5 °F (36.4 °C)   Resp 18   Ht 5' 1\" (1.549 m)   Wt 67.7 kg (149 lb 4 oz)   SpO2 93%   BMI 28.20 kg/m²         Intake/Output Summary (Last 24 hours) at 7/31/2022 0931  Last data filed at 7/31/2022 0845  Gross per 24 hour   Intake 840 ml   Output 2300 ml   Net -1460 ml          Physical Examination:     I had a face to face encounter with this patient and independently examined them on 7/31/2022 as outlined below:          Constitutional:  Awake and alert and following commands    ENT:  Oral mucosa moist, oropharynx benign   Resp:  CTA bilaterally. No wheezing/rhonchi/rales. No accessory muscle use.     CV:  Regular rhythm, normal rate, no murmurs, gallops, rubs    GI:  Soft, non distended, non tender   Musculoskeletal:  No edema, warm    Neurologic:    Moves all extremities. AAOx2-3 (hospital but not specific), CN II-XII reviewed. Following commands. Data Review:    Review and/or order of clinical lab test      Labs:     Recent Labs     07/31/22  0311   WBC 9.8   HGB 8.8*   HCT 28.7*   *       Recent Labs     07/31/22  0311 07/30/22  0307 07/29/22  0151    141 140   K 3.7 4.0 3.3*    99 100   CO2 36* 33* 33*   BUN 66* 64* 52*   CREA 2.64* 2.41* 2.13*   * 97 114*   CA 10.7* 11.1* 10.8*   MG 2.2  --  1.7   PHOS 3.4  --  3.3       No results for input(s): ALT, AP, TBIL, TBILI, TP, ALB, GLOB, GGT, AML, LPSE in the last 72 hours. No lab exists for component: SGOT, GPT, AMYP, HLPSE    No results for input(s): INR, PTP, APTT, INREXT, INREXT in the last 72 hours. No results for input(s): FE, TIBC, PSAT, FERR in the last 72 hours. No results found for: FOL, RBCF   No results for input(s): PH, PCO2, PO2 in the last 72 hours. No results for input(s): CPK, CKNDX, TROIQ in the last 72 hours.     No lab exists for component: CPKMB  No results found for: CHOL, CHOLX, CHLST, CHOLV, HDL, HDLP, LDL, LDLC, DLDLP, TGLX, TRIGL, TRIGP, CHHD, CHHDX  No results found for: [facility]  Lab Results   Component Value Date/Time    Color YELLOW/STRAW 07/30/2022 05:58 PM    Appearance CLOUDY (A) 07/30/2022 05:58 PM    Specific gravity 1.009 07/30/2022 05:58 PM    pH (UA) 5.5 07/30/2022 05:58 PM    Protein 30 (A) 07/30/2022 05:58 PM    Glucose Negative 07/30/2022 05:58 PM    Ketone Negative 07/30/2022 05:58 PM    Bilirubin Negative 07/30/2022 05:58 PM    Urobilinogen 0.2 07/30/2022 05:58 PM    Nitrites Negative 07/30/2022 05:58 PM    Leukocyte Esterase LARGE (A) 07/30/2022 05:58 PM    Epithelial cells MODERATE (A) 07/30/2022 05:58 PM    Bacteria Negative 07/30/2022 05:58 PM    WBC 20-50 07/30/2022 05:58 PM    RBC 5-10 07/30/2022 05:58 PM Medications Reviewed:     Current Facility-Administered Medications   Medication Dose Route Frequency    0.9% sodium chloride infusion  75 mL/hr IntraVENous CONTINUOUS    [Held by provider] haloperidol lactate (HALDOL) injection 1 mg  1 mg IntraMUSCular Q6H PRN    apixaban (ELIQUIS) tablet 2.5 mg  2.5 mg Oral Q12H    miconazole (MICONTIN) 2 % ointment   Topical BID    amiodarone (CORDARONE) tablet 200 mg  200 mg Oral DAILY    calcium carbonate (OS-WANDA) tablet 500 mg [elemental]  500 mg Oral BID    guaiFENesin (ORGANIDIN) tablet 600 mg  600 mg Oral Q4H PRN    melatonin tablet 3 mg  3 mg Oral QHS PRN    albuterol-ipratropium (DUO-NEB) 2.5 MG-0.5 MG/3 ML  3 mL Nebulization BID RT    ondansetron (ZOFRAN) injection 4 mg  4 mg IntraVENous Q6H PRN    albuterol-ipratropium (DUO-NEB) 2.5 MG-0.5 MG/3 ML  3 mL Nebulization Q4H PRN    [Held by provider] furosemide (LASIX) tablet 40 mg  40 mg Oral DAILY    metoprolol (LOPRESSOR) injection 2.5 mg  2.5 mg IntraVENous Q6H PRN    [Held by provider] HYDROmorphone (DILAUDID) injection 0.5 mg  0.5 mg IntraVENous Q4H PRN    sodium chloride (NS) flush 5-10 mL  5-10 mL IntraVENous PRN    sodium chloride (NS) flush 5-40 mL  5-40 mL IntraVENous Q8H    sodium chloride (NS) flush 5-40 mL  5-40 mL IntraVENous PRN    acetaminophen (TYLENOL) tablet 650 mg  650 mg Oral Q4H PRN    aspirin chewable tablet 81 mg  81 mg Oral DAILY    famotidine (PEPCID) tablet 10 mg  10 mg Oral BID    [Held by provider] oxyCODONE IR (ROXICODONE) tablet 5 mg  5 mg Oral Q4H PRN    polyethylene glycol (MIRALAX) packet 17 g  17 g Oral DAILY    simethicone (MYLICON) tablet 80 mg  80 mg Oral Q6H PRN     ______________________________________________________________________  EXPECTED LENGTH OF STAY: 4d 2h  ACTUAL LENGTH OF STAY:          11                 Julieth Linares MD

## 2022-07-31 NOTE — PROGRESS NOTES
Nursing Care Plan    Problem: Aspiration - Risk of  Goal: *Absence of aspiration  Outcome: Progressing Towards Goal     Problem: Patient Education: Go to Patient Education Activity  Goal: Patient/Family Education  Outcome: Progressing Towards Goal     Problem: Pressure Injury - Risk of  Goal: *Prevention of pressure injury  Description: Document Fly Scale and appropriate interventions in the flowsheet. Outcome: Progressing Towards Goal  Note: Pressure Injury Interventions:  Sensory Interventions: Assess changes in LOC, Check visual cues for pain    Moisture Interventions: Apply protective barrier, creams and emollients, Internal/External urinary devices    Activity Interventions: PT/OT evaluation, Pressure redistribution bed/mattress(bed type)    Mobility Interventions: Float heels, PT/OT evaluation, Turn and reposition approx. every two hours(pillow and wedges)    Nutrition Interventions: Document food/fluid/supplement intake, Discuss nutritional consult with provider    Friction and Shear Interventions: Lift sheet, Apply protective barrier, creams and emollients, Minimize layers       Problem: Patient Education: Go to Patient Education Activity  Goal: Patient/Family Education  Outcome: Progressing Towards Goal     Problem: Falls - Risk of  Goal: *Absence of Falls  Description: Document Bard Bagley Fall Risk and appropriate interventions in the flowsheet.   Note: Fall Risk Interventions:  Mobility Interventions: Bed/chair exit alarm, Assess mobility with egress test, Communicate number of staff needed for ambulation/transfer, OT consult for ADLs, Patient to call before getting OOB, PT Consult for mobility concerns, PT Consult for assist device competence, Strengthening exercises (ROM-active/passive), Utilize walker, cane, or other assistive device, Utilize gait belt for transfers/ambulation    Mentation Interventions: Bed/chair exit alarm, Adequate sleep, hydration, pain control, Door open when patient unattended    Medication Interventions: Bed/chair exit alarm, Evaluate medications/consider consulting pharmacy    Elimination Interventions: Toileting schedule/hourly rounds, Call light in reach    History of Falls Interventions:  Investigate reason for fall, Door open when patient unattended, Bed/chair exit alarm      Problem: Patient Education: Go to Patient Education Activity  Goal: Patient/Family Education  Outcome: Progressing Towards Goal     Problem: Patient Education: Go to Patient Education Activity  Goal: Patient/Family Education  Outcome: Progressing Towards Goal     Problem: Delirium Treatment  Goal: *Level of consciousness restored to baseline  Outcome: Progressing Towards Goal  Goal: *Level of environmental perceptions restored to baseline  Outcome: Progressing Towards Goal  Goal: *Sensory perception restored to baseline  Outcome: Progressing Towards Goal  Goal: *Emotional stability restored to baseline  Outcome: Progressing Towards Goal  Goal: *Functional assessment restored to baseline  Outcome: Progressing Towards Goal  Goal: *Absence of falls  Outcome: Progressing Towards Goal  Goal: *Will remain free of delirium, CAM Score negative  Outcome: Progressing Towards Goal  Goal: *Cognitive status will be restored to baseline  Outcome: Progressing Towards Goal  Goal: Interventions  Outcome: Progressing Towards Goal     Problem: Patient Education: Go to Patient Education Activity  Goal: Patient/Family Education  Outcome: Progressing Towards Goal

## 2022-08-01 LAB
ANION GAP SERPL CALC-SCNC: 4 MMOL/L (ref 5–15)
BASOPHILS # BLD: 0.1 K/UL (ref 0–0.1)
BASOPHILS NFR BLD: 1 % (ref 0–1)
BUN SERPL-MCNC: 58 MG/DL (ref 6–20)
BUN/CREAT SERPL: 23 (ref 12–20)
CALCIUM SERPL-MCNC: 9.6 MG/DL (ref 8.5–10.1)
CHLORIDE SERPL-SCNC: 110 MMOL/L (ref 97–108)
CK SERPL-CCNC: 11 U/L (ref 26–192)
CO2 SERPL-SCNC: 32 MMOL/L (ref 21–32)
CREAT SERPL-MCNC: 2.55 MG/DL (ref 0.55–1.02)
DIFFERENTIAL METHOD BLD: ABNORMAL
EOSINOPHIL # BLD: 0.4 K/UL (ref 0–0.4)
EOSINOPHIL NFR BLD: 4 % (ref 0–7)
ERYTHROCYTE [DISTWIDTH] IN BLOOD BY AUTOMATED COUNT: 16 % (ref 11.5–14.5)
GLUCOSE SERPL-MCNC: 97 MG/DL (ref 65–100)
HCT VFR BLD AUTO: 27.6 % (ref 35–47)
HGB BLD-MCNC: 8.3 G/DL (ref 11.5–16)
IMM GRANULOCYTES # BLD AUTO: 0.2 K/UL (ref 0–0.04)
IMM GRANULOCYTES NFR BLD AUTO: 2 % (ref 0–0.5)
LYMPHOCYTES # BLD: 1.8 K/UL (ref 0.8–3.5)
LYMPHOCYTES NFR BLD: 19 % (ref 12–49)
MAGNESIUM SERPL-MCNC: 2 MG/DL (ref 1.6–2.4)
MCH RBC QN AUTO: 29.1 PG (ref 26–34)
MCHC RBC AUTO-ENTMCNC: 30.1 G/DL (ref 30–36.5)
MCV RBC AUTO: 96.8 FL (ref 80–99)
MONOCYTES # BLD: 0.7 K/UL (ref 0–1)
MONOCYTES NFR BLD: 7 % (ref 5–13)
NEUTS SEG # BLD: 6.6 K/UL (ref 1.8–8)
NEUTS SEG NFR BLD: 67 % (ref 32–75)
NRBC # BLD: 0 K/UL (ref 0–0.01)
NRBC BLD-RTO: 0 PER 100 WBC
PHOSPHATE SERPL-MCNC: 3.6 MG/DL (ref 2.6–4.7)
PLATELET # BLD AUTO: 386 K/UL (ref 150–400)
PMV BLD AUTO: 9 FL (ref 8.9–12.9)
POTASSIUM SERPL-SCNC: 3.8 MMOL/L (ref 3.5–5.1)
RBC # BLD AUTO: 2.85 M/UL (ref 3.8–5.2)
SODIUM SERPL-SCNC: 146 MMOL/L (ref 136–145)
WBC # BLD AUTO: 9.7 K/UL (ref 3.6–11)

## 2022-08-01 PROCEDURE — 74011000250 HC RX REV CODE- 250: Performed by: FAMILY MEDICINE

## 2022-08-01 PROCEDURE — 83735 ASSAY OF MAGNESIUM: CPT

## 2022-08-01 PROCEDURE — 74011250637 HC RX REV CODE- 250/637: Performed by: HOSPITALIST

## 2022-08-01 PROCEDURE — 74011000250 HC RX REV CODE- 250: Performed by: INTERNAL MEDICINE

## 2022-08-01 PROCEDURE — 74011250637 HC RX REV CODE- 250/637: Performed by: FAMILY MEDICINE

## 2022-08-01 PROCEDURE — 36415 COLL VENOUS BLD VENIPUNCTURE: CPT

## 2022-08-01 PROCEDURE — 80048 BASIC METABOLIC PNL TOTAL CA: CPT

## 2022-08-01 PROCEDURE — 82550 ASSAY OF CK (CPK): CPT

## 2022-08-01 PROCEDURE — 74011250637 HC RX REV CODE- 250/637: Performed by: INTERNAL MEDICINE

## 2022-08-01 PROCEDURE — 92610 EVALUATE SWALLOWING FUNCTION: CPT

## 2022-08-01 PROCEDURE — 74011250636 HC RX REV CODE- 250/636: Performed by: HOSPITALIST

## 2022-08-01 PROCEDURE — 85025 COMPLETE CBC W/AUTO DIFF WBC: CPT

## 2022-08-01 PROCEDURE — 84100 ASSAY OF PHOSPHORUS: CPT

## 2022-08-01 PROCEDURE — 65270000029 HC RM PRIVATE

## 2022-08-01 RX ORDER — IPRATROPIUM BROMIDE AND ALBUTEROL SULFATE 2.5; .5 MG/3ML; MG/3ML
3 SOLUTION RESPIRATORY (INHALATION)
Status: DISCONTINUED | OUTPATIENT
Start: 2022-08-01 | End: 2022-08-03 | Stop reason: HOSPADM

## 2022-08-01 RX ORDER — SODIUM CHLORIDE 450 MG/100ML
75 INJECTION, SOLUTION INTRAVENOUS CONTINUOUS
Status: DISCONTINUED | OUTPATIENT
Start: 2022-08-01 | End: 2022-08-03 | Stop reason: HOSPADM

## 2022-08-01 RX ADMIN — SODIUM CHLORIDE, PRESERVATIVE FREE 10 ML: 5 INJECTION INTRAVENOUS at 21:08

## 2022-08-01 RX ADMIN — CALCIUM 500 MG: 500 TABLET ORAL at 12:05

## 2022-08-01 RX ADMIN — SODIUM CHLORIDE, PRESERVATIVE FREE 10 ML: 5 INJECTION INTRAVENOUS at 12:08

## 2022-08-01 RX ADMIN — Medication: at 12:07

## 2022-08-01 RX ADMIN — ASPIRIN 81 MG CHEWABLE TABLET 81 MG: 81 TABLET CHEWABLE at 12:04

## 2022-08-01 RX ADMIN — FAMOTIDINE 10 MG: 20 TABLET ORAL at 12:05

## 2022-08-01 RX ADMIN — ONDANSETRON HYDROCHLORIDE 4 MG: 2 SOLUTION INTRAMUSCULAR; INTRAVENOUS at 22:39

## 2022-08-01 RX ADMIN — Medication 3 MG: at 22:59

## 2022-08-01 RX ADMIN — SODIUM CHLORIDE 75 ML/HR: 4.5 INJECTION, SOLUTION INTRAVENOUS at 12:03

## 2022-08-01 RX ADMIN — APIXABAN 2.5 MG: 2.5 TABLET, FILM COATED ORAL at 21:07

## 2022-08-01 RX ADMIN — CALCIUM 500 MG: 500 TABLET ORAL at 20:40

## 2022-08-01 RX ADMIN — FAMOTIDINE 10 MG: 20 TABLET ORAL at 20:39

## 2022-08-01 RX ADMIN — APIXABAN 2.5 MG: 2.5 TABLET, FILM COATED ORAL at 12:06

## 2022-08-01 RX ADMIN — POLYETHYLENE GLYCOL 3350 17 G: 17 POWDER, FOR SOLUTION ORAL at 12:06

## 2022-08-01 RX ADMIN — Medication: at 19:11

## 2022-08-01 RX ADMIN — AMIODARONE HYDROCHLORIDE 200 MG: 200 TABLET ORAL at 12:05

## 2022-08-01 NOTE — PROGRESS NOTES
[facility] for SNF rehab. Transportation:[facility]  Primary contact: Joanmike Diaz,  [phone]    CM will follow.  MARCO A Novak, ACOLGA-SW

## 2022-08-01 NOTE — PROGRESS NOTES
[address] [facility] visit. Mrs. Miriam Mendoza declined communion today. Let her know we have a music therapist and asked if she might be interested in a visit. She said yes. Referral made on Wed.     DAV Webb, RN, ACSW, LCSW   Page:  [phone]

## 2022-08-01 NOTE — PROGRESS NOTES
SPEECH PATHOLOGY BEDSIDE SWALLOW EVALUATION/DISCHARGE  Patient: Phani Ballesteros (60 y.o. female)  Date: 8/1/2022  Primary Diagnosis: Sepsis ([facility] [address] [A41.9]       Precautions:  Fall, DNR    ASSESSMENT :  Based on the objective data described below, the patient presents with functional pharyngeal swallow limited by respiratory endurance and coordination. Patient known to this department and has been evaluation with recommendation for soft and bite sized diet and thin liquids via single sips. Re-consult received due to difficulty with liquids per RN report. Bedside patient with cough once sat up, prior to any PO trials but no cough following any PO including sequential sips of thin. Patient with prolonged yet efficient mastication of soft and bite sized solids, benefiting from liquid wash to clear. Given bedside presentation and no clinical indicators of aspiration as WBC count has been stable, and improvements on XRC, recommend continue with soft and bite sized diet/thin liquids with precautions as outlined below. Do not recommend thickened liquids as these are more likely to lead to pneumonia if aspirated and not indicated unless tested on objective imaging. Suspect that as respiratory status stabilizes patient will show progression with PO. Skilled acute therapy provided by a speech-language pathologist is not indicated at this time. PLAN :  Recommendations:  -- soft and bite sized diet/thin liquids   -- upright for all PO   -- single sips to facilitate coordination   -- alternate bites and sips  -- assistance for meals and to provide hydration throughout the day  -- SLP will sign off    Discharge Recommendations: None     SUBJECTIVE:   Patient stated I just want some regular ice water. OBJECTIVE:   History reviewed. No pertinent past medical history. No past surgical history on file.   Prior Level of Function/Home Situation:   Home Situation  Home Environment: Private residence  # Steps to Enter: 3  One/Two Story Residence: Split level (1 step from bedrooms and living spaces)  Living Alone: No  Support Systems: Spouse/Significant Other  Patient Expects to be Discharged to[de-identified] Skilled nursing facility  Current DME Used/Available at Home: None  Tub or Shower Type: Shower  Diet prior to admission: regular/thin  Current Diet:  soft and bite sized/thin   Cognitive and Communication Status:  Neurologic State: Alert  Orientation Level: Disoriented X4  Cognition: Follows commands  Perception: Appears intact  Perseveration: No perseveration noted  Safety/Judgement: Awareness of environment  Oral Assessment:  Oral Assessment  Labial: No impairment  Dentition: Limited  Oral Hygiene: dry oral mucosa  Lingual: No impairment  Velum: No impairment  Mandible: No impairment  P.O. Trials:  Patient Position: upright in bed  Vocal quality prior to P.O.: No impairment  Consistency Presented: Thin liquid; Solid  How Presented: SLP-fed/presented     Bolus Acceptance: No impairment        Propulsion: No impairment  Oral Residue: None  Initiation of Swallow: No impairment     Aspiration Signs/Symptoms: None  Pharyngeal Phase Characteristics: Double swallow             Oral Phase Severity: No impairment  Pharyngeal Phase Severity : No impairment  NOMS:   The NOMS functional outcome measure was used to quantify this patient's level of swallowing impairment. Based on the NOMS, the patient was determined to be at level 6 for swallow function     NOMS Swallowing Levels:  Level 1 (CN): NPO  Level 2 (CM): NPO but takes consistency in therapy  Level 3 (CL): Takes less than 50% of nutrition p.o. and continues with nonoral feedings; and/or safe with mod cues; and/or max diet restriction  Level 4 (CK):  Safe swallow but needs mod cues; and/or mod diet restriction; and/or still requires some nonoral feeding/supplements  Level 5 (CJ): Safe swallow with min diet restriction; and/or needs min cues  Level 6 (CI): Independent with p.o.; rare cues; usually self cues; may need to avoid some foods or needs extra time  Level 7 [facility]): Independent for all p.o.  RUBA. (2003). National Outcomes Measurement System (NOMS): Adult Speech-Language Pathology User's Guide. Pain:  Pain Scale 1: Numeric (0 - 10)  Pain Intensity 1: 0     After treatment:   Patient left in no apparent distress in bed, Call bell within reach, Nursing notified, and PCT present feeding patient    COMMUNICATION/EDUCATION:   Patient was educated regarding her deficit(s) of aspiration risk as this relates to her respiratory status. She demonstrated Fair understanding as evidenced by verbalization of understanding. The patient's plan of care including recommendations, planned interventions, and recommended diet changes were discussed with: Registered nurse.      Thank you for this referral.  ZAK Ken Handler Pathologist     Time Calculation: 11 mins          e

## 2022-08-01 NOTE — PROGRESS NOTES
[facility] Adult  Hospitalist Group                                                                                          Hospitalist Progress Note  Samuel Chang MD  Answering service: [phone] OR [phone] from in house phone        Date of Service:  2022  NAME:  Ebony Martinez  :  1932  MRN:  [id number]      Admission Summary:   Ebony Martinez is a 80 y.o. female who presents with shortness of breath and cough    Patient is a poor historian, patient is at [facility] rehab, patient had a ground-level fall, was found to have PEs, was also found to have pneumonia, was treated with Flagyl and Rocephin, was transitioned to oral cephalosporins, has chronic hypoxic respiratory failure and uses 4 L of oxygen at baseline, was sent over here with concerns for impending sepsis, patient was found to have a white count of 38,000 and was found to have mild hypotension associated with her symptoms, came to the ER and was requested to be admitted to the hospitalist service, currently patient is resting in bed, alert x1, is coughing but denies any other complaints or problems       Interval history / Subjective:      Mental status remained improved. Complains of feeling \"sick\" and thirsty. Denies specific complaints of SOB, CP or abdominal pain. Cr slightly improved today. Na elevated, will switch IVF. DC will be pending clearance from nephrology hopefully tomorrow if Cr continues to improve. Assessment & Plan:     Acute hypoxic respiratory failure, - 4LNC at [facility], but on RA prior to admission to [address] [place] [address] in July   SIRS -now resolved  Healthcare associated pneumonia  Pulmonary edema  -Cultures no growth   -finished the course of Cefepime (--)  -Responded with occasional IV diuretic as well  -Wean O2 as able.  Pt is NOT on home O2     Metabolic encephalopathy   Acute kidney injury : had improved, now returned likely d/t poor PO intake d/t delirium  Urinary retention   - Hold lasix  - Continue IV fluids, switch to 0.45NS  - Consult nephrology again   - I and O, avoid nephrotoxins   - Mares placed    Hypernatremia - monitor. Switch IVF to 0.45NS    Hypotension: resolved  History of atrial flutter  Probable V tach vs flutter:   -Continue on cardizem/ASA/Eliquis/oral amiodarone  -Was put on Amiodarone 7/21, now PO  -Appreciate cardiology     Elevated troponin-per cardiology sec to above, not ACS  History of pulmonary embolism: on Eliquis, continue  Dysphagia:dysphagia diet  Delirium- Supportive care, haldol prn  Hypokalemia, replete - Replete mag    PT/OT SNF    Code status: DNR  Prophylaxis: Eliquis  Care Plan discussed with: RN, CM,    Anticipated Disposition: [facility], bed awaiting renal improvement. Hopefully tomorrow. Will discuss with renal      Hospital Problems  Date Reviewed: 7/24/2022            Codes Class Noted POA    * (Principal) Sepsis ([facility] [address] ICD-10-CM: A41.9  ICD-9-CM: 038.9, 995.91  7/20/2022 Yes       Review of Systems:   A comprehensive review of systems was negative except for that written in the HPI. Vital Signs:    Last 24hrs VS reviewed since prior progress note. Most recent are:  Visit Vitals  BP (!) 126/59 (BP 1 Location: Left upper arm, BP Patient Position: At rest)   Pulse 82   Temp 98.1 °F (36.7 °C)   Resp 18   Ht 5' 1\" (1.549 m)   Wt 67.7 kg (149 lb 4 oz)   SpO2 95%   BMI 28.20 kg/m²         Intake/Output Summary (Last 24 hours) at 8/1/2022 3732  Last data filed at 7/31/2022 2220  Gross per 24 hour   Intake 100 ml   Output 600 ml   Net -500 ml          Physical Examination:     I had a face to face encounter with this patient and independently examined them on 8/1/2022 as outlined below:          Constitutional:  Awake and alert and following commands    ENT:  Oral mucosa moist, oropharynx benign   Resp:  CTA bilaterally. No wheezing/rhonchi/rales. No accessory muscle use.     CV:  Regular rhythm, normal rate, no murmurs, gallops, rubs    GI:  Soft, non distended, non tender   Musculoskeletal:  No edema, warm    Neurologic:    Moves all extremities. AAOx 3 (hospital but not specific), CN II-XII reviewed. Following commands. Data Review:    Review and/or order of clinical lab test      Labs:     Recent Labs     08/01/22 0239 07/31/22 0311   WBC 9.7 9.8   HGB 8.3* 8.8*   HCT 27.6* 28.7*    421*       Recent Labs     08/01/22 0239 07/31/22 0311 07/30/22  0307   * 145 141   K 3.8 3.7 4.0   * 105 99   CO2 32 36* 33*   BUN 58* 66* 64*   CREA 2.55* 2.64* 2.41*   GLU 97 111* 97   CA 9.6 10.7* 11.1*   MG 2.0 2.2  --    PHOS 3.6 3.4  --        No results for input(s): ALT, AP, TBIL, TBILI, TP, ALB, GLOB, GGT, AML, LPSE in the last 72 hours. No lab exists for component: SGOT, GPT, AMYP, HLPSE    No results for input(s): INR, PTP, APTT, INREXT, INREXT in the last 72 hours. No results for input(s): FE, TIBC, PSAT, FERR in the last 72 hours. No results found for: FOL, RBCF   No results for input(s): PH, PCO2, PO2 in the last 72 hours.   Recent Labs     08/01/22 0239 07/31/22  1435   CPK 11* 16*     No results found for: CHOL, CHOLX, CHLST, CHOLV, HDL, HDLP, LDL, LDLC, DLDLP, TGLX, TRIGL, TRIGP, CHHD, CHHDX  No results found for: [facility]  Lab Results   Component Value Date/Time    Color YELLOW/STRAW 07/30/2022 05:58 PM    Appearance CLOUDY (A) 07/30/2022 05:58 PM    Specific gravity 1.009 07/30/2022 05:58 PM    pH (UA) 5.5 07/30/2022 05:58 PM    Protein 30 (A) 07/30/2022 05:58 PM    Glucose Negative 07/30/2022 05:58 PM    Ketone Negative 07/30/2022 05:58 PM    Bilirubin Negative 07/30/2022 05:58 PM    Urobilinogen 0.2 07/30/2022 05:58 PM    Nitrites Negative 07/30/2022 05:58 PM    Leukocyte Esterase LARGE (A) 07/30/2022 05:58 PM    Epithelial cells MODERATE (A) 07/30/2022 05:58 PM    Bacteria Negative 07/30/2022 05:58 PM    WBC 20-50 07/30/2022 05:58 PM    RBC 5-10 07/30/2022 05:58 PM         Medications Reviewed:     Current Facility-Administered Medications   Medication Dose Route Frequency    albuterol-ipratropium (DUO-NEB) 2.5 MG-0.5 MG/3 ML  3 mL Nebulization Q6H PRN    0.45% sodium chloride infusion  75 mL/hr IntraVENous CONTINUOUS    [Held by provider] haloperidol lactate (HALDOL) injection 1 mg  1 mg IntraMUSCular Q6H PRN    apixaban (ELIQUIS) tablet 2.5 mg  2.5 mg Oral Q12H    miconazole (MICONTIN) 2 % ointment   Topical BID    amiodarone (CORDARONE) tablet 200 mg  200 mg Oral DAILY    calcium carbonate (OS-WANDA) tablet 500 mg [elemental]  500 mg Oral BID    guaiFENesin (ORGANIDIN) tablet 600 mg  600 mg Oral Q4H PRN    melatonin tablet 3 mg  3 mg Oral QHS PRN    ondansetron (ZOFRAN) injection 4 mg  4 mg IntraVENous Q6H PRN    [Held by provider] furosemide (LASIX) tablet 40 mg  40 mg Oral DAILY    metoprolol (LOPRESSOR) injection 2.5 mg  2.5 mg IntraVENous Q6H PRN    [Held by provider] HYDROmorphone (DILAUDID) injection 0.5 mg  0.5 mg IntraVENous Q4H PRN    sodium chloride (NS) flush 5-10 mL  5-10 mL IntraVENous PRN    sodium chloride (NS) flush 5-40 mL  5-40 mL IntraVENous Q8H    sodium chloride (NS) flush 5-40 mL  5-40 mL IntraVENous PRN    acetaminophen (TYLENOL) tablet 650 mg  650 mg Oral Q4H PRN    aspirin chewable tablet 81 mg  81 mg Oral DAILY    famotidine (PEPCID) tablet 10 mg  10 mg Oral BID    [Held by provider] oxyCODONE IR (ROXICODONE) tablet 5 mg  5 mg Oral Q4H PRN    polyethylene glycol (MIRALAX) packet 17 g  17 g Oral DAILY    simethicone (MYLICON) tablet 80 mg  80 mg Oral Q6H PRN     ______________________________________________________________________  EXPECTED LENGTH OF STAY: 4d 2h  ACTUAL LENGTH OF STAY:          12                 Laly Carpio MD

## 2022-08-01 NOTE — PROGRESS NOTES
RENAL  PROGRESS NOTE        Subjective:   resting  Objective:   VITALS SIGNS:    Visit Vitals  BP (!) 153/78 (BP 1 Location: Left upper arm, BP Patient Position: At rest)   Pulse 84   Temp 97.5 °F (36.4 °C)   Resp 17   Ht 5' 1\" (1.549 m)   Wt 67.7 kg (149 lb 4 oz)   SpO2 95%   BMI 28.20 kg/m²       O2 Device: Nasal cannula   O2 Flow Rate (L/min): 3 l/min   Temp (24hrs), Av.8 °F (36.6 °C), Min:97.5 °F (36.4 °C), Max:98.1 °F (36.7 °C)         PHYSICAL EXAM:  resting  DATA REVIEW:     INTAKE / OUTPUT:   Last shift:      No intake/output data recorded.   Last 3 shifts:  1901 -  0700  In: 100 [P.O.:100]  Out: 1100 [Urine:1100]    Intake/Output Summary (Last 24 hours) at 2022 1226  Last data filed at 2022 2220  Gross per 24 hour   Intake 100 ml   Output 600 ml   Net -500 ml         LABS:   Recent Labs     22  02322  0311   WBC 9.7 9.8   HGB 8.3* 8.8*   HCT 27.6* 28.7*    421*     Recent Labs     22  02322  0311 22  0307   * 145 141   K 3.8 3.7 4.0   * 105 99   CO2 32 36* 33*   GLU 97 111* 97   BUN 58* 66* 64*   CREA 2.55* 2.64* 2.41*   CA 9.6 10.7* 11.1*   MG 2.0 2.2  --    PHOS 3.6 3.4  --            Assessment:   PAULO,likely 2nd Hpercalcemia in the setting of UTI  hypercalcemia  AMS  Prior SIRS   Anemia      IVF 1/2 NS  Check PTH;expand work up accordingly  Pos UA  Will follow  Batsheva Chauhan MD

## 2022-08-01 NOTE — PROGRESS NOTES
Music Therapy Assessment  [address] [id number]     5/14/1932  80 y.o.  female    Patient Telephone Number: [phone] (home)   Yarsanism Affiliation: Zoroastrianism   Language: English   Patient Active Problem List    Diagnosis Date Noted    Sepsis ([facility] [address] 07/20/2022        Date: 8/1/2022            Total Time (in minutes): 28          [facility] MED ONCOLOGY    Mental Status:   [x] Alert [  ] Joelyn Pear [  ]  Confused  [  ] Minimally responsive  [  ] Sleeping    Communication Status: [  ] Impaired Speech [  ] Nonverbal -N/A    Physical Status:   [  ] Oxygen in use  [  ] Hard of Hearing [  ] Vision Impaired  [  ] Ambulatory  [  ] Ambulatory with assistance [  ] Non-ambulatory -N/A    Music Preferences, Background: Classical and traditional hymns    Clinical Problem addressed: Emotional support and encourage relaxation    Goal(s) met in session:  Physical/Pain management (Scale of 1-10):    Pre-session rating: Pt denied pain but reported discomfort  Post-session rating: Pt didn't report on this  [x] Increased relaxation   [  ] Affected breathing patterns  [  ] Decreased muscle tension   [  ] Decreased agitation  [  ] Affected heart rate    [  ] Increased alertness     Emotional/Psychological:  [  ] Increased self-expression   [  ] Decreased aggressive behavior   [  ] Decreased feelings of stress  [  ] Discussed healthy coping skills     [  ] Improved mood    [  ] Decreased withdrawn behavior     Social:  [  ] Decreased feelings of isolation/loneliness [  ] Positive social interaction   [  ] Provided support and/or comfort for family/friends    Spiritual:  [x] Spiritual support    [  ] Expressed peace  [  ] Expressed chris    [  ] Discussed beliefs    Techniques Utilized (Check all that apply):   [  ] Procedural support MT [x] Music for relaxation [x] Patient preferred music  [  ] Sabra analysis  [  ] Song choice  [  ] Music for validation  [  ] Entrainment  [  ] Movement to music [x] Guided visualization  [  ] Mario Mail  [  ] Patient instrument playing [  ] Jessica Wan writing  [  ] Lynn Joya along   [  ] Improvisation  [x] Sensory stimulation  [  ] Active Listening  [x] Music for spiritual support [  ] Making of CDs as gifts    Session Observations:  Referred by Sister Zafar Epstein; Patient (pt) was lying in bed with her eyes closed when this music therapist (MT) entered the room. MT knocked on the pt's door and spoke her name. She awoke to this. MT introduced self and asked how she was feeling. She reported feeling \"awful\" and \"exhausted\". MT asked further questions, but she was unable to explain why. MT offered a music therapy session to allow her time to relax and she agreed to this. MT asked about her music preferences and she shared these. MT sat at bedside and sang and played  Thou Art\" with guitar at a slow tempo. Pt increased relaxation in response to the music as evidenced by (AEB) closing her eyes, though she presented with facial muscle tension. After the first chorus of the song, pt appeared to start coughing and stated \"I need help\". MT exited the room to make her nurse aware of this. Pt's RN entered the room and shared about the pt's swallow test, and reassured this MT that coughing is normal. As her RN exited, pt requested water. MT exited the room to ask about water intake and a unit RN entered the room to assist with this. As this concluded, MT sat at bedside and provided emotional support as the pt stated Star[place] is this happening? This is absolutely awful\". MT gently rubbed the pt's arm and provided a supportive presence. MT provided a brief guided visualization and sang and played the traditional hymn \"In the Garden\" with guitar to further encourage relaxation. Pt stopped this MT and requested help from her RN. MT assisted in pressing her call bell and shared they would exit to allow more room for her RN to check on her/provide care.  MT asked if she would be open to a f/up visit later in the week and she confirmed this. MT exited and let her RN know she was in need of additional care.     MICHELL Amador (Music Therapist, Board Certified)  [id number] [facility]

## 2022-08-02 LAB
25(OH)D3 SERPL-MCNC: 28 NG/ML (ref 30–100)
ANION GAP SERPL CALC-SCNC: 5 MMOL/L (ref 5–15)
BASOPHILS # BLD: 0.1 K/UL (ref 0–0.1)
BASOPHILS NFR BLD: 1 % (ref 0–1)
BUN SERPL-MCNC: 51 MG/DL (ref 6–20)
BUN/CREAT SERPL: 22 (ref 12–20)
CALCIUM SERPL-MCNC: 9.3 MG/DL (ref 8.5–10.1)
CALCIUM SERPL-MCNC: 9.6 MG/DL (ref 8.5–10.1)
CHLORIDE SERPL-SCNC: 109 MMOL/L (ref 97–108)
CO2 SERPL-SCNC: 29 MMOL/L (ref 21–32)
CREAT SERPL-MCNC: 2.28 MG/DL (ref 0.55–1.02)
DIFFERENTIAL METHOD BLD: ABNORMAL
EOSINOPHIL # BLD: 0.4 K/UL (ref 0–0.4)
EOSINOPHIL NFR BLD: 4 % (ref 0–7)
ERYTHROCYTE [DISTWIDTH] IN BLOOD BY AUTOMATED COUNT: 15.9 % (ref 11.5–14.5)
GLUCOSE SERPL-MCNC: 89 MG/DL (ref 65–100)
HCT VFR BLD AUTO: 25.5 % (ref 35–47)
HGB BLD-MCNC: 7.9 G/DL (ref 11.5–16)
IMM GRANULOCYTES # BLD AUTO: 0.1 K/UL (ref 0–0.04)
IMM GRANULOCYTES NFR BLD AUTO: 1 % (ref 0–0.5)
LYMPHOCYTES # BLD: 2 K/UL (ref 0.8–3.5)
LYMPHOCYTES NFR BLD: 19 % (ref 12–49)
MAGNESIUM SERPL-MCNC: 1.7 MG/DL (ref 1.6–2.4)
MCH RBC QN AUTO: 29.9 PG (ref 26–34)
MCHC RBC AUTO-ENTMCNC: 31 G/DL (ref 30–36.5)
MCV RBC AUTO: 96.6 FL (ref 80–99)
MONOCYTES # BLD: 0.7 K/UL (ref 0–1)
MONOCYTES NFR BLD: 6 % (ref 5–13)
NEUTS SEG # BLD: 7.7 K/UL (ref 1.8–8)
NEUTS SEG NFR BLD: 69 % (ref 32–75)
NRBC # BLD: 0 K/UL (ref 0–0.01)
NRBC BLD-RTO: 0 PER 100 WBC
PHOSPHATE SERPL-MCNC: 3 MG/DL (ref 2.6–4.7)
PLATELET # BLD AUTO: 377 K/UL (ref 150–400)
PMV BLD AUTO: 9.1 FL (ref 8.9–12.9)
POTASSIUM SERPL-SCNC: 3.8 MMOL/L (ref 3.5–5.1)
PTH-INTACT SERPL-MCNC: 53.3 PG/ML (ref 18.4–88)
RBC # BLD AUTO: 2.64 M/UL (ref 3.8–5.2)
SODIUM SERPL-SCNC: 143 MMOL/L (ref 136–145)
WBC # BLD AUTO: 11 K/UL (ref 3.6–11)

## 2022-08-02 PROCEDURE — 84100 ASSAY OF PHOSPHORUS: CPT

## 2022-08-02 PROCEDURE — 74011000250 HC RX REV CODE- 250: Performed by: FAMILY MEDICINE

## 2022-08-02 PROCEDURE — 97530 THERAPEUTIC ACTIVITIES: CPT

## 2022-08-02 PROCEDURE — 36415 COLL VENOUS BLD VENIPUNCTURE: CPT

## 2022-08-02 PROCEDURE — 82306 VITAMIN D 25 HYDROXY: CPT

## 2022-08-02 PROCEDURE — 97535 SELF CARE MNGMENT TRAINING: CPT

## 2022-08-02 PROCEDURE — 83735 ASSAY OF MAGNESIUM: CPT

## 2022-08-02 PROCEDURE — 74011250637 HC RX REV CODE- 250/637: Performed by: HOSPITALIST

## 2022-08-02 PROCEDURE — 82652 VIT D 1 25-DIHYDROXY: CPT

## 2022-08-02 PROCEDURE — 65270000029 HC RM PRIVATE

## 2022-08-02 PROCEDURE — 74011250637 HC RX REV CODE- 250/637: Performed by: FAMILY MEDICINE

## 2022-08-02 PROCEDURE — 80048 BASIC METABOLIC PNL TOTAL CA: CPT

## 2022-08-02 PROCEDURE — 85025 COMPLETE CBC W/AUTO DIFF WBC: CPT

## 2022-08-02 PROCEDURE — 83970 ASSAY OF PARATHORMONE: CPT

## 2022-08-02 PROCEDURE — 74011250637 HC RX REV CODE- 250/637: Performed by: INTERNAL MEDICINE

## 2022-08-02 PROCEDURE — 74011000250 HC RX REV CODE- 250: Performed by: INTERNAL MEDICINE

## 2022-08-02 RX ADMIN — Medication: at 10:12

## 2022-08-02 RX ADMIN — SODIUM CHLORIDE 75 ML/HR: 4.5 INJECTION, SOLUTION INTRAVENOUS at 15:37

## 2022-08-02 RX ADMIN — SODIUM CHLORIDE, PRESERVATIVE FREE 10 ML: 5 INJECTION INTRAVENOUS at 20:58

## 2022-08-02 RX ADMIN — SODIUM CHLORIDE 75 ML/HR: 4.5 INJECTION, SOLUTION INTRAVENOUS at 01:25

## 2022-08-02 RX ADMIN — POLYETHYLENE GLYCOL 3350 17 G: 17 POWDER, FOR SOLUTION ORAL at 10:11

## 2022-08-02 RX ADMIN — APIXABAN 2.5 MG: 2.5 TABLET, FILM COATED ORAL at 20:58

## 2022-08-02 RX ADMIN — ASPIRIN 81 MG CHEWABLE TABLET 81 MG: 81 TABLET CHEWABLE at 10:11

## 2022-08-02 RX ADMIN — FAMOTIDINE 10 MG: 20 TABLET ORAL at 10:11

## 2022-08-02 RX ADMIN — APIXABAN 2.5 MG: 2.5 TABLET, FILM COATED ORAL at 10:11

## 2022-08-02 RX ADMIN — SODIUM CHLORIDE, PRESERVATIVE FREE 10 ML: 5 INJECTION INTRAVENOUS at 06:29

## 2022-08-02 RX ADMIN — AMIODARONE HYDROCHLORIDE 200 MG: 200 TABLET ORAL at 10:11

## 2022-08-02 RX ADMIN — FAMOTIDINE 10 MG: 20 TABLET ORAL at 18:17

## 2022-08-02 RX ADMIN — Medication: at 18:17

## 2022-08-02 NOTE — PROGRESS NOTES
I tried to wean this pt off of oxygen. I tried to go from 3L down to 2L but the pt's O2 = 86%. Increased to 3L, O2 = 88%, then increased to 4L, O2 = 91%.

## 2022-08-02 NOTE — PROGRESS NOTES
Problem: Aspiration - Risk of  Goal: *Absence of aspiration  Outcome: Progressing Towards Goal     Problem: Pressure Injury - Risk of  Goal: *Prevention of pressure injury  Description: Document Fly Scale and appropriate interventions in the flowsheet. Outcome: Progressing Towards Goal  Note: Pressure Injury Interventions:  Sensory Interventions: Assess changes in LOC    Moisture Interventions: Absorbent underpads    Activity Interventions: PT/OT evaluation    Mobility Interventions: Float heels    Nutrition Interventions: Document food/fluid/supplement intake    Friction and Shear Interventions: Lift sheet                Problem: Falls - Risk of  Goal: *Absence of Falls  Description: Document Sharri Fall Risk and appropriate interventions in the flowsheet. Outcome: Progressing Towards Goal  Note: Fall Risk Interventions:  Mobility Interventions: Communicate number of staff needed for ambulation/transfer    Mentation Interventions: Adequate sleep, hydration, pain control    Medication Interventions: Evaluate medications/consider consulting pharmacy    Elimination Interventions: Call light in reach    History of Falls Interventions: Door open when patient unattended         Problem: Delirium Treatment  Goal: *Level of consciousness restored to baseline  Outcome: Progressing Towards Goal  Goal: *Level of environmental perceptions restored to baseline  Outcome: Progressing Towards Goal  Goal: *Sensory perception restored to baseline  Outcome: Progressing Towards Goal  Goal: *Emotional stability restored to baseline  Outcome: Progressing Towards Goal  Goal: *Functional assessment restored to baseline  Outcome: Progressing Towards Goal     Problem: Pressure Injury - Risk of  Goal: *Prevention of pressure injury  Description: Document Fly Scale and appropriate interventions in the flowsheet.   Outcome: Progressing Towards Goal  Note: Pressure Injury Interventions:  Sensory Interventions: Assess changes in LOC    Moisture Interventions: Absorbent underpads    Activity Interventions: PT/OT evaluation    Mobility Interventions: Float heels    Nutrition Interventions: Document food/fluid/supplement intake    Friction and Shear Interventions: Lift sheet                Problem: Falls - Risk of  Goal: *Absence of Falls  Description: Document Sharri Fall Risk and appropriate interventions in the flowsheet.   Outcome: Progressing Towards Goal  Note: Fall Risk Interventions:  Mobility Interventions: Communicate number of staff needed for ambulation/transfer    Mentation Interventions: Adequate sleep, hydration, pain control    Medication Interventions: Evaluate medications/consider consulting pharmacy    Elimination Interventions: Call light in reach    History of Falls Interventions: Door open when patient unattended

## 2022-08-02 NOTE — PROGRESS NOTES
Problem: Mobility Impaired (Adult and Pediatric)  Goal: *Acute Goals and Plan of Care (Insert Text)  Description: FUNCTIONAL STATUS PRIOR TO ADMISSION: Patient was independent without DME prior to July 3rd when she suffered a fall (sprained ankle). She was d/c'ed home with a CAM boot and had difficulty mobilizing within her home ( stated they were even calling the rescue squad to assist her to the bathroom) and then eventually suffered a fall again and led to admission at [facility] (PEs, PNA). DOES NOT wear O2 at home at baseline but was on O2 following her hospitalization due to PNA and PEs. Stated she had not participated with therapy much at [facility] (where she was d/c'ed after [facility] admission). HOME SUPPORT PRIOR TO ADMISSION: The patient lived with supportive  who is active and independent. Stated he rarely assisted her with care or mobility at baseline. Physical Therapy Goals  Re-assessed 7/28/2022  1. Patient will move from supine to sit and sit to supine, scoot up and down, and roll side to side in bed with moderate assistance within 7 day(s). 2.  Patient will tolerate bed in chair position 30 min BID within 7 days. 3.  Patient will sit EOB x5 min with stand-by- assist within 7 days. 4.  Patient will perform sit to stand with moderate assist within 7 days. Initiated 7/21/2022  1. Patient will move from supine to sit and sit to supine , scoot up and down, and roll side to side in bed with moderate assistance within 7 day(s). 2.  Patient will tolerate bed in chair position 30 min BID within 7 days. 3.  Patient will sit EOB x5 min with max assist within 7 days. 4.  Patient will participate in supine HEP with AAROM (due to pain) within 7 days.      Outcome: Progressing Towards Goal   PHYSICAL THERAPY TREATMENT  Patient: Danny Diop (1 y.o. female)  Date: 8/2/2022  Diagnosis: Sepsis ([address] [A41.9] Sepsis ([address]      Precautions: Fall, DNR  Chart, physical therapy assessment, plan of care and goals were reviewed. ASSESSMENT  Patient continues with skilled PT services and is progressing towards goals. Patient received supine and agreeable to therapy. Assisting with all tasks but remains overall mod to max assist for rolling and supine to sit. Bilateral knee pain (bruising  noted). Sat EOB 10+ minutes with overall supervision while working on balance, brushing teeth. Fatigues quickly but engaged throughout session and demonstrating desire to progress. Returned to supine and set up in modified chair position for lunch. OT assisting with getting patient to feed self with modified spoon. Overall participates well and anticipate slow steady progress. .     Current Level of Function Impacting Discharge (mobility/balance): mod to  max assist of 1-2. Other factors to consider for discharge: far below her baseline of modified Independent         PLAN :  Patient continues to benefit from skilled intervention to address the above impairments. Continue treatment per established plan of care. to address goals. Recommendation for discharge: (in order for the patient to meet his/her long term goals)  Therapy up to 5 days/week in SNF setting    This discharge recommendation:  Has been made in collaboration with the attending provider and/or case management    IF patient discharges home will need the following DME: hospital bed, portable oxygen, transfer bench, wheelchair, and sliding/transfer board       SUBJECTIVE:   Patient stated I just want to go home. I know I'm going to rehab.     OBJECTIVE DATA SUMMARY:   Critical Behavior:  Neurologic State: Alert  Orientation Level: Oriented to person, Oriented to place  Cognition: Follows commands  Safety/Judgement: Awareness of environment  Functional Mobility Training:  Bed Mobility:  Rolling: Maximum assistance  Supine to Sit: Maximum assistance  Sit to Supine: Maximum assistance;Assist x2  Scooting:  Total assistance         Balance:  Sitting: Impaired; Without support  Sitting - Static: Fair (occasional)  Sitting - Dynamic: Fair (occasional)         Activity Tolerance:   Fair    After treatment patient left in no apparent distress:   Call bell within reach, Bed / chair alarm activated, Side rails x 3, and modified chair position    COMMUNICATION/COLLABORATION:   The patients plan of care was discussed with: Occupational therapist and Registered nurse.      Cisco Tidwell, PT   Time Calculation: 47 mins

## 2022-08-02 NOTE — PROGRESS NOTES
RENAL  PROGRESS NOTE        Subjective:   weak  Objective:   VITALS SIGNS:    Visit Vitals  BP (!) 148/73 (BP 1 Location: Right arm, BP Patient Position: At rest)   Pulse 79   Temp 98.1 °F (36.7 °C)   Resp 17   Ht 5' 1\" (1.549 m)   Wt 68.3 kg (150 lb 9.2 oz)   SpO2 92%   BMI 28.45 kg/m²       O2 Device: Nasal cannula   O2 Flow Rate (L/min): 4 l/min   Temp (24hrs), Av.9 °F (36.6 °C), Min:97.6 °F (36.4 °C), Max:98.1 °F (36.7 °C)         PHYSICAL EXAM:  resting  DATA REVIEW:     INTAKE / OUTPUT:   Last shift:      No intake/output data recorded. Last 3 shifts:  1901 -  0700  In: 1729 [P.O.:600;  I.V.:1129]  Out: 1275 [Urine:1275]    Intake/Output Summary (Last 24 hours) at 2022 1221  Last data filed at 2022 7911  Gross per 24 hour   Intake 1489 ml   Output 650 ml   Net 839 ml           LABS:   Recent Labs     22  0126 22  02322  0311   WBC 11.0 9.7 9.8   HGB 7.9* 8.3* 8.8*   HCT 25.5* 27.6* 28.7*    386 421*       Recent Labs     22  0127 22  0126 22  02322  0311   NA  --  143 146* 145   K  --  3.8 3.8 3.7   CL  --  109* 110* 105   CO2  --  29 32 36*   GLU  --  89 97 111*   BUN  --  51* 58* 66*   CREA  --  2.28* 2.55* 2.64*   CA 9.6 9.3 9.6 10.7*   MG  --  1.7 2.0 2.2   PHOS  --  3.0 3.6 3.4             Assessment:   PAULO,likely 2nd Hpercalcemia in the setting of UTI  Hypercalcemia,PTH mediated  AMS  Prior SIRS   Anemia      IVF 1/2 NS  Creat ,calcium better  Will follow  Evelena Cousin, MD

## 2022-08-02 NOTE — PROGRESS NOTES
Problem: Self Care Deficits Care Plan (Adult)  Goal: *Acute Goals and Plan of Care (Insert Text)  Description:   FUNCTIONAL STATUS PRIOR TO ADMISSION: Patient was active and independent without DME prior to fall ~July 2, 2022. Was admitted to [facility] for metatarsal fx and then to [facility] for rehab.  and patient reports not receiving much therapy there d/t decline in medical condition. HOME SUPPORT: Lives with     Occupational Therapy Goals  Downgraded 7/28/2022  1. Patient will perform grooming with moderate assistance within 7 day(s). 2.  Patient will perform upper body dressing with moderate assistance within 7 day(s). 3.  Patient will perform lower body dressing with maximum assistance  within 7 day(s). 4.  Patient will perform all aspects of toileting with maximum assistance  within 7 day(s). 5.  Patient will utilize energy conservation techniques during functional activities with verbal cues within 7 day(s). Initiated 7/21/2022  1. Patient will perform grooming with minimal assistance/contact guard assist within 7 day(s). 2.  Patient will perform upper body dressing with minimal assistance/contact guard assist within 7 day(s). 3.  Patient will perform lower body dressing with moderate assistance  within 7 day(s). 4.  Patient will perform all aspects of toileting with moderate assistance  within 7 day(s). 5.  Patient will utilize energy conservation techniques during functional activities with verbal cues within 7 day(s). Outcome: Progressing Towards Goal     OCCUPATIONAL THERAPY TREATMENT  Patient: Frank Vargas (73 y.o. female)  Date: 8/2/2022  Diagnosis: Sepsis ([facility] [A41.9] Sepsis ([facility] [address]      Precautions: Fall, DNR  Chart, occupational therapy assessment, plan of care, and goals were reviewed. ASSESSMENT  Patient continues with skilled OT services and is progressing towards goals.   AAOx4, some noted confusion at the end of the session following sitting EOB but patient acknowledged difficulty with cognition when fatigued. She was overall mod to max A x2 for bed mobility and min A to SBA for sitting EOB during grooming tasks. The patient needed min A to max A for grooming and dressing EOB due to poor sitting balance, strength and endurance. Progressed back into bed for assistance with utensil management and stamina for feeding tasks. Acute care OT will continue to follow, recommend SNF placement at [place]. Current Level of Function Impacting Discharge (ADLs): total to st/up    Other factors to consider for discharge: high PLOF         PLAN :  Patient continues to benefit from skilled intervention to address the above impairments. Continue treatment per established plan of care to address goals. Recommend with staff: chair positioning in  bed. Assistance with built up rebecca for eating, encourage participation in items     Recommend next OT session: follow POC    Recommendation for discharge: (in order for the patient to meet his/her long term goals)  Therapy up to 5 days/week in SNF setting    This discharge recommendation:  Has not yet been discussed the attending provider and/or case management    IF patient discharges home will need the following DME: TBD following rehab       SUBJECTIVE:   Patient stated I miss my  a lot.     OBJECTIVE DATA SUMMARY:   Cognitive/Behavioral Status:  Neurologic State: Alert  Orientation Level: Oriented X4  Cognition: Follows commands;Memory loss             Functional Mobility and Transfers for ADLs:  Bed Mobility:  Rolling: Maximum assistance  Supine to Sit: Maximum assistance  Sit to Supine: Maximum assistance;Assist x2  Scooting: Total assistance    Transfers:             Balance:  Sitting: Impaired; Without support  Sitting - Static: Fair (occasional)  Sitting - Dynamic: Fair (occasional)    ADL Intervention:  Progressed to sitting EOB with SBA to min A needed for support.  She sat and performed ADLs as listed below  Feeding  Feeding Assistance: Moderate assistance  Container Management: Maximum assistance  Cutting Food: Maximum assistance  Food to Mouth: Moderate assistance  Drink to Mouth: Set-up  Cues: Tactile cues provided;Verbal cues provided  Unable to perform full feeding task due to fatigue. Use of built up rebecca to help recruit larger muscle groups with spoon use. Needed encouragement and assistance     Grooming  Grooming Assistance: Moderate assistance  Position Performed: Seated edge of bed  Washing Face: Set-up  Brushing Teeth: Moderate assistance- use of suction toothbrush, unable to tolerate hand to mouth activity for the duration of the task     Upper [address]: Maximum assistance    Lower Body Dressing Assistance  Dressing Assistance: Total assistance(dependent)    Toileting  Toileting Assistance: Total assistance(dependent)    Cognitive Retraining  Orientation Retraining: Situation      Pain:  None rated    Activity Tolerance:   Good and requires frequent rest breaks    After treatment patient left in no apparent distress:   Supine in bed, Heels elevated for pressure relief, Call bell within reach, Bed / chair alarm activated, and Side rails x 3    COMMUNICATION/COLLABORATION:   The patients plan of care was discussed with: Physical therapist and Registered nurse.      Jose Angel Sharif  Time Calculation: 45 mins

## 2022-08-02 NOTE — PROGRESS NOTES
JULIOCESAR- D/c to [facility] or [facility] met with pt and  at 's request. He would really like to see this pt go to [facility]. [facility] is the second choice. ANGELA re-sent a referral to [facility] yesterday. Today, ANGELA received a call from [address] [place] [address] ([phone]) at [facility]. She said that she will have an available bed for this pt either tomorrow or Thursday. ANGELA received a call from Linda Torres ([phone]) in admissions at [facility]. She said that she will have a bed for this pt tomorrow. ANGELA sent Dr. Mily Nguyen a perfectserve message asking her if pt will be medically ready for d/c tomorrow.  Maya Sargent

## 2022-08-02 NOTE — PROGRESS NOTES
[facility] Adult  Hospitalist Group                                                                                          Hospitalist Progress Note  Fernanda Lea MD  Answering service: [phone] or [phone] from in house phone        Date of Service:  2022  NAME:  Donald Joyner  :  1932  MRN:  [id number]      Admission Summary:   Donald Joyner is a 80 y.o. female who presents with shortness of breath and cough    Patient is a poor historian, patient is at [facility] rehab, patient had a ground-level fall, was found to have PEs, was also found to have pneumonia, was treated with Flagyl and Rocephin, was transitioned to oral cephalosporins, has chronic hypoxic respiratory failure and uses 4 L of oxygen at baseline, was sent over here with concerns for impending sepsis, patient was found to have a white count of 38,000 and was found to have mild hypotension associated with her symptoms, came to the ER and was requested to be admitted to the hospitalist service, currently patient is resting in bed, alert x1, is coughing but denies any other complaints or problems       Interval history / Subjective:      No complaints today. Ca improving. Cr slowly improving     Assessment & Plan:     Acute hypoxic respiratory failure, - 4LNC at [facility], but on RA prior to admission [facility] in July   SIRS -now resolved  Healthcare associated pneumonia  Pulmonary edema  -Cultures no growth   -finished the course of Cefepime (--)  -Responded with occasional IV diuretic as well  -Wean O2 as able. Pt is NOT on home O2     Metabolic encephalopathy   Acute kidney injury : had improved, now returned likely d/t poor PO intake d/t delirium  Urinary retention   - Hold lasix  - Continue IV fluids, switch to 0.45NS  - Consult nephrology again   - I and O, avoid nephrotoxins   - Mares placed    Hypernatremia - monitor.  Continue IVF to 0.45NS    Hypotension: resolved  History of atrial flutter  Probable V tach vs flutter:   -Continue on cardizem/ASA/Eliquis/oral amiodarone  -Was put on Amiodarone 7/21, now PO  -Appreciate cardiology     Elevated troponin-per cardiology sec to above, not ACS  History of pulmonary embolism: on Eliquis, continue  Dysphagia:dysphagia diet  Delirium- Supportive care, haldol prn  Hypokalemia, replete - Replete mag    PT/OT SNF    Code status: DNR  Prophylaxis: Eliquis  Care Plan discussed with: RN, CM,    Anticipated Disposition: [facility], bed awaiting renal improvement. Hopefully tomorrow. Will discuss with renal      Hospital Problems  Date Reviewed: 7/24/2022            Codes Class Noted POA    * (Principal) Sepsis ([facility] [address] [facility] ICD-10-CM: A41.9  ICD-9-CM: 038.9, 995.91  7/20/2022 Yes       Review of Systems:   A comprehensive review of systems was negative except for that written in the HPI. Vital Signs:    Last 24hrs VS reviewed since prior progress note. Most recent are:  Visit Vitals  BP (!) 114/50 (BP 1 Location: Right arm, BP Patient Position: At rest)   Pulse 80   Temp 98.6 °F (37 °C)   Resp 18   Ht 5' 1\" (1.549 m)   Wt 68.3 kg (150 lb 9.2 oz)   SpO2 92%   BMI 28.45 kg/m²         Intake/Output Summary (Last 24 hours) at 8/2/2022 1649  Last data filed at 8/2/2022 1533  Gross per 24 hour   Intake 1489 ml   Output 1000 ml   Net 489 ml          Physical Examination:     I had a face to face encounter with this patient and independently examined them on 8/2/2022 as outlined below:          Constitutional:  Awake and alert and following commands    ENT:  Oral mucosa moist, oropharynx benign   Resp:  CTA bilaterally. No wheezing/rhonchi/rales. No accessory muscle use. CV:  Regular rhythm, normal rate, no murmurs, gallops, rubs    GI:  Soft, non distended, non tender   Musculoskeletal:  No edema, warm    Neurologic:    Moves all extremities. AAOx 3 (hospital but not specific), CN II-XII reviewed. Following commands.                Data Review: Review and/or order of clinical lab test      Labs:     Recent Labs     08/02/22  0126 08/01/22  0239   WBC 11.0 9.7   HGB 7.9* 8.3*   HCT 25.5* 27.6*    386       Recent Labs     08/02/22  0127 08/02/22  0126 08/01/22  0239 07/31/22  0311   NA  --  143 146* 145   K  --  3.8 3.8 3.7   CL  --  109* 110* 105   CO2  --  29 32 36*   BUN  --  51* 58* 66*   CREA  --  2.28* 2.55* 2.64*   GLU  --  89 97 111*   CA 9.6 9.3 9.6 10.7*   MG  --  1.7 2.0 2.2   PHOS  --  3.0 3.6 3.4       No results for input(s): ALT, AP, TBIL, TBILI, TP, ALB, GLOB, GGT, AML, LPSE in the last 72 hours. No lab exists for component: SGOT, GPT, AMYP, HLPSE    No results for input(s): INR, PTP, APTT, INREXT, INREXT in the last 72 hours. No results for input(s): FE, TIBC, PSAT, FERR in the last 72 hours. No results found for: FOL, RBCF   No results for input(s): PH, PCO2, PO2 in the last 72 hours.   Recent Labs     08/01/22  0239 07/31/22  1435   CPK 11* 16*       No results found for: CHOL, CHOLX, CHLST, CHOLV, HDL, HDLP, LDL, LDLC, DLDLP, TGLX, TRIGL, TRIGP, CHHD, CHHDX  No results found for: [facility]  Lab Results   Component Value Date/Time    Color YELLOW/STRAW 07/30/2022 05:58 PM    Appearance CLOUDY (A) 07/30/2022 05:58 PM    Specific gravity 1.009 07/30/2022 05:58 PM    pH (UA) 5.5 07/30/2022 05:58 PM    Protein 30 (A) 07/30/2022 05:58 PM    Glucose Negative 07/30/2022 05:58 PM    Ketone Negative 07/30/2022 05:58 PM    Bilirubin Negative 07/30/2022 05:58 PM    Urobilinogen 0.2 07/30/2022 05:58 PM    Nitrites Negative 07/30/2022 05:58 PM    Leukocyte Esterase LARGE (A) 07/30/2022 05:58 PM    Epithelial cells MODERATE (A) 07/30/2022 05:58 PM    Bacteria Negative 07/30/2022 05:58 PM    WBC 20-50 07/30/2022 05:58 PM    RBC 5-10 07/30/2022 05:58 PM         Medications Reviewed:     Current Facility-Administered Medications   Medication Dose Route Frequency    albuterol-ipratropium (DUO-NEB) 2.5 MG-0.5 MG/3 ML  3 mL Nebulization Q6H PRN 0.45% sodium chloride infusion  75 mL/hr IntraVENous CONTINUOUS    [Held by provider] haloperidol lactate (HALDOL) injection 1 mg  1 mg IntraMUSCular Q6H PRN    apixaban (ELIQUIS) tablet 2.5 mg  2.5 mg Oral Q12H    miconazole (MICONTIN) 2 % ointment   Topical BID    amiodarone (CORDARONE) tablet 200 mg  200 mg Oral DAILY    guaiFENesin (ORGANIDIN) tablet 600 mg  600 mg Oral Q4H PRN    melatonin tablet 3 mg  3 mg Oral QHS PRN    ondansetron (ZOFRAN) injection 4 mg  4 mg IntraVENous Q6H PRN    [Held by provider] furosemide (LASIX) tablet 40 mg  40 mg Oral DAILY    metoprolol (LOPRESSOR) injection 2.5 mg  2.5 mg IntraVENous Q6H PRN    [Held by provider] HYDROmorphone (DILAUDID) injection 0.5 mg  0.5 mg IntraVENous Q4H PRN    sodium chloride (NS) flush 5-10 mL  5-10 mL IntraVENous PRN    sodium chloride (NS) flush 5-40 mL  5-40 mL IntraVENous Q8H    sodium chloride (NS) flush 5-40 mL  5-40 mL IntraVENous PRN    acetaminophen (TYLENOL) tablet 650 mg  650 mg Oral Q4H PRN    aspirin chewable tablet 81 mg  81 mg Oral DAILY    famotidine (PEPCID) tablet 10 mg  10 mg Oral BID    [Held by provider] oxyCODONE IR (ROXICODONE) tablet 5 mg  5 mg Oral Q4H PRN    polyethylene glycol (MIRALAX) packet 17 g  17 g Oral DAILY    simethicone (MYLICON) tablet 80 mg  80 mg Oral Q6H PRN     ______________________________________________________________________  EXPECTED LENGTH OF STAY: 4d 2h  ACTUAL LENGTH OF STAY:          13                 Mihir Sotelo MD

## 2022-08-02 NOTE — PROGRESS NOTES
Provided pastoral care visit to [facility] 5 patient. Did not include sacramental care.      Adam Rivera

## 2022-08-03 VITALS
BODY MASS INDEX: 27.14 KG/M2 | HEIGHT: 61 IN | DIASTOLIC BLOOD PRESSURE: 74 MMHG | TEMPERATURE: 98.4 F | HEART RATE: 62 BPM | RESPIRATION RATE: 18 BRPM | OXYGEN SATURATION: 94 % | WEIGHT: 143.74 LBS | SYSTOLIC BLOOD PRESSURE: 152 MMHG

## 2022-08-03 PROBLEM — G93.41 METABOLIC ENCEPHALOPATHY: Status: ACTIVE | Noted: 2022-08-03

## 2022-08-03 PROBLEM — R33.9 URINARY RETENTION: Status: ACTIVE | Noted: 2022-08-03

## 2022-08-03 PROBLEM — J81.1 PULMONARY EDEMA: Status: ACTIVE | Noted: 2022-08-03

## 2022-08-03 PROBLEM — E87.0 HYPERNATREMIA: Status: ACTIVE | Noted: 2022-08-03

## 2022-08-03 PROBLEM — I48.92 ATRIAL FLUTTER (HCC): Status: ACTIVE | Noted: 2022-08-03

## 2022-08-03 PROBLEM — J18.9 HCAP (HEALTHCARE-ASSOCIATED PNEUMONIA): Status: ACTIVE | Noted: 2022-08-03

## 2022-08-03 PROBLEM — N17.9 AKI (ACUTE KIDNEY INJURY) (HCC): Status: ACTIVE | Noted: 2022-08-03

## 2022-08-03 LAB
1,25(OH)2D3 SERPL-MCNC: 7.6 PG/ML (ref 24.8–81.5)
ANION GAP SERPL CALC-SCNC: 7 MMOL/L (ref 5–15)
BASOPHILS # BLD: 0.1 K/UL (ref 0–0.1)
BASOPHILS NFR BLD: 1 % (ref 0–1)
BUN SERPL-MCNC: 48 MG/DL (ref 6–20)
BUN/CREAT SERPL: 22 (ref 12–20)
CALCIUM SERPL-MCNC: 8.9 MG/DL (ref 8.5–10.1)
CHLORIDE SERPL-SCNC: 107 MMOL/L (ref 97–108)
CO2 SERPL-SCNC: 27 MMOL/L (ref 21–32)
CREAT SERPL-MCNC: 2.22 MG/DL (ref 0.55–1.02)
DIFFERENTIAL METHOD BLD: ABNORMAL
EOSINOPHIL # BLD: 0.5 K/UL (ref 0–0.4)
EOSINOPHIL NFR BLD: 5 % (ref 0–7)
ERYTHROCYTE [DISTWIDTH] IN BLOOD BY AUTOMATED COUNT: 15.6 % (ref 11.5–14.5)
GLUCOSE SERPL-MCNC: 95 MG/DL (ref 65–100)
HCT VFR BLD AUTO: 25.1 % (ref 35–47)
HGB BLD-MCNC: 7.9 G/DL (ref 11.5–16)
IMM GRANULOCYTES # BLD AUTO: 0.1 K/UL (ref 0–0.04)
IMM GRANULOCYTES NFR BLD AUTO: 1 % (ref 0–0.5)
LYMPHOCYTES # BLD: 2.1 K/UL (ref 0.8–3.5)
LYMPHOCYTES NFR BLD: 19 % (ref 12–49)
MAGNESIUM SERPL-MCNC: 1.7 MG/DL (ref 1.6–2.4)
MCH RBC QN AUTO: 29.9 PG (ref 26–34)
MCHC RBC AUTO-ENTMCNC: 31.5 G/DL (ref 30–36.5)
MCV RBC AUTO: 95.1 FL (ref 80–99)
MONOCYTES # BLD: 0.7 K/UL (ref 0–1)
MONOCYTES NFR BLD: 6 % (ref 5–13)
NEUTS SEG # BLD: 7.4 K/UL (ref 1.8–8)
NEUTS SEG NFR BLD: 68 % (ref 32–75)
NRBC # BLD: 0 K/UL (ref 0–0.01)
NRBC BLD-RTO: 0 PER 100 WBC
PHOSPHATE SERPL-MCNC: 2.6 MG/DL (ref 2.6–4.7)
PLATELET # BLD AUTO: 351 K/UL (ref 150–400)
PMV BLD AUTO: 8.8 FL (ref 8.9–12.9)
POTASSIUM SERPL-SCNC: 3.7 MMOL/L (ref 3.5–5.1)
RBC # BLD AUTO: 2.64 M/UL (ref 3.8–5.2)
SODIUM SERPL-SCNC: 141 MMOL/L (ref 136–145)
WBC # BLD AUTO: 10.9 K/UL (ref 3.6–11)

## 2022-08-03 PROCEDURE — 74011250637 HC RX REV CODE- 250/637: Performed by: INTERNAL MEDICINE

## 2022-08-03 PROCEDURE — 84100 ASSAY OF PHOSPHORUS: CPT

## 2022-08-03 PROCEDURE — 74011250637 HC RX REV CODE- 250/637: Performed by: HOSPITALIST

## 2022-08-03 PROCEDURE — 83735 ASSAY OF MAGNESIUM: CPT

## 2022-08-03 PROCEDURE — 74011000250 HC RX REV CODE- 250: Performed by: INTERNAL MEDICINE

## 2022-08-03 PROCEDURE — 74011000250 HC RX REV CODE- 250: Performed by: FAMILY MEDICINE

## 2022-08-03 PROCEDURE — 36415 COLL VENOUS BLD VENIPUNCTURE: CPT

## 2022-08-03 PROCEDURE — 74011250637 HC RX REV CODE- 250/637: Performed by: FAMILY MEDICINE

## 2022-08-03 PROCEDURE — 85025 COMPLETE CBC W/AUTO DIFF WBC: CPT

## 2022-08-03 PROCEDURE — 80048 BASIC METABOLIC PNL TOTAL CA: CPT

## 2022-08-03 RX ADMIN — AMIODARONE HYDROCHLORIDE 200 MG: 200 TABLET ORAL at 10:15

## 2022-08-03 RX ADMIN — APIXABAN 2.5 MG: 2.5 TABLET, FILM COATED ORAL at 10:15

## 2022-08-03 RX ADMIN — ASPIRIN 81 MG CHEWABLE TABLET 81 MG: 81 TABLET CHEWABLE at 10:14

## 2022-08-03 RX ADMIN — Medication: at 18:00

## 2022-08-03 RX ADMIN — FAMOTIDINE 10 MG: 20 TABLET ORAL at 18:10

## 2022-08-03 RX ADMIN — POLYETHYLENE GLYCOL 3350 17 G: 17 POWDER, FOR SOLUTION ORAL at 10:15

## 2022-08-03 RX ADMIN — SODIUM CHLORIDE, PRESERVATIVE FREE 10 ML: 5 INJECTION INTRAVENOUS at 15:49

## 2022-08-03 RX ADMIN — FAMOTIDINE 10 MG: 20 TABLET ORAL at 10:15

## 2022-08-03 RX ADMIN — SODIUM CHLORIDE 75 ML/HR: 4.5 INJECTION, SOLUTION INTRAVENOUS at 04:24

## 2022-08-03 NOTE — PROGRESS NOTES
[address] [facility] visit. Mrs. Harsh Pinzon was watching the Mass on her TV. Prayer and communion offered. Mrs. Galeano shared she is going to rehab this afternoon.     Koffi Godinez, SBS, RN, ACSW, LCSW   Page:  [phone]

## 2022-08-03 NOTE — PROGRESS NOTES
Music Therapy Assessment  [address] [id number]     5/14/1932  80 y.o.  female    Patient Telephone Number: [phone] (home)   Religion Affiliation: Restorationism   Language: English   Patient Active Problem List    Diagnosis Date Noted    HCAP (healthcare-associated pneumonia) 08/03/2022    Pulmonary edema 52/08/7687    Metabolic encephalopathy 48/69/3390    PAULO (acute kidney injury) ([facility] 08/03/2022    Urinary retention 08/03/2022    Hypernatremia 08/03/2022    Atrial flutter ([facility] 08/03/2022    Sepsis ([facility] 07/20/2022        Date: 8/3/2022            Total Time (in minutes): 5          [address] MED ONCOLOGY    Mental Status:   [x] Alert [  ] Iram Jose [  ]  Confused  [  ] Minimally responsive  [  ] Sleeping    Communication Status: [  ] Impaired Speech [  ] Nonverbal -N/A    Physical Status:   [  ] Oxygen in use  [  ] Hard of Hearing [  ] Vision Impaired  [  ] Ambulatory  [  ] Ambulatory with assistance [  ] Non-ambulatory -N/A    Music Preferences, Background: Classical, Restorationism Hymns. Clinical Problem addressed: N/A: Please see Session Observations below. Goal(s) met in session: N/A: Please see Session Observations below.   Physical/Pain management (Scale of 1-10):    Pre-session rating ___________    Post-session rating __________  [  ] Increased relaxation   [  ] Affected breathing patterns  [  ] Decreased muscle tension   [  ] Decreased agitation  [  ] Affected heart rate    [  ] Increased alertness     Emotional/Psychological:  [  ] Increased self-expression   [  ] Decreased aggressive behavior   [  ] Decreased feelings of stress  [  ] Discussed healthy coping skills     [  ] Improved mood    [  ] Decreased withdrawn behavior     Social:  [  ] Decreased feelings of isolation/loneliness [  ] Positive social interaction   [  ] Provided support and/or comfort for family/friends    Spiritual:  [  ] Spiritual support    [  ] Expressed peace  [  ] Expressed chris    [  ] Discussed beliefs    Techniques Utilized (Check all that apply): N/A: Please see Session Observations below. [  ] Procedural support MT [  ] Music for relaxation [  ] Patient preferred music  [  ] Sabra analysis  [  ] Song choice  [  ] Music for validation  [  ] Entrainment  [  ] Movement to music [  ] Guided visualization  [  ] Adilson Loll  [  ] Patient instrument playing [  ] [place]  writing  [  ] Mary Norwood along   [  ] Kwame Singh  [  ] Sensory stimulation  [  ] Active Listening  [  ] Music for spiritual support [  ] Making of CDs as gifts    Session Observations:  Referral from Sister Lynne Islas. Patient (pt) was alert lying in bed with a sad affect. This music therapist (MT) introduced self, acknowledging the pt met this MT's music therapist colleague Julio Nair last week. MT asked the pt how she was feeling and pt said depressed. MT asked the pt if she'd like to try a song she enjoys to see if that helped her mood. Pt politely declined and MT expressed respect for this. Pt denied any further needs at this time.     Kirsten Pickens, MT-BC (Music Therapist-Board Certified)  Spiritual Care Department  Referral-based service

## 2022-08-03 NOTE — DISCHARGE SUMMARY
Discharge Summary       PATIENT ID: Poonam Pugh  MRN: [id number]   YOB: 1932    DATE OF ADMISSION: 7/20/2022 10:38 AM    DATE OF DISCHARGE: 08/03/22     PRIMARY CARE PROVIDER: Melissa Rhoades MD     ATTENDING PHYSICIAN: Kita Kumar MD    DISCHARGING PROVIDER: Kita Kumar MD    To contact this individual call [phone] and ask the  to page. If unavailable ask to be transferred the Adult Hospitalist Department. CONSULTATIONS: IP CONSULT TO CARDIOLOGY  IP CONSULT TO CARDIOLOGY  IP CONSULT TO NEPHROLOGY  IP CONSULT TO PALLIATIVE CARE - PROVIDER  IP CONSULT TO NEPHROLOGY    PROCEDURES/SURGERIES: * No surgery found *    DISCHARGE DIAGNOSES:   Acute hypoxic respiratory failure  HCAP Pulmonary edema   Metabolic encephalopathy  PAULO  Urinary retention   Hypernatremia  Hypotension - resolved  [address] COURSE:   Poonam Pugh is a 80 y.o. female who presents with shortness of breath and cough. Pt was diagnosed with HCAP and completed course of abx. She has been on intermittent O2, and has since she has been at [facility] for the last month. Course complicated by PAULO and hypercalcemia, which is now stable. Nephrology was consulted and cleared for DC to SNF. DISCHARGE DIAGNOSES / PLAN:      Acute hypoxic respiratory failure, - 4LNC at [facility], but on RA prior to admission to [facility] in July   Sepsis 2/2  Healthcare associated pneumonia  Pulmonary edema  -Cultures no growth  -finished the course of Cefepime (7/23--7/27)  -Responded with occasional IV diuretic as well, holding for now due to PAULO and per nephrology recs   -Wean O2 as able. Pt is NOT on home O2, but has been on chronic oxygen since her admission to [facility] about 4 weeks ago. - Currently on 3-4L, wean as tolerated.       Metabolic encephalopathy  Acute kidney injury : had improved, now returned likely d/t poor PO intake d/t delirium  Urinary retention  - Hold lasix  - DC IV fluids   - Nephrology following  - I and O, avoid nephrotoxins  - Mares placed, will need to remove by urology, or void trial in about 1-2 weeks      Hypernatremia - monitor. resolved. Hypotension: resolved  History of atrial flutter  Probable V tach vs flutter:   -Continue on cardizem/ASA/Eliquis/oral amiodarone  -Was put on Amiodarone 7/21, now PO  -Appreciate cardiology     Elevated troponin-per cardiology sec to above, not ACS  History of pulmonary embolism: on Eliquis, continue  Dysphagia:dysphagia diet  Delirium- Supportive care, haldol prn  Hypokalemia, replete - Replete mag    BMI: Body mass index is 27.16 kg/m². . This patient: Meets criteria for overweight given BMI >/= 25 and < 30 due to excess calories/nutritional. Weight loss and lifestyle modifications should be encouraged as an outpatient. PENDING TEST RESULTS:   At the time of discharge the following test results are still pending: None     ADDITIONAL CARE RECOMMENDATIONS:   - Please take all medications as prescribed. Note changes as below. **Reduce your eliquis dose due to kidney function  **Start amiodarone   - Please make sure to follow up with your primary care physician within 1-2 weeks of discharge for hospital follow up. You should also follow up with nephrology and cardiology   - Please make sure to continue to monitor for: Chest pain, shortness of breath, high fevers,  and return to the Emergency Department with any of these symptoms.   - Please get up slowly from a seated or laying position, avoid falls. - Avoid tobacco, alcohol and other illicit drug use. - Diet restrictions: None, resume regular diet   - Activity restrictions: Per PT/OT  - Wound care: None         NOTIFY YOUR PHYSICIAN FOR ANY OF THE FOLLOWING:   Fever over 101 degrees for 24 hours.    Chest pain, shortness of breath, fever, chills, nausea, vomiting, diarrhea, change in mentation, falling, weakness, bleeding. Severe pain or pain not relieved by medications, as well as any other signs or symptoms that you may have questions about. FOLLOW UP APPOINTMENTS:    Follow-up Information       Follow up With Specialties Details Why Contact Info    Mathew Caro MD Family Medicine Follow up in 1 week(s)  [address] [place] [address]  [phone]      Martha Bashir, [facility] Vascular Surgery, Interventional Cardiology Physician, Cardiovascular Disease Physician Follow up in 1 week(s)  [address] [place] [address]  [address] [facility] [address]   [address]  [phone]               DIET: Regular Diet    ACTIVITY: Activity as tolerated    EQUIPMENT needed: None    DISCHARGE MEDICATIONS:  Current Discharge Medication List        START taking these medications    Details   furosemide (LASIX) 40 mg tablet 1 tab PO daily  Qty: 30 Tablet, Refills: 0  Start date: 7/28/2022      amiodarone (CORDARONE) 200 mg tablet Take 1 Tablet by mouth in the morning. Qty: 30 Tablet, Refills: 0  Start date: 7/28/2022           CONTINUE these medications which have NOT CHANGED    Details   nystatin (MYCOSTATIN) 100,000 unit/gram ointment Apply  to affected area two (2) times a day. albuterol (PROVENTIL HFA, VENTOLIN HFA, PROAIR HFA) 90 mcg/actuation inhaler Take 1 Puff by inhalation every four (4) hours as needed. albuterol-ipratropium (DUO-NEB) 2.5 mg-0.5 mg/3 ml nebu 3 mL by Nebulization route two (2) times a day. oxyCODONE IR (ROXICODONE) 5 mg immediate release tablet Take 5 mg by mouth every four (4) hours as needed for Pain. acetaminophen (Tylenol Arthritis Pain) 650 mg TbER Take 650 mg by mouth every eight (8) hours. apixaban (ELIQUIS) 5 mg tablet Take 5 mg by mouth two (2) times a day.       guaiFENesin (ORGANIDIN) 200 mg tablet Take 600 mg by mouth every four (4) hours as needed for Congestion. calcium carbonate (CALTREX) 600 mg calcium (1,500 mg) tablet Take 600 mg by mouth two (2) times a day. aspirin 81 mg chewable tablet Take 81 mg by mouth in the morning. polyethylene glycol (Miralax) 17 gram packet Take 17 g by mouth in the morning. famotidine (PEPCID) 10 mg tablet Take 10 mg by mouth two (2) times a day. melatonin 3 mg tablet Take 3 mg by mouth nightly as needed for Insomnia.      simethicone (GAS-X) 125 mg chewable tablet Take 125 mg by mouth every six (6) hours as needed for Flatulence. ondansetron (ZOFRAN ODT) 4 mg disintegrating tablet Take 4 mg by mouth every eight (8) hours as needed for Nausea or Vomiting.      bisacodyL (Dulcolax, bisacodyl,) 10 mg supp Insert 10 mg into rectum daily as needed for Constipation.            STOP taking these medications       cefdinir (OMNICEF) 300 mg capsule Comments:   Reason for Stopping:         dilTIAZem ER (DILACOR XR) 120 mg capsule Comments:   Reason for Stopping:               DISPOSITION:    Home With:   OT  PT  HH  RN      X Long term SNF/Inpatient Rehab    Independent/assisted living    Hospice    Other:       PATIENT CONDITION AT DISCHARGE:     Functional status   X Poor     Deconditioned     Independent      Cognition    X Lucid     Forgetful     Dementia      Catheters/lines (plus indication)   X Mares     PICC     PEG     None      Code status     Full code    X DNR      PHYSICAL EXAMINATION AT DISCHARGE:  Visit Vitals  BP (!) 173/78 (BP 1 Location: Right arm, BP Patient Position: At rest)   Pulse 67   Temp 98.5 °F (36.9 °C)   Resp 18   Ht 5' 1\" (1.549 m)   Wt 65.2 kg (143 lb 11.8 oz)   SpO2 94%   BMI 27.16 kg/m²     Gen: NAD, awake in bed, elderly female   HEENT: NC/AT, sclera anicteric, PERRL, EOMI  CV: RRR no m/r/g, normal S1 and S2, no pedal edema   Resp: CTA b/l no increased work of breathing, no wheezing or rhonchi, speaking in full sentences   Abd: NT/ND, normal bowel sounds, no rebound or guarding  Ext: 2+ pulses, no edema  : Mares in place   Skin: No rashes or lesions        CHRONIC MEDICAL DIAGNOSES:  Problem List as of 8/3/2022 Date Reviewed: 8/3/2022            Codes Class Noted - Resolved    HCAP (healthcare-associated pneumonia) ICD-10-CM: J18.9  ICD-9-CM: 486  8/3/2022 - Present        Pulmonary edema ICD-10-CM: J81.1  ICD-9-CM: 412  8/3/2022 - Present        Metabolic encephalopathy HPK-03-QJ: G93.41  ICD-9-CM: 348.31  8/3/2022 - Present        PAULO (acute kidney injury) ([facility] ICD-10-CM: N17.9  ICD-9-CM: 584.9  8/3/2022 - Present        Urinary retention ICD-10-CM: R33.9  ICD-9-CM: 788.20  8/3/2022 - Present        Hypernatremia ICD-10-CM: E87.0  ICD-9-CM: 276.0  8/3/2022 - Present        Atrial flutter ([email or web address] ICD-10-CM: I48.92  ICD-9-CM: 427.32  8/3/2022 - Present        * (Principal) Sepsis ([facility] ICD-10-CM: A41.9  ICD-9-CM: 038.9, 995.91  7/20/2022 - Present           Greater than 30 minutes were spent with the patient on counseling and coordination of care    Signed:   Juan Manuel Ramírez MD  8/3/2022  11:02 AM

## 2022-08-03 NOTE — PROGRESS NOTES
N attempted to call report to [facility]; however, multiple calls to the facility went unanswered. Will attempt to call report again before the patient leaves for the facility. 1708: Report called to Krystal at [facility], questions facilitated and call back number provided for clarification if necessary.

## 2022-08-03 NOTE — PROGRESS NOTES
RENAL  PROGRESS NOTE        Subjective:   Feels ok  Objective:   VITALS SIGNS:    Visit Vitals  BP (!) 173/78 (BP 1 Location: Right arm, BP Patient Position: At rest)   Pulse 67   Temp 98.5 °F (36.9 °C)   Resp 18   Ht 5' 1\" (1.549 m)   Wt 65.2 kg (143 lb 11.8 oz)   SpO2 94%   BMI 27.16 kg/m²       O2 Device: Nasal cannula   O2 Flow Rate (L/min): 4 l/min   Temp (24hrs), Av.3 °F (36.8 °C), Min:97.8 °F (36.6 °C), Max:98.7 °F (37.1 °C)         PHYSICAL EXAM:  No edema  NAD  DATA REVIEW:     INTAKE / OUTPUT:   Last shift:      No intake/output data recorded. Last 3 shifts:  1901 -  0700  In: 2866 [P.O.:120;  I.V.:2746]  Out: 1525 [Urine:1525]    Intake/Output Summary (Last 24 hours) at 8/3/2022 0933  Last data filed at 8/3/2022 0427  Gross per 24 hour   Intake 1617 ml   Output 875 ml   Net 742 ml           LABS:   Recent Labs     22  0022 22  0126 22  0239   WBC 10.9 11.0 9.7   HGB 7.9* 7.9* 8.3*   HCT 25.1* 25.5* 27.6*    377 386       Recent Labs     22  0022 22  0127 22  01222  0239     --  143 146*   K 3.7  --  3.8 3.8     --  109* 110*   CO2 27  --  29 32   GLU 95  --  89 97   BUN 48*  --  51* 58*   CREA 2.22*  --  2.28* 2.55*   CA 8.9 9.6 9.3 9.6   MG 1.7  --  1.7 2.0   PHOS 2.6  --  3.0 3.6             Assessment:   PAULO,likely 2nd Hpercalcemia in the setting of UTI  Hypercalcemia,PTH mediated  AMS  Prior SIRS   Anemia         Creat ,calcium better  Can dc   Avoid calcium or vit D  Repeat labs next week  If calcium up as OP will be a good sensipar candidate   Batsheva Chauhan MD

## 2022-08-03 NOTE — PROGRESS NOTES
Problem: Aspiration - Risk of  Goal: *Absence of aspiration  Outcome: Resolved/Met     Problem: Patient Education: Go to Patient Education Activity  Goal: Patient/Family Education  Outcome: Resolved/Met     Problem: Pressure Injury - Risk of  Goal: *Prevention of pressure injury  Description: Document Fly Scale and appropriate interventions in the flowsheet. Outcome: Resolved/Met     Problem: Patient Education: Go to Patient Education Activity  Goal: Patient/Family Education  Outcome: Resolved/Met     Problem: Falls - Risk of  Goal: *Absence of Falls  Description: Document Sharri Fall Risk and appropriate interventions in the flowsheet.   Outcome: Resolved/Met     Problem: Patient Education: Go to Patient Education Activity  Goal: Patient/Family Education  Outcome: Resolved/Met     Problem: Patient Education: Go to Patient Education Activity  Goal: Patient/Family Education  Outcome: Resolved/Met     Problem: Patient Education: Go to Patient Education Activity  Goal: Patient/Family Education  Outcome: Resolved/Met     Problem: Patient Education: Go to Patient Education Activity  Goal: Patient/Family Education  Outcome: Resolved/Met     Problem: Delirium Treatment  Goal: *Level of consciousness restored to baseline  Outcome: Resolved/Met  Goal: *Level of environmental perceptions restored to baseline  Outcome: Resolved/Met  Goal: *Sensory perception restored to baseline  Outcome: Resolved/Met  Goal: *Emotional stability restored to baseline  Outcome: Resolved/Met  Goal: *Functional assessment restored to baseline  Outcome: Resolved/Met  Goal: *Absence of falls  Outcome: Resolved/Met  Goal: *Will remain free of delirium, CAM Score negative  Outcome: Resolved/Met  Goal: *Cognitive status will be restored to baseline  Outcome: Resolved/Met  Goal: Interventions  Outcome: Resolved/Met     Problem: Patient Education: Go to Patient Education Activity  Goal: Patient/Family Education  Outcome: Resolved/Met

## 2022-08-03 NOTE — PROGRESS NOTES
Problem: Aspiration - Risk of  Goal: *Absence of aspiration  Outcome: Progressing Towards Goal     Problem: Pressure Injury - Risk of  Goal: *Prevention of pressure injury  Description: Document Fly Scale and appropriate interventions in the flowsheet. Outcome: Progressing Towards Goal  Note: Pressure Injury Interventions:  Sensory Interventions: Assess changes in LOC    Moisture Interventions: Absorbent underpads    Activity Interventions: PT/OT evaluation    Mobility Interventions: HOB 30 degrees or less    Nutrition Interventions: Discuss nutritional consult with provider    Friction and Shear Interventions: Lift sheet                Problem: Falls - Risk of  Goal: *Absence of Falls  Description: Document Sharri Fall Risk and appropriate interventions in the flowsheet. Outcome: Progressing Towards Goal  Note: Fall Risk Interventions:  Mobility Interventions: Communicate number of staff needed for ambulation/transfer    Mentation Interventions: Door open when patient unattended    Medication Interventions: Evaluate medications/consider consulting pharmacy    Elimination Interventions: Call light in reach    History of Falls Interventions: Door open when patient unattended         Problem: Patient Education: Go to Patient Education Activity  Goal: Patient/Family Education  Outcome: Progressing Towards Goal     Problem: Pressure Injury - Risk of  Goal: *Prevention of pressure injury  Description: Document Fly Scale and appropriate interventions in the flowsheet.   Outcome: Progressing Towards Goal  Note: Pressure Injury Interventions:  Sensory Interventions: Assess changes in LOC    Moisture Interventions: Absorbent underpads    Activity Interventions: PT/OT evaluation    Mobility Interventions: HOB 30 degrees or less    Nutrition Interventions: Discuss nutritional consult with provider    Friction and Shear Interventions: Lift sheet                Problem: Falls - Risk of  Goal: *Absence of Falls  Description: Document Leeanne Angel Fall Risk and appropriate interventions in the flowsheet.   Outcome: Progressing Towards Goal  Note: Fall Risk Interventions:  Mobility Interventions: Communicate number of staff needed for ambulation/transfer    Mentation Interventions: Door open when patient unattended    Medication Interventions: Evaluate medications/consider consulting pharmacy    Elimination Interventions: Call light in reach    History of Falls Interventions: Door open when patient unattended         Problem: Patient Education: Go to Patient Education Activity  Goal: Patient/Family Education  Outcome: Progressing Towards Goal

## 2022-08-03 NOTE — PROGRESS NOTES
Transition of Care Plan to SNF/Rehab    SNF/Rehab Transition:  Patient has been accepted to [facility] and meets criteria for admission. Patient will transported by [facility] and expected to leave at 1800. Communication to Patient/Family:  Met with patient and spoke with her  via telephone (identified care giver) and they are agreeable to the transition plan. Communication to SNF/Rehab:  Bedside RN, Ceci Chacon, has been notified to update the transition plan to the facility and call report (phone number [phone] Room 304). Discharge information has been updated on the AVS.           Nursing Please include all hard scripts for controlled substances, med rec and dc summary, and AVS in packet. Reviewed and confirmed with facility, [facility], can manage the patient care needs for the following:     Job Foots with (X) only those applicable:    Medication:  []  Medications will be available at the facility  []  IV Antibiotics  []  Controlled Substance - hard copy to be sent with patient   []  Weekly Labs   Documents:  [x] Hard RX  [x] MAR  [x] Kardex   AVS  [x][x]Transfer Summary  [x]Discharge   Equipment:  []  CPAP/BiPAP  []  Wound Vacuum  []  Mares or Urinary Device  []  PICC/Central Line  []  Nebulizer  []  Ventilator   Treatment:  []Isolation (for MRSA, VRE, etc.)  []Surgical Drain Management  []Tracheostomy Care  []Dressing Changes  []Dialysis with transportation and chair time   []PEG Care  []Oxygen  []Daily Weights for Heart Failure   Dietary:  []Any diet limitations  []Tube Feedings   []Total Parenteral Management (TPN)   Eligible for Medicaid Long Term Services and Supports  Yes:  [] Eligible for medical assistance or will become eligible within 180 days and UAI completed. [] Provider/Patient and/or support system has requested screening. [] UAI copy provided to patient or responsible party,   [] UAI unavailable at discharge will send once processed to SNF provider.   [] UAI unavailable at discharged mailed to patient  No:   [] Private pay and is not financially eligible for Medicaid within the next 180 days. [] Reside out-of-state. [] A residents of a state owned/operated facility that is licensed  by [address] [employer] [facility] or [facility]  [] Enrollment in [facility] services  [] [address] [place] [address]  [x] Patient /Family declines to have screening completed or provide financial information for screening     Financial Resources:  Medicaid    [] Initiated and application pending   [] Full coverage     Advanced Care Plan:  []Surrogate Decision Maker of Care  []POA  []Communicated Code Status DNR (DDNR\", \"Full\")    Other   Medicare pt has received, reviewed, and signed 2nd IM letter informing them of their right to appeal the discharge. Signed copy has been placed on pt bedside chart.     Car Potts, [facility]

## 2022-08-11 NOTE — PROGRESS NOTES
Physician Progress Note      PATIENT:               Iesha Connors  CSN #:                  [id number]  :                       1932  ADMIT DATE:       2022 10:38 AM  [address] DATE:        8/3/2022 7:25 PM  RESPONDING  PROVIDER #:        Gera Fowler MD          QUERY TEXT:    Dear attending,    Patient was admitted with pneumonia and was documented to have HCAP as currently in a rehab after recent hospitalization. During admission, patient was treated with 5 days of Cefepime. Based on clinical indicators and treatment, can the suspected type of pneumonia be further specified as:    Note: CAP and HCAP indicate where the pneumonia was acquired, not a specific type. The medical record reflects the following:  Risk Factors: from rehab  Clinical Indicators: -WBC 38.0, neutrophils 92  ct chest IMPRESSION 1. Bibasilar pneumonia may be due to aspiration given the debris in the trachea. Per H&P- Cecilia Su is a 80 y.o. female who presents with shortness of breath and cough  Patient is a poor historian, patient is at [facility] rehab, patient had a ground-level fall, was found to have PEs, was also found to have pneumonia, was treated with Flagyl and Rocephin, was transitioned to oral cephalosporins  Healthcare associated pneumonia  hypoxic respiratory failure appears to be multifactorial, likely pulmonary edema with possible underlying pneumonia, broad-spectrum IV antibiotics  -Per cardiology- Elevated ProBNP: may be related to recent PNA, CXR showing interstitial edema or atypical PNA. Echo showed EF 55-60%, no valvular disease of significance. She has received Lasix 40mg IV x1. Cont management of hypoxic respiratory failure/? PNA  -Per cardiology- PNA - still with leuko + cough.   Treatment: IV Maxipime 2g q12 hours, IV Levaquin 750 mg x 1 on , IV Vancocin per pharmacy, Monitor vital signs, labwork, imaging,      Thank you,    Katina Virk RN, BSN, CRCR, CCDS  Clinical Documentation Improvement  [phone] or via Perfect Serve  Options provided:  -- Gram negative pneumonia excluding Haemophilus influenza  -- Strep pneumoniae  -- pneumonia, please specify type. -- Other - I will add my own diagnosis  -- Disagree - Not applicable / Not valid  -- Disagree - Clinically unable to determine / Unknown  -- Refer to Clinical Documentation Reviewer    PROVIDER RESPONSE TEXT:    This patient has pneumonia unknown which speicfic type    Query created by: Trish Pete on 8/9/2022 9:37 AM      QUERY TEXT:      Dear attending,    Noted documentation of metabolic encephalopathy with pneumonia. The patient was documented to have some anxiety or delirium that seemed to wax and wane during the admission, after treatment of the infection and PAULO. No head CT was obtained. Haldol x 1 was given on 7/29. In order to support the diagnosis of metabolic encephalopathy, please include additional clinical indicators in your documentation. Or please document if the diagnosis of metabolic encephalopathy has been ruled out after further study. The medical record reflects the following:  Risk Factors: had PNA this admission  Clinical Indicators:  7/30-Per PN- On RA, still very somnolent. PAULO worsening with worsening uremia. Metabolic encephalopathy  Acute kidney injury : had improved, now returned likely d/t poor PO intake d/t delirium  Delirium- Supportive care, haldol prn  7/29-per PN- Pt more delirious today, A&Ox0  8/2-Per PN-  Neurologic:  Moves all extremities. AAOx 3 (hospital but not specific), CN II-XII reviewed. Following commands  Treatment: IV Haldol 1 mg x 1 on 7/29, monitor labwork, vital signs, imaging, neuro status  Options provided:  -- metabolic encephalopathy present as evidenced by, Please document evidence.   -- metabolic encephalopathy was ruled out, delirium only  -- Other - I will add my own diagnosis  -- Disagree - Not applicable / Not valid  -- Disagree - Clinically unable to determine / Unknown  -- Refer to Clinical Documentation Reviewer    PROVIDER RESPONSE TEXT:    metabolic encephalopathy was ruled out after study, delirium only. Query created by: Keturah Klinefelter on 8/9/2022 9:58 AM      QUERY TEXT:    Dear attending,    Patient was admitted with pneumonia and was noted to have had a recent pulmonary embolism. Patient was continued on Eliquis. After further review, can the acuity of the pulmonary embolism be specified as: The medical record reflects the following:  Risk Factors:Dced from rehab on 7/17 after PE-on Eliquis 5 mg po bid PTA  Clinical Indicators:7/21-Troponin -hi sensitivity 157  Per H&P-Patient is a poor historian, patient is at [facility] rehab, patient had a ground-level fall, was found to have PEs, was also found to have pneumonia, was treated with Flagyl and Rocephin, was transitioned to oral cephalosporins, has chronic hypoxic respiratory failure and uses 4 L of oxygen at baseline  7/20-Per cardiology-She was recently at [facility] after GLF sustaining metatarsal fx. Diagnosed with PNA and PE while inpt. She was started on ABX and Eliquis. D/C to [facility] for rehab on 7/17.  7/22- Per cardiology-Troponin elevation: presenting with worsening SOB. Recent PNA/ PE which may be contributing to elevation.   Per dc summary-History of pulmonary embolism: on Eliquis, continue  Treatment: Monitor vital signs, labwork, imaging, Eliquis 5 mg po bid      Thank you,    Katina Virk RN, BSN, CRCR, CCDS  Clinical Documentation Improvement  [phone] or via Perfect Serve  Options provided:  -- Acute pulmonary embolism under active treatment  -- Subacute pulmonary embolism under active treatment  -- Personal history of  pulmonary embolism under active treatment  -- Other - I will add my own diagnosis  -- Disagree - Not applicable / Not valid  -- Disagree - Clinically unable to determine / Unknown  -- Refer to Clinical Documentation Reviewer    PROVIDER RESPONSE TEXT:    this patient had a personal history of pulmonary embolism under active treatment    Query created by: Myesha Peoples on 8/9/2022 10:10 AM      Electronically signed by:  Tammy Mack MD 8/11/2022 6:24 PM

## 2022-10-01 ENCOUNTER — APPOINTMENT (OUTPATIENT)
Dept: GENERAL RADIOLOGY | Age: 87
DRG: 242 | End: 2022-10-01
Attending: STUDENT IN AN ORGANIZED HEALTH CARE EDUCATION/TRAINING PROGRAM
Payer: MEDICARE

## 2022-10-01 ENCOUNTER — HOSPITAL ENCOUNTER (EMERGENCY)
Age: 87
Discharge: HOME | DRG: 242 | End: 2022-10-02
Attending: STUDENT IN AN ORGANIZED HEALTH CARE EDUCATION/TRAINING PROGRAM
Payer: MEDICARE

## 2022-10-01 DIAGNOSIS — R00.1 BRADYCARDIA: Primary | ICD-10-CM

## 2022-10-01 LAB
ALBUMIN SERPL-MCNC: 2.8 G/DL (ref 3.5–5)
ALBUMIN/GLOB SERPL: 0.7 {RATIO} (ref 1.1–2.2)
ALP SERPL-CCNC: 112 U/L (ref 45–117)
ALT SERPL-CCNC: 30 U/L (ref 12–78)
ANION GAP SERPL CALC-SCNC: 1 MMOL/L (ref 5–15)
AST SERPL-CCNC: 19 U/L (ref 15–37)
BASOPHILS # BLD: 0.1 K/UL (ref 0–0.1)
BASOPHILS NFR BLD: 1 % (ref 0–1)
BILIRUB SERPL-MCNC: 0.1 MG/DL (ref 0.2–1)
BUN SERPL-MCNC: 49 MG/DL (ref 6–20)
BUN/CREAT SERPL: 24 (ref 12–20)
CALCIUM SERPL-MCNC: 11 MG/DL (ref 8.5–10.1)
CHLORIDE SERPL-SCNC: 105 MMOL/L (ref 97–108)
CO2 SERPL-SCNC: 33 MMOL/L (ref 21–32)
CREAT SERPL-MCNC: 2.01 MG/DL (ref 0.55–1.02)
DIFFERENTIAL METHOD BLD: ABNORMAL
EOSINOPHIL # BLD: 0.4 K/UL (ref 0–0.4)
EOSINOPHIL NFR BLD: 4 % (ref 0–7)
ERYTHROCYTE [DISTWIDTH] IN BLOOD BY AUTOMATED COUNT: 16.1 % (ref 11.5–14.5)
GLOBULIN SER CALC-MCNC: 3.9 G/DL (ref 2–4)
GLUCOSE SERPL-MCNC: 118 MG/DL (ref 65–100)
HCT VFR BLD AUTO: 27.6 % (ref 35–47)
HGB BLD-MCNC: 8.2 G/DL (ref 11.5–16)
IMM GRANULOCYTES # BLD AUTO: 0.1 K/UL (ref 0–0.04)
IMM GRANULOCYTES NFR BLD AUTO: 1 % (ref 0–0.5)
LYMPHOCYTES # BLD: 2.2 K/UL (ref 0.8–3.5)
LYMPHOCYTES NFR BLD: 21 % (ref 12–49)
MAGNESIUM SERPL-MCNC: 1.7 MG/DL (ref 1.6–2.4)
MCH RBC QN AUTO: 30.1 PG (ref 26–34)
MCHC RBC AUTO-ENTMCNC: 29.7 G/DL (ref 30–36.5)
MCV RBC AUTO: 101.5 FL (ref 80–99)
MONOCYTES # BLD: 1.1 K/UL (ref 0–1)
MONOCYTES NFR BLD: 10 % (ref 5–13)
NEUTS SEG # BLD: 6.5 K/UL (ref 1.8–8)
NEUTS SEG NFR BLD: 63 % (ref 32–75)
NRBC # BLD: 0 K/UL (ref 0–0.01)
NRBC BLD-RTO: 0 PER 100 WBC
PLATELET # BLD AUTO: 400 K/UL (ref 150–400)
PMV BLD AUTO: 8.8 FL (ref 8.9–12.9)
POTASSIUM SERPL-SCNC: 4.8 MMOL/L (ref 3.5–5.1)
PROT SERPL-MCNC: 6.7 G/DL (ref 6.4–8.2)
RBC # BLD AUTO: 2.72 M/UL (ref 3.8–5.2)
SODIUM SERPL-SCNC: 139 MMOL/L (ref 136–145)
TROPONIN-HIGH SENSITIVITY: 23 NG/L (ref 0–51)
WBC # BLD AUTO: 10.3 K/UL (ref 3.6–11)

## 2022-10-01 PROCEDURE — 85025 COMPLETE CBC W/AUTO DIFF WBC: CPT

## 2022-10-01 PROCEDURE — 36415 COLL VENOUS BLD VENIPUNCTURE: CPT

## 2022-10-01 PROCEDURE — 84484 ASSAY OF TROPONIN QUANT: CPT

## 2022-10-01 PROCEDURE — 71045 X-RAY EXAM CHEST 1 VIEW: CPT

## 2022-10-01 PROCEDURE — 93005 ELECTROCARDIOGRAM TRACING: CPT

## 2022-10-01 PROCEDURE — 83735 ASSAY OF MAGNESIUM: CPT

## 2022-10-01 PROCEDURE — 80053 COMPREHEN METABOLIC PANEL: CPT

## 2022-10-01 PROCEDURE — 99285 EMERGENCY DEPT VISIT HI MDM: CPT

## 2022-10-01 RX ORDER — GUAIFENESIN 100 MG/5ML
324 LIQUID (ML) ORAL
Status: DISCONTINUED | OUTPATIENT
Start: 2022-10-01 | End: 2022-10-01

## 2022-10-01 NOTE — ED TRIAGE NOTES
Pt arrives via EMS c/o lightheadedness and bradycardia. Per EMS, pt's SBP was in 70s en route but adriane to 110s after 250ml fluid bolus. EMS placed 20g L AC. A&O x4, clear speech, follows commands, NAD.

## 2022-10-01 NOTE — ED PROVIDER NOTES
Patient is a 80-year-old female presented to ED with EMS from a nursing care facility with bradycardia and hypotension that improved with EMS small fluid bolus of 150 mL. Patient was reported to be full code by EMS. I discussed CODE STATUS with patient immediately based on her vital signs. She stated she would like to be DNR and does not want any aerobic measures to save her life. The history is provided by the patient, medical records, the EMS personnel and the spouse. Slow Heart Rate   This is a new problem. The current episode started 1 to 2 hours ago. The problem has been gradually improving. The pain is associated with normal activity. The pain is at a severity of 0/10. The patient is experiencing no pain. Associated symptoms include malaise/fatigue. Pertinent negatives include no diaphoresis, no nausea and no shortness of breath. Treatments tried: Fluids. The treatment provided significant relief. Her past medical history does not include DVT or PE. No past medical history on file. No past surgical history on file. No family history on file.     Social History     Socioeconomic History    Marital status:      Spouse name: Not on file    Number of children: Not on file    Years of education: Not on file    Highest education level: Not on file   Occupational History    Not on file   Tobacco Use    Smoking status: Every Day    Smokeless tobacco: Never   Substance and Sexual Activity    Alcohol use: Not on file    Drug use: Not on file    Sexual activity: Not on file   Other Topics Concern    Not on file   Social History Narrative    Not on file     Social Determinants of Health     Financial Resource Strain: Not on file   Food Insecurity: Not on file   Transportation Needs: Not on file   Physical Activity: Not on file   Stress: Not on file   Social Connections: Not on file   Intimate Partner Violence: Not on file   Housing Stability: Not on file         ALLERGIES: Amoxicillin, Penicillins, and Sulfa (sulfonamide antibiotics)    Review of Systems   Constitutional:  Positive for fatigue and malaise/fatigue. Negative for diaphoresis. HENT: Negative. Eyes: Negative. Respiratory: Negative. Negative for shortness of breath. Cardiovascular: Negative. Gastrointestinal: Negative. Negative for nausea. Endocrine: Negative. Genitourinary: Negative. Musculoskeletal: Negative. Skin: Negative. Allergic/Immunologic: Negative. Neurological: Negative. Hematological: Negative. Psychiatric/Behavioral: Negative. Vitals:    10/01/22 1914 10/01/22 1932 10/01/22 1937 10/01/22 1937   BP: (!) 148/37 (!) 124/97     Pulse: (!) 46 (!) 42 (!) 32    Resp:       Temp:    97.9 °F (36.6 °C)   SpO2: 100%  93%             Physical Exam  Vitals and nursing note reviewed. Constitutional:       General: She is not in acute distress. Appearance: Normal appearance. HENT:      Head: Normocephalic and atraumatic. Right Ear: External ear normal.      Left Ear: External ear normal.      Nose: Nose normal.   Eyes:      Extraocular Movements: Extraocular movements intact. Conjunctiva/sclera: Conjunctivae normal.   Cardiovascular:      Rate and Rhythm: Regular rhythm. Bradycardia present. Pulses: Normal pulses. Radial pulses are 2+ on the right side and 2+ on the left side. Heart sounds: Normal heart sounds. Pulmonary:      Effort: Pulmonary effort is normal.      Breath sounds: Normal breath sounds. Chest:      Chest wall: No deformity or tenderness. Abdominal:      General: Abdomen is flat. There is no distension. Tenderness: There is no abdominal tenderness. Musculoskeletal:         General: No deformity or signs of injury. Normal range of motion. Cervical back: Normal range of motion and neck supple. No tenderness. Skin:     General: Skin is warm and dry. Capillary Refill: Capillary refill takes less than 2 seconds. Neurological:      General: No focal deficit present. Mental Status: She is alert and oriented to person, place, and time. Psychiatric:         Attention and Perception: Attention normal.         Mood and Affect: Mood normal.         Behavior: Behavior normal.        MDM     Amount and/or Complexity of Data Reviewed  Decide to obtain previous medical records or to obtain history from someone other than the patient: yes      ED Course as of 10/01/22 2112   Sat Oct 01, 2022   1744 EKG interpretation:   Rhythm: sinus bradycardia; LBBB and regular . Rate (approx.): 36-44; Axis: left axis deviation; Intervals: normal ; ST/T wave: normal; EKG documented and interpreted by Mary Bell. Kishore King MD, Emergency Medicine.   [AL]      ED Course User Index  [AL] Dixon Wheeler MD       LABORATORY RESULTS:  Labs Reviewed   METABOLIC PANEL, COMPREHENSIVE - Abnormal; Notable for the following components:       Result Value    CO2 33 (*)     Anion gap 1 (*)     Glucose 118 (*)     BUN 49 (*)     Creatinine 2.01 (*)     BUN/Creatinine ratio 24 (*)     GFR est AA 28 (*)     GFR est non-AA 23 (*)     Calcium 11.0 (*)     Bilirubin, total 0.1 (*)     Albumin 2.8 (*)     A-G Ratio 0.7 (*)     All other components within normal limits   CBC WITH AUTOMATED DIFF - Abnormal; Notable for the following components:    RBC 2.72 (*)     HGB 8.2 (*)     HCT 27.6 (*)     .5 (*)     MCHC 29.7 (*)     RDW 16.1 (*)     MPV 8.8 (*)     IMMATURE GRANULOCYTES 1 (*)     ABS. MONOCYTES 1.1 (*)     ABS. IMM. GRANS. 0.1 (*)     All other components within normal limits   TROPONIN-HIGH SENSITIVITY   MAGNESIUM   SAMPLE TO BLOOD BANK       IMAGING RESULTS:  XR CHEST SNGL V   Final Result      No acute findings.              MEDICATIONS GIVEN:  Medications - No data to display    Differential diagnosis: Symptomatic bradycardia, hypotension, electrolyte abnormality, heart block    ED physician interpretation of imaging: Chest x-ray without focal pneumonia, pneumothorax  ED physician interpretation of EKG: No STEMI. See my interpretation EKG in ED course above. ED physician interpretation of laboratory results: Troponin within normal limits. Hemoglobin at baseline. Creatinine elevated at baseline. MDM: Patient is a 27-year-old female presenting the ED with bradycardia and hypotension with EMS with blood pressure improved with small fluid bolus. Patient still with bradycardia in the 40s. Blood pressure stable during her entire ED stay. Discussed patient with Dr. Per Ramachandran and he recommended stopping amiodarone. Patient offered admission but she declined. Both patient and spouse are comfortable with this plan. Discussed possible pacemaker placement if needed but she declined this as well. As patient is 80years old and DNR now discharged back to her facility is appropriate. Both spouse and patient agree with DNR. Called patient's facility and gave signout Lalo. He understood and agreed with plan to return her to her facility. He states that we will get palliative care to talk to her about hospice versus comfort care if needed. Further personalized recommendations for outpatient care as below. Key discharge instructions and summary of care: You presented to the ED with symptomatic slow heart rate. This generally improved with IV fluids. Your work-up here in the ED showed no electrolyte abnormalities or change in your hemoglobin. I discussed your case with cardiology. They recommend stopping amiodarone. This medication was canceled on your drug list.  We offered hospital admission for further treatment and evaluation however you declined. I contacted your  and he agreed with discharge. We talked about CODE STATUS and you are now a DO NOT RESUSCITATE/DNR. We discussed pacemaker placement and both you and your  stated you would not want that. I called her facility and they understand the situation.   There will talk to about additional CODE STATUS as well as palliative care. The patient has been re-evaluated and feeling better. All available radiology and laboratory results have been reviewed with patient and/or available family. Patient and/or family verbally conveyed their understanding and agreement of the patient's signs, symptoms, diagnosis, treatment and prognosis and additionally agree to follow-up as recommended in the discharge instructions or to return to the Emergency Department should their condition change or worsen prior to their follow-up appointment. All questions have been answered and patient and/or available family express understanding. IMPRESSION:  1. Bradycardia        DISPOSITION: Discharged     Yazan Green MD      Procedures

## 2022-10-02 VITALS
RESPIRATION RATE: 20 BRPM | OXYGEN SATURATION: 100 % | DIASTOLIC BLOOD PRESSURE: 72 MMHG | SYSTOLIC BLOOD PRESSURE: 162 MMHG | HEART RATE: 73 BPM | TEMPERATURE: 98.2 F

## 2022-10-02 NOTE — ED NOTES
Pt provided a heated meal and phone to call .  Pt says she is to go back to [facility] which she came

## 2022-10-02 NOTE — ED NOTES
[facility] is here to transport patient back to [place] with oxygen. Paperwork given to [facility] to give to Angelito Ruiz and questions answered.

## 2022-10-02 NOTE — DISCHARGE INSTRUCTIONS
You presented to the ED with symptomatic slow heart rate. This generally improved with IV fluids. Your work-up here in the ED showed no electrolyte abnormalities or change in your hemoglobin. I discussed your case with cardiology. They recommend stopping amiodarone. This medication was canceled on your drug list.  We offered hospital admission for further treatment and evaluation however you declined. I contacted your  and he agreed with discharge. We talked about CODE STATUS and you are now a DO NOT RESUSCITATE/DNR. We discussed pacemaker placement and both you and your  stated you would not want that. I called her facility and they understand the situation. There will talk to about additional CODE STATUS as well as palliative care.

## 2022-10-02 NOTE — ED NOTES
[facility] was contacted to let know that pt was returning  in the AM, [facility] ETA [id number]. Artie Parsons received report saying that pt is now DNR and that amiodarone is to be stopped.

## 2022-10-03 LAB
ATRIAL RATE: 36 BPM
CALCULATED P AXIS, ECG09: 53 DEGREES
CALCULATED R AXIS, ECG10: -48 DEGREES
CALCULATED T AXIS, ECG11: 64 DEGREES
DIAGNOSIS, 93000: NORMAL
P-R INTERVAL, ECG05: 182 MS
Q-T INTERVAL, ECG07: 514 MS
QRS DURATION, ECG06: 138 MS
QTC CALCULATION (BEZET), ECG08: 397 MS
VENTRICULAR RATE, ECG03: 36 BPM

## 2022-10-04 ENCOUNTER — HOSPITAL ENCOUNTER (INPATIENT)
Age: 87
LOS: 14 days | Discharge: NURSING, REHAB OR LONG-TERM CARE | DRG: 242 | End: 2022-10-18
Attending: EMERGENCY MEDICINE | Admitting: INTERNAL MEDICINE
Payer: MEDICARE

## 2022-10-04 ENCOUNTER — APPOINTMENT (OUTPATIENT)
Dept: CT IMAGING | Age: 87
DRG: 242 | End: 2022-10-04
Attending: EMERGENCY MEDICINE
Payer: MEDICARE

## 2022-10-04 DIAGNOSIS — R00.1 BRADYCARDIA: ICD-10-CM

## 2022-10-04 DIAGNOSIS — I95.9 HYPOTENSION, UNSPECIFIED HYPOTENSION TYPE: ICD-10-CM

## 2022-10-04 DIAGNOSIS — I44.2 COMPLETE AV BLOCK (HCC): ICD-10-CM

## 2022-10-04 DIAGNOSIS — R53.81 PHYSICAL DEBILITY: ICD-10-CM

## 2022-10-04 DIAGNOSIS — Z95.0 PACEMAKER: ICD-10-CM

## 2022-10-04 DIAGNOSIS — Z51.5 PALLIATIVE CARE ENCOUNTER: ICD-10-CM

## 2022-10-04 DIAGNOSIS — E88.09 HYPOALBUMINEMIA: ICD-10-CM

## 2022-10-04 DIAGNOSIS — R29.6 FREQUENT FALLS: Primary | ICD-10-CM

## 2022-10-04 PROBLEM — R53.1 WEAKNESS: Status: ACTIVE | Noted: 2022-10-04

## 2022-10-04 LAB
ALBUMIN SERPL-MCNC: 3.2 G/DL (ref 3.5–5)
ALBUMIN/GLOB SERPL: 0.8 {RATIO} (ref 1.1–2.2)
ALP SERPL-CCNC: 103 U/L (ref 45–117)
ALT SERPL-CCNC: 33 U/L (ref 12–78)
ANION GAP SERPL CALC-SCNC: 4 MMOL/L (ref 5–15)
AST SERPL-CCNC: 28 U/L (ref 15–37)
BASOPHILS # BLD: 0.1 K/UL (ref 0–0.1)
BASOPHILS NFR BLD: 1 % (ref 0–1)
BILIRUB SERPL-MCNC: 0.3 MG/DL (ref 0.2–1)
BUN SERPL-MCNC: 44 MG/DL (ref 6–20)
BUN/CREAT SERPL: 26 (ref 12–20)
CALCIUM SERPL-MCNC: 10.7 MG/DL (ref 8.5–10.1)
CHLORIDE SERPL-SCNC: 109 MMOL/L (ref 97–108)
CO2 SERPL-SCNC: 27 MMOL/L (ref 21–32)
COMMENT, HOLDF: NORMAL
CREAT SERPL-MCNC: 1.68 MG/DL (ref 0.55–1.02)
DIFFERENTIAL METHOD BLD: ABNORMAL
EOSINOPHIL # BLD: 0.4 K/UL (ref 0–0.4)
EOSINOPHIL NFR BLD: 4 % (ref 0–7)
ERYTHROCYTE [DISTWIDTH] IN BLOOD BY AUTOMATED COUNT: 16.1 % (ref 11.5–14.5)
GLOBULIN SER CALC-MCNC: 4 G/DL (ref 2–4)
GLUCOSE SERPL-MCNC: 88 MG/DL (ref 65–100)
HCT VFR BLD AUTO: 28.9 % (ref 35–47)
HGB BLD-MCNC: 8.8 G/DL (ref 11.5–16)
IMM GRANULOCYTES # BLD AUTO: 0.1 K/UL (ref 0–0.04)
IMM GRANULOCYTES NFR BLD AUTO: 1 % (ref 0–0.5)
INR PPP: 1.1 (ref 0.9–1.1)
LYMPHOCYTES # BLD: 1.6 K/UL (ref 0.8–3.5)
LYMPHOCYTES NFR BLD: 17 % (ref 12–49)
MAGNESIUM SERPL-MCNC: 1.8 MG/DL (ref 1.6–2.4)
MCH RBC QN AUTO: 31.5 PG (ref 26–34)
MCHC RBC AUTO-ENTMCNC: 30.4 G/DL (ref 30–36.5)
MCV RBC AUTO: 103.6 FL (ref 80–99)
MONOCYTES # BLD: 0.9 K/UL (ref 0–1)
MONOCYTES NFR BLD: 10 % (ref 5–13)
NEUTS SEG # BLD: 6.2 K/UL (ref 1.8–8)
NEUTS SEG NFR BLD: 67 % (ref 32–75)
NRBC # BLD: 0 K/UL (ref 0–0.01)
NRBC BLD-RTO: 0 PER 100 WBC
PHOSPHATE SERPL-MCNC: 4 MG/DL (ref 2.6–4.7)
PLATELET # BLD AUTO: 374 K/UL (ref 150–400)
PMV BLD AUTO: 8.8 FL (ref 8.9–12.9)
POTASSIUM SERPL-SCNC: 4.5 MMOL/L (ref 3.5–5.1)
PROT SERPL-MCNC: 7.2 G/DL (ref 6.4–8.2)
PROTHROMBIN TIME: 11.2 SEC (ref 9–11.1)
RBC # BLD AUTO: 2.79 M/UL (ref 3.8–5.2)
SAMPLES BEING HELD,HOLD: NORMAL
SODIUM SERPL-SCNC: 140 MMOL/L (ref 136–145)
TROPONIN-HIGH SENSITIVITY: 24 NG/L (ref 0–51)
TSH SERPL DL<=0.05 MIU/L-ACNC: 3.71 UIU/ML (ref 0.36–3.74)
WBC # BLD AUTO: 9.2 K/UL (ref 3.6–11)

## 2022-10-04 PROCEDURE — 84100 ASSAY OF PHOSPHORUS: CPT

## 2022-10-04 PROCEDURE — 74011250636 HC RX REV CODE- 250/636: Performed by: INTERNAL MEDICINE

## 2022-10-04 PROCEDURE — 99285 EMERGENCY DEPT VISIT HI MDM: CPT

## 2022-10-04 PROCEDURE — 84484 ASSAY OF TROPONIN QUANT: CPT

## 2022-10-04 PROCEDURE — 70450 CT HEAD/BRAIN W/O DYE: CPT

## 2022-10-04 PROCEDURE — 97165 OT EVAL LOW COMPLEX 30 MIN: CPT

## 2022-10-04 PROCEDURE — 36415 COLL VENOUS BLD VENIPUNCTURE: CPT

## 2022-10-04 PROCEDURE — 85025 COMPLETE CBC W/AUTO DIFF WBC: CPT

## 2022-10-04 PROCEDURE — 93005 ELECTROCARDIOGRAM TRACING: CPT

## 2022-10-04 PROCEDURE — 80053 COMPREHEN METABOLIC PANEL: CPT

## 2022-10-04 PROCEDURE — 97535 SELF CARE MNGMENT TRAINING: CPT

## 2022-10-04 PROCEDURE — 83735 ASSAY OF MAGNESIUM: CPT

## 2022-10-04 PROCEDURE — 97161 PT EVAL LOW COMPLEX 20 MIN: CPT

## 2022-10-04 PROCEDURE — 65270000046 HC RM TELEMETRY

## 2022-10-04 PROCEDURE — 84443 ASSAY THYROID STIM HORMONE: CPT

## 2022-10-04 PROCEDURE — 74011250637 HC RX REV CODE- 250/637: Performed by: INTERNAL MEDICINE

## 2022-10-04 PROCEDURE — 85610 PROTHROMBIN TIME: CPT

## 2022-10-04 PROCEDURE — 97530 THERAPEUTIC ACTIVITIES: CPT

## 2022-10-04 RX ORDER — ACETAMINOPHEN 325 MG/1
650 TABLET ORAL
Status: DISCONTINUED | OUTPATIENT
Start: 2022-10-04 | End: 2022-10-18 | Stop reason: HOSPADM

## 2022-10-04 RX ORDER — SODIUM CHLORIDE 0.9 % (FLUSH) 0.9 %
5-40 SYRINGE (ML) INJECTION EVERY 8 HOURS
Status: DISCONTINUED | OUTPATIENT
Start: 2022-10-04 | End: 2022-10-18 | Stop reason: HOSPADM

## 2022-10-04 RX ORDER — SODIUM CHLORIDE 9 MG/ML
75 INJECTION, SOLUTION INTRAVENOUS CONTINUOUS
Status: DISCONTINUED | OUTPATIENT
Start: 2022-10-04 | End: 2022-10-06

## 2022-10-04 RX ORDER — SODIUM CHLORIDE 0.9 % (FLUSH) 0.9 %
5-40 SYRINGE (ML) INJECTION AS NEEDED
Status: DISCONTINUED | OUTPATIENT
Start: 2022-10-04 | End: 2022-10-18 | Stop reason: HOSPADM

## 2022-10-04 RX ORDER — POLYETHYLENE GLYCOL 3350 17 G/17G
17 POWDER, FOR SOLUTION ORAL DAILY PRN
Status: DISCONTINUED | OUTPATIENT
Start: 2022-10-04 | End: 2022-10-18 | Stop reason: HOSPADM

## 2022-10-04 RX ORDER — ACETAMINOPHEN 650 MG/1
650 SUPPOSITORY RECTAL
Status: DISCONTINUED | OUTPATIENT
Start: 2022-10-04 | End: 2022-10-18 | Stop reason: HOSPADM

## 2022-10-04 RX ORDER — ONDANSETRON 2 MG/ML
4 INJECTION INTRAMUSCULAR; INTRAVENOUS
Status: DISCONTINUED | OUTPATIENT
Start: 2022-10-04 | End: 2022-10-18 | Stop reason: HOSPADM

## 2022-10-04 RX ORDER — ONDANSETRON 4 MG/1
4 TABLET, ORALLY DISINTEGRATING ORAL
Status: DISCONTINUED | OUTPATIENT
Start: 2022-10-04 | End: 2022-10-18 | Stop reason: HOSPADM

## 2022-10-04 RX ADMIN — SODIUM CHLORIDE 75 ML/HR: 9 INJECTION, SOLUTION INTRAVENOUS at 16:08

## 2022-10-04 RX ADMIN — APIXABAN 2.5 MG: 2.5 TABLET, FILM COATED ORAL at 19:27

## 2022-10-04 NOTE — PROGRESS NOTES
Problem: Mobility Impaired (Adult and Pediatric)  Goal: *Acute Goals and Plan of Care (Insert Text)  Description: FUNCTIONAL STATUS PRIOR TO ADMISSION: Patient was modified independent using a rollator vs no device? for functional mobility. Recently discharged from SNF to [facility] for respite care and was home for 1 day prior to this admission for fall. HOME SUPPORT PRIOR TO ADMISSION: The patient lived with her supportive and independent . Son lives in [place] but was coming into town to assist with care needs. Physical Therapy Goals  Initiated 10/4/2022  1. Patient will move from supine to sit and sit to supine , scoot up and down, and roll side to side in bed with moderate assistance  within 7 day(s). 2.  Patient will transfer from bed to chair and chair to bed with moderate assistance  using the least restrictive device within 7 day(s). 3.  Patient will perform sit to stand with moderate assistance  within 7 day(s). 4.  Patient will ambulate with moderate assistance  for 15 feet with the least restrictive device within 7 day(s). Outcome: Progressing Towards Goal   PHYSICAL THERAPY EVALUATION  Patient: Giuliano Harding (79 y.o. female)  Date: 10/4/2022  Primary Diagnosis: Weakness [R53.1]       Precautions:   Fall      ASSESSMENT  Based on the objective data described below, the patient presents with impaired balance, decreased activity tolerance, decreased endurance, generalized weakness, and confusion following admission for weakness and fall suffered at home. Has had steady decline since July of this year and was recently in SNF for rehab. Received in bed, shivering, soothed with warmed blankets. Overall max A to mobilize to EOB, stand, and return to bed after hygiene and removal of soiled underwear/brief. She reported dizziness throughout session. Recommend SNF at d/c based on readmission with fall from home within 1 day of return home.      Current Level of Function Impacting Discharge (mobility/balance): max A    Functional Outcome Measure: The patient scored Total Score: 1/28 on the Tinetti outcome measure which is indicative of high fall risk. Other factors to consider for discharge: on 2L/min     Patient will benefit from skilled therapy intervention to address the above noted impairments. PLAN :  Recommendations and Planned Interventions: bed mobility training, transfer training, gait training, therapeutic exercises, neuromuscular re-education, edema management/control, patient and family training/education, and therapeutic activities      Frequency/Duration: Patient will be followed by physical therapy:  5 times a week to address goals. Recommendation for discharge: (in order for the patient to meet his/her long term goals)  Therapy up to 5 days/week in SNF setting    This discharge recommendation:  Has been made in collaboration with the attending provider and/or case management    IF patient discharges home will need the following DME: to be determined (TBD)         SUBJECTIVE:   Patient stated I'm dizzy! !    OBJECTIVE DATA SUMMARY:   HISTORY:    No past medical history on file. No past surgical history on file. Personal factors and/or comorbidities impacting plan of care: [facility]    Home Situation  Home Environment: Private residence  # Steps to Enter: 3  One/Two Story Residence: One story (1 step within the home)  Living Alone: No  Support Systems: Spouse/Significant Other, Child(yessica) (two children (one in [place] and one in [place]))  Current DME Used/Available at Home: None    EXAMINATION/PRESENTATION/DECISION MAKING:   Critical Behavior:  Neurologic State: Alert  Orientation Level: Oriented to person, Disoriented to time, Disoriented to situation, Disoriented to place     Safety/Judgement: Decreased awareness of need for assistance, Decreased awareness of environment, Decreased insight into deficits  Hearing:   Auditory  Auditory Impairment: Hard of hearing, bilateral  Range Of Motion:  AROM: Generally decreased, functional  Strength:    Strength: Generally decreased, functional  Tone & Sensation:   Tone: Normal  Functional Mobility:  Bed Mobility:  Rolling: Maximum assistance  Supine to Sit: Maximum assistance  Sit to Supine: Maximum assistance  Scooting: Maximum assistance  Transfers:  Sit to Stand: Maximum assistance  Stand to Sit: Maximum assistance  Balance:   Sitting: Impaired; Without support  Sitting - Static: Fair (occasional)  Sitting - Dynamic: Fair (occasional)  Standing: Impaired; With support  Standing - Static: Poor;Constant support  Standing - Dynamic : Poor;Constant support      Functional Measure:  Tinetti test:    Sitting Balance: 1  Arises: 0  Attempts to Rise: 0  Immediate Standing Balance: 0  Standing Balance: 0  Nudged: 0  Eyes Closed: 0  Turn 360 Degrees - Continuous/Discontinuous: 0  Turn 360 Degrees - Steady/Unsteady: 0  Sitting Down: 0  Balance Score: 1 Balance total score  Indication of Gait: 0  R Step Length/Height: 0  L Step Length/Height: 0  R Foot Clearance: 0  L Foot Clearance: 0  Step Symmetry: 0  Step Continuity: 0  Path: 0  Trunk: 0  Walking Time: 0  Gait Score: 0 Gait total score  Total Score: 1/28 Overall total score         Tinetti Tool Score Risk of Falls  <19 = High Fall Risk  19-24 = Moderate Fall Risk  25-28 = Low Fall Risk  Tinetti ME. Performance-Oriented Assessment of Mobility Problems in Elderly Patients. Adams 66; [id number].  (Scoring Description: PT Bulletin Feb. 10, 1993)    Older adults: Terri Quesada et al, 2009; n = 1000 [facility] elderly evaluated with ABC, REJI, ADL, and IADL)  · Mean REJI score for males aged 69-68 years = 26.21(3.40)  · Mean REJI score for females age 69-68 years = 25.16(4.30)  · Mean REJI score for males over 80 years = 23.29(6.02)  · Mean REJI score for females over 80 years = 17.20(8.32)        Physical Therapy Evaluation Charge Determination   History Examination Presentation Decision-Making   HIGH Complexity :3+ comorbidities / personal factors will impact the outcome/ POC  HIGH Complexity : 4+ Standardized tests and measures addressing body structure, function, activity limitation and / or participation in recreation  LOW Complexity : Stable, uncomplicated  Other outcome measures Tinetti 1/28  HIGH       Based on the above components, the patient evaluation is determined to be of the following complexity level: LOW     Pain Rating:  Did not c/o pain    Activity Tolerance:   Poor and requires frequent rest breaks      After treatment patient left in no apparent distress:   Supine in bed, Call bell within reach, and Side rails x 3    COMMUNICATION/EDUCATION:   The patients plan of care was discussed with: Occupational therapist and Registered nurse and ED MD    Fall prevention education was provided and the patient/caregiver indicated understanding., Patient/family have participated as able in goal setting and plan of care. , and Patient/family agree to work toward stated goals and plan of care.     Thank you for this referral.  Aimee Pierce, PT, DPT   Time Calculation: 20 mins

## 2022-10-04 NOTE — ROUTINE PROCESS
Has a final transfer review been performed?  Yes    Reason for Admission: frequent falls/SNF placement  Patient comes from: home  Mental Status: Confused  ADL:total assistance  Ambulation:dizziness with ambulation  Pertinent Info/Safety Concerns: fall risk, confusion, and not talking  COVID Status: Not Tested  MEWS Score: 1    Vitals:    10/04/22 1230 10/04/22 1300 10/04/22 1330 10/04/22 1615   BP: (!) 138/55 (!) 124/46 (!) 104/43 (!) 149/68   Pulse: 69 71 70 73   Resp: 22 21 21 20   Temp:    98 °F (36.7 °C)   SpO2:  99% 100% 100%   Weight:       Height:         Lines:   Peripheral IV 10/04/22 Anterior;Right Forearm (Active)      Transport mode:ED stretcher    Elver Chavez  being transferred to (unit) for routine progression of care     \"Notification of etransfer note given to (Name) bud (Title) sec

## 2022-10-04 NOTE — PROGRESS NOTES
Problem: Self Care Deficits Care Plan (Adult)  Goal: *Acute Goals and Plan of Care (Insert Text)  Description: FUNCTIONAL STATUS PRIOR TO ADMISSION: Patient with recent admission 7/20-8/03 of this year. At that time, patient was admitted from [facility] but had limited progress at [facility] due to medical status. Patient was discharged 8/03/2022 to [facility] and was reportedly discharged to a respite care facility ~2 weeks prior to admission. Had an ED visit 10/1-10/2 for bradycardia and hypotension but declined admission to hospital at that time. Patient then was discharged back home with her  on 10/03 and presented to ED 10/4 S/p ground level fall. HOME SUPPORT: The patient lived with her , was hope for one day before presentation to ED for a fall. Per CM note, patient dependent for ADLs, no equipment reported at home. Occupational Therapy Goals  Initiated 10/4/2022  1. Patient will perform grooming in unsupported sitting with minimal assistance/contact guard assist within 7 day(s). 2.  Patient will perform lower body dressing with maximal assistance within 7 day(s). 3.  Patient will perform anterior bathing from neck to thighs with minimal assistance/contact guard assist within 7 day(s). 4.  Patient will perform toilet transfers with moderate assistance  within 7 day(s). 5.  Patient will perform all aspects of toileting with moderate assistance  within 7 day(s). 6.  Patient will participate in upper extremity therapeutic exercise/activities with minimal assistance/contact guard assist for 5 minutes within 7 day(s). Outcome: Not Met   OCCUPATIONAL THERAPY EVALUATION  Patient: Hi Canales (74 y.o. female)  Date: 10/4/2022  Primary Diagnosis: No admission diagnoses are documented for this encounter.        Precautions: Fall    ASSESSMENT  Based on the objective data described below, the patient presents with orientation to self only, impaired balance, decreased activity tolerance, history of falls, limited local supports (primarily  for support, children live out of state), generalized deconditioning, decreased generalized strength, and requiring increased assist for self care and functional mobility/transfers. Patient semi supine on stretcher upon OT/PT arrival and agreeable to working with therapy. Patient complaints of being cold throughout session. Patient donned gown prior to mobility for modesty requiring assist to complete. Patient then transferred to edge of bed and noted underwear from home soiled in urine. Patient transferred into standing with heavy assist of one person while linens removed from under patient, underwear removed and placed in patient belonging bag at bedside. Patient returned to supine on stretcher and donned a brief after total A toileting for pericare. Overall patient did poor with session and is a high fall risk if discharged home, would require 24/7 physical assist and DME below if she were to discharge back home. Patient would benefit from skilled OT services during admission to improve independence with self care and functional mobility/transfers. Recommend discharge to SNF at this time. Current Level of Function Impacting Discharge (ADLs/self-care): max A for mobility/transfers, total A lower extremity dressing, total A toileting, mod A upper extremity dressing    Functional Outcome Measure: The patient scored Total: 15/100 on the Barthel Index outcome measure which is indicative of being total dependence in basic self-care. Other factors to consider for discharge: prior level of function, fall prior to admission, limited local support, severity of deficits, high fall risk     Patient will benefit from skilled therapy intervention to address the above noted impairments.        PLAN :  Recommendations and Planned Interventions: self care training, functional mobility training, therapeutic exercise, balance training, therapeutic activities, cognitive retraining, endurance activities, patient education, home safety training, and family training/education    Frequency/Duration: Patient will be followed by occupational therapy 5 times a week to address goals. Recommendation for discharge: (in order for the patient to meet his/her long term goals)  Therapy up to 5 days/week in SNF setting    This discharge recommendation:  Has been made in collaboration with the attending provider and/or case management    IF patient discharges home will need the following DME: bedside commode, hospital bed, mechanical lift, shower chair, and wheelchair       SUBJECTIVE:   Patient stated It's so cold in here, I'm freezing.     OBJECTIVE DATA SUMMARY:   HISTORY:   No past medical history on file. No past surgical history on file. Expanded or extensive additional review of patient history:     Home Situation  Home Environment: Private residence  # Steps to Enter: 3  One/Two Story Residence: One story (1 step within the home)  Living Alone: No  Support Systems: Spouse/Significant Other, Child(yessica) (two children (one in [place] and one in [place]))  Current DME Used/Available at Home: None      EXAMINATION OF PERFORMANCE DEFICITS:  Cognitive/Behavioral Status:  Neurologic State: Alert  Orientation Level: Oriented to person;Disoriented to time;Disoriented to situation;Disoriented to place           Safety/Judgement: Decreased awareness of need for assistance;Decreased awareness of environment;Decreased insight into deficits    Skin: intact where visible    Edema: none noted    Hearing: Auditory  Auditory Impairment: Hard of hearing, bilateral    Vision/Perceptual:                                     Range of Motion:  AROM: Generally decreased, functional                         Strength:  Strength: Grossly decreased, non-functional                Coordination: Tone & Sensation:  Tone: Normal                         Balance:  Sitting: Impaired; Without support  Sitting - Static: Fair (occasional)  Sitting - Dynamic: Fair (occasional)  Standing: Impaired; With support  Standing - Static: Poor;Constant support  Standing - Dynamic : Poor;Constant support    Functional Mobility and Transfers for ADLs:  Bed Mobility:  Rolling: Maximum assistance  Supine to Sit: Maximum assistance  Sit to Supine: Maximum assistance  Scooting: Maximum assistance    Transfers:  Sit to Stand: Maximum assistance  Stand to Sit: Maximum assistance  Toilet Transfer : Maximum assistance;Assist x2 (infer to [facility])    ADL Assessment:  Feeding: Moderate assistance (infer from functional reach)    Oral Facial Hygiene/Grooming: Moderate assistance (infer from functional reach)    Bathing: Maximum assistance (infer from functional reach and LE access)         Upper Body Dressing: Maximum assistance    Lower Body Dressing: Total assistance    Toileting: Total assistance (bed level)                ADL Intervention and task modifications:                           Upper Body [address]: Maximum assistance    Lower Body Dressing Assistance  Socks: Total assistance (dependent)  Leg Crossed Method Used: No  Position Performed: Supine    Toileting  Bladder Hygiene: Total assistance (dependent)  Clothing Management: Total assistance (dependent)    Cognitive Retraining  Safety/Judgement: Decreased awareness of need for assistance;Decreased awareness of environment;Decreased insight into deficits     Functional Measure:    Barthel Index:  Bathin  Bladder: 5  Bowels: 5  Groomin  Dressin  Feedin  Mobility: 0  Stairs: 0  Toilet Use: 0  Transfer (Bed to Chair and Back): 5  Total: 15/100      The Barthel ADL Index: Guidelines  1. The index should be used as a record of what a patient does, not as a record of what a patient could do. 2. The main aim is to establish degree of independence from any help, physical or verbal, however minor and for whatever reason.   3. The need for supervision renders the patient not independent. 4. A patient's performance should be established using the best available evidence. Asking the patient, friends/relatives and nurses are the usual sources, but direct observation and common sense are also important. However direct testing is not needed. 5. Usually the patient's performance over the preceding 24-48 hours is important, but occasionally longer periods will be relevant. 6. Middle categories imply that the patient supplies over 50 per cent of the effort. 7. Use of aids to be independent is allowed. Score Interpretation (from [facility])    Independent   60-79 Minimally independent   40-59 Partially dependent   20-39 Very dependent   <20 Totally dependent     -Pat Diaz., Barthel, D.W. (1965). Functional evaluation: the Barthel Index. [facility] ([address] [place] (1997). The Barthel activities of daily living index: self-reporting versus actual performance in the old (> or = 75 years). Journal of [address] [place] [address] 45(7), [facility], .Luis FernandoM., [facility]., [facility]. (1999). Measuring the change in disability after inpatient rehabilitation; comparison of the responsiveness of the Barthel Index and Functional Jack Measure. Journal of Neurology, Neurosurgery, and Psychiatry, 66(4), 921-618. Clemencia Gilford, N.J.A, RYLEE Kenney, & Cynthia López M.A. (2004) Assessment of post-stroke quality of life in cost-effectiveness studies: The usefulness of the Barthel Index and the EuroQoL-5D.  Quality of Life Research, 15, 873-20     Occupational Therapy Evaluation Charge Determination   History Examination Decision-Making   LOW Complexity : Brief history review  MEDIUM Complexity : 3-5 performance deficits relating to physical, cognitive , or psychosocial skils that result in activity limitations and / or participation restrictions LOW Complexity : No comorbidities that affect functional and no verbal or physical assistance needed to complete eval tasks       Based on the above components, the patient evaluation is determined to be of the following complexity level: LOW   Pain Rating:  None reported    Activity Tolerance:   Fair, desaturates with exertion and requires oxygen, and SpO2 stable on RA    After treatment patient left in no apparent distress:    Call bell within reach and supine on stretcher with riddhi rails in place    COMMUNICATION/EDUCATION:   The patients plan of care was discussed with: Physical therapist, Registered nurse, and Physician. Patient/family agree to work toward stated goals and plan of care. This patients plan of care is appropriate for delegation to [facility].     Thank you for this referral.  Jj Nj, OTR/L  Time Calculation: 20 mins

## 2022-10-04 NOTE — H&P
[address] [facility] Adult  Hospitalist Group  History and Physical    Date of Service:  10/4/2022  Primary Care Provider: Lakisha Casillas MD  Source of information: Spouse/family member    Chief Complaint: Fall      History of Presenting Illness:   Diana Curtis is a 80 y.o. female with known past medical history of a flutter on Eliquis, recent pneumonia patient was admitted in the hospital and 12 July and discharged in the beginning of August due to pneumonia and hypoxemia at that time he will blood pressure was low, after the last admission patient was discharged to rehab her condition improved and 2 weeks ago she was discharged home, per her  was not at the edge patient was weak and experienced on and off lightheadedness and unsteady gait, the patient had difficulty to ambulate, did not feel well, was brought into emergency department for evaluation. Most determination was obtained through patient  and medical record as patient currently sleeping. Per patient  patient did not have any fever, shortness of breath, chest pain, nausea, vomiting, abdominal pain or diarrhea. She does take Eliquis. And she is supposed to see cardiologist as outpatient but did not have a chance to schedule appointment yet. In the emergency department patient was evaluated systolic blood pressure was on the low side, patient feeling lightheaded and weak, CT head was done was negative for acute intracranial pathology. Laboratory data was obtained creatinine was 1.6, stable, EKG revealed bigeminy with frequent cardiac pauses. Medicine was consulted for admission and further evaluation.     The patient's  denies patient had any headache, blurry vision, sore throat, trouble swallowing, trouble with speech, chest pain, SOB, cough, fever, chills, N/V/D, abd pain, urinary symptoms, constipation, recent travels, sick contacts, rashes, contact with COVID-19 diagnosed patients, hematemesis, melena, hemoptysis, hematuria, rashes, denies starting any new medications and denies any other concerns or problems besides as mentioned above. REVIEW OF SYSTEMS:  A comprehensive review of systems was negative except for that written in the History of Present Illness. No past medical history on file. No past surgical history on file. Prior to Admission medications    Medication Sig Start Date End Date Taking? Authorizing Provider   nystatin (MYCOSTATIN) 100,000 unit/gram ointment Apply  to affected area two (2) times a day. Madhavi Elena MD   albuterol (PROVENTIL HFA, VENTOLIN HFA, PROAIR HFA) 90 mcg/actuation inhaler Take 1 Puff by inhalation every four (4) hours as needed. Madhavi Elena MD   albuterol-ipratropium (DUO-NEB) 2.5 mg-0.5 mg/3 ml nebu 3 mL by Nebulization route two (2) times a day. Madhavi Elena MD   acetaminophen (Tylenol Arthritis Pain) 650 mg TbER Take 650 mg by mouth every eight (8) hours. Madhavi Elena MD   guaiFENesin (ORGANIDIN) 200 mg tablet Take 600 mg by mouth every four (4) hours as needed for Congestion. Madhavi Elena MD   calcium carbonate (CALTREX) 600 mg calcium (1,500 mg) tablet Take 600 mg by mouth two (2) times a day. Madhavi Elena MD   aspirin 81 mg chewable tablet Take 81 mg by mouth in the morning. Madhavi Elena MD   polyethylene glycol (Miralax) 17 gram packet Take 17 g by mouth in the morning. Provider, Historical   famotidine (PEPCID) 10 mg tablet Take 10 mg by mouth two (2) times a day. Provider, Historical   melatonin 3 mg tablet Take 3 mg by mouth nightly as needed for Insomnia. Provider, Historical   simethicone (GAS-X) 125 mg chewable tablet Take 125 mg by mouth every six (6) hours as needed for Flatulence. Provider, Historical   ondansetron (ZOFRAN ODT) 4 mg disintegrating tablet Take 4 mg by mouth every eight (8) hours as needed for Nausea or Vomiting.     Provider, Historical   bisacodyL (Dulcolax, bisacodyl,) 10 mg supp Insert 10 mg into rectum daily as needed for Constipation. Provider, Historical     Allergies   Allergen Reactions    Amoxicillin Unknown (comments)    Penicillins Unknown (comments)    Sulfa (Sulfonamide Antibiotics) Unknown (comments)      No family history on file. Social History:  reports that she has been smoking. She has never used smokeless tobacco.     Family and social history were personally reviewed, all pertinent and relevant details are outlined as above, negative for DM, stroke, CAD. Objective:   Visit Vitals  BP (!) 149/68   Pulse 73   Temp 98 °F (36.7 °C)   Resp 20   Ht 5' 1\" (1.549 m)   Wt 61.2 kg (135 lb)   SpO2 100%   BMI 25.51 kg/m²    O2 Flow Rate (L/min): 3 l/min O2 Device: Nasal cannula    PHYSICAL EXAM:   General: somnolent, dose not communicate, in no acute distress, resting in bed, pleasant female, appears to be stated age  HEENT: PEERL, EOMI, moist mucus membranes  Neck: Supple, no JVD, no meningeal signs  Chest: Clear to auscultation bilaterally   CVS: RRR, S1 S2 heard, no murmurs/rubs/gallops  Abd: Soft, non-tender, non-distended, +bowel sounds   Ext: No clubbing, no cyanosis, no edema  Neuro/Psych: CN 2-12 grossly intact,  moves all extremities, DTR 1+ x 4  Cap refill: Brisk, less than 3 seconds  Pulses: 2+, symmetric in all extremities  Skin: Warm, dry, without rashes or lesions    Data Review: All diagnostic labs and studies have been reviewed. Abnormal Labs Reviewed   CBC WITH AUTOMATED DIFF - Abnormal; Notable for the following components:       Result Value    RBC 2.79 (*)     HGB 8.8 (*)     HCT 28.9 (*)     .6 (*)     RDW 16.1 (*)     MPV 8.8 (*)     IMMATURE GRANULOCYTES 1 (*)     ABS. IMM.  GRANS. 0.1 (*)     All other components within normal limits   METABOLIC PANEL, COMPREHENSIVE - Abnormal; Notable for the following components:    Chloride 109 (*)     Anion gap 4 (*)     BUN 44 (*)     Creatinine 1.68 (*)     BUN/Creatinine ratio 26 (*)     eGFR 29 (*)     Calcium 10.7 (*)     Albumin 3.2 (*)     A-G Ratio 0.8 (*)     All other components within normal limits   PROTHROMBIN TIME + INR - Abnormal; Notable for the following components:    Prothrombin time 11.2 (*)     All other components within normal limits       All Micro Results       None            IMAGING:   CT HEAD WO CONT   Final Result      No acute traumatic injury. ECG/ECHO:    Results for orders placed or performed during the hospital encounter of 10/04/22   EKG, 12 LEAD, INITIAL   Result Value Ref Range    Ventricular Rate 77 BPM    Atrial Rate 77 BPM    P-R Interval 168 ms    QRS Duration 142 ms    Q-T Interval 436 ms    QTC Calculation (Bezet) 493 ms    Calculated P Axis 35 degrees    Calculated R Axis -55 degrees    Calculated T Axis 92 degrees    Diagnosis       Normal sinus rhythm  Left axis deviation  Left bundle branch block  Abnormal ECG  When compared with ECG of 01-OCT-2022 17:44,  Sinus rhythm is no longer with complete heart block  Vent. rate has increased BY  41 BPM  T wave amplitude has increased in Inferior leads  QT has lengthened          Assessment:   Given the patient's current clinical presentation, there is a high level of concern for decompensation if discharged from the emergency department. Complex decision making was performed, which includes reviewing the patient's available past medical records, laboratory results, and imaging studies.     Active Problems:    Weakness (10/4/2022)    Lightheadedness, and dizziness,     unsteady gait, requiring wheelchair    Cardiac arrhythmia, bigeminy with cardiac pauses and bradycardia, with known history of a flutter    Hypertension    Deconditioning, frailty,    CKD stage 3-4    Anemia possible related to chronic kidney disease, hemoglobin stable compared with his prior admission    Chronic respiratory failure with hypoxia on home O2 2-3 l/min    Plan:     The patient will be admitted to medicine as inpatient and will require at least 2 midnight for inpatient treatment  Patient will be monitored on telemetry  Troponin will be obtained  Orthostatic test will be done  Echocardiogram will be obtained  Cardiologist was consulted for further evaluation of arrhythmia with bradycardia and cardiac pauses which could be a course of patient dizziness. PT OT will evaluate patient  Eliquis will be continued  Cortisol level will be obtained in the morning  TSH within within normal limits  Speech therapy will be consulted          DIET: ADULT DIET Dysphagia - Pureed   ISOLATION PRECAUTIONS: There are currently no Active Isolations  CODE STATUS: DNR   DVT PROPHYLAXIS: Eliquis  FUNCTIONAL STATUS PRIOR TO HOSPITALIZATION: Capable of only limited self-care; confined to bed or chair likely more than 50% of waking hours. Ambulatory status/function: By self   EARLY MOBILITY ASSESSMENT: Recommend an assessment from physical therapy and/or occupational therapy  ANTICIPATED DISCHARGE: 24-48 hours. ANTICIPATED DISPOSITION: Home with Home Healthcare  EMERGENCY CONTACT/SURROGATE DECISION MAKER:     CRITICAL CARE WAS PERFORMED FOR THIS ENCOUNTER: NO.      Signed By: Arash Jean MD     October 4, 2022         Please note that this dictation may have been completed with Dragon, the computer voice recognition software. Quite often unanticipated grammatical, syntax, homophones, and other interpretive errors are inadvertently transcribed by the computer software. Please disregard these errors. Please excuse any errors that have escaped final proofreading.

## 2022-10-04 NOTE — ED PROVIDER NOTES
This is a 42-year-old female who presents to the ED after having had a fall at home. She states that she just gets dizzy and falls. She had some soreness in her left arm but says it does not really hurt anywhere in particular. She did not hit her head. She states she lives at home with her  and that she was supposed to be having some folks come out and help her at home in [place] [address]. Patient has had no fever or chill, headache, nausea or vomiting and no chest pain. She has had no shortness of breath and no abdominal pain or bowel or bladder symptoms. She has no complaint from the fall other than the left arm which is nonspecific. No other acute complaints or findings. No past medical history on file. No past surgical history on file. No family history on file. Social History     Socioeconomic History    Marital status:      Spouse name: Not on file    Number of children: Not on file    Years of education: Not on file    Highest education level: Not on file   Occupational History    Not on file   Tobacco Use    Smoking status: Every Day    Smokeless tobacco: Never   Substance and Sexual Activity    Alcohol use: Not on file    Drug use: Not on file    Sexual activity: Not on file   Other Topics Concern    Not on file   Social History Narrative    Not on file     Social Determinants of Health     Financial Resource Strain: Not on file   Food Insecurity: Not on file   Transportation Needs: Not on file   Physical Activity: Not on file   Stress: Not on file   Social Connections: Not on file   Intimate Partner Violence: Not on file   Housing Stability: Not on file         ALLERGIES: Amoxicillin, Penicillins, and Sulfa (sulfonamide antibiotics)    Review of Systems   Constitutional:  Negative for activity change, appetite change, chills, fatigue and fever. HENT:  Negative for ear pain, facial swelling, sore throat and trouble swallowing.     Eyes:  Negative for pain, discharge and visual disturbance. Respiratory:  Negative for chest tightness, shortness of breath and wheezing. Cardiovascular:  Negative for chest pain and palpitations. Gastrointestinal:  Negative for abdominal pain, blood in stool, nausea and vomiting. Genitourinary:  Negative for difficulty urinating, flank pain and hematuria. Musculoskeletal:  Negative for arthralgias, joint swelling, myalgias and neck pain. Skin:  Negative for color change and rash. Neurological:  Positive for dizziness and light-headedness. Negative for weakness, numbness and headaches. Hematological:  Negative for adenopathy. Does not bruise/bleed easily. Psychiatric/Behavioral:  Negative for behavioral problems, confusion and sleep disturbance. All other systems reviewed and are negative. Vitals:    10/04/22 1122 10/04/22 1142   BP: (!) 153/58    Pulse: 80    Resp: 18    Temp: 97.2 °F (36.2 °C)    SpO2: 100%    Weight:  61.2 kg (135 lb)   Height:  5' 1\" (1.549 m)            Physical Exam  Vitals and nursing note reviewed. Constitutional:       General: She is not in acute distress. Appearance: She is well-developed. She is ill-appearing (Pallor). She is not diaphoretic. Comments: This is a 70-year-old female who presents after getting dizzy and falling this morning. There is no reported complaint of significant injury. She did not hit her head. Signs are as noted. HENT:      Head: Normocephalic and atraumatic. Nose: Nose normal.      Mouth/Throat:      Mouth: Mucous membranes are moist.   Eyes:      General: No scleral icterus. Conjunctiva/sclera: Conjunctivae normal.      Pupils: Pupils are equal, round, and reactive to light. Neck:      Thyroid: No thyromegaly. Vascular: No JVD. Trachea: No tracheal deviation. Comments: No carotid bruits noted. Cardiovascular:      Rate and Rhythm: Normal rate and regular rhythm. Heart sounds: Normal heart sounds. No murmur heard.     No friction rub. No gallop. Pulmonary:      Effort: Pulmonary effort is normal. No respiratory distress. Breath sounds: Normal breath sounds. No wheezing or rales. Chest:      Chest wall: No tenderness. Abdominal:      General: Bowel sounds are normal. There is no distension. Palpations: Abdomen is soft. There is no mass. Tenderness: There is no abdominal tenderness. There is no guarding or rebound. Musculoskeletal:         General: No tenderness. Normal range of motion. Cervical back: Normal range of motion and neck supple. Lymphadenopathy:      Cervical: No cervical adenopathy. Skin:     General: Skin is warm and dry. Capillary Refill: Capillary refill takes 2 to 3 seconds. Coloration: Skin is pale. Findings: No erythema or rash. Neurological:      General: No focal deficit present. Mental Status: She is alert and oriented to person, place, and time. Cranial Nerves: No cranial nerve deficit. Coordination: Coordination normal.      Deep Tendon Reflexes: Reflexes are normal and symmetric. Psychiatric:         Behavior: Behavior normal.         Thought Content: Thought content normal.         Judgment: Judgment normal.        MDM     Amount and/or Complexity of Data Reviewed  Clinical lab tests: ordered and reviewed  Tests in the radiology section of CPT®: ordered and reviewed  Decide to obtain previous medical records or to obtain history from someone other than the patient: yes  Review and summarize past medical records: yes  Discuss the patient with other providers: yes           Procedures    This is a 80-year-old female who presents after having a fall this morning at home. She states she got dizzy and fell. There was no significant injury. She is complaining of slight discomfort in her arm but on physical exam there is no acute finding. There is no obvious site for trauma.   Patient looks extremely pale and had a hemoglobin of 8.2 when she was seen on the first of this month for the same type of problem. Will reevaluate with lab urine and CT as she is on blood thinners. Patient denies any blood in her stool or black stools. Her last case management to evaluate with Senior services given the patient's repetitive falls at home. The patient was seen at [facility] ER on the first of this month and admission was recommended for hypotension and bradycardia. She refused she was discharged home and proceeded to fall again. Her  is concerned because of this new onset of dizziness prior to these falls. The patient is now willing to be admitted to the hospital with this additional fall today and recurrent dizziness. We will consult the hospitalist for possible admission. Perfect Serve Consult for Admission  2:39 PM    ED Room Number: ER04/04  Patient Name and age:  Elver Chavez 80 y.o.  female  Working Diagnosis:   1. Frequent falls    2. Hypotension, unspecified hypotension type    3.  Bradycardia        COVID-19 Suspicion:  no  Sepsis present:  no  Reassessment needed: no  Code Status:  DNR   Readmission: no  Isolation Requirements:  no  Recommended Level of Care:  telemetry  Department:[facility] Adult ED - 21   Other:  Seen three days ago for same at [facility]

## 2022-10-04 NOTE — PROGRESS NOTES
10/4/2022 -     Date of previous inpatient admission/ ED visit? 10/1-10/2 [facility] ED Visit for Bradycardia  7/20-8/3 [facility] for Sepsis    What brought the patient back to ED? Falls, Dizziness     Did patient decline recommended services during last admission/ ED visit (if yes, what)? No - patient was discharged to [place] to complete respite placement    Has patient seen a provider since their last inpatient admission/ED visit (if yes, when)? PCP: First and Last name: Yasmin Cruz MD   Name of Practice: [facility]   Are you a current patient: Yes/No: Yes   Approximate date of last visit: 07/2022    CM Interventions:  From previous inpatient admission/ED visit:  From current inpatient admission/ED visit  CM notes SS CM Consult for Multiple Falls. CM contacted patient's spouse Trevor Torresaker: [phone]) who identified that the patient discharged from [facility] approximately 2 weeks ago. Upon discharge, patient was at [facility] for a respite placement. Patient was discharged to home on 10/3. Patient's spouse contacted the patient's PCP on 10/3 to request [place] [facility]. CM contacted patient's PCP (Dr. Faviola Barry: [phone], Option 5, [address] [facility] and [address] [place] [address], Nurses) to inquire if [place] services were established for the patient. Per [address] [place] [address], patient's PCP will set up [place] [facility] with [facility] for PT/OT/SN. Information is on AVS.    At baseline, patient lives with spouse in a single story home, 3 exterior steps. Spouse identified that their son is coming from [facility] for a few days to assist with the patient's care needs. At baseline, patient is primarily dependent in ADLs - patient can feed self but spouse assists with dressing and bathing, to include transport.      Home DME includes: wheelchair, rollator, Home O2 serviced by [employer], shower stool, raised toilet seat, toilet grab bars, shower grab bars    Patient has hx of HH with [facility] and [address] [facility] and [id number] preference is: [facility] and [facility]    Additional support includes: limited to spouse and friends/neighbors locally; 2 adult aged children (Daughter in [place]; Son in [place])    Patient has an AMD that indicates spouse as mPOA; per spouse, patient is a DNR    Patient has been fully vaccinated via the [address] vaccine, with dosing total of 3 doses. CM discussed patient with ED Attending, who identified that the patient has recent hx of Bradycardia and Dizziness. Attending notes that patient had declined admission on 10/1, citing being a DNR. Due to recurrent dizziness, Attending to discuss possible admission to further eval, including a Cardio Consult. PT and OT evals submitted, and therapy teams aware of consults. If patient declines eval for admission, patient may be most appropriate for hospice support at home; ED Attending is in agreement to the above. CM Team to continue following for any additional JULIOCESAR needs.       CRM: Sandra Marcano, MPH, [address].: [phone] or [phone]

## 2022-10-04 NOTE — ED TRIAGE NOTES
Pt arrives for ground level fall at home today, state she got dizzy and lost her balance. Pt takes eliquis daily. Pt was recently admitted for same complaint, states she had a full workup that was negative.

## 2022-10-05 LAB
ALBUMIN SERPL-MCNC: 2.7 G/DL (ref 3.5–5)
ALBUMIN/GLOB SERPL: 0.8 {RATIO} (ref 1.1–2.2)
ALP SERPL-CCNC: 86 U/L (ref 45–117)
ALT SERPL-CCNC: 27 U/L (ref 12–78)
ANION GAP SERPL CALC-SCNC: 3 MMOL/L (ref 5–15)
AST SERPL-CCNC: 24 U/L (ref 15–37)
ATRIAL RATE: 77 BPM
BASOPHILS # BLD: 0.1 K/UL (ref 0–0.1)
BASOPHILS NFR BLD: 1 % (ref 0–1)
BILIRUB SERPL-MCNC: 0.2 MG/DL (ref 0.2–1)
BUN SERPL-MCNC: 40 MG/DL (ref 6–20)
BUN/CREAT SERPL: 28 (ref 12–20)
CALCIUM SERPL-MCNC: 9.6 MG/DL (ref 8.5–10.1)
CALCULATED P AXIS, ECG09: 35 DEGREES
CALCULATED R AXIS, ECG10: -30 DEGREES
CALCULATED R AXIS, ECG10: -30 DEGREES
CALCULATED R AXIS, ECG10: -55 DEGREES
CALCULATED T AXIS, ECG11: 147 DEGREES
CALCULATED T AXIS, ECG11: 148 DEGREES
CALCULATED T AXIS, ECG11: 92 DEGREES
CHLORIDE SERPL-SCNC: 112 MMOL/L (ref 97–108)
CO2 SERPL-SCNC: 27 MMOL/L (ref 21–32)
CREAT SERPL-MCNC: 1.42 MG/DL (ref 0.55–1.02)
DIAGNOSIS, 93000: NORMAL
DIFFERENTIAL METHOD BLD: ABNORMAL
EOSINOPHIL # BLD: 0.4 K/UL (ref 0–0.4)
EOSINOPHIL NFR BLD: 5 % (ref 0–7)
ERYTHROCYTE [DISTWIDTH] IN BLOOD BY AUTOMATED COUNT: 15.9 % (ref 11.5–14.5)
GLOBULIN SER CALC-MCNC: 3.5 G/DL (ref 2–4)
GLUCOSE SERPL-MCNC: 93 MG/DL (ref 65–100)
HCT VFR BLD AUTO: 25.6 % (ref 35–47)
HGB BLD-MCNC: 7.9 G/DL (ref 11.5–16)
IMM GRANULOCYTES # BLD AUTO: 0.1 K/UL (ref 0–0.04)
IMM GRANULOCYTES NFR BLD AUTO: 1 % (ref 0–0.5)
LACTATE SERPL-SCNC: 0.8 MMOL/L (ref 0.4–2)
LYMPHOCYTES # BLD: 2.1 K/UL (ref 0.8–3.5)
LYMPHOCYTES NFR BLD: 27 % (ref 12–49)
MAGNESIUM SERPL-MCNC: 1.7 MG/DL (ref 1.6–2.4)
MCH RBC QN AUTO: 31 PG (ref 26–34)
MCHC RBC AUTO-ENTMCNC: 30.9 G/DL (ref 30–36.5)
MCV RBC AUTO: 100.4 FL (ref 80–99)
MONOCYTES # BLD: 0.7 K/UL (ref 0–1)
MONOCYTES NFR BLD: 9 % (ref 5–13)
NEUTS SEG # BLD: 4.4 K/UL (ref 1.8–8)
NEUTS SEG NFR BLD: 57 % (ref 32–75)
NRBC # BLD: 0 K/UL (ref 0–0.01)
NRBC BLD-RTO: 0 PER 100 WBC
P-R INTERVAL, ECG05: 164 MS
P-R INTERVAL, ECG05: 168 MS
P-R INTERVAL, ECG05: 168 MS
PLATELET # BLD AUTO: 366 K/UL (ref 150–400)
PMV BLD AUTO: 8.9 FL (ref 8.9–12.9)
POTASSIUM SERPL-SCNC: 4 MMOL/L (ref 3.5–5.1)
PROT SERPL-MCNC: 6.2 G/DL (ref 6.4–8.2)
Q-T INTERVAL, ECG07: 436 MS
Q-T INTERVAL, ECG07: 458 MS
Q-T INTERVAL, ECG07: 466 MS
QRS DURATION, ECG06: 142 MS
QRS DURATION, ECG06: 144 MS
QRS DURATION, ECG06: 146 MS
QTC CALCULATION (BEZET), ECG08: 484 MS
QTC CALCULATION (BEZET), ECG08: 493 MS
QTC CALCULATION (BEZET), ECG08: 518 MS
RBC # BLD AUTO: 2.55 M/UL (ref 3.8–5.2)
SODIUM SERPL-SCNC: 142 MMOL/L (ref 136–145)
VENTRICULAR RATE, ECG03: 65 BPM
VENTRICULAR RATE, ECG03: 77 BPM
VENTRICULAR RATE, ECG03: 77 BPM
WBC # BLD AUTO: 7.8 K/UL (ref 3.6–11)

## 2022-10-05 PROCEDURE — 85025 COMPLETE CBC W/AUTO DIFF WBC: CPT

## 2022-10-05 PROCEDURE — 93005 ELECTROCARDIOGRAM TRACING: CPT

## 2022-10-05 PROCEDURE — 80053 COMPREHEN METABOLIC PANEL: CPT

## 2022-10-05 PROCEDURE — 83735 ASSAY OF MAGNESIUM: CPT

## 2022-10-05 PROCEDURE — 83605 ASSAY OF LACTIC ACID: CPT

## 2022-10-05 PROCEDURE — 36415 COLL VENOUS BLD VENIPUNCTURE: CPT

## 2022-10-05 PROCEDURE — 74011250636 HC RX REV CODE- 250/636: Performed by: INTERNAL MEDICINE

## 2022-10-05 PROCEDURE — 77010033678 HC OXYGEN DAILY

## 2022-10-05 PROCEDURE — 65210000002 HC RM PRIVATE GYN

## 2022-10-05 PROCEDURE — 74011250637 HC RX REV CODE- 250/637: Performed by: INTERNAL MEDICINE

## 2022-10-05 PROCEDURE — 99223 1ST HOSP IP/OBS HIGH 75: CPT | Performed by: INTERNAL MEDICINE

## 2022-10-05 PROCEDURE — 99222 1ST HOSP IP/OBS MODERATE 55: CPT | Performed by: NURSE PRACTITIONER

## 2022-10-05 PROCEDURE — 74011000250 HC RX REV CODE- 250: Performed by: INTERNAL MEDICINE

## 2022-10-05 RX ORDER — SODIUM CHLORIDE 0.9 % (FLUSH) 0.9 %
5-40 SYRINGE (ML) INJECTION EVERY 8 HOURS
Status: DISCONTINUED | OUTPATIENT
Start: 2022-10-05 | End: 2022-10-06 | Stop reason: HOSPADM

## 2022-10-05 RX ORDER — SODIUM CHLORIDE 0.9 % (FLUSH) 0.9 %
5-40 SYRINGE (ML) INJECTION AS NEEDED
Status: DISCONTINUED | OUTPATIENT
Start: 2022-10-05 | End: 2022-10-06 | Stop reason: HOSPADM

## 2022-10-05 RX ADMIN — SODIUM CHLORIDE 75 ML/HR: 9 INJECTION, SOLUTION INTRAVENOUS at 21:05

## 2022-10-05 RX ADMIN — SODIUM CHLORIDE, PRESERVATIVE FREE 10 ML: 5 INJECTION INTRAVENOUS at 02:49

## 2022-10-05 RX ADMIN — APIXABAN 2.5 MG: 2.5 TABLET, FILM COATED ORAL at 08:08

## 2022-10-05 RX ADMIN — SODIUM CHLORIDE, PRESERVATIVE FREE 10 ML: 5 INJECTION INTRAVENOUS at 21:07

## 2022-10-05 RX ADMIN — ACETAMINOPHEN 650 MG: 325 TABLET ORAL at 11:41

## 2022-10-05 NOTE — PROGRESS NOTES
Spiritual Care Assessment/Progress Note  [facility]      NAME: Cindi Girard      MRN: [id number]  AGE: 80 y.o. SEX: female  Caodaism Affiliation: Hinduism   Language: English     10/5/2022     Total Time (in minutes): 15     Spiritual Assessment begun in [address] through conversation with:         []Patient        [] Family    [] Friend(s)        Reason for Consult: Initial/Spiritual assessment, patient floor     Spiritual beliefs: (Please include comment if needed)     [] Identifies with a chris tradition:         [] Supported by a chris community:            [] Claims no spiritual orientation:           [] Seeking spiritual identity:                [] Adheres to an individual form of spirituality:           [x] Not able to assess:                           Identified resources for coping:      [] Prayer                               [] Music                  [] Guided Imagery     [] Family/friends                 [] Pet visits     [] Devotional reading                         [x] Unknown     [] Other:                                               Interventions offered during this visit: (See comments for more details)    Patient Interventions: Initial visit           Plan of Care:     [] Support spiritual and/or cultural needs    [] Support AMD and/or advance care planning process      [] Support grieving process   [] Coordinate Rites and/or Rituals    [] Coordination with community clergy   [] No spiritual needs identified at this time   [] Detailed Plan of Care below (See Comments)  [] Make referral to Music Therapy  [] Make referral to Pet Therapy     [] Make referral to Addiction services  [] Make referral to [facility]  [] Make referral to Spiritual Care Partner  [] No future visits requested        [x] Contact Spiritual Care for further referrals        Attempted visit for palliative initial spiritual assessment. Unable to visit patient at this time.  Patient was sleeping and did not awake. No family present. Patient's chart was consulted.   Chaplain Zelaya MDiv, MS, [facility]

## 2022-10-05 NOTE — PROGRESS NOTES
[address] visit attempted. Mrs. Ihsan Nevarez was not in the room listed. Located the new room she was transferred to. She was being attended to by several nurses. Mrs. Ihsan Nevarez is [facility]. Prayer for spiritual communion offered for her well-being outside her room.     DAV Cabrera, RN, ACSW, LCSW   Page:  [phone]

## 2022-10-05 NOTE — PROGRESS NOTES
Patient went into second degree heart block. Contacted MD Richard Franco and she put in transfer orders for tele bed. Contacted bed board supervisor to notify them of this. 1000: Report called to 6W and patient being transported now with critical care nurse.

## 2022-10-05 NOTE — PROGRESS NOTES
Physical Therapy    Chart reviewed. Pt with abnormal heart rhythm this morning and STAT EKG: second degree heart block noted and transfer to tele bed ordered. Cardiology consulted. Will follow up as appropriate. Pt maxA for bed mobility/tranfers yesterday with therapy, SNF recommended vs home with total care and DME anticipated.     Thank you,  Alexandrea Lamar, PT, DPT

## 2022-10-05 NOTE — PROGRESS NOTES
Occupational Therapy    Chart reviewed in prep for skilled OT treatment, with RN requesting OT to hold secondary to bradycardia. OT to follow up later as able and appropriate.     Thank you,  Tita Quinn, OT

## 2022-10-05 NOTE — PROGRESS NOTES
[facility] Adult  Hospitalist Group                                                                                          Hospitalist Progress Note  Genevieve Hagen MD  Answering service: [phone] from in house phone        Date of Service:  10/5/2022  NAME:  Ghazal Chan  :  1932  MRN:  [id number]      Admission Summary:   Ghazal Chan is a 80 y.o. female with known past medical history of a flutter on Eliquis, recent pneumonia patient was admitted in the hospital and  and discharged in the beginning of August due to pneumonia and hypoxemia at that time he will blood pressure was low, after the last admission patient was discharged to rehab her condition improved and 2 weeks ago she was discharged home, per her  was not at the edge patient was weak and experienced on and off lightheadedness and unsteady gait, the patient had difficulty to ambulate, did not feel well, was brought into emergency department for evaluation. Most determination was obtained through patient  and medical record as patient currently sleeping. Per patient  patient did not have any fever, shortness of breath, chest pain, nausea, vomiting, abdominal pain or diarrhea. She does take Eliquis. And she is supposed to see cardiologist as outpatient but did not have a chance to schedule appointment yet. In the emergency department patient was evaluated systolic blood pressure was on the low side, patient feeling lightheaded and weak, CT head was done was negative for acute intracranial pathology. Laboratory data was obtained creatinine was 1.6, stable, EKG revealed bigeminy with frequent cardiac pauses. Medicine was consulted for admission and further evaluation. Interval history / Subjective:    Follow up pt with light headedness, cardiac arrhythmia  + weak, light headed  Telemetry sp, Mobitz II with HR 40s  Cardiologist consulted, input appreciated, pt will need PPM  ECHO pending     Assessment & Plan:     Symptomatic sp  Mobitz II  Paroxsymal Aflutter  Cardiology consulted, discussed, input appreciated  Pt will need PPM  Eliquis BID      Weakness   Lightheadedness, and dizziness,   Unsteady gait, requiring wheelchair,   Deconditioning, frailty,  Possible related to sp  Cards consulted, will need PPM        Hypertension  Hypotension improved  BP controlled, due to sp, will keep BP on high side        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  Improved  Continue fluids IV     Anemia possible related to CKD  And possible hemodelution  hemoglobin stable compared with his prior admission     Chronic respiratory failure with hypoxia on home O2 2-3 l/min       Code status: DNR  Prophylaxis: Eliquis  Care Plan discussed with: patient,  and RN, cardiologist  Anticipated Disposition: TBD, pending clinical improvement, possible will need [facility]       Hospital Problems  Date Reviewed: 8/3/2022            Codes Class Noted POA    Weakness ICD-10-CM: R53.1  ICD-9-CM: 780.79  10/4/2022 Unknown             Review of Systems:   A comprehensive review of systems was negative except for that written in the HPI. Vital Signs:    Last 24hrs VS reviewed since prior progress note. Most recent are:  Visit Vitals  BP (!) 123/41 (BP 1 Location: Left upper arm, BP Patient Position: At rest)   Pulse (!) 49   Temp 97.1 °F (36.2 °C)   Resp 25   Ht 5' 1\" (1.549 m)   Wt 61.2 kg (135 lb)   SpO2 93%   BMI 25.51 kg/m²       No intake or output data in the 24 hours ending 10/05/22 1258     Physical Examination:     I had a face to face encounter with this patient and independently examined them on 10/5/2022 as outlined below:          Constitutional:  + weak,  cooperative, pleasant    ENT:  Oral mucosa moist, oropharynx benign. Resp:  CTA bilaterally. No wheezing/rhonchi/rales. No accessory muscle use. CV:  Sp , irregular, no gallops, rubs    GI:  Soft, non distended, non tender.  normoactive bowel sounds, no hepatosplenomegaly     Musculoskeletal:  No edema, warm, 2+ pulses throughout    Neurologic:  Moves all extremities. AAOx3, CN II-XII reviewed            Data Review:    Review and/or order of clinical lab test      Labs:     Recent Labs     10/05/22  0257 10/04/22  1127   WBC 7.8 9.2   HGB 7.9* 8.8*   HCT 25.6* 28.9*    374     Recent Labs     10/05/22  0257 10/04/22  1835 10/04/22  1127     --  140   K 4.0  --  4.5   *  --  109*   CO2 27  --  27   BUN 40*  --  44*   CREA 1.42*  --  1.68*   GLU 93  --  88   CA 9.6  --  10.7*   MG 1.7 1.8  --    PHOS  --  4.0  --      Recent Labs     10/05/22  0257 10/04/22  1127   ALT 27 33   AP 86 103   TBILI 0.2 0.3   TP 6.2* 7.2   ALB 2.7* 3.2*   GLOB 3.5 4.0     Recent Labs     10/04/22  1126   INR 1.1   PTP 11.2*      No results for input(s): FE, TIBC, PSAT, FERR in the last 72 hours. No results found for: FOL, RBCF   No results for input(s): PH, PCO2, PO2 in the last 72 hours. No results for input(s): CPK, CKNDX, TROIQ in the last 72 hours.     No lab exists for component: CPKMB  No results found for: CHOL, CHOLX, CHLST, CHOLV, HDL, HDLP, LDL, LDLC, DLDLP, TGLX, TRIGL, TRIGP, CHHD, CHHDX  No results found for: [facility]  Lab Results   Component Value Date/Time    Color YELLOW/STRAW 07/30/2022 05:58 PM    Appearance CLOUDY (A) 07/30/2022 05:58 PM    Specific gravity 1.009 07/30/2022 05:58 PM    pH (UA) 5.5 07/30/2022 05:58 PM    Protein 30 (A) 07/30/2022 05:58 PM    Glucose Negative 07/30/2022 05:58 PM    Ketone Negative 07/30/2022 05:58 PM    Bilirubin Negative 07/30/2022 05:58 PM    Urobilinogen 0.2 07/30/2022 05:58 PM    Nitrites Negative 07/30/2022 05:58 PM    Leukocyte Esterase LARGE (A) 07/30/2022 05:58 PM    Epithelial cells MODERATE (A) 07/30/2022 05:58 PM    Bacteria Negative 07/30/2022 05:58 PM    WBC 20-50 07/30/2022 05:58 PM    RBC 5-10 07/30/2022 05:58 PM         Medications Reviewed:     Current Facility-Administered Medications   Medication Dose Route Frequency    sodium chloride (NS) flush 5-40 mL  5-40 mL IntraVENous Q8H    sodium chloride (NS) flush 5-40 mL  5-40 mL IntraVENous PRN    acetaminophen (TYLENOL) tablet 650 mg  650 mg Oral Q6H PRN    Or    acetaminophen (TYLENOL) suppository 650 mg  650 mg Rectal Q6H PRN    polyethylene glycol (MIRALAX) packet 17 g  17 g Oral DAILY PRN    ondansetron (ZOFRAN ODT) tablet 4 mg  4 mg Oral Q8H PRN    Or    ondansetron (ZOFRAN) injection 4 mg  4 mg IntraVENous Q6H PRN    0.9% sodium chloride infusion  75 mL/hr IntraVENous CONTINUOUS    apixaban (ELIQUIS) tablet 2.5 mg  2.5 mg Oral BID     ______________________________________________________________________  EXPECTED LENGTH OF STAY: 2d 14h  ACTUAL LENGTH OF STAY:          1                 Adelaida Gavin MD

## 2022-10-05 NOTE — PROGRESS NOTES
1100- pt transfer from  - due to symptomatic bradycardia - pt gets dizzy when HR down to 30's - cardiologist made aware

## 2022-10-05 NOTE — ROUTINE PROCESS
Tele monitor alerted staff of abnormal heart rhythm, pt asymptomatic resting in bed with eyes closed, on-call provider aware STAT EKG order and completed. Sent results of abnormal to provider via perfect serve.

## 2022-10-05 NOTE — PROGRESS NOTES
1850- pt HR still down to high 20's to 30's -for few seconds -back up to 40's to 50's -will have pacer placement in am-  Consent signed by  -npo nilesh amos-monitored closely

## 2022-10-05 NOTE — CONSULTS
[facility]  Cardiac Electrophysiology Initial Visit Note                  [x]Initial visit     []Established visit     Patient Name: Jose De Jesus Mccain  Primary Cardiologist: MD Evelina Qureshi MD     Reason for initial visit: high degree av block    HPI:   Ms Alfonso Peterson is a 79 yo female with hx of Atrial flutter RVR, PE hx on oac, with hospitalization in July for pneumonia,  Sepsis, PAULO,  Afluter RVR, hypotension who was discharged on amiodarone and oac. Pt fell at home and was brought to ED. She has been feeling dizzy, weak, tired at home. Per ED notes she was hypotensive with bradycardia and given 150 cc bolus with improvement in sx. Has not seen cardiology since last admission. SUBJECTIVE:  Ms Alfonso Peterson has hip pain post fall. Assessment and Plan     Bradycardia. Tele with Mobitz type  II, ventricular rate in the 30-40s. With her hx of rapid atrial flutter, this will be difficult to manage medically. Would benefit from PPM and possible AVN if pt and  are willing to proceed. FIB/FLUTTER: was discharged home 7/22 with amiodarone 30 days and no refills. Unclear if she was taking PTA. Eliquis per home meds. TSH WNL. Recent PE: on Eliquis. 4. Anemic- on oac. Hgb 7.9. careful with eliquis      Addendum from EP attending: This patient was seen and examined by me in a face-to-face visit today. I reviewed the medical record including lab results, imaging and other provider notes. I confirmed the history as described above. I spoke to the patient, obtained history and examined the patient. I discussed assessments and plans in details with nurse practitioner. Below are my treatment plans and recommendations.       She is not talking much so I called and spoke to her  at length tonight  Vital signs with bradycardia and persistent Mobitz II AV block  She has had third degree av block and hx of LBBB  Exam shows    Constitutional: No acute respiratory distress. Head: Normocephalic and atraumatic. Eyes: Pupils are equal   Neck: Neck supple. No JVD present. Cardiovascular: slow rate, regular rhythm   Pulmonary/Chest: No wheezes. Abdominal: Soft, no rebound. Musculoskeletal: no edema   Neurological: arousable  Skin: not diaphoretic. Psychiatric: flat affect     Assessment and Plan: symptomatic bradycardia with dizziness and fatigue although anemia can also contributed but she has persistent bradycardia and high degree av block Mobitz II and third degree av block with underlying LBBB so she meets indication for dual chamber pacemaker  I discussed with her  tonight, explained risks and benefits of dual chamber pacemaker  She agrees that she should proceed with it  Risks involve device implant include but are not limited to bleeding, infection, valvular damage, heart attack, stroke, lung collapse (pneumothorax or hemothorax), heart collapse (pericardial tamponade), heart perforation, kidney failure, death. Elective or emergency surgery may be required to repair some of these complications. Prolonged hospitalization would be required. General anesthesia may be required for the procedure. Time spent 45 minutes reviewing chart, discussing with her   He will call and talk to her nurse and give consent   Jamshid Grijalva M.D.  [address] [facility]  Electrophysiology/Cardiology  [facility] [place]  [address] [place] [address]                              [phone]                                                    ____________________________________________________________    Cardiac testing  07/20/22    ECHO ADULT COMPLETE 07/20/2022 7/20/2022    Interpretation Summary    Technical qualifiers: Echo study was technically difficult with poor endocardial visualization and technically difficult due to patient's body habitus. Contrast used: Definity. Left Ventricle: Normal left ventricular systolic function with a visually estimated EF of 55 - 60%. Left ventricle size is normal. Mild septal thickening. Normal wall motion. Normal diastolic function. Left Atrium: Left atrium is mildly dilated. Aortic Valve: Tricuspid valve. Mildly thickened cusp. Mitral Valve: Moderate annular calcification of the mitral valve. Signed by: Tello Peralta MD on 7/20/2022 10:21 PM                Most recent HS troponins:  Recent Labs     10/04/22  1835   TROPHS 24     ECG: normal EKG, normal sinus rhythm  Review of Systems    [x]All other systems reviewed and all negative except as written in HPI    [] Patient unable to provide secondary to condition    past medical history hypertension, LBBB  No past surgical history on file. Social Hx:  reports that she has been smoking. She has never used smokeless tobacco.  Family Hx: no known pacer per   Allergies   Allergen Reactions    Amoxicillin Unknown (comments)    Penicillins Unknown (comments)    Sulfa (Sulfonamide Antibiotics) Unknown (comments)          OBJECTIVE:  Wt Readings from Last 3 Encounters:   10/04/22 135 lb (61.2 kg)   08/03/22 143 lb 11.8 oz (65.2 kg)          Physical Exam    Vitals:   Vitals:    10/05/22 0501 10/05/22 0546 10/05/22 0815 10/05/22 0841   BP: 124/77   (!) 152/51   Pulse: (!) 40 78 (!) 40 (!) 41   Resp: 18  18 17   Temp:   98.4 °F (36.9 °C) 98.3 °F (36.8 °C)   SpO2: 95%  95% 93%   Weight:       Height:         Telemetry: Mobitz type II, ventricular rate 30-40s. General:    Alert, cooperative, no distress, appears stated age. Neck:   Supple, no carotid bruit and no JVD. Back:     Symmetric,    Lungs:     clear to auscultation bilaterally. Heart[de-identified]    bradycardic S1, S2 normal, no murmur, click, rub or gallop. Abdomen:     Soft, non-tender.  Bowel sounds normal.    Extremities: Extremities normal, atraumatic, no cyanosis or edema. Vascular:   Pulses - normal.    Skin:   Skin color normal. No rashes or lesions on visible areas   Neurologic:   Alert, Moves all extremities. Data Review:     Radiology:   XR Results (most recent):  Results from Hospital Encounter encounter on 10/01/22    XR CHEST SNGL V    Narrative  EXAM:  XR CHEST SNGL V    INDICATION: chest pain    COMPARISON: Chest radiograph 7/26/2022, CT chest 7/20/2022    TECHNIQUE: Upright portable chest AP view    FINDINGS:    Cardiomediastinal silhouette is stably enlarged. Lungs and pleural spaces are  grossly clear. Widespread chronic interstitial markings. Impression  No acute findings. CT Results (most recent):  Results from Hospital Encounter encounter on 10/04/22    CT HEAD WO CONT    Narrative  INDICATION:   Fall    EXAMINATION:  CT HEAD WO CONTRAST    COMPARISON:  None    TECHNIQUE:  Routine noncontrast axial head CT was performed. Sagittal and  coronal reconstructions were generated. CT dose reduction was achieved through  use of a standardized protocol tailored for this examination and automatic  exposure control for dose modulation. FINDINGS:    Ventricles:  Midline, no hydrocephalus. Intracranial Hemorrhage:  None. Brain Parenchyma/Brainstem:  Normal for age. Basal Cisterns:  Normal.  Paranasal Sinuses:  Visualized sinuses are clear. Soft Tissues:  No significant soft tissue swelling. Osseous Structures:  No acute fracture. Additional Comments:  N/A. Impression  No acute traumatic injury.     Recent Labs     10/05/22  0257 10/04/22  1835 10/04/22  1127     --  140   K 4.0  --  4.5   *  --  109*   CO2 27  --  27   BUN 40*  --  44*   CREA 1.42*  --  1.68*   GLU 93  --  88   PHOS  --  4.0  --    CA 9.6  --  10.7*     Recent Labs     10/05/22  0257 10/04/22  1127   WBC 7.8 9.2   HGB 7.9* 8.8*   HCT 25.6* 28.9*    374     Recent Labs     10/05/22  0257 10/04/22  1127 10/04/22  1126 PTP  --   --  11.2*   INR  --   --  1.1   AP 86 103  --      Recent Labs     10/04/22  1127   TSH 3.71           Current meds:    Current Facility-Administered Medications:     sodium chloride (NS) flush 5-40 mL, 5-40 mL, IntraVENous, Q8H, Flor Guadarrama MD, 10 mL at 10/05/22 0249    sodium chloride (NS) flush 5-40 mL, 5-40 mL, IntraVENous, PRN, Flor Guadarrama MD    acetaminophen (TYLENOL) tablet 650 mg, 650 mg, Oral, Q6H PRN **OR** acetaminophen (TYLENOL) suppository 650 mg, 650 mg, Rectal, Q6H PRN, Flor Guadarrama MD    polyethylene glycol (MIRALAX) packet 17 g, 17 g, Oral, DAILY PRN, Flor Guadarrama MD    ondansetron (ZOFRAN ODT) tablet 4 mg, 4 mg, Oral, Q8H PRN **OR** ondansetron (ZOFRAN) injection 4 mg, 4 mg, IntraVENous, Q6H PRN, Flor Guadarrama MD    0.9% sodium chloride infusion, 75 mL/hr, IntraVENous, CONTINUOUS, Flor Guadarrama MD, Last Rate: 75 mL/hr at 10/04/22 1608, 75 mL/hr at 10/04/22 1608    apixaban (ELIQUIS) tablet 2.5 mg, 2.5 mg, Oral, BID, Flor Guadarrama MD, 2.5 mg at 10/05/22 TALIA/Brock Antonio, ANP  [facility] [address]  [address]  [phone]      CC: Vandana Gupta MD

## 2022-10-05 NOTE — CONSULTS
Palliative Medicine Consult  Dorian: [phone] ([id number])    Patient Name: Tete Og  YOB: 1932    Date of Initial Consult: October 5, 2022  Reason for Consult: Care Decisions  Requesting Provider: Dr. Brigido Toure   Primary Care Physician: Vijay Bailey MD     SUMMARY:   Tete Og is a 80 y.o. admitted on 10/4/2022 from home with a diagnosis of  Fall, dizziness, cardiac arrhythmia, deconditioning, hypotension, bradycardia. Cardiology consult~PPM and possible AVN recommended for bradycardia    PMH: aflutter RVR (Eliquis, amiodarone), PE, recent pneumonia, HTN, CKD3-4, Anemia, chronic respiratory failure with hypoxia on home oxygen    Pt was dc from [facility] about 2 weeks ago and went to [place] for respite before going home 10/3. Current medical issues leading to Palliative Medicine involvement include: Patient with overall decline postacute illness and rehab. Medical team recommending hospice. We have been asked to support with care goals. Social:   DNR   PALLIATIVE DIAGNOSES:   Palliative care encounter  Hypoalbuminemia   Frequent falls  Bradycardia  Hypotension  Physical debility     PLAN:   Patient seen without family present. She is hypersomnolent but nodding appropriately. She prefers not to talk at this time and asked that I reach out to her . Call placed to spouse. Message left on voicemail with my contact information. Initial consult note routed to primary continuity provider and/or primary health care team members  Communicated plan of care with: Palliative IDTJessica 192 Team     GOALS OF CARE / TREATMENT PREFERENCES:     GOALS OF CARE:  Patient/Health Care Proxy Stated Goals:  Other (comment) (To be discussed)    TREATMENT PREFERENCES:   Code Status: DNR    Patient and family's personal goals include:  [x] Unable to obtain this information    Important upcoming milestones or family events:  [x] Unable to obtain this information    The patient identifies the following as important for living well:  [x] Unable to obtain this information      Advance Care Planning:  [x] [facility] Interdisciplinary Team has updated the ACP Navigator with Health Care Decision Maker and Patient Capacity      Primary Decision Maker: Karen Smith - Spouse - [phone]  No flowsheet data found. Medical Interventions: Limited additional interventions       Other:    As far as possible, the palliative care team has discussed with patient / health care proxy about goals of care / treatment preferences for patient.      HISTORY:     History obtained from: chart, team    CHIEF COMPLAINT: Pt admitted with aforementioned history and issues    HPI/SUBJECTIVE:    The patient is:   [] Verbal and participatory  [x] Non-participatory due to: medical condition      Clinical Pain Assessment (nonverbal scale for severity on nonverbal patients):   Clinical Pain Assessment  Severity: 0          Duration: for how long has pt been experiencing pain (e.g., 2 days, 1 month, years)  Frequency: how often pain is an issue (e.g., several times per day, once every few days, constant)     FUNCTIONAL ASSESSMENT:     Palliative Performance Scale (PPS):  PPS: 30       PSYCHOSOCIAL/SPIRITUAL SCREENING:     Palliative IDT has assessed this patient for cultural preferences / practices and a referral made as appropriate to needs (Cultural Services, Patient Advocacy, Ethics, etc.)    Any spiritual / Yazidi concerns:  [] Yes /  [x] No  [] Unable to obtain this information  If \"Yes\" to discuss with pastoral care during IDT     Does caregiver feel burdened by caring for their loved one:   [] Yes /  [] No /  [x] No Caregiver Present/Available [] No Caregiver [] Pt Lives at Facility  If \"Yes\" to discuss with social work during IDT    Anticipatory grief assessment:   [] Normal  / [] Maladaptive   [x] Unable to obtain this information  [] n/a  If \"Maladaptive\" to discuss with social work during IDT    ESAS Anxiety: Anxiety: 0    ESAS Depression:          REVIEW OF SYSTEMS:     Positive and pertinent negative findings in ROS are noted above in HPI. The following systems were [] reviewed / [x] unable to be reviewed as noted in HPI  Other findings are noted below. Systems: constitutional, ears/nose/mouth/throat, respiratory, gastrointestinal, genitourinary, musculoskeletal, integumentary, neurologic, psychiatric, endocrine. Positive findings noted below. Modified ESAS Completed by: provider   Fatigue: 9 Drowsiness: 2     Pain: 0   Anxiety: 0       Dyspnea: 0                    PHYSICAL EXAM:     From RN flowsheet:  Wt Readings from Last 3 Encounters:   10/04/22 135 lb (61.2 kg)   08/03/22 143 lb 11.8 oz (65.2 kg)     Blood pressure (!) 123/41, pulse (!) 49, temperature 97.1 °F (36.2 °C), resp. rate 25, height 5' 1\" (1.549 m), weight 135 lb (61.2 kg), SpO2 93 %. Pain Scale 1: Numeric (0 - 10)  Pain Intensity 1: 0                 Last bowel movement, if known:     Constitutional: Frail, somnolent  Eyes: pupils equal, anicteric  ENMT: no nasal discharge, moist mucous membranes  Cardiovascular:   Respiratory: breathing not labored, symmetric  Gastrointestinal: soft non-tender  Musculoskeletal: no deformity, no tenderness to palpation  Skin: warm, dry  Neurologic: following commands, deconditioned   Psychiatric: calm  Other:       HISTORY:     Active Problems:    Weakness (10/4/2022)    No past medical history on file. No past surgical history on file. No family history on file. History reviewed, no pertinent family history.   Social History     Tobacco Use    Smoking status: Every Day    Smokeless tobacco: Never   Substance Use Topics    Alcohol use: Not on file     Allergies   Allergen Reactions    Amoxicillin Unknown (comments)    Penicillins Unknown (comments)    Sulfa (Sulfonamide Antibiotics) Unknown (comments)      Current Facility-Administered Medications   Medication Dose Route Frequency sodium chloride (NS) flush 5-40 mL  5-40 mL IntraVENous Q8H    sodium chloride (NS) flush 5-40 mL  5-40 mL IntraVENous PRN    acetaminophen (TYLENOL) tablet 650 mg  650 mg Oral Q6H PRN    Or    acetaminophen (TYLENOL) suppository 650 mg  650 mg Rectal Q6H PRN    polyethylene glycol (MIRALAX) packet 17 g  17 g Oral DAILY PRN    ondansetron (ZOFRAN ODT) tablet 4 mg  4 mg Oral Q8H PRN    Or    ondansetron (ZOFRAN) injection 4 mg  4 mg IntraVENous Q6H PRN    0.9% sodium chloride infusion  75 mL/hr IntraVENous CONTINUOUS    apixaban (ELIQUIS) tablet 2.5 mg  2.5 mg Oral BID          LAB AND IMAGING FINDINGS:     Lab Results   Component Value Date/Time    WBC 7.8 10/05/2022 02:57 AM    HGB 7.9 (L) 10/05/2022 02:57 AM    PLATELET 638 18/20/6469 02:57 AM     Lab Results   Component Value Date/Time    Sodium 142 10/05/2022 02:57 AM    Potassium 4.0 10/05/2022 02:57 AM    Chloride 112 (H) 10/05/2022 02:57 AM    CO2 27 10/05/2022 02:57 AM    BUN 40 (H) 10/05/2022 02:57 AM    Creatinine 1.42 (H) 10/05/2022 02:57 AM    Calcium 9.6 10/05/2022 02:57 AM    Magnesium 1.7 10/05/2022 02:57 AM    Phosphorus 4.0 10/04/2022 06:35 PM      Lab Results   Component Value Date/Time    Alk. phosphatase 86 10/05/2022 02:57 AM    Protein, total 6.2 (L) 10/05/2022 02:57 AM    Albumin 2.7 (L) 10/05/2022 02:57 AM    Globulin 3.5 10/05/2022 02:57 AM     Lab Results   Component Value Date/Time    INR 1.1 10/04/2022 11:26 AM    Prothrombin time 11.2 (H) 10/04/2022 11:26 AM      No results found for: IRON, FE, TIBC, IBCT, PSAT, FERR   No results found for: PH, PCO2, PO2  No components found for: [place]   Lab Results   Component Value Date/Time    CK 11 (L) 08/01/2022 02:39 AM                Total time: 50 minutes  Counseling / coordination time, spent as noted above:  40 minutes  > 50% counseling / coordination?: yes    Prolonged service was provided for  []30 min   []75 min in face to face time in the presence of the patient, spent as noted above.   Time Start: Time End:   Note: this can only be billed with 20418 (initial) or 55673 (follow up). If multiple start / stop times, list each separately.

## 2022-10-05 NOTE — PROGRESS NOTES
Primary Nurse Silverio Mcdonnell RN and Shasha Dillard RN performed a dual skin assessment on this patient Impairment noted- see wound doc flow sheet  Pt has redness in saccral area,bruises all over      1740- for pacer implant in am -will be npo pmn

## 2022-10-06 ENCOUNTER — APPOINTMENT (OUTPATIENT)
Dept: NON INVASIVE DIAGNOSTICS | Age: 87
DRG: 242 | End: 2022-10-06
Attending: INTERNAL MEDICINE
Payer: MEDICARE

## 2022-10-06 ENCOUNTER — APPOINTMENT (OUTPATIENT)
Dept: GENERAL RADIOLOGY | Age: 87
DRG: 242 | End: 2022-10-06
Attending: INTERNAL MEDICINE
Payer: MEDICARE

## 2022-10-06 PROBLEM — Z95.0 PACEMAKER: Status: ACTIVE | Noted: 2022-10-06

## 2022-10-06 PROBLEM — I44.2 THIRD DEGREE AV BLOCK (HCC): Status: ACTIVE | Noted: 2022-10-06

## 2022-10-06 PROCEDURE — 77010033678 HC OXYGEN DAILY

## 2022-10-06 PROCEDURE — 74011250637 HC RX REV CODE- 250/637: Performed by: INTERNAL MEDICINE

## 2022-10-06 PROCEDURE — 97168 OT RE-EVAL EST PLAN CARE: CPT

## 2022-10-06 PROCEDURE — C1898 LEAD, PMKR, OTHER THAN TRANS: HCPCS | Performed by: INTERNAL MEDICINE

## 2022-10-06 PROCEDURE — 71045 X-RAY EXAM CHEST 1 VIEW: CPT

## 2022-10-06 PROCEDURE — 33208 INSRT HEART PM ATRIAL & VENT: CPT | Performed by: INTERNAL MEDICINE

## 2022-10-06 PROCEDURE — 97530 THERAPEUTIC ACTIVITIES: CPT

## 2022-10-06 PROCEDURE — 77030037400 HC ADH TISS HI VISC EXOFIN CHMP -B: Performed by: INTERNAL MEDICINE

## 2022-10-06 PROCEDURE — 74011000636 HC RX REV CODE- 636: Performed by: INTERNAL MEDICINE

## 2022-10-06 PROCEDURE — 99153 MOD SED SAME PHYS/QHP EA: CPT | Performed by: INTERNAL MEDICINE

## 2022-10-06 PROCEDURE — 3E0132A INTRODUCTION OF ANTI-INFECTIVE ENVELOPE INTO SUBCUTANEOUS TISSUE, PERCUTANEOUS APPROACH: ICD-10-PCS | Performed by: INTERNAL MEDICINE

## 2022-10-06 PROCEDURE — 97164 PT RE-EVAL EST PLAN CARE: CPT

## 2022-10-06 PROCEDURE — 74011000250 HC RX REV CODE- 250: Performed by: INTERNAL MEDICINE

## 2022-10-06 PROCEDURE — 02H63JZ INSERTION OF PACEMAKER LEAD INTO RIGHT ATRIUM, PERCUTANEOUS APPROACH: ICD-10-PCS | Performed by: INTERNAL MEDICINE

## 2022-10-06 PROCEDURE — 77030041279 HC DRSG PRMSL AG MDII -B: Performed by: INTERNAL MEDICINE

## 2022-10-06 PROCEDURE — 2709999900 HC NON-CHARGEABLE SUPPLY: Performed by: INTERNAL MEDICINE

## 2022-10-06 PROCEDURE — 99152 MOD SED SAME PHYS/QHP 5/>YRS: CPT | Performed by: INTERNAL MEDICINE

## 2022-10-06 PROCEDURE — 65270000046 HC RM TELEMETRY

## 2022-10-06 PROCEDURE — 65210000002 HC RM PRIVATE GYN

## 2022-10-06 PROCEDURE — 02HK3JZ INSERTION OF PACEMAKER LEAD INTO RIGHT VENTRICLE, PERCUTANEOUS APPROACH: ICD-10-PCS | Performed by: INTERNAL MEDICINE

## 2022-10-06 PROCEDURE — 77030022704 HC SUT VLOC COVD -B: Performed by: INTERNAL MEDICINE

## 2022-10-06 PROCEDURE — 77030032060 HC PWDR HEMSTAT ARISTA ASRB 3GM BARD -C: Performed by: INTERNAL MEDICINE

## 2022-10-06 PROCEDURE — C1781 MESH (IMPLANTABLE): HCPCS | Performed by: INTERNAL MEDICINE

## 2022-10-06 PROCEDURE — 74011250636 HC RX REV CODE- 250/636: Performed by: INTERNAL MEDICINE

## 2022-10-06 PROCEDURE — C1785 PMKR, DUAL, RATE-RESP: HCPCS | Performed by: INTERNAL MEDICINE

## 2022-10-06 PROCEDURE — 77030018547 HC SUT ETHBND1 J&J -B: Performed by: INTERNAL MEDICINE

## 2022-10-06 PROCEDURE — C1893 INTRO/SHEATH, FIXED,NON-PEEL: HCPCS | Performed by: INTERNAL MEDICINE

## 2022-10-06 PROCEDURE — 0JH606Z INSERTION OF PACEMAKER, DUAL CHAMBER INTO CHEST SUBCUTANEOUS TISSUE AND FASCIA, OPEN APPROACH: ICD-10-PCS | Performed by: INTERNAL MEDICINE

## 2022-10-06 PROCEDURE — C1892 INTRO/SHEATH,FIXED,PEEL-AWAY: HCPCS | Performed by: INTERNAL MEDICINE

## 2022-10-06 PROCEDURE — C1894 INTRO/SHEATH, NON-LASER: HCPCS | Performed by: INTERNAL MEDICINE

## 2022-10-06 DEVICE — LEAD PACE 6FR L52CM RNG ELECTRD DIA2MM PLATINIZED HELIX SIL: Type: IMPLANTABLE DEVICE | Status: FUNCTIONAL

## 2022-10-06 DEVICE — ENVELOPE CMRM6133 ABSORB LRG MR
Type: IMPLANTABLE DEVICE | Status: FUNCTIONAL
Brand: TYRX™

## 2022-10-06 DEVICE — PCMKR AZURE XT DR MRI --: Type: IMPLANTABLE DEVICE | Status: FUNCTIONAL

## 2022-10-06 DEVICE — LEAD PCMKR 58CM -- CAPSURE SENSE MRI SURESCAN: Type: IMPLANTABLE DEVICE | Status: FUNCTIONAL

## 2022-10-06 RX ORDER — SODIUM CHLORIDE 0.9 % (FLUSH) 0.9 %
5-40 SYRINGE (ML) INJECTION AS NEEDED
Status: DISCONTINUED | OUTPATIENT
Start: 2022-10-06 | End: 2022-10-18 | Stop reason: HOSPADM

## 2022-10-06 RX ORDER — FENTANYL CITRATE 50 UG/ML
INJECTION, SOLUTION INTRAMUSCULAR; INTRAVENOUS AS NEEDED
Status: DISCONTINUED | OUTPATIENT
Start: 2022-10-06 | End: 2022-10-06 | Stop reason: HOSPADM

## 2022-10-06 RX ORDER — MIDAZOLAM HYDROCHLORIDE 1 MG/ML
INJECTION, SOLUTION INTRAMUSCULAR; INTRAVENOUS AS NEEDED
Status: DISCONTINUED | OUTPATIENT
Start: 2022-10-06 | End: 2022-10-06 | Stop reason: HOSPADM

## 2022-10-06 RX ORDER — LIDOCAINE HYDROCHLORIDE 10 MG/ML
INJECTION INFILTRATION; PERINEURAL AS NEEDED
Status: DISCONTINUED | OUTPATIENT
Start: 2022-10-06 | End: 2022-10-06 | Stop reason: HOSPADM

## 2022-10-06 RX ORDER — VANCOMYCIN HYDROCHLORIDE 1 G/20ML
INJECTION, POWDER, LYOPHILIZED, FOR SOLUTION INTRAVENOUS AS NEEDED
Status: DISCONTINUED | OUTPATIENT
Start: 2022-10-06 | End: 2022-10-06 | Stop reason: HOSPADM

## 2022-10-06 RX ORDER — OXYCODONE HYDROCHLORIDE 5 MG/1
2.5 TABLET ORAL
Status: DISCONTINUED | OUTPATIENT
Start: 2022-10-06 | End: 2022-10-18 | Stop reason: HOSPADM

## 2022-10-06 RX ORDER — NALOXONE HYDROCHLORIDE 0.4 MG/ML
0.4 INJECTION, SOLUTION INTRAMUSCULAR; INTRAVENOUS; SUBCUTANEOUS AS NEEDED
Status: DISCONTINUED | OUTPATIENT
Start: 2022-10-06 | End: 2022-10-18 | Stop reason: HOSPADM

## 2022-10-06 RX ORDER — SODIUM CHLORIDE 0.9 % (FLUSH) 0.9 %
5-40 SYRINGE (ML) INJECTION EVERY 8 HOURS
Status: DISCONTINUED | OUTPATIENT
Start: 2022-10-06 | End: 2022-10-18 | Stop reason: HOSPADM

## 2022-10-06 RX ADMIN — ACETAMINOPHEN 650 MG: 325 TABLET ORAL at 11:57

## 2022-10-06 RX ADMIN — ACETAMINOPHEN 650 MG: 325 TABLET ORAL at 18:32

## 2022-10-06 RX ADMIN — OXYCODONE 2.5 MG: 5 TABLET ORAL at 19:08

## 2022-10-06 RX ADMIN — SODIUM CHLORIDE, PRESERVATIVE FREE 10 ML: 5 INJECTION INTRAVENOUS at 06:25

## 2022-10-06 RX ADMIN — SODIUM CHLORIDE, PRESERVATIVE FREE 10 ML: 5 INJECTION INTRAVENOUS at 22:09

## 2022-10-06 NOTE — PROGRESS NOTES
Problem: Self Care Deficits Care Plan (Adult)  Goal: *Acute Goals and Plan of Care (Insert Text)  Description: FUNCTIONAL STATUS PRIOR TO ADMISSION: Patient with recent admission 7/20-8/03 of this year. At that time, patient was admitted from [facility] but had limited progress at [facility] due to medical status. Patient was discharged 8/03/2022 to [facility] and was reportedly discharged to a respite care facility ~2 weeks prior to admission. Had an ED visit 10/1-10/2 for bradycardia and hypotension but declined admission to hospital at that time. Patient then was discharged back home with her  on 10/03 and presented to ED 10/4 S/p ground level fall. HOME SUPPORT: The patient lived with her , was hope for one day before presentation to ED for a fall. Per CM note, patient dependent for ADLs, no equipment reported at home. Occupational Therapy Goals  Re-eval 10/6/2022 S/p pacemaker placement, goals modified as below  1. Patient will perform grooming in unsupported sitting with minimal assistance/contact guard assist within 7 day(s). Cont   2. Patient will perform lower body dressing with maximal assistance within 7 day(s). Cont   3. Patient will perform anterior bathing from neck to thighs with moderate assistance within 7 day(s). Downgraded   4. Patient will perform toilet transfers with maximal assistance  within 7 day(s). Downgraded   5. Patient will perform all aspects of toileting with moderate assistance  within 7 day(s). Cont   6. Patient will participate in upper extremity therapeutic exercise/activities with minimal assistance/contact guard assist for 5 minutes within 7 day(s). Cont   7. Patient will participate in upper extremity dressing to don/doff sling with minimal assistance within 7 day(s). Goal added 10/06    Initiated 10/4/2022  1. Patient will perform grooming in unsupported sitting with minimal assistance/contact guard assist within 7 day(s).   2.  Patient will perform lower body dressing with maximal assistance within 7 day(s). 3.  Patient will perform anterior bathing from neck to thighs with minimal assistance/contact guard assist within 7 day(s). 4.  Patient will perform toilet transfers with moderate assistance  within 7 day(s). 5.  Patient will perform all aspects of toileting with moderate assistance  within 7 day(s). 6.  Patient will participate in upper extremity therapeutic exercise/activities with minimal assistance/contact guard assist for 5 minutes within 7 day(s). Outcome: Not Progressing Towards Goal   OCCUPATIONAL THERAPY RE-EVALUATION  Patient: Tim Xie (79 y.o. female)  Date: 10/6/2022  Diagnosis: Weakness [R53.1] <principal problem not specified>  Procedure(s) (LRB):  INSERT PPM DUAL (N/A) Day of Surgery  Precautions: Fall (L sling, PM precautions)  Chart, occupational therapy assessment, plan of care, and goals were reviewed. ASSESSMENT  Based on the objective data described below, patient presents with new pacemaker precautions, impaired sitting balance, complaints of back and left shoulder pain with mobility, poor lower extremity access, impaired activity tolerance, generalized deconditioning, and requiring increased assist for self care and functional mobility/transfers. Patient [facility] to [facility] pacemaker placement 10/06/2022 and  was on bed rest for one hour following procedure per orders in chart. Patient received semi supine in bed and agreeable to working with therapy. Patient transferring to sitting at edge of bed with complaints of increased back pain during transfer, therapist providing support from behind patient for sitting balance and reports improvement in back pain. Patient participating in simple grooming task to wash face while sitting at edge of bed prior to return to supine. Patient's sling was adjusted while sitting at edge of bed, noted sling is too big and does not fit patient but adjusted to support arm.  Patient complaints of left shoulder pain throughout session but increases with mobility, upon return to bed, would not allow therapist to prop arm on pillow due to pain but supported in sling. Patient would benefit from skilled OT services during admission to improve independence with self care and functional mobility/transfers. Recommend discharge to SNF at this time. Current Level of Function Impacting Discharge (ADLs): max A x2 for mobility/transfers, min A grooming, total A lower extremity dressing    Other factors to consider for discharge: prior level of function, recent admission and SNF stay, family support         PLAN :  Recommendations and Planned Interventions: self care training, functional mobility training, therapeutic exercise, balance training, therapeutic activities, endurance activities, patient education, home safety training, and family training/education    Frequency/Duration: Patient will be followed by occupational therapy 3 times a week to address goals. Recommend with staff: bed in chair position for meals even if not eating, bedpan for toileting if able to communicate need    Recommend next OT session: continue POC    Recommendation for discharge: (in order for the patient to meet his/her long term goals)  Therapy up to 5 days/week in SNF setting    This discharge recommendation:  Has been made in collaboration with the attending provider and/or case management    Equipment recommendations for successful discharge (if) home: TBD pending progress       SUBJECTIVE:   Patient stated My shoulder hurts.     OBJECTIVE DATA SUMMARY:   Hospital course since last seen and reason for reevaluation: patient had pacemaker placed 10/06/2022, on bed rest for one hour following procedure    Cognitive/Behavioral Status:  Neurologic State: Alert;Confused  Orientation Level: Disoriented to place;Oriented to person;Oriented to situation;Disoriented to time  Cognition: Decreased attention/concentration; Impaired decision making;Memory loss; Follows commands  Perception: Cues to maintain midline in sitting  Perseveration: No perseveration noted  Safety/Judgement: Fall prevention    Skin: bandage over pacemaker site    Edema: left upper extremity swelling    Hearing: Auditory  Auditory Impairment: Hard of hearing, bilateral    Vision/Perceptual:                                     Range of Motion:  AROM: Generally decreased, functional (LUE NT)                         Strength:  Strength: Generally decreased, functional (LUE NT)                Coordination: Tone & Sensation:  Tone: Normal                           Functional Mobility and Transfers for ADLs:  Bed Mobility:  Rolling: Maximum assistance; Additional time;Assist x2  Supine to Sit: Maximum assistance; Additional time;Assist x2  Sit to Supine: Maximum assistance; Additional time;Assist x2  Scooting: Maximum assistance; Additional time    Transfers:             Balance:  Sitting: Impaired; With support  Sitting - Static: Fair (occasional)  Sitting - Dynamic: Poor (constant support)  Standing:  (NT)    ADL Assessment:                 Type of Bath: Chlorhexidine (CHG); Full                               ADL Intervention and task modifications:       Grooming  Position Performed: Seated edge of bed  Washing Face: Minimum assistance (for sitting balance during task)         Type of Bath: Chlorhexidine (CHG); Full              Lower Body Dressing Assistance  Socks: Total assistance (dependent)  Leg Crossed Method Used: No  Position Performed: Seated edge of bed         Cognitive Retraining  Safety/Judgement: Fall prevention    Functional Measure:    Barthel Index:  Bathin  Bladder: 5  Bowels: 5  Groomin  Dressin  Feedin  Mobility: 0  Stairs: 0  Toilet Use: 0  Transfer (Bed to Chair and Back): 5  Total: 15/100      The Barthel ADL Index: Guidelines  1. The index should be used as a record of what a patient does, not as a record of what a patient could do.   2. The main aim is to establish degree of independence from any help, physical or verbal, however minor and for whatever reason. 3. The need for supervision renders the patient not independent. 4. A patient's performance should be established using the best available evidence. Asking the patient, friends/relatives and nurses are the usual sources, but direct observation and common sense are also important. However direct testing is not needed. 5. Usually the patient's performance over the preceding 24-48 hours is important, but occasionally longer periods will be relevant. 6. Middle categories imply that the patient supplies over 50 per cent of the effort. 7. Use of aids to be independent is allowed. Score Interpretation (from [address] [facility] 83)    Independent   60-79 Minimally independent   40-59 Partially dependent   20-39 Very dependent   <20 Totally dependent     -Pat Diaz., Barthel, DLuis FernandoW. (1965). Functional evaluation: the Barthel Index. [facility] ([address] [facility] [address] [facility] [address] [place] [address] (1997). The Barthel activities of daily living index: self-reporting versus actual performance in the old (> or = 75 years). Journal of [address] [place] [address] 45(7), [facility], J.J.M.F, Oneal Mcallister., Naila Cabrales. (1999). Measuring the change in disability after inpatient rehabilitation; comparison of the responsiveness of the Barthel Index and Functional [place] Measure. Journal of Neurology, Neurosurgery, and Psychiatry, 66(4), 356-536. YEFRI Roger.A, RYLEE Kenney, & Helder Payne M.A. (2004) Assessment of post-stroke quality of life in cost-effectiveness studies: The usefulness of the Barthel Index and the EuroQoL-5D.  Quality of Life Research, 13, 427-43         Pain:  Patient reports low back and left shoulder pain, did not rate    Activity Tolerance:   Fair, desaturates with exertion and requires oxygen, and requires frequent rest breaks  On 4 L via nasal cannula    After treatment patient left in no apparent distress:   Supine in bed, Call bell within reach, Bed / chair alarm activated, and Side rails x 3    COMMUNICATION/COLLABORATION:   The patients plan of care was discussed with: Physical therapist, Registered nurse, and Physician.      Cody Ochoa OTR/L  Time Calculation: 20 mins

## 2022-10-06 NOTE — PROCEDURES
Cardiac Procedure Note   Patient: Ghazal Chan  MRN: [id number]  SSN: [id number]   YOB: 1932 Age: 80 y.o.   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 Jesse Padilla MD    Assistant(s):  None  Anesthesia: Moderate Sedation   Estimated Blood Loss: Less than 10 mL   Specimens Removed: None  Findings: left subclavian vein patent  RAA lead  RV apical tined lead  Complications: None   Implants:  Medtronic dual chamber pacemaker  Signed by:  Jesse Padilla MD  10/6/2022  7:01 AM

## 2022-10-06 NOTE — PROGRESS NOTES
Transfer to [address] [place] [address] from Procedure Area    Verbal report given to Dicky Hamman on Shirley Earnest being transferred to Cardiac Cath Lab  for routine progression of care   Patient is post PPM placement procedure. Patient stable upon transfer to . Report consisted of patients Situation, Background, Assessment and   Recommendations(SBAR). Information from the following report(s) Procedure Summary, MAR, and Recent Results was reviewed with the receiving nurse. Opportunity for questions and clarification was provided. Patient medicated during procedure with orders obtained and verified by Dr. Daisha Frank. Refer to patient PROCEDURE REPORT for vital signs, assessment, status, and response during procedure.

## 2022-10-06 NOTE — PROGRESS NOTES
Patient's HR dipping into 20s. Was reported by outgoing RN as going that low but would stay between 30-0s. On-call brittany and Dr. Criss Dakins advised to let rapid response nurse assess patient PRN or contact cardiologist onboard for any needed interventions. Rapid response nurse made away when she came to transport a patient from the floor to CT/MRI. She said since patient is asymptomatic, we'd continue to monitor and to call PRN. Patient asleep and when awakened, denies any chest pain or discomfort.

## 2022-10-06 NOTE — PROGRESS NOTES
TRANSFER - OUT REPORT:    Verbal report given to ERIKA Barr(name) on [facility]  being transferred to cath lab (unit) for ordered procedure   Pacemaker placement    Report consisted of patients Situation, Background, Assessment and   Recommendations(SBAR). Information from the following report(s) SBAR, Kardex, Intake/Output, MAR, Recent Results, Cardiac Rhythm of bradycardia, and Alarm Parameters  was reviewed with the receiving nurse. Lines:   Peripheral IV 10/04/22 Anterior;Right Forearm (Active)   Site Assessment Clean, dry, & intact 10/06/22 0400   Phlebitis Assessment 0 10/06/22 0400   Infiltration Assessment 0 10/06/22 0400   Dressing Status Clean, dry, & intact 10/06/22 0400   Dressing Type Transparent 10/06/22 0400   Hub Color/Line Status Patent; Infusing 10/06/22 0400   Action Taken Open ports on tubing capped 10/06/22 0400   Alcohol Cap Used No 10/06/22 0400        Opportunity for questions and clarification was provided.       Patient transported with:   Monitor  Registered Nurse

## 2022-10-06 NOTE — PROGRESS NOTES
Occupational Therapy  10/06/22     Patient currently on OT caseload. Chart reviewed and noted patient off the floor in cardiac cath lab at this time. Will continue to follow patient and attempt OT at a later time.      Thank you,  Ameena Dash OTR/L

## 2022-10-06 NOTE — PROGRESS NOTES
[facility] Adult  Hospitalist Group                                                                                          Hospitalist Progress Note  Rodger Villatoro MD  Answering service: [phone] from in house phone        Date of Service:  10/6/2022  NAME:  Daphnie Pacheco  :  1932  MRN:  [id number]      Admission Summary:   Daphnie Pacheco is a 80 y.o. female with known past medical history of a flutter on Eliquis, recent pneumonia patient was admitted in the hospital and  and discharged in the beginning of August due to pneumonia and hypoxemia at that time he will blood pressure was low, after the last admission patient was discharged to rehab her condition improved and 2 weeks ago she was discharged home, per her  was not at the edge patient was weak and experienced on and off lightheadedness and unsteady gait, the patient had difficulty to ambulate, did not feel well, was brought into emergency department for evaluation. Most determination was obtained through patient  and medical record as patient currently sleeping. Per patient  patient did not have any fever, shortness of breath, chest pain, nausea, vomiting, abdominal pain or diarrhea. She does take Eliquis. And she is supposed to see cardiologist as outpatient but did not have a chance to schedule appointment yet. In the emergency department patient was evaluated systolic blood pressure was on the low side, patient feeling lightheaded and weak, CT head was done was negative for acute intracranial pathology. Laboratory data was obtained creatinine was 1.6, stable, EKG revealed bigeminy with frequent cardiac pauses. Medicine was consulted for admission and further evaluation. Interval history / Subjective:   Patient is seen and examined at bedside this AM. She just underwent pacemaker insertion, tolerated well.  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 Possible [address] Problems  Date Reviewed: 8/3/2022            Codes Class Noted POA    Pacemaker ICD-10-CM: Z95.0  ICD-9-CM: V45.01  10/6/2022 Unknown    Overview Signed 10/6/2022  7:00 AM by Remedios Fragoso MD     10/6/22 [employer]onic             Third degree AV block [facility]) ICD-10-CM: I44.2  ICD-9-CM: 426.0  10/6/2022 Unknown        Weakness ICD-10-CM: R53.1  ICD-9-CM: 780.79  10/4/2022 Unknown           Review of Systems:   A comprehensive review of systems was negative except for that written in the HPI. Vital Signs:    Last 24hrs VS reviewed since prior progress note. Most recent are:  Visit Vitals  BP (!) 139/49 (BP 1 Location: Right upper arm, BP Patient Position: At rest)   Pulse 71   Temp 98.3 °F (36.8 °C)   Resp 22   Ht 5' 1\" (1.549 m)   Wt 53.3 kg (117 lb 9.6 oz)   SpO2 93%   BMI 22.22 kg/m²       No intake or output data in the 24 hours ending 10/06/22 3375     Physical Examination:     I had a face to face encounter with this patient and independently examined them on 10/6/2022 as outlined below:          Constitutional:   NAD   ENT:  Oral mucosa moist, oropharynx benign. Resp:  CTA bilaterally.  No wheezing/rhonchi/rales. No accessory muscle use. CV:  Ronald , irregular, no gallops, rubs    GI:  Soft, non distended, non tender. normoactive bowel sounds, no hepatosplenomegaly     Musculoskeletal:  No edema, warm, 2+ pulses throughout    Neurologic:  Moves all extremities. Lethargic             Data Review:    Review and/or order of clinical lab test      Labs:     Recent Labs     10/05/22  0257 10/04/22  1127   WBC 7.8 9.2   HGB 7.9* 8.8*   HCT 25.6* 28.9*    374       Recent Labs     10/05/22  0257 10/04/22  1835 10/04/22  1127     --  140   K 4.0  --  4.5   *  --  109*   CO2 27  --  27   BUN 40*  --  44*   CREA 1.42*  --  1.68*   GLU 93  --  88   CA 9.6  --  10.7*   MG 1.7 1.8  --    PHOS  --  4.0  --        Recent Labs     10/05/22  0257 10/04/22  1127   ALT 27 33   AP 86 103   TBILI 0.2 0.3   TP 6.2* 7.2   ALB 2.7* 3.2*   GLOB 3.5 4.0       Recent Labs     10/04/22  1126   INR 1.1   PTP 11.2*        No results for input(s): FE, TIBC, PSAT, FERR in the last 72 hours. No results found for: FOL, RBCF   No results for input(s): PH, PCO2, PO2 in the last 72 hours. No results for input(s): CPK, CKNDX, TROIQ in the last 72 hours.     No lab exists for component: CPKMB  No results found for: CHOL, CHOLX, CHLST, CHOLV, HDL, HDLP, LDL, LDLC, DLDLP, TGLX, TRIGL, TRIGP, CHHD, CHHDX  No results found for: [facility]  Lab Results   Component Value Date/Time    Color YELLOW/STRAW 07/30/2022 05:58 PM    Appearance CLOUDY (A) 07/30/2022 05:58 PM    Specific gravity 1.009 07/30/2022 05:58 PM    pH (UA) 5.5 07/30/2022 05:58 PM    Protein 30 (A) 07/30/2022 05:58 PM    Glucose Negative 07/30/2022 05:58 PM    Ketone Negative 07/30/2022 05:58 PM    Bilirubin Negative 07/30/2022 05:58 PM    Urobilinogen 0.2 07/30/2022 05:58 PM    Nitrites Negative 07/30/2022 05:58 PM    Leukocyte Esterase LARGE (A) 07/30/2022 05:58 PM    Epithelial cells MODERATE (A) 07/30/2022 05:58 PM    Bacteria Negative 07/30/2022 05:58 PM WBC 20-50 07/30/2022 05:58 PM    RBC 5-10 07/30/2022 05:58 PM         Medications Reviewed:     Current Facility-Administered Medications   Medication Dose Route Frequency    sodium chloride (NS) flush 5-40 mL  5-40 mL IntraVENous Q8H    sodium chloride (NS) flush 5-40 mL  5-40 mL IntraVENous PRN    naloxone (NARCAN) injection 0.4 mg  0.4 mg IntraVENous PRN    sodium chloride (NS) flush 5-40 mL  5-40 mL IntraVENous Q8H    sodium chloride (NS) flush 5-40 mL  5-40 mL IntraVENous PRN    acetaminophen (TYLENOL) tablet 650 mg  650 mg Oral Q6H PRN    Or    acetaminophen (TYLENOL) suppository 650 mg  650 mg Rectal Q6H PRN    polyethylene glycol (MIRALAX) packet 17 g  17 g Oral DAILY PRN    ondansetron (ZOFRAN ODT) tablet 4 mg  4 mg Oral Q8H PRN    Or    ondansetron (ZOFRAN) injection 4 mg  4 mg IntraVENous Q6H PRN    0.9% sodium chloride infusion  75 mL/hr IntraVENous CONTINUOUS    [Held by provider] apixaban (ELIQUIS) tablet 2.5 mg  2.5 mg Oral BID     ______________________________________________________________________  EXPECTED LENGTH OF STAY: 2d 14h  ACTUAL LENGTH OF STAY:          2                 Oliverio Sorto MD

## 2022-10-06 NOTE — PROGRESS NOTES
Problem: Mobility Impaired (Adult and Pediatric)  Goal: *Acute Goals and Plan of Care (Insert Text)  Description: FUNCTIONAL STATUS PRIOR TO ADMISSION: Patient was modified independent using a rollator vs no device? for functional mobility. Recently discharged from SNF to [facility] for respite care and was home for 1 day prior to this admission for fall. HOME SUPPORT PRIOR TO ADMISSION: The patient lived with her supportive and independent . Son lives in [place] but was coming into town to assist with care needs. Physical Therapy Goals  Revised 10/6/2022  1. Patient will move from supine to sit and sit to supine, scoot up and down, and roll side to side in bed with moderate assistance  within 7 day(s). 2.  Patient will transfer from bed to chair and chair to bed with max assistance using the least restrictive device within 7 day(s). 3.  Patient will perform sit to stand with max assistance within 7 day(s). 4.  Patient will completed seated LE therex x10 reps with CGA for balance within 7 days. 5.  PT to assess gait as appropriate. Physical Therapy Goals  Initiated 10/4/2022  1. Patient will move from supine to sit and sit to supine, scoot up and down, and roll side to side in bed with moderate assistance  within 7 day(s). 2.  Patient will transfer from bed to chair and chair to bed with moderate assistance  using the least restrictive device within 7 day(s). 3.  Patient will perform sit to stand with moderate assistance  within 7 day(s). 4.  Patient will ambulate with moderate assistance  for 15 feet with the least restrictive device within 7 day(s).        Outcome: Progressing Towards Goal   PHYSICAL THERAPY REEVALUATION  Patient: Abhi Anthony (9 y.o. female)  Date: 10/6/2022  Primary Diagnosis: Weakness [R53.1]  Procedure(s) (LRB):  INSERT PPM DUAL (N/A) Day of Surgery   Precautions:  Fall (L sling, PM precautions)      ASSESSMENT  Pt seen following RN clearance and bedrest orders post PM placement this morning. 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 Pt drowsy but agreeable to bedside assessment. Based on the objective data described below, the patient presents with continued general weakness, decreased balance, decreased AROM, and increased LUE pain with sling placed for PM precautions. Pt completed bed mobility and seated balance EOB- she was returned to semi-fowlers position with all needs met and NAD with ice placed on L shoulder after session events. Next session: LE therex, bed mobility, transfers as appropriate     Current Level of Function Impacting Discharge (mobility/balance): upwards of maxA x2    Functional Outcome Measure: The patient scored 0 on the Tinetti outcome measure which is indicative of high fall risk. Other factors to consider for discharge: pt with baseline dementia and admitted following 1 day at home after being in ROD/respite care and previously SNF     Patient will benefit from skilled therapy intervention to address the above noted impairments. PLAN :  Recommendations and Planned Interventions: bed mobility training, transfer training, gait training, therapeutic exercises, patient and family training/education, and therapeutic activities      Frequency/Duration: Patient will be followed by physical therapy:  3 times a week to address goals. Recommendation for discharge: (in order for the patient to meet his/her long term goals)  Therapy up to 5 days/week in SNF setting    This discharge recommendation:  Has been made in collaboration with the attending provider and/or case management    Equipment recommendations for successful discharge (if) home: facility is strongly recommended (alfred, hospital bed, w/c... TBD if going home with 24/7 assistance)         SUBJECTIVE:   Patient stated Pearl Vivas are we doing this?     OBJECTIVE DATA SUMMARY:   HISTORY:    No past medical history on file. No past surgical history on file. Hospital course since last seen and reason for reevaluation: pt with above complicated medical course    Personal factors and/or comorbidities impacting plan of care: pt lives with her  but unsure of his ability to assist pt in this condition; 2 sons live out of state per chart    Home Situation  Home Environment: Private residence  # Steps to Enter: 3  One/Two Story Residence: One story  Living Alone: No  Support Systems: Spouse/Significant Other (children out of state)  Patient Expects to be Discharged to[de-identified] Skilled nursing facility  Current DME Used/Available at Home: None    EXAMINATION/PRESENTATION/DECISION MAKING:   Critical Behavior:  Neurologic State: Alert, Confused  Orientation Level: Disoriented to place, Oriented to person, Oriented to situation, Disoriented to time  Cognition: Decreased attention/concentration, Impaired decision making, Memory loss, Follows commands  Safety/Judgement: Fall prevention  Hearing: Auditory  Auditory Impairment: Hard of hearing, bilateral  Range Of Motion:  AROM: Generally decreased, functional (LUE NT)                       Strength:    Strength: Generally decreased, functional (LUE NT)                    Tone & Sensation:   Tone: Normal           Functional Mobility:  Bed Mobility:  Rolling: Maximum assistance; Additional time;Assist x2  Supine to Sit: Maximum assistance; Additional time;Assist x2  Sit to Supine: Maximum assistance; Additional time;Assist x2  Scooting: Maximum assistance; Additional time  Balance:   Sitting: Impaired; With support  Sitting - Static: Fair (occasional)  Sitting - Dynamic: Poor (constant support)  Standing:  (NT)  Therapeutic Exercises:   Seated: APs and LAQ x10 reps    Functional Measure:  Tinetti test:    Sitting Balance: 0  Arises: 0  Attempts to Rise: 0  Immediate Standing Balance: 0  Standing Balance: 0  Nudged: 0  Eyes Closed: 0  Turn 360 Degrees - Continuous/Discontinuous: 0  Turn 360 Degrees - Steady/Unsteady: 0  Sitting Down: 0  Balance Score: 0 Balance total score  Indication of Gait: 0  R Step Length/Height: 0  L Step Length/Height: 0  R Foot Clearance: 0  L Foot Clearance: 0  Step Symmetry: 0  Step Continuity: 0  Path: 0  Trunk: 0  Walking Time: 0  Gait Score: 0 Gait total score  Total Score: 0/28 Overall total score         Tinetti Tool Score Risk of Falls  <19 = High Fall Risk  19-24 = Moderate Fall Risk  25-28 = Low Fall Risk  Tinetti ME. Performance-Oriented Assessment of Mobility Problems in Elderly Patients. [facility] 66; [id number]. (Scoring Description: PT Bulletin Feb. 10, 1993)    Older adults: Dawn Sharpe et al, 2009; n = 1000 [facility] elderly evaluated with ABC, REJI, ADL, and IADL)  · Mean REJI score for males aged 69-68 years = 26.21(3.40)  · Mean REJI score for females age 69-68 years = 25.16(4.30)  · Mean REJI score for males over 80 years = 23.29(6.02)  · Mean REJI score for females over 80 years = 17.20(8.32)              Pain Rating:  Pain with movement, no VAS provided    Activity Tolerance:   Poor and pt on 4 L NC, BP stable supine to sit    After treatment patient left in no apparent distress:   Supine in bed, Heels elevated for pressure relief, Call bell within reach, Bed / chair alarm activated, and Side rails x 3    COMMUNICATION/EDUCATION:   The patients plan of care was discussed with: Occupational therapist and Registered nurse. Fall prevention education was provided and the patient/caregiver indicated understanding., Patient/family have participated as able in goal setting and plan of care. , and Patient/family agree to work toward stated goals and plan of care.     Thank you for this referral.  Adelaida Olson   Time Calculation: 21 mins

## 2022-10-06 NOTE — WOUND CARE
Wound Care Note:     New consult placed by nurse request for sacral red area    Chart shows:  Admitted for weakness, pacemaker and third degree AV block with a history of a flutter, PNA and hypoxemia. WBC = 7.8 on 10/5/22  Admitted from home    Assessment:   Patient is alert and talking, confused, incontinent with some assistance needed in repositioning. Bed: [facility]  Patient wearing briefs for incontinence and has a Pure wick in place. Diet: Adult regular  Patient pre-medicated for pain by RN. Bilateral heels and buttocks skin intact and without erythema. 1. POA sacrum with light erythema that is blanchable, probably more moisture injury than pressure, no open area, patient incontinent. Z guard paste to be ordered. Patient recently returned from having a pacemaker placed, ice on surgical site and left arm in sling. Spoke with Dr. Daniel German, wound care orders obtained. Patient repositioned on right side. Heels offloaded on pillow. Recommendations:    Sacrum- Every 12 hours and as needed apply Z guard paste (orange tube). Skin Care & Pressure Prevention:  Minimize layers of linen/pads under patient to optimize support surface. Turn/reposition approximately every 2 hours and offload heels. Manage incontinence / promote continence   Nourishing Skin Cream to dry skin, minimize use of briefs when able    Discussed above plan with patient & Christina Schuler RN    Transition of Care: Wound care will sign off.       ZAHRA Lu, RN, [facility]  Certified Wound and Ostomy Nurse  office [phone]  Best way to contact me is through [facility]

## 2022-10-06 NOTE — PROGRESS NOTES
TRANSFER - OUT REPORT:    Verbal report given to ERIKA Quintero(name) on Jeremias Grijalva  being transferred to (unit) for routine progression of care       Report consisted of patients Situation, Background, Assessment and   Recommendations(SBAR). Information from the following report(s) Procedure Summary, Intake/Output, MAR, and Recent Results was reviewed with the receiving nurse. Lines:   Peripheral IV 10/04/22 Anterior;Right Forearm (Active)   Site Assessment Clean, dry, & intact 10/06/22 0400   Phlebitis Assessment 0 10/06/22 0400   Infiltration Assessment 0 10/06/22 0400   Dressing Status Clean, dry, & intact 10/06/22 0400   Dressing Type Transparent 10/06/22 0400   Hub Color/Line Status Patent; Infusing 10/06/22 0400   Action Taken Open ports on tubing capped 10/06/22 0400   Alcohol Cap Used No 10/06/22 0400       Peripheral IV 10/06/22 Distal;Left;Posterior Forearm (Active)   Site Assessment Clean, dry, & intact 10/06/22 0659   Phlebitis Assessment 0 10/06/22 0659   Infiltration Assessment 0 10/06/22 0659   Dressing Status Clean, dry, & intact 10/06/22 0659   Dressing Type Transparent 10/06/22 0659   Hub Color/Line Status Pink;Flushed;Capped 10/06/22 1685   Action Taken Open ports on tubing capped 10/06/22 0659   Alcohol Cap Used No 10/06/22 0659        Opportunity for questions and clarification was provided.       Patient transported with:   [employer]

## 2022-10-06 NOTE — PROGRESS NOTES
Physical Therapy  10/06/22      Patient currently on PT caseload. Chart reviewed and noted patient off the floor in cardiac cath lab at this time. Will continue to follow patient and attempt PT at a later time.       Thank you,  Best Mcfarland, PT, DPT

## 2022-10-06 NOTE — PROGRESS NOTES
Rounded on Nondenominational patients and provided Anointing of the Sick at request of patient.     Rachel Hardwick

## 2022-10-06 NOTE — PROGRESS NOTES
Transition of Care  RUR 23% High  Disposition SNF-referrals pending  DME Wheelchair, rollator, home oxygen ([employer]), shower stool, raised toilet seat, toilet grab bars, shower grab bars  Transportation BLS  Follow Up PCP, Specialist    CM contacted patient's  to discuss transitional care plan.  agreeable to SNF. Choices received: [facility], [address], [facility], and [facility]. CM will place referrals. Transition of Care Plan:     The Plan for Transition of Care is related to the following treatment goals: SNF    The Patient and/or patient representative  was provided with a choice of provider and agrees  with the discharge plan. Yes [x] No []    A Freedom of choice list was provided with basic dialogue that supports the patient's individualized plan of care/goals and shares the quality data associated with the providers.        Yes [x] No []    Veronica Isabel MS

## 2022-10-06 NOTE — DISCHARGE INSTRUCTIONS
PATIENT INSTRUCTIONS POST-PACEMAKER IMPLANT    1. No heavy lifting or exercises with the left arm for 4 weeks. This includes the following:  Do not raise arm above the shoulder level to comb hair, pull on clothes, etc... Do not use the affected arm to pull up or push up from a seated or laying  down position. Do not allow anyone else to pull on the affected arm. 2.  You may shower with your Aquacel chest dressing in place. You may remove your Aquacel chest dressing in 1 week, or it can be removed in clinic at follow up if you prefer. 3.  Do not drive for 3 days. 4.  Call Dr. David Lopez at [phone] if you experience any of the following symptoms:  1. Redness at the pacemaker site  2. Swelling at or around the pacemaker or in the left arm  3. Pain around the pacemaker  4. Dizziness, lightheadedness, fainting spells  5. Lack of energy  6. Shortness of breath  7. Rapid heart rate  8. Chest or muscle twitches    5. Follow-up with Dr. Courtney Godinez office as scheduled below. Future Appointments   Date Time Provider Crow Huertas   10/21/2022  9:40 AM PACEMAKER3, ELE CHARLES BS AMB   10/21/2022 10:00 AM WOUND CHECKS, ELE CHARLES BS AMB     6. You may use over the counter pain medication (Tylenol) and ice pack for pain relief as needed. You may wear the sling as a reminder to keep your arm below the your shoulder. Domenica Kumar M.D.  [facility]  Electrophysiology/Cardiology  [facility] [place]  [facility], [facility] [address] [place] [address], [address] [place] [address]  [phone]        Patient Discharge Instructions    Cindi Girard / [id number] : 1932    Admitted 10/4/2022 Discharged: 10/8/2022 10:08 AM     Discharging provider: Greg Regalado MD. To contact this provider call [phone] and ask  to page, if not available ask for triage hospitalist to be paged. Primary care provider: @PCP@  . . . . . . . . . . . . . . . . . . . . . . . . . . . . . . . . . . . . . . . . . . . . . . . . . . . . . . . . . . . . . . . . . . . . . . . Hira Tj FINAL DIAGNOSES & HOSPITAL COURSE:    Symptomatic sp due to Mobitz II s/p PPM placement 10/6   Paroxsymal Aflutter   - pt presented with Weakness, Lightheadedness, Unsteady gait  - hypotension improved   - appreciate Cardiology input - discussed with Dr. 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 Bring discharge papers, medication list (and/or medication bottles) to follow-up appointments for review by outpatient provider(s). PENDING TEST RESULTS:  At the time of discharge the following test results are still pending: none . Please review these results as they become available. Specific symptoms to watch for: chest pain, shortness of breath, fever, chills, nausea, vomiting, diarrhea, change in mentation, falling, weakness, bleeding.           GOALS OF CARE:  X  Eventual return to home/independent/assisted living     Long term SNF      Hospice     No rehospitalization     Patient condition at discharge:   Functional status  X  Poor      Deconditioned      Independent   Cognition    Lucid     Forgetful (some sensescence)     Dementia   Catheters/lines (plus indication)    Suzan     PICC      PEG         Code status    Full code    X  DNR         CHRONIC MEDICAL CONDITIONS:  [unfilled]      Information obtained by :   I understand that if any problems occur once I am at home I am to contact my physician. I understand and acknowledge receipt of the instructions indicated above.                                                                                                                                              Physician's or R.N.'s Signature                                                                  Date/Time                                                                                                                                              Patient or Representative Signature                                                          Date/Time

## 2022-10-06 NOTE — PROGRESS NOTES
TRANSFER - IN REPORT:    Verbal report received from Trevor Quevedo on Abhi Anthony , from the Cardiac Cath lab, for routine progression of care. Report consisted of patients Situation, Background, Assessment and Recommendations(SBAR). Information from the following report(s) Procedure Summary, Intake/Output, MAR, and Recent Results was reviewed with the receiving clinician. Opportunity for questions and clarification was provided. Assessment completed upon patients arrival to [address] [place] [address] and care assumed. Cardiac Cath Lab Recovery Arrival Note:     Abhi Anthony arrived to [facility] recovery area. Patient procedure= PPI. Patient on cardiac monitor, non-invasive blood pressure, Patient status doing well without problems. Patient is A&Ox 4. Patient reports no complaints. Procedure site without any bleeding and no hematoma.  Ice pack placed to left chest.

## 2022-10-06 NOTE — PROGRESS NOTES
Cardiac Cath Lab Procedure Area Arrival Note:    Diana Curtis arrived to Cardiac Cath Lab, Procedure Area. Patient identifiers verified with NAME and DATE OF BIRTH. Procedure verified with patient. Consent forms verified. Allergies verified. Patient informed of procedure and plan of care. Questions answered with review. Patient voiced understanding of procedure and plan of care. Patient on cardiac monitor, non-invasive blood pressure, SPO2 monitor. On RA . IV of NS on pump at 75 ml/hr. Patient status doing well without problems. Patient is A&Ox 4. Patient reports no CP or SOB. Patient medicated during procedure with orders obtained and verified by Dr. Darien Castaneda. Refer to patients Cardiac Cath Lab PROCEDURE REPORT for vital signs, assessment, status, and response during procedure, printed at end of case. Printed report on chart or scanned into chart.

## 2022-10-07 ENCOUNTER — DOCUMENTATION ONLY (OUTPATIENT)
Dept: CASE MANAGEMENT | Age: 87
End: 2022-10-07

## 2022-10-07 LAB
ANION GAP SERPL CALC-SCNC: 6 MMOL/L (ref 5–15)
BUN SERPL-MCNC: 32 MG/DL (ref 6–20)
BUN/CREAT SERPL: 28 (ref 12–20)
CALCIUM SERPL-MCNC: 8.6 MG/DL (ref 8.5–10.1)
CHLORIDE SERPL-SCNC: 112 MMOL/L (ref 97–108)
CO2 SERPL-SCNC: 24 MMOL/L (ref 21–32)
CREAT SERPL-MCNC: 1.16 MG/DL (ref 0.55–1.02)
ERYTHROCYTE [DISTWIDTH] IN BLOOD BY AUTOMATED COUNT: 15.5 % (ref 11.5–14.5)
GLUCOSE SERPL-MCNC: 83 MG/DL (ref 65–100)
HCT VFR BLD AUTO: 27.6 % (ref 35–47)
HGB BLD-MCNC: 8.3 G/DL (ref 11.5–16)
MCH RBC QN AUTO: 30.6 PG (ref 26–34)
MCHC RBC AUTO-ENTMCNC: 30.1 G/DL (ref 30–36.5)
MCV RBC AUTO: 101.8 FL (ref 80–99)
NRBC # BLD: 0 K/UL (ref 0–0.01)
NRBC BLD-RTO: 0 PER 100 WBC
PLATELET # BLD AUTO: 333 K/UL (ref 150–400)
PMV BLD AUTO: 9 FL (ref 8.9–12.9)
POTASSIUM SERPL-SCNC: 4.3 MMOL/L (ref 3.5–5.1)
RBC # BLD AUTO: 2.71 M/UL (ref 3.8–5.2)
SODIUM SERPL-SCNC: 142 MMOL/L (ref 136–145)
WBC # BLD AUTO: 10.5 K/UL (ref 3.6–11)

## 2022-10-07 PROCEDURE — 74011250637 HC RX REV CODE- 250/637: Performed by: INTERNAL MEDICINE

## 2022-10-07 PROCEDURE — 87635 SARS-COV-2 COVID-19 AMP PRB: CPT

## 2022-10-07 PROCEDURE — 85027 COMPLETE CBC AUTOMATED: CPT

## 2022-10-07 PROCEDURE — 65270000046 HC RM TELEMETRY

## 2022-10-07 PROCEDURE — 36415 COLL VENOUS BLD VENIPUNCTURE: CPT

## 2022-10-07 PROCEDURE — 74011000250 HC RX REV CODE- 250: Performed by: INTERNAL MEDICINE

## 2022-10-07 PROCEDURE — 74011250637 HC RX REV CODE- 250/637: Performed by: NURSE PRACTITIONER

## 2022-10-07 PROCEDURE — 80048 BASIC METABOLIC PNL TOTAL CA: CPT

## 2022-10-07 RX ORDER — LIDOCAINE 4 G/100G
1 PATCH TOPICAL EVERY 24 HOURS
Status: DISCONTINUED | OUTPATIENT
Start: 2022-10-07 | End: 2022-10-18 | Stop reason: HOSPADM

## 2022-10-07 RX ORDER — LANOLIN ALCOHOL/MO/W.PET/CERES
3 CREAM (GRAM) TOPICAL
Status: DISCONTINUED | OUTPATIENT
Start: 2022-10-07 | End: 2022-10-18 | Stop reason: HOSPADM

## 2022-10-07 RX ADMIN — OXYCODONE 2.5 MG: 5 TABLET ORAL at 16:49

## 2022-10-07 RX ADMIN — SODIUM CHLORIDE, PRESERVATIVE FREE 10 ML: 5 INJECTION INTRAVENOUS at 06:12

## 2022-10-07 RX ADMIN — Medication 3 MG: at 23:57

## 2022-10-07 RX ADMIN — OXYCODONE 2.5 MG: 5 TABLET ORAL at 22:31

## 2022-10-07 RX ADMIN — SODIUM CHLORIDE, PRESERVATIVE FREE 10 ML: 5 INJECTION INTRAVENOUS at 22:10

## 2022-10-07 RX ADMIN — OXYCODONE 2.5 MG: 5 TABLET ORAL at 02:27

## 2022-10-07 RX ADMIN — SODIUM CHLORIDE, PRESERVATIVE FREE 10 ML: 5 INJECTION INTRAVENOUS at 16:59

## 2022-10-07 RX ADMIN — APIXABAN 2.5 MG: 2.5 TABLET, FILM COATED ORAL at 17:00

## 2022-10-07 RX ADMIN — SODIUM CHLORIDE, PRESERVATIVE FREE 10 ML: 5 INJECTION INTRAVENOUS at 22:09

## 2022-10-07 RX ADMIN — OXYCODONE 2.5 MG: 5 TABLET ORAL at 11:07

## 2022-10-07 NOTE — PROGRESS NOTES
Transition of Care  RUR 22% High  Disposition [facility]   DME Wheelchair, rollator, home oxygen, shower stool, raised toilet seat, toilet grab bars, shower grab bars  Transportation [facility] phone [phone], WILL CALL  Follow Up PCP, Specialist    Patient has been accepted to [facility]. ANGELA spoke with [facility] [phone] in admissions and was informed there are no beds until next week. CM confirmed acceptance to [facility] the [facility] with Geni in admissions, [phone]. Patient can admit tomorrow and report can be called to [phone], 100 hallway. CM made two attempts to contact  to with update, line rang busy. Weekend CM will need to follow up for further coordination. CM will place patient on WILL CALL with [facility].      Fay Moore MS

## 2022-10-07 NOTE — PROGRESS NOTES
Noted SSED consult on 10/4/22 Assessment and Interventions completed by Kevin Hancock. Currently patient is hospitalized and followed by inpatient unit CM.

## 2022-10-07 NOTE — PROGRESS NOTES
[id number]  Hospitalist Group                                                                                          Hospitalist Progress Note  Marisela Begum MD  Answering service: [phone] from in house phone        Date of Service:  10/7/2022  NAME:  Beverly Ramesh  :  1932  MRN:  [id number]      Admission Summary:   Beverly Ramesh is a 80 y.o. female with known past medical history of a flutter on Eliquis, recent pneumonia patient was admitted in the hospital and  and discharged in the beginning of August due to pneumonia and hypoxemia at that time he will blood pressure was low, after the last admission patient was discharged to rehab her condition improved and 2 weeks ago she was discharged home, per her  was not at the edge patient was weak and experienced on and off lightheadedness and unsteady gait, the patient had difficulty to ambulate, did not feel well, was brought into emergency department for evaluation. Most determination was obtained through patient  and medical record as patient currently sleeping. Per patient  patient did not have any fever, shortness of breath, chest pain, nausea, vomiting, abdominal pain or diarrhea. She does take Eliquis. And she is supposed to see cardiologist as outpatient but did not have a chance to schedule appointment yet. In the emergency department patient was evaluated systolic blood pressure was on the low side, patient feeling lightheaded and weak, CT head was done was negative for acute intracranial pathology. Laboratory data was obtained creatinine was 1.6, stable, EKG revealed bigeminy with frequent cardiac pauses. Medicine was consulted for admission and further evaluation. Interval history / Subjective:   Patient is seen and examined at bedside this AM. She c/o pain - PPM site, shoulder and back - reassured her. Added lidocaine patch.  She requesting to go home - explained that she is too weak to go home   Waiting for placement  Discussed with nursing and cm and cardiology Dr. Segundo Martin:     Symptomatic sp due to Mobitz II s/p PPM placement 10/6   Paroxsymal Aflutter   - pt presented with Weakness, Lightheadedness, Unsteady gait  - hypotension improved   - appreciate Cardiology input - discussed with Dr. 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 CM working on [facility]  Date Reviewed: 8/3/2022            Codes Class Noted POA    Pacemaker ICD-10-CM: Z95.0  ICD-9-CM: V45.01  10/6/2022 Unknown    Overview Signed 10/6/2022  7:00 AM by Terrence Hillman MD     10/6/22 [employer]             Third degree AV block [facility]) ICD-10-CM: I44.2  ICD-9-CM: 426.0  10/6/2022 Unknown        Weakness ICD-10-CM: R53.1  ICD-9-CM: 780.79  10/4/2022 Unknown         Review of Systems:   A comprehensive review of systems was negative except for that written in the HPI. Vital Signs:    Last 24hrs VS reviewed since prior progress note. Most recent are:  Visit Vitals  BP (!) 140/67   Pulse 90   Temp 99.2 °F (37.3 °C)   Resp 25   Ht 5' 1\" (1.549 m)   Wt 50.3 kg (111 lb)   SpO2 94%   BMI 20.97 kg/m²       No intake or output data in the 24 hours ending 10/07/22 1500     Physical Examination:     I had a face to face encounter with this patient and independently examined them on 10/7/2022 as outlined below:          Constitutional:   Mild - moderate distress    ENT:  Oral mucosa moist, oropharynx benign. Resp:  CTA bilaterally. No wheezing/rhonchi/rales. No accessory muscle use.     CV:  Beena Small irregular, no gallops, rubs    GI:  Soft, non distended, non tender. normoactive bowel sounds, no hepatosplenomegaly     Musculoskeletal:  No edema, warm, 2+ pulses throughout    Neurologic:  Moves all extremities. Data Review:    Review and/or order of clinical lab test      Labs:     Recent Labs     10/07/22  0209 10/05/22  0257   WBC 10.5 7.8   HGB 8.3* 7.9*   HCT 27.6* 25.6*    366       Recent Labs     10/07/22  0209 10/05/22  0257 10/04/22  1835    142  --    K 4.3 4.0  --    * 112*  --    CO2 24 27  --    BUN 32* 40*  --    CREA 1.16* 1.42*  --    GLU 83 93  --    CA 8.6 9.6  --    MG  --  1.7 1.8   PHOS  --   --  4.0       Recent Labs     10/05/22  0257   ALT 27   AP 86   TBILI 0.2   TP 6.2*   ALB 2.7*   GLOB 3.5       No results for input(s): INR, PTP, APTT, INREXT, INREXT in the last 72 hours. No results for input(s): FE, TIBC, PSAT, FERR in the last 72 hours. No results found for: FOL, RBCF   No results for input(s): PH, PCO2, PO2 in the last 72 hours. No results for input(s): CPK, CKNDX, TROIQ in the last 72 hours.     No lab exists for component: CPKMB  No results found for: CHOL, CHOLX, CHLST, CHOLV, HDL, HDLP, LDL, LDLC, DLDLP, TGLX, TRIGL, TRIGP, CHHD, CHHDX  No results found for: Lonnie Mason  Lab Results   Component Value Date/Time    Color YELLOW/STRAW 07/30/2022 05:58 PM    Appearance CLOUDY (A) 07/30/2022 05:58 PM    Specific gravity 1.009 07/30/2022 05:58 PM    pH (UA) 5.5 07/30/2022 05:58 PM    Protein 30 (A) 07/30/2022 05:58 PM    Glucose Negative 07/30/2022 05:58 PM    Ketone Negative 07/30/2022 05:58 PM    Bilirubin Negative 07/30/2022 05:58 PM    Urobilinogen 0.2 07/30/2022 05:58 PM    Nitrites Negative 07/30/2022 05:58 PM    Leukocyte Esterase LARGE (A) 07/30/2022 05:58 PM    Epithelial cells MODERATE (A) 07/30/2022 05:58 PM    Bacteria Negative 07/30/2022 05:58 PM    WBC 20-50 07/30/2022 05:58 PM    RBC 5-10 07/30/2022 05:58 PM         Medications Reviewed:     Current Facility-Administered Medications   Medication Dose Route Frequency    lidocaine 4 % patch 1 Patch  1 Patch TransDERmal Q24H    sodium chloride (NS) flush 5-40 mL  5-40 mL IntraVENous Q8H    sodium chloride (NS) flush 5-40 mL  5-40 mL IntraVENous PRN    naloxone (NARCAN) injection 0.4 mg  0.4 mg IntraVENous PRN    oxyCODONE IR (ROXICODONE) tablet 2.5 mg  2.5 mg Oral Q4H PRN    sodium chloride (NS) flush 5-40 mL  5-40 mL IntraVENous Q8H    sodium chloride (NS) flush 5-40 mL  5-40 mL IntraVENous PRN    acetaminophen (TYLENOL) tablet 650 mg  650 mg Oral Q6H PRN    Or    acetaminophen (TYLENOL) suppository 650 mg  650 mg Rectal Q6H PRN    polyethylene glycol (MIRALAX) packet 17 g  17 g Oral DAILY PRN    ondansetron (ZOFRAN ODT) tablet 4 mg  4 mg Oral Q8H PRN    Or    ondansetron (ZOFRAN) injection 4 mg  4 mg IntraVENous Q6H PRN    apixaban (ELIQUIS) tablet 2.5 mg  2.5 mg Oral BID     ______________________________________________________________________  EXPECTED LENGTH OF STAY: 2d 14h  ACTUAL LENGTH OF STAY:          3                 Amanda Lyman MD

## 2022-10-07 NOTE — PROGRESS NOTES
Pacemaker check this morning has shown proper function   Future Appointments   Date Time Provider Crow Huertas   10/21/2022  9:40 AM PACEMAKER3, [facility]   10/21/2022 10:00 AM WOUND CHECKS, [facility]

## 2022-10-07 NOTE — PROGRESS NOTES
Problem: Patient Education: Go to Patient Education Activity  Goal: Patient/Family Education  Outcome: Progressing Towards Goal     Problem: Falls - Risk of  Goal: *Absence of Falls  Description: Document [place] Fail Fall Risk and appropriate interventions in the flowsheet. Outcome: Progressing Towards Goal  Note: Fall Risk Interventions:       Mentation Interventions: Bed/chair exit alarm    Medication Interventions: Bed/chair exit alarm, Patient to call before getting OOB    Elimination Interventions: Bed/chair exit alarm, Call light in reach, Patient to call for help with toileting needs    History of Falls Interventions: Bed/chair exit alarm, Investigate reason for fall, Room close to nurse's station, Door open when patient unattended         Problem: Patient Education: Go to Patient Education Activity  Goal: Patient/Family Education  Outcome: Progressing Towards Goal     Problem: Pressure Injury - Risk of  Goal: *Prevention of pressure injury  Description: Document Fly Scale and appropriate interventions in the flowsheet.   Outcome: Progressing Towards Goal  Note: Pressure Injury Interventions:  Sensory Interventions: Assess changes in LOC, Assess need for specialty bed    Moisture Interventions: Absorbent underpads, Internal/External urinary devices    Activity Interventions: Assess need for specialty bed, Increase time out of bed    Mobility Interventions: Float heels    Nutrition Interventions: Document food/fluid/supplement intake    Friction and Shear Interventions: Apply protective barrier, creams and emollients, Feet elevated on foot rest                Problem: Patient Education: Go to Patient Education Activity  Goal: Patient/Family Education  Outcome: Progressing Towards Goal

## 2022-10-07 NOTE — PROGRESS NOTES
Bedside and Verbal shift change report given to Watson Kim RN (oncoming nurse) by Tony Herrera RN (offgoing nurse). Report included the following information SBAR, Kardex, Procedure Summary, Intake/Output, MAR, Recent Results, Cardiac Rhythm of ventricular paced, and Alarm Parameters . Status post pacemaker placement 10/6, with right arm in sling, and patient complaining of arm pain and back pain. Sling loosened.

## 2022-10-08 LAB
COVID-19 RAPID TEST, COVR: NOT DETECTED
SOURCE, COVRS: NORMAL

## 2022-10-08 PROCEDURE — 74011000250 HC RX REV CODE- 250: Performed by: INTERNAL MEDICINE

## 2022-10-08 PROCEDURE — 74011250636 HC RX REV CODE- 250/636: Performed by: INTERNAL MEDICINE

## 2022-10-08 PROCEDURE — 74011250637 HC RX REV CODE- 250/637: Performed by: INTERNAL MEDICINE

## 2022-10-08 PROCEDURE — 65270000046 HC RM TELEMETRY

## 2022-10-08 RX ORDER — LIDOCAINE 4 G/100G
PATCH TOPICAL
Qty: 10 EACH | Refills: 0 | Status: SHIPPED
Start: 2022-10-09

## 2022-10-08 RX ORDER — OXYCODONE HYDROCHLORIDE 5 MG/1
2.5 TABLET ORAL
Qty: 10 TABLET | Refills: 0 | Status: SHIPPED | OUTPATIENT
Start: 2022-10-08 | End: 2022-10-18 | Stop reason: SDUPTHER

## 2022-10-08 RX ADMIN — SODIUM CHLORIDE, PRESERVATIVE FREE 10 ML: 5 INJECTION INTRAVENOUS at 06:27

## 2022-10-08 RX ADMIN — APIXABAN 2.5 MG: 2.5 TABLET, FILM COATED ORAL at 17:33

## 2022-10-08 RX ADMIN — OXYCODONE 2.5 MG: 5 TABLET ORAL at 11:54

## 2022-10-08 RX ADMIN — ONDANSETRON 4 MG: 2 INJECTION INTRAMUSCULAR; INTRAVENOUS at 00:53

## 2022-10-08 RX ADMIN — APIXABAN 2.5 MG: 2.5 TABLET, FILM COATED ORAL at 08:27

## 2022-10-08 NOTE — PROGRESS NOTES
Transition of Care Plan  RUR- high  DISPOSITION: This CM spoke with  of the [facility] [phone], spoke with nurse Thelma Sellers with the SNF. Faxed DC summary and H and P to [phone]  F/U with PCP/Specialist    Transport: [facility] 2pm  Ian Velasco- [phone]  IM was discussed over the phone with  listed above        MARCO A Ramos, CRM  11:22 AM    Addendum: This CM was requested to call  and speak about dc concern  Patient's  Ian Velasco, [phone] stated that after he reviewed the Medicare review rating, he is no longer in agreement with SNF placement and requested Dr Ignacio Love to call and CM send referral to \"[place] SN[facility] locations. \"  CM to follow and will send referrals as requested. MARCO A Ramos CRM  12:57 PM    This CM spoke with  in regards to IM form and explained the option to appeal DC,  stated that he will proceed appeal of dc. MARCO A Ramos, CRM  1:22 PM      This CM gave the number for Medicare appeal per  request.      CM to follow. MARCO A Ramos, CRM  1:42 PM  '    After review of SNF referrals that had been sent prior to 10/8[facility] did not have beds until next week,  [facility] declined, [facility] and [address] declined patient as well. Will send to [facility], Dalene Baumgarten at [employer],  stated that he will be reviewing Medicare ratings of \"[place] skilled nursing facilities. \"    MARCO A Ramos, CRM  2:22 PM

## 2022-10-08 NOTE — PROGRESS NOTES
Bedside and Verbal shift change report given to Jose Enrique RN (oncoming nurse) by Sarah Beth Schroeder RN (offgoing nurse). Report included the following information SBAR, Kardex, Intake/Output, and MAR. Pt to be planned for discharge in AM tomorrow 10/8. Rapid COVID already done today and sent to lab. Pt constantly yelling out and confused at all times. Takes PO meds whole.

## 2022-10-08 NOTE — PROGRESS NOTES
Problem: Patient Education: Go to Patient Education Activity  Goal: Patient/Family Education  10/8/2022 1145 by Owen Queen RN  Outcome: Resolved/Met  10/8/2022 1144 by Owen Queen RN  Outcome: Progressing Towards Goal     Problem: Patient Education: Go to Patient Education Activity  Goal: Patient/Family Education  10/8/2022 1145 by Owen Queen RN  Outcome: Resolved/Met  10/8/2022 1144 by Owen Queen RN  Outcome: Progressing Towards Goal     Problem: Falls - Risk of  Goal: *Absence of Falls  Description: Document Ivanna Nap Fall Risk and appropriate interventions in the flowsheet.   10/8/2022 1145 by Owen Queen RN  Outcome: Resolved/Met  Note: Fall Risk Interventions:       Mentation Interventions: Adequate sleep, hydration, pain control, Bed/chair exit alarm, Room close to nurse's station    Medication Interventions: Bed/chair exit alarm, Evaluate medications/consider consulting pharmacy, Patient to call before getting OOB    Elimination Interventions: Bed/chair exit alarm, Call light in reach, Toileting schedule/hourly rounds    History of Falls Interventions: Bed/chair exit alarm, Room close to nurse's station, Door open when patient unattended      10/8/2022 1144 by Owen Queen RN  Outcome: Progressing Towards Goal  Note: Fall Risk Interventions:       Mentation Interventions: Adequate sleep, hydration, pain control, Bed/chair exit alarm, Room close to nurse's station    Medication Interventions: Bed/chair exit alarm, Evaluate medications/consider consulting pharmacy, Patient to call before getting OOB    Elimination Interventions: Bed/chair exit alarm, Call light in reach, Toileting schedule/hourly rounds    History of Falls Interventions: Bed/chair exit alarm, Room close to nurse's station, Door open when patient unattended         Problem: Patient Education: Go to Patient Education Activity  Goal: Patient/Family Education  10/8/2022 1145 by Owen Queen RN  Outcome: Resolved/Met  10/8/2022 1144 by Kishoer Hussein RN  Outcome: Progressing Towards Goal     Problem: Pressure Injury - Risk of  Goal: *Prevention of pressure injury  Description: Document Fly Scale and appropriate interventions in the flowsheet.   Outcome: Resolved/Met  Note: Pressure Injury Interventions:  Sensory Interventions: Assess changes in LOC, Assess need for specialty bed, Monitor skin under medical devices, Minimize linen layers, Maintain/enhance activity level, Keep linens dry and wrinkle-free    Moisture Interventions: Absorbent underpads, Internal/External urinary devices, Minimize layers, Moisture barrier    Activity Interventions: Assess need for specialty bed, Pressure redistribution bed/mattress(bed type), Increase time out of bed    Mobility Interventions: Assess need for specialty bed, HOB 30 degrees or less, Float heels    Nutrition Interventions: Document food/fluid/supplement intake    Friction and Shear Interventions: Apply protective barrier, creams and emollients, Lift sheet, HOB 30 degrees or less                Problem: Patient Education: Go to Patient Education Activity  Goal: Patient/Family Education  Outcome: Resolved/Met

## 2022-10-08 NOTE — PROGRESS NOTES
1502 Pt IV removed pending DC. Pt no longer being DC today. Refuses to have new Iv placed. Md notified via perfect serve, instructed to hold for now.

## 2022-10-08 NOTE — DISCHARGE SUMMARY
Discharge Instructions/Summary     Patient: Shirley Neil       MRN: [id number]       YOB: 1932       Age: 80 y.o. Date of admission:  10/4/2022    Date of discharge:  10/8/2022    Primary care provider:  Rafael Baez MD     Admitting provider:  Larry Rivera MD    Discharging provider:  Loly Wakefield MD , to contact this individual call [phone] and ask the  to page, if unavailable ask for the triage hospitalist to be paged. Consultations  ED CONSULT TO Marzena N Sunitha Anthony CASE MANAGEMENT  ED CONSULT TO SENIOR SERVICES NURSE PRACTITIONER  IP CONSULT TO PALLIATIVE CARE - PROVIDER  IP CONSULT TO CARDIOLOGY    Procedures/Surgeries  Procedure(s):  INSERT PPM DUAL    Discharge destination: SNF-  [facility] at the [facility]  . The patient is stable for discharge. Admission diagnosis  Weakness [R53.1]      [address] COURSE:    Per HPI:\"Deneen Romero is a 80 y.o. female with known past medical history of a flutter on Eliquis, recent pneumonia patient was admitted in the hospital and 12 July and discharged in the beginning of August due to pneumonia and hypoxemia at that time he will blood pressure was low, after the last admission patient was discharged to rehab her condition improved and 2 weeks ago she was discharged home, per her  was not at the edge patient was weak and experienced on and off lightheadedness and unsteady gait, the patient had difficulty to ambulate, did not feel well, was brought into emergency department for evaluation. Most determination was obtained through patient  and medical record as patient currently sleeping. Per patient  patient did not have any fever, shortness of breath, chest pain, nausea, vomiting, abdominal pain or diarrhea. She does take Eliquis.   And she is supposed to see cardiologist as outpatient but did not have a chance to schedule appointment yet. In the emergency department patient was evaluated systolic blood pressure was on the low side, patient feeling lightheaded and weak, CT head was done was negative for acute intracranial pathology. Laboratory data was obtained creatinine was 1.6, stable, EKG revealed bigeminy with frequent cardiac pauses. Medicine was consulted for admission and further evaluation. \"    Symptomatic sp due to Mobitz II s/p PPM placement 10/6   Paroxsymal Aflutter   - pt presented with Weakness, Lightheadedness, Unsteady gait  - hypotension improved   - appreciate Cardiology input - discussed with Dr. 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 She understood and agreed         Current Discharge Medication List        START taking these medications    Details   apixaban (ELIQUIS) 2.5 mg tablet Take 1 Tablet by mouth two (2) times a day. Qty: 60 Tablet, Refills: 0  Start date: 10/8/2022      lidocaine 4 % patch Apply patch to back  . Apply patch to the affected area for 12 hours a day and remove for 12 hours a day. Qty: 10 Each, Refills: 0  Start date: 10/9/2022      oxyCODONE IR (ROXICODONE) 5 mg immediate release tablet Take 0.5 Tablets by mouth every four (4) hours as needed for Pain for up to 3 days. Max Daily Amount: 15 mg.  Qty: 10 Tablet, Refills: 0  Start date: 10/8/2022, End date: 10/11/2022    Associated Diagnoses: Pacemaker           CONTINUE these medications which have NOT CHANGED    Details   nystatin (MYCOSTATIN) 100,000 unit/gram ointment Apply  to affected area two (2) times a day.       albuterol (PROVENTIL HFA, VENTOLIN HFA, PROAIR HFA) 90 mcg/actuation inhaler Take 1 Puff by inhalation every four (4) hours as needed. albuterol-ipratropium (DUO-NEB) 2.5 mg-0.5 mg/3 ml nebu 3 mL by Nebulization route two (2) times a day. acetaminophen (Tylenol Arthritis Pain) 650 mg TbER Take 650 mg by mouth every eight (8) hours. guaiFENesin (ORGANIDIN) 200 mg tablet Take 600 mg by mouth every four (4) hours as needed for Congestion. calcium carbonate (CALTREX) 600 mg calcium (1,500 mg) tablet Take 600 mg by mouth two (2) times a day. polyethylene glycol (Miralax) 17 gram packet Take 17 g by mouth in the morning. famotidine (PEPCID) 10 mg tablet Take 10 mg by mouth two (2) times a day. melatonin 3 mg tablet Take 3 mg by mouth nightly as needed for Insomnia.      simethicone (GAS-X) 125 mg chewable tablet Take 125 mg by mouth every six (6) hours as needed for Flatulence. ondansetron (ZOFRAN ODT) 4 mg disintegrating tablet Take 4 mg by mouth every eight (8) hours as needed for Nausea or Vomiting.      bisacodyL (Dulcolax, bisacodyl,) 10 mg supp Insert 10 mg into rectum daily as needed for Constipation. STOP taking these medications       aspirin 81 mg chewable tablet Comments:   Reason for Stopping:                 FOLLOW-UP CARE RECOMMENDATIONS/TESTING/NURSING ORDERS:  PT/OT      DIET:  Regular  Diet    ACTIVITY:  Activity as tolerated, PT/OT to evaluate and treat, and OOB for meals    APPOINTMENTS:  Follow-up with primary care provider, Dr. Alexia Saleem MD  -  Please call to set up an appointment to be seen in 1 week   Cardiology in 2 weeks       PENDING TEST RESULTS:  At the time of discharge the following test results are still pending: none . Please review these results as they become available. Specific symptoms to watch for: chest pain, shortness of breath, fever, chills, nausea, vomiting, diarrhea, change in mentation, falling, weakness, bleeding.     GOALS OF CARE:  X  Eventual return to home/independent/assisted living     Long term SNF Hospice     No rehospitalization     SOCIAL HISTORY:     Social History     Socioeconomic History    Marital status:      Spouse name: Not on file    Number of children: Not on file    Years of education: Not on file    Highest education level: Not on file   Occupational History    Not on file   Tobacco Use    Smoking status: Every Day    Smokeless tobacco: Never   Substance and Sexual Activity    Alcohol use: Not on file    Drug use: Not on file    Sexual activity: Not on file   Other Topics Concern    Not on file   Social History Narrative    Not on file     Social Determinants of Health     Financial Resource Strain: Not on file   Food Insecurity: Not on file   Transportation Needs: Not on file   Physical Activity: Not on file   Stress: Not on file   Social Connections: Not on file   Intimate Partner Violence: Not on file   Housing Stability: Not on file       Patient condition at discharge:   Functional status    Poor    X  Deconditioned      Independent   Cognition    Lucid     Forgetful (some sensescence)     Dementia   Catheters/lines (plus indication)    Mares     PICC      PEG         Code status    Full code    X  DNR         CHRONIC MEDICAL CONDITIONS:  Problem List as of 10/8/2022 Date Reviewed: 8/3/2022            Codes Class Noted - Resolved    Pacemaker ICD-10-CM: Z95.0  ICD-9-CM: V45.01  10/6/2022 - Present    Overview Signed 10/6/2022  7:00 AM by Tamar Chung MD     10/6/22 [employer]             Third degree AV block ([facility] ICD-10-CM: I44.2  ICD-9-CM: 426.0  10/6/2022 - Present        Weakness ICD-10-CM: R53.1  ICD-9-CM: 780.79  10/4/2022 - Present        HCAP (healthcare-associated pneumonia) ICD-10-CM: J18.9  ICD-9-CM: 486  8/3/2022 - Present        Pulmonary edema ICD-10-CM: J81.1  ICD-9-CM: 209  8/3/2022 - Present        Metabolic encephalopathy ALC-42-JS: G93.41  ICD-9-CM: 348.31  8/3/2022 - Present        PAULO (acute kidney injury) ([facility] ICD-10-CM: N17.9  ICD-9-CM: 584.9  8/3/2022 - Present        Urinary retention ICD-10-CM: R33.9  ICD-9-CM: 788.20  8/3/2022 - Present        Hypernatremia ICD-10-CM: E87.0  ICD-9-CM: 276.0  8/3/2022 - Present        Atrial flutter ([facility] ICD-10-CM: I48.92  ICD-9-CM: 427.32  8/3/2022 - Present        Sepsis ([facility] ICD-10-CM: A41.9  ICD-9-CM: 038.9, 995.91  7/20/2022 - Present               Physical examination at discharge  Visit Vitals  BP (!) 127/49   Pulse 86   Temp 98.3 °F (36.8 °C)   Resp 27   Ht 5' 1\" (1.549 m)   Wt 50.3 kg (111 lb)   SpO2 95%   BMI 20.97 kg/m²     Constitutional:   Mild distress    ENT:  Oral mucosa moist, oropharynx benign. Resp:  CTA bilaterally. No wheezing/rhonchi/rales. No accessory muscle use. CV:  NSR, no gallops, rubs    GI:  Soft, non distended, non tender. normoactive bowel sounds, no hepatosplenomegaly     Musculoskeletal:  No edema, warm, 2+ pulses throughout    Neurologic:  Moves all extremities. Significant Diagnostic Studies:   10/4/2022: BUN 44 MG/DL (H; Ref range: 6 - 20 MG/DL); Calcium 10.7 MG/DL (H; Ref range: 8.5 - 10.1 MG/DL); CO2 27 mmol/L (Ref range: 21 - 32 mmol/L); Creatinine 1.68 MG/DL (H; Ref range: 0.55 - 1.02 MG/DL); Glucose 88 mg/dL (Ref range: 65 - 100 mg/dL); HCT 28.9 % (L; Ref range: 35.0 - 47.0 %); HGB 8.8 g/dL (L; Ref range: 11.5 - 16.0 g/dL); Potassium 4.5 mmol/L (Ref range: 3.5 - 5.1 mmol/L); Sodium 140 mmol/L (Ref range: 136 - 145 mmol/L)  10/5/2022: BUN 40 MG/DL (H; Ref range: 6 - 20 MG/DL); Calcium 9.6 MG/DL (Ref range: 8.5 - 10.1 MG/DL); CO2 27 mmol/L (Ref range: 21 - 32 mmol/L); Creatinine 1.42 MG/DL (H; Ref range: 0.55 - 1.02 MG/DL); Glucose 93 mg/dL (Ref range: 65 - 100 mg/dL); HCT 25.6 % (L; Ref range: 35.0 - 47.0 %); HGB 7.9 g/dL (L; Ref range: 11.5 - 16.0 g/dL); Potassium 4.0 mmol/L (Ref range: 3.5 - 5.1 mmol/L);  Sodium 142 mmol/L (Ref range: 136 - 145 mmol/L)  Recent Labs     10/07/22  0209   WBC 10.5   HGB 8.3*   HCT 27.6*        Recent Labs     10/07/22  0209    K 4.3   *   CO2 24   BUN 32*   CREA 1.16*   GLU 83   CA 8.6     No results for input(s): AP, TBIL, TP, ALB, GLOB, GGT, AML, LPSE in the last 72 hours. No lab exists for component: SGOT, GPT, AMYP, HLPSE  No results for input(s): INR, PTP, APTT, INREXT in the last 72 hours. No results for input(s): FE, TIBC, PSAT, FERR in the last 72 hours. No results for input(s): PH, PCO2, PO2 in the last 72 hours. No results for input(s): CPK, CKMB in the last 72 hours. No lab exists for component: TROPONINI  No components found for: [place]    Pertinent imaging studies:    Per EMR       Time spent on discharge related activities today greater than 30 minutes.       Signed:  Shilo Pena MD                 Hospitalist                 10/8/2022                 10:18 AM        Cc: Jayden Pérez MD

## 2022-10-08 NOTE — PROGRESS NOTES
Bedside and Verbal shift change report given to Tatyana Aguilar RN (oncoming nurse) by Ana Rodriguez RN (offgoing nurse). Report included the following information SBAR, Kardex, Intake/Output, MAR, Recent Results, Cardiac Rhythm of V-paced , and Alarm Parameters .

## 2022-10-08 NOTE — PROGRESS NOTES
Problem: Patient Education: Go to Patient Education Activity  Goal: Patient/Family Education  Outcome: Progressing Towards Goal     Problem: Patient Education: Go to Patient Education Activity  Goal: Patient/Family Education  Outcome: Progressing Towards Goal     Problem: Falls - Risk of  Goal: *Absence of Falls  Description: Document [place] Fall Risk and appropriate interventions in the flowsheet.   Outcome: Progressing Towards Goal  Note: Fall Risk Interventions:       Mentation Interventions: Adequate sleep, hydration, pain control, Bed/chair exit alarm, Room close to nurse's station    Medication Interventions: Bed/chair exit alarm, Evaluate medications/consider consulting pharmacy, Patient to call before getting OOB    Elimination Interventions: Bed/chair exit alarm, Call light in reach, Toileting schedule/hourly rounds    History of Falls Interventions: Bed/chair exit alarm, Room close to nurse's station, Door open when patient unattended         Problem: Patient Education: Go to Patient Education Activity  Goal: Patient/Family Education  Outcome: Progressing Towards Goal

## 2022-10-08 NOTE — PROGRESS NOTES
[facility]  Hospitalist Group                                                                                          Hospitalist Progress Note  Christina Richards MD  Answering service: [phone] from in house phone        Date of Service:  10/8/2022  NAME:  Giuliano Harding  :  1932  MRN:  [id number]      Admission Summary:   Giuliano Harding is a 80 y.o. female with known past medical history of a flutter on Eliquis, recent pneumonia patient was admitted in the hospital and  and discharged in the beginning of August due to pneumonia and hypoxemia at that time he will blood pressure was low, after the last admission patient was discharged to rehab her condition improved and 2 weeks ago she was discharged home, per her  was not at the edge patient was weak and experienced on and off lightheadedness and unsteady gait, the patient had difficulty to ambulate, did not feel well, was brought into emergency department for evaluation. Most determination was obtained through patient  and medical record as patient currently sleeping. Per patient  patient did not have any fever, shortness of breath, chest pain, nausea, vomiting, abdominal pain or diarrhea. She does take Eliquis. And she is supposed to see cardiologist as outpatient but did not have a chance to schedule appointment yet. In the emergency department patient was evaluated systolic blood pressure was on the low side, patient feeling lightheaded and weak, CT head was done was negative for acute intracranial pathology. Laboratory data was obtained creatinine was 1.6, stable, EKG revealed bigeminy with frequent cardiac pauses. Medicine was consulted for admission and further evaluation. Interval history / Subjective:   Patient is seen and examined at bedside this AM. She c/o left hand pain at IV site, not much swollen.  Reassured her     Discussed with nursing and cm  Stable to be discharged to SNF today but  refused [facility] at [facility]. He appealed for discharge          Assessment & Plan:     Symptomatic sp due to Mobitz II s/p PPM placement 10/6   Paroxsymal Aflutter   - pt presented with Weakness, Lightheadedness, Unsteady gait  - hypotension improved   - appreciate Cardiology input - discussed with Dr. 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 CM working on SNF - need a new place. Hospital Problems  Date Reviewed: 8/3/2022            Codes Class Noted POA    Pacemaker ICD-10-CM: Z95.0  ICD-9-CM: V45.01  10/6/2022 Unknown    Overview Signed 10/6/2022  7:00 AM by Terrence Hillman MD     10/6/22 [employer]             Third degree AV block [facility]) ICD-10-CM: I44.2  ICD-9-CM: 426.0  10/6/2022 Unknown        Weakness ICD-10-CM: R53.1  ICD-9-CM: 780.79  10/4/2022 Unknown         Review of Systems:   A comprehensive review of systems was negative except for that written in the HPI. Vital Signs:    Last 24hrs VS reviewed since prior progress note.  Most recent are:  Visit Vitals  BP (!) 118/30 (BP 1 Location: Right upper arm, BP Patient Position: At rest)   Pulse 87   Temp 98.2 °F (36.8 °C)   Resp 30   Ht 5' 1\" (1.549 m)   Wt 50.3 kg (111 lb)   SpO2 95%   BMI 20.97 kg/m²         Intake/Output Summary (Last 24 hours) at 10/8/2022 1414  Last data filed at 10/8/2022 1000  Gross per 24 hour   Intake 218 ml   Output 200 ml   Net 18 ml          Physical Examination:     I had a face to face encounter with this patient and independently examined them on 10/8/2022 as outlined below:          Constitutional:   Mild distress    ENT:  Oral mucosa moist, oropharynx benign. Resp:  CTA bilaterally. No wheezing/rhonchi/rales. No accessory muscle use. CV:  NSR, no gallops, rubs    GI:  Soft, non distended, non tender. normoactive bowel sounds, no hepatosplenomegaly     Musculoskeletal:  No edema, warm, 2+ pulses throughout    Neurologic:  Moves all extremities. Data Review:    Review and/or order of clinical lab test      Labs:     Recent Labs     10/07/22  0209   WBC 10.5   HGB 8.3*   HCT 27.6*          Recent Labs     10/07/22  0209      K 4.3   *   CO2 24   BUN 32*   CREA 1.16*   GLU 83   CA 8.6       No results for input(s): ALT, AP, TBIL, TBILI, TP, ALB, GLOB, GGT, AML, LPSE in the last 72 hours. No lab exists for component: SGOT, GPT, AMYP, HLPSE    No results for input(s): INR, PTP, APTT, INREXT, INREXT in the last 72 hours. No results for input(s): FE, TIBC, PSAT, FERR in the last 72 hours. No results found for: FOL, RBCF   No results for input(s): PH, PCO2, PO2 in the last 72 hours. No results for input(s): CPK, CKNDX, TROIQ in the last 72 hours.     No lab exists for component: CPKMB  No results found for: CHOL, CHOLX, CHLST, CHOLV, HDL, HDLP, LDL, LDLC, DLDLP, TGLX, TRIGL, TRIGP, CHHD, CHHDX  No results found for: [facility]  Lab Results   Component Value Date/Time    Color YELLOW/STRAW 07/30/2022 05:58 PM    Appearance CLOUDY (A) 07/30/2022 05:58 PM    Specific gravity 1.009 07/30/2022 05:58 PM    pH (UA) 5.5 07/30/2022 05:58 PM    Protein 30 (A) 07/30/2022 05:58 PM    Glucose Negative 07/30/2022 05:58 PM    Ketone Negative 07/30/2022 05:58 PM    Bilirubin Negative 07/30/2022 05:58 PM    Urobilinogen 0.2 07/30/2022 05:58 PM    Nitrites Negative 07/30/2022 05:58 PM    Leukocyte Esterase LARGE (A) 07/30/2022 05:58 PM    Epithelial cells MODERATE (A) 07/30/2022 05:58 PM    Bacteria Negative 07/30/2022 05:58 PM    WBC 20-50 07/30/2022 05:58 PM    RBC 5-10 07/30/2022 05:58 PM         Medications Reviewed: Current Facility-Administered Medications   Medication Dose Route Frequency    lidocaine 4 % patch 1 Patch  1 Patch TransDERmal Q24H    melatonin tablet 3 mg  3 mg Oral QHS PRN    sodium chloride (NS) flush 5-40 mL  5-40 mL IntraVENous Q8H    sodium chloride (NS) flush 5-40 mL  5-40 mL IntraVENous PRN    naloxone (NARCAN) injection 0.4 mg  0.4 mg IntraVENous PRN    oxyCODONE IR (ROXICODONE) tablet 2.5 mg  2.5 mg Oral Q4H PRN    sodium chloride (NS) flush 5-40 mL  5-40 mL IntraVENous Q8H    sodium chloride (NS) flush 5-40 mL  5-40 mL IntraVENous PRN    acetaminophen (TYLENOL) tablet 650 mg  650 mg Oral Q6H PRN    Or    acetaminophen (TYLENOL) suppository 650 mg  650 mg Rectal Q6H PRN    polyethylene glycol (MIRALAX) packet 17 g  17 g Oral DAILY PRN    ondansetron (ZOFRAN ODT) tablet 4 mg  4 mg Oral Q8H PRN    Or    ondansetron (ZOFRAN) injection 4 mg  4 mg IntraVENous Q6H PRN    apixaban (ELIQUIS) tablet 2.5 mg  2.5 mg Oral BID     ______________________________________________________________________  EXPECTED LENGTH OF STAY: 2d 14h  ACTUAL LENGTH OF STAY:          4                 Sigifredo Linton MD

## 2022-10-08 NOTE — PROGRESS NOTES
Problem: Patient Education: Go to Patient Education Activity  Goal: Patient/Family Education  10/8/2022 1145 by Ming Ly RN  Outcome: Resolved/Met  10/8/2022 1144 by Ming Ly RN  Outcome: Progressing Towards Goal     Problem: Patient Education: Go to Patient Education Activity  Goal: Patient/Family Education  10/8/2022 1145 by Ming Ly RN  Outcome: Resolved/Met  10/8/2022 1144 by Ming Ly RN  Outcome: Progressing Towards Goal     Problem: Falls - Risk of  Goal: *Absence of Falls  Description: Document [place] Fall Risk and appropriate interventions in the flowsheet.   10/8/2022 1145 by Ming Ly RN  Outcome: Resolved/Met  Note: Fall Risk Interventions:       Mentation Interventions: Adequate sleep, hydration, pain control, Bed/chair exit alarm, Room close to nurse's station    Medication Interventions: Bed/chair exit alarm, Evaluate medications/consider consulting pharmacy, Patient to call before getting OOB    Elimination Interventions: Bed/chair exit alarm, Call light in reach, Toileting schedule/hourly rounds    History of Falls Interventions: Bed/chair exit alarm, Room close to nurse's station, Door open when patient unattended      10/8/2022 1144 by Ming Ly RN  Outcome: Progressing Towards Goal  Note: Fall Risk Interventions:       Mentation Interventions: Adequate sleep, hydration, pain control, Bed/chair exit alarm, Room close to nurse's station    Medication Interventions: Bed/chair exit alarm, Evaluate medications/consider consulting pharmacy, Patient to call before getting OOB    Elimination Interventions: Bed/chair exit alarm, Call light in reach, Toileting schedule/hourly rounds    History of Falls Interventions: Bed/chair exit alarm, Room close to nurse's station, Door open when patient unattended         Problem: Patient Education: Go to Patient Education Activity  Goal: Patient/Family Education  10/8/2022 1145 by Ming Ly RN  Outcome: Resolved/Met  10/8/2022 1144 by Curly Roy RN  Outcome: Progressing Towards Goal     Problem: Pressure Injury - Risk of  Goal: *Prevention of pressure injury  Description: Document Fly Scale and appropriate interventions in the flowsheet.   Outcome: Resolved/Met  Note: Pressure Injury Interventions:  Sensory Interventions: Assess changes in LOC, Assess need for specialty bed, Monitor skin under medical devices, Minimize linen layers, Maintain/enhance activity level, Keep linens dry and wrinkle-free    Moisture Interventions: Absorbent underpads, Internal/External urinary devices, Minimize layers, Moisture barrier    Activity Interventions: Assess need for specialty bed, Pressure redistribution bed/mattress(bed type), Increase time out of bed    Mobility Interventions: Assess need for specialty bed, HOB 30 degrees or less, Float heels    Nutrition Interventions: Document food/fluid/supplement intake    Friction and Shear Interventions: Apply protective barrier, creams and emollients, Lift sheet, HOB 30 degrees or less                Problem: Patient Education: Go to Patient Education Activity  Goal: Patient/Family Education  Outcome: Resolved/Met

## 2022-10-09 PROCEDURE — 74011250637 HC RX REV CODE- 250/637: Performed by: INTERNAL MEDICINE

## 2022-10-09 PROCEDURE — 74011000250 HC RX REV CODE- 250: Performed by: INTERNAL MEDICINE

## 2022-10-09 PROCEDURE — 65270000046 HC RM TELEMETRY

## 2022-10-09 PROCEDURE — 74011250637 HC RX REV CODE- 250/637: Performed by: NURSE PRACTITIONER

## 2022-10-09 RX ADMIN — APIXABAN 2.5 MG: 2.5 TABLET, FILM COATED ORAL at 11:00

## 2022-10-09 RX ADMIN — OXYCODONE 2.5 MG: 5 TABLET ORAL at 22:10

## 2022-10-09 RX ADMIN — OXYCODONE 2.5 MG: 5 TABLET ORAL at 10:59

## 2022-10-09 RX ADMIN — ACETAMINOPHEN 650 MG: 325 TABLET ORAL at 16:18

## 2022-10-09 RX ADMIN — Medication 3 MG: at 22:10

## 2022-10-09 RX ADMIN — APIXABAN 2.5 MG: 2.5 TABLET, FILM COATED ORAL at 18:16

## 2022-10-09 NOTE — PROGRESS NOTES
Medicare discharge appeal received from [facility]. Case control # [id number]. All requested notes, DND, and IMM letter was sent electronically to [facility]. Patient  given a copy of the DND and a copy was placed on patient's chart.  Will continue to monitor the status of the appeal.     Tiara Elizabeth LCSW, ACM-SW  Case Management [address] [place] [address]  C: [phone]

## 2022-10-09 NOTE — PROGRESS NOTES
[facility] [facility] Adult  Hospitalist Group                                                                                          Hospitalist Progress Note  Diana Prado MD  Answering service: [phone] from in house phone        Date of Service:  10/9/2022  NAME:  Bright Jansen  :  1932  MRN:  [id number]      Admission Summary:   Bright Jansen is a 80 y.o. female with known past medical history of a flutter on Eliquis, recent pneumonia patient was admitted in the hospital and  and discharged in the beginning of August due to pneumonia and hypoxemia at that time he will blood pressure was low, after the last admission patient was discharged to rehab her condition improved and 2 weeks ago she was discharged home, per her  was not at the edge patient was weak and experienced on and off lightheadedness and unsteady gait, the patient had difficulty to ambulate, did not feel well, was brought into emergency department for evaluation. Most determination was obtained through patient  and medical record as patient currently sleeping. Per patient  patient did not have any fever, shortness of breath, chest pain, nausea, vomiting, abdominal pain or diarrhea. She does take Eliquis. And she is supposed to see cardiologist as outpatient but did not have a chance to schedule appointment yet. In the emergency department patient was evaluated systolic blood pressure was on the low side, patient feeling lightheaded and weak, CT head was done was negative for acute intracranial pathology. Laboratory data was obtained creatinine was 1.6, stable, EKG revealed bigeminy with frequent cardiac pauses. Medicine was consulted for admission and further evaluation. Interval history / Subjective:   Patient is seen and examined at bedside this AM. She c/o left upper back pain  - she in sling - reassured her.    Discussed with nursing and cm       Assessment & Plan: Symptomatic sp due to Mobitz II s/p PPM placement 10/6   Paroxsymal Aflutter   - pt presented with Weakness, Lightheadedness, Unsteady gait  - hypotension improved   - appreciate Cardiology input - discussed with Dr. 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 CM working on SNF - need a new place. Possible [address] Problems  Date Reviewed: 8/3/2022            Codes Class Noted POA    Pacemaker ICD-10-CM: Z95.0  ICD-9-CM: V45.01  10/6/2022 Unknown    Overview Signed 10/6/2022  7:00 AM by Geraldine Landry MD     10/6/22 [employer]             Third degree AV block [facility]) ICD-10-CM: I44.2  ICD-9-CM: 426.0  10/6/2022 Unknown        Weakness ICD-10-CM: R53.1  ICD-9-CM: 780.79  10/4/2022 Unknown       Review of Systems:   A comprehensive review of systems was negative except for that written in the HPI. Vital Signs:    Last 24hrs VS reviewed since prior progress note. Most recent are:  Visit Vitals  BP (!) 111/51 (BP 1 Location: Right upper arm, BP Patient Position: At rest)   Pulse 95   Temp 97.8 °F (36.6 °C)   Resp 20   Ht 5' 1\" (1.549 m)   Wt 50.3 kg (111 lb)   SpO2 97%   BMI 20.97 kg/m²       No intake or output data in the 24 hours ending 10/09/22 1252       Physical Examination:     I had a face to face encounter with this patient and independently examined them on 10/9/2022 as outlined below:          Constitutional:   Mild distress    ENT:  Oral mucosa moist, oropharynx benign. Resp:  CTA bilaterally. No wheezing/rhonchi/rales. No accessory muscle use. CV:  NSR, no gallops, rubs    GI:  Soft, non distended, non tender. normoactive bowel sounds, no hepatosplenomegaly     Musculoskeletal:  No edema, warm, 2+ pulses throughout    Neurologic:  Moves all extremities. Data Review:    Review and/or order of clinical lab test      Labs:     Recent Labs     10/07/22  0209   WBC 10.5   HGB 8.3*   HCT 27.6*          Recent Labs     10/07/22  0209      K 4.3   *   CO2 24   BUN 32*   CREA 1.16*   GLU 83   CA 8.6       No results for input(s): ALT, AP, TBIL, TBILI, TP, ALB, GLOB, GGT, AML, LPSE in the last 72 hours. No lab exists for component: SGOT, GPT, AMYP, HLPSE    No results for input(s): INR, PTP, APTT, INREXT, INREXT in the last 72 hours. No results for input(s): FE, TIBC, PSAT, FERR in the last 72 hours. No results found for: FOL, RBCF   No results for input(s): PH, PCO2, PO2 in the last 72 hours. No results for input(s): CPK, CKNDX, TROIQ in the last 72 hours.     No lab exists for component: CPKMB  No results found for: CHOL, CHOLX, CHLST, CHOLV, HDL, HDLP, LDL, LDLC, DLDLP, TGLX, TRIGL, TRIGP, CHHD, CHHDX  No results found for: [facility]  Lab Results   Component Value Date/Time    Color YELLOW/STRAW 07/30/2022 05:58 PM    Appearance CLOUDY (A) 07/30/2022 05:58 PM    Specific gravity 1.009 07/30/2022 05:58 PM    pH (UA) 5.5 07/30/2022 05:58 PM    Protein 30 (A) 07/30/2022 05:58 PM    Glucose Negative 07/30/2022 05:58 PM    Ketone Negative 07/30/2022 05:58 PM    Bilirubin Negative 07/30/2022 05:58 PM    Urobilinogen 0.2 07/30/2022 05:58 PM    Nitrites Negative 07/30/2022 05:58 PM    Leukocyte Esterase LARGE (A) 07/30/2022 05:58 PM    Epithelial cells MODERATE (A) 07/30/2022 05:58 PM    Bacteria Negative 07/30/2022 05:58 PM    WBC 20-50 07/30/2022 05:58 PM    RBC 5-10 07/30/2022 05:58 PM         Medications Reviewed:     Current Facility-Administered Medications   Medication Dose Route Frequency    lidocaine 4 % patch 1 Patch  1 Patch TransDERmal Q24H    melatonin tablet 3 mg  3 mg Oral QHS PRN sodium chloride (NS) flush 5-40 mL  5-40 mL IntraVENous Q8H    sodium chloride (NS) flush 5-40 mL  5-40 mL IntraVENous PRN    naloxone (NARCAN) injection 0.4 mg  0.4 mg IntraVENous PRN    oxyCODONE IR (ROXICODONE) tablet 2.5 mg  2.5 mg Oral Q4H PRN    sodium chloride (NS) flush 5-40 mL  5-40 mL IntraVENous Q8H    sodium chloride (NS) flush 5-40 mL  5-40 mL IntraVENous PRN    acetaminophen (TYLENOL) tablet 650 mg  650 mg Oral Q6H PRN    Or    acetaminophen (TYLENOL) suppository 650 mg  650 mg Rectal Q6H PRN    polyethylene glycol (MIRALAX) packet 17 g  17 g Oral DAILY PRN    ondansetron (ZOFRAN ODT) tablet 4 mg  4 mg Oral Q8H PRN    Or    ondansetron (ZOFRAN) injection 4 mg  4 mg IntraVENous Q6H PRN    apixaban (ELIQUIS) tablet 2.5 mg  2.5 mg Oral BID     ______________________________________________________________________  EXPECTED LENGTH OF STAY: 2d 14h  ACTUAL LENGTH OF STAY:          5                 Steven Perez MD

## 2022-10-10 LAB
ANION GAP SERPL CALC-SCNC: 7 MMOL/L (ref 5–15)
BUN SERPL-MCNC: 32 MG/DL (ref 6–20)
BUN/CREAT SERPL: 25 (ref 12–20)
CALCIUM SERPL-MCNC: 8.6 MG/DL (ref 8.5–10.1)
CHLORIDE SERPL-SCNC: 106 MMOL/L (ref 97–108)
CO2 SERPL-SCNC: 25 MMOL/L (ref 21–32)
CREAT SERPL-MCNC: 1.26 MG/DL (ref 0.55–1.02)
ERYTHROCYTE [DISTWIDTH] IN BLOOD BY AUTOMATED COUNT: 15.1 % (ref 11.5–14.5)
GLUCOSE SERPL-MCNC: 112 MG/DL (ref 65–100)
HCT VFR BLD AUTO: 27.3 % (ref 35–47)
HGB BLD-MCNC: 8.4 G/DL (ref 11.5–16)
MCH RBC QN AUTO: 30.7 PG (ref 26–34)
MCHC RBC AUTO-ENTMCNC: 30.8 G/DL (ref 30–36.5)
MCV RBC AUTO: 99.6 FL (ref 80–99)
NRBC # BLD: 0 K/UL (ref 0–0.01)
NRBC BLD-RTO: 0 PER 100 WBC
PLATELET # BLD AUTO: 291 K/UL (ref 150–400)
PMV BLD AUTO: 9.6 FL (ref 8.9–12.9)
POTASSIUM SERPL-SCNC: 4.1 MMOL/L (ref 3.5–5.1)
RBC # BLD AUTO: 2.74 M/UL (ref 3.8–5.2)
SODIUM SERPL-SCNC: 138 MMOL/L (ref 136–145)
WBC # BLD AUTO: 12.1 K/UL (ref 3.6–11)

## 2022-10-10 PROCEDURE — 65270000046 HC RM TELEMETRY

## 2022-10-10 PROCEDURE — 74011250637 HC RX REV CODE- 250/637: Performed by: INTERNAL MEDICINE

## 2022-10-10 PROCEDURE — 80048 BASIC METABOLIC PNL TOTAL CA: CPT

## 2022-10-10 PROCEDURE — 36415 COLL VENOUS BLD VENIPUNCTURE: CPT

## 2022-10-10 PROCEDURE — 85027 COMPLETE CBC AUTOMATED: CPT

## 2022-10-10 PROCEDURE — 74011000250 HC RX REV CODE- 250: Performed by: INTERNAL MEDICINE

## 2022-10-10 RX ADMIN — APIXABAN 2.5 MG: 2.5 TABLET, FILM COATED ORAL at 09:27

## 2022-10-10 RX ADMIN — OXYCODONE 2.5 MG: 5 TABLET ORAL at 18:41

## 2022-10-10 RX ADMIN — ACETAMINOPHEN 650 MG: 325 TABLET ORAL at 16:36

## 2022-10-10 RX ADMIN — OXYCODONE 2.5 MG: 5 TABLET ORAL at 23:12

## 2022-10-10 RX ADMIN — APIXABAN 2.5 MG: 2.5 TABLET, FILM COATED ORAL at 18:22

## 2022-10-10 RX ADMIN — OXYCODONE 2.5 MG: 5 TABLET ORAL at 09:28

## 2022-10-10 RX ADMIN — OXYCODONE 2.5 MG: 5 TABLET ORAL at 03:26

## 2022-10-10 NOTE — PROGRESS NOTES
Physician Progress Note      PATIENT:               Tyrese Barr  CSN #:                  [id number]  :                       1932  ADMIT DATE:       10/4/2022 11:16 AM  DISCH DATE:  RESPONDING  PROVIDER #:        Marshall Liz MD          QUERY TEXT:    Patient admitted with symptomatic bradycardia and AVB, s/p dual chamber PPM during admission. 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 In order to support the diagnosis of acute kidney injury, please include additional clinical indicators in your documentation. ?Or, please document if the diagnosis of acute kidney injury has been ruled out after further study. The medical record reflects the following:    Risk Factors: 80 y.o. 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 [email or web address]  [phone]  You can also contact me via [facility]. Defined by Kidney Disease Improving Global Outcomes (KDIGO) clinical practice guideline for acute kidney injury:  -Increase in SCr by greater than or equal to 0.3 mg/dl within 48 hours; or  -Increase or decrease in SCr to greater than or equal to 1.5 times baseline, which is known or presumed to have occurred within the prior 7 days; or  -Urine volume < 0.5ml/kg/h for 6 hours. Options provided:  -- Acute kidney injury evidenced by, Please document evidence as well as a numerical baseline creatinine, if known.   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     Query created by: Ana Morelos on 10/10/2022 12:10 PM      Electronically signed by:  Dale Adam MD 10/10/2022 1:53 PM

## 2022-10-10 NOTE — PROGRESS NOTES
Problem: Falls - Risk of  Goal: *Absence of Falls  Description: Document Rosalino Saenz Fall Risk and appropriate interventions in the flowsheet.   Outcome: Progressing Towards Goal  Note: Fall Risk Interventions:       Mentation Interventions: Adequate sleep, hydration, pain control, Bed/chair exit alarm, Door open when patient unattended, Evaluate medications/consider consulting pharmacy, Eyeglasses and hearing aids, Familiar objects from home, Family/sitter at bedside, Gait belt with transfers/ambulation, HELP ([address]) if available, Increase mobility, More frequent rounding, Reorient patient, Room close to nurse's station, Update white board, Toileting rounds, Self-releasing belt    Medication Interventions: Assess postural VS orthostatic hypotension, Bed/chair exit alarm, Evaluate medications/consider consulting pharmacy, Patient to call before getting OOB, Teach patient to arise slowly, Utilize gait belt for transfers/ambulation    Elimination Interventions: Bed/chair exit alarm, Call light in reach, Patient to call for help with toileting needs, Stay With Me (per policy), Toileting schedule/hourly rounds    History of Falls Interventions: Bed/chair exit alarm, Consult care management for discharge planning, Door open when patient unattended, Evaluate medications/consider consulting pharmacy, Investigate reason for fall, Room close to nurse's station, Utilize gait belt for transfer/ambulation, Assess for delayed presentation/identification of injury for 48 hrs (comment for end date)

## 2022-10-10 NOTE — PROGRESS NOTES
[address] [facility] Adult  Hospitalist Group                                                                                          Hospitalist Progress Note  Jania Lua MD  Answering service: [phone] from in house phone        Date of Service:  10/10/2022  NAME:  Diana Curtis  :  1932  MRN:  [id number]      Admission Summary:   Diana Curtis is a 80 y.o. female with known past medical history of a flutter on Eliquis, recent pneumonia patient was admitted in the hospital and  and discharged in the beginning of August due to pneumonia and hypoxemia at that time he will blood pressure was low, after the last admission patient was discharged to rehab her condition improved and 2 weeks ago she was discharged home, per her  was not at the edge patient was weak and experienced on and off lightheadedness and unsteady gait, the patient had difficulty to ambulate, did not feel well, was brought into emergency department for evaluation. Most history was obtained through patient  and medical record as patient currently sleeping. Per patient  patient did not have any fever, shortness of breath, chest pain, nausea, vomiting, abdominal pain or diarrhea. She does take Eliquis. And she is supposed to see cardiologist as outpatient but did not have a chance to schedule appointment yet. In the emergency department patient was evaluated systolic blood pressure was on the low side, patient feeling lightheaded and weak, CT head was done was negative for acute intracranial pathology. Laboratory data was obtained creatinine was 1.6, stable, EKG revealed bigeminy with frequent cardiac pauses. Medicine was consulted for admission and further evaluation. Interval history / Subjective:   Patient is seen and examined at bedside this AM. She still has left upper back pain  - she in sling - reassured her.  Pain meds prn   Discussed with nursing and cm       Assessment & Plan:     Symptomatic sp due to Mobitz II s/p PPM placement 10/6   Paroxsymal Aflutter   - pt presented with Weakness, Lightheadedness, Unsteady gait  - hypotension improved   - appreciate Cardiology input - discussed with Dr. 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 CM working on SNF - need a new place. Hospital Problems  Date Reviewed: 8/3/2022            Codes Class Noted POA    Pacemaker ICD-10-CM: Z95.0  ICD-9-CM: V45.01  10/6/2022 Unknown    Overview Signed 10/6/2022  7:00 AM by Lisa Altamirano MD     10/6/22 [employer]             Third degree AV block [facility]) ICD-10-CM: I44.2  ICD-9-CM: 426.0  10/6/2022 Unknown        Weakness ICD-10-CM: R53.1  ICD-9-CM: 780.79  10/4/2022 Unknown       Review of Systems:   A comprehensive review of systems was negative except for that written in the HPI. Vital Signs:    Last 24hrs VS reviewed since prior progress note. Most recent are:  Visit Vitals  /78 (BP 1 Location: Right upper arm, BP Patient Position: Semi fowlers)   Pulse (!) 104   Temp 97.5 °F (36.4 °C)   Resp 24   Ht 5' 1\" (1.549 m)   Wt 50.3 kg (111 lb)   SpO2 99%   BMI 20.97 kg/m²         Intake/Output Summary (Last 24 hours) at 10/10/2022 1255  Last data filed at 10/10/2022 1036  Gross per 24 hour   Intake 457 ml   Output 300 ml   Net 157 ml          Physical Examination:     I had a face to face encounter with this patient and independently examined them on 10/10/2022 as outlined below:          Constitutional:   Mild distress    ENT:  Oral mucosa moist, oropharynx benign. Resp:  CTA bilaterally. No wheezing/rhonchi/rales.  No accessory muscle use. CV:  NSR, no gallops, rubs    GI:  Soft, non distended, non tender. normoactive bowel sounds, no hepatosplenomegaly     Musculoskeletal:  No edema, warm, 2+ pulses throughout    Neurologic:  Moves all extremities. Data Review:    Review and/or order of clinical lab test      Labs:     Recent Labs     10/10/22  0321   WBC 12.1*   HGB 8.4*   HCT 27.3*          Recent Labs     10/10/22  0321      K 4.1      CO2 25   BUN 32*   CREA 1.26*   *   CA 8.6       No results for input(s): ALT, AP, TBIL, TBILI, TP, ALB, GLOB, GGT, AML, LPSE in the last 72 hours. No lab exists for component: SGOT, GPT, AMYP, HLPSE    No results for input(s): INR, PTP, APTT, INREXT, INREXT in the last 72 hours. No results for input(s): FE, TIBC, PSAT, FERR in the last 72 hours. No results found for: FOL, RBCF   No results for input(s): PH, PCO2, PO2 in the last 72 hours. No results for input(s): CPK, CKNDX, TROIQ in the last 72 hours.     No lab exists for component: CPKMB  No results found for: CHOL, CHOLX, CHLST, CHOLV, HDL, HDLP, LDL, LDLC, DLDLP, TGLX, TRIGL, TRIGP, CHHD, CHHDX  No results found for: [facility]  Lab Results   Component Value Date/Time    Color YELLOW/STRAW 07/30/2022 05:58 PM    Appearance CLOUDY (A) 07/30/2022 05:58 PM    Specific gravity 1.009 07/30/2022 05:58 PM    pH (UA) 5.5 07/30/2022 05:58 PM    Protein 30 (A) 07/30/2022 05:58 PM    Glucose Negative 07/30/2022 05:58 PM    Ketone Negative 07/30/2022 05:58 PM    Bilirubin Negative 07/30/2022 05:58 PM    Urobilinogen 0.2 07/30/2022 05:58 PM    Nitrites Negative 07/30/2022 05:58 PM    Leukocyte Esterase LARGE (A) 07/30/2022 05:58 PM    Epithelial cells MODERATE (A) 07/30/2022 05:58 PM    Bacteria Negative 07/30/2022 05:58 PM    WBC 20-50 07/30/2022 05:58 PM    RBC 5-10 07/30/2022 05:58 PM         Medications Reviewed:     Current Facility-Administered Medications   Medication Dose Route Frequency lidocaine 4 % patch 1 Patch  1 Patch TransDERmal Q24H    melatonin tablet 3 mg  3 mg Oral QHS PRN    sodium chloride (NS) flush 5-40 mL  5-40 mL IntraVENous Q8H    sodium chloride (NS) flush 5-40 mL  5-40 mL IntraVENous PRN    naloxone (NARCAN) injection 0.4 mg  0.4 mg IntraVENous PRN    oxyCODONE IR (ROXICODONE) tablet 2.5 mg  2.5 mg Oral Q4H PRN    sodium chloride (NS) flush 5-40 mL  5-40 mL IntraVENous Q8H    sodium chloride (NS) flush 5-40 mL  5-40 mL IntraVENous PRN    acetaminophen (TYLENOL) tablet 650 mg  650 mg Oral Q6H PRN    Or    acetaminophen (TYLENOL) suppository 650 mg  650 mg Rectal Q6H PRN    polyethylene glycol (MIRALAX) packet 17 g  17 g Oral DAILY PRN    ondansetron (ZOFRAN ODT) tablet 4 mg  4 mg Oral Q8H PRN    Or    ondansetron (ZOFRAN) injection 4 mg  4 mg IntraVENous Q6H PRN    apixaban (ELIQUIS) tablet 2.5 mg  2.5 mg Oral BID     ______________________________________________________________________  EXPECTED LENGTH OF STAY: 3d 2h  ACTUAL LENGTH OF STAY:          6                 Gerardo Henry MD

## 2022-10-10 NOTE — PROGRESS NOTES
Faculty or Preceptor Review of Student Work    10/10/2022  - Shift times - 0700 to 1300    The student documentation of patient care for Jung Scott has been reviewed and approved. All medications have been administered under the direct supervision of the faculty or preceptor.     Gabi Thakkar RN

## 2022-10-10 NOTE — PROGRESS NOTES
Medicare Appeal Completed  DC Agrees with termination of Hospital services  Liability:  Beneficiary Liability Starts on 10/11/2022 Starts self pay 10/11/2022 @ 12 Noon.      Lovely Givens, IVELISSE, AC-  Case Management [address] [place] [address]  C: [phone]

## 2022-10-10 NOTE — PROGRESS NOTES
[address] [facility] visit. Mrs. Galeano was sound asleep in bed. She did not wake while  prayer the spiritual communion prayer for her.     DAV Gee, RN, ACSW, LCSW   Page:  [phone]

## 2022-10-10 NOTE — PROGRESS NOTES
Transition of Care  RUR 21% High  Disposition Likely [facility] pending bed  DME Wheelchair, rollator, home oxygen, shower stool, raised toilet seat, grab bars  Transportation [facility] phone [phone], WILL CALL  Follow Up PCP, Specialist    ANGELA placed call to Geni with [facility] at the [place], [phone]. Dinomorayshawn states that she has talked with patient's  who will be touring facility this morning. ANGELA notes patient appealed to Medicare over the weekend as he did not want patient to DC to [facility] due to ratings. Additional referrals were placed to [facility] [address], both facilities have accepted patient. CM attempted phone call to patient's  but no answer. CM to monitor. Steve Leonardo MS    12:35 CM met with patient's  at bedside. He has toured [employer] at [employer] and does not wish to have patient placed. He is requesting for CM to send out additional referrals extending to the [place] area. CM to monitor. Steve Leonardo MS    2:50 Patient has been accepted to the following SNFs: [facility], [address], [facility], [facility], [facility], and [facility] will not have a bed until later in the week. The following referrals are still pending: [facility], [address], [place], and [facility] [facility]. [facility], [facility] can not accept. CM contacted  with update. Patient's  will follow up with CM with choice. Steve Leonardo MS    3:00 CM received phone call from patient's  who provided choice for [facility]. CM placed call to Hem[facility] at [address]. Hemant will have to follow back up with CM tomorrow morning regarding admission. CM to monitor.      Steve Leonardo MS

## 2022-10-11 ENCOUNTER — APPOINTMENT (OUTPATIENT)
Dept: INTERVENTIONAL RADIOLOGY/VASCULAR | Age: 87
DRG: 242 | End: 2022-10-11
Attending: INTERNAL MEDICINE
Payer: MEDICARE

## 2022-10-11 ENCOUNTER — APPOINTMENT (OUTPATIENT)
Dept: GENERAL RADIOLOGY | Age: 87
DRG: 242 | End: 2022-10-11
Attending: INTERNAL MEDICINE
Payer: MEDICARE

## 2022-10-11 LAB
ANION GAP SERPL CALC-SCNC: 6 MMOL/L (ref 5–15)
BUN SERPL-MCNC: 40 MG/DL (ref 6–20)
BUN/CREAT SERPL: 30 (ref 12–20)
CALCIUM SERPL-MCNC: 8.5 MG/DL (ref 8.5–10.1)
CHLORIDE SERPL-SCNC: 103 MMOL/L (ref 97–108)
CO2 SERPL-SCNC: 26 MMOL/L (ref 21–32)
COVID-19 RAPID TEST, COVR: NOT DETECTED
CREAT SERPL-MCNC: 1.33 MG/DL (ref 0.55–1.02)
GLUCOSE SERPL-MCNC: 105 MG/DL (ref 65–100)
LACTATE SERPL-SCNC: 1.2 MMOL/L (ref 0.4–2)
POTASSIUM SERPL-SCNC: 4.1 MMOL/L (ref 3.5–5.1)
PROCALCITONIN SERPL-MCNC: 0.72 NG/ML
SODIUM SERPL-SCNC: 135 MMOL/L (ref 136–145)
SOURCE, COVRS: NORMAL

## 2022-10-11 PROCEDURE — 74011000258 HC RX REV CODE- 258: Performed by: INTERNAL MEDICINE

## 2022-10-11 PROCEDURE — C1769 GUIDE WIRE: HCPCS

## 2022-10-11 PROCEDURE — 65270000046 HC RM TELEMETRY

## 2022-10-11 PROCEDURE — 77030002986 HC SUT PROL J&J -A

## 2022-10-11 PROCEDURE — 84145 PROCALCITONIN (PCT): CPT

## 2022-10-11 PROCEDURE — 74011250636 HC RX REV CODE- 250/636: Performed by: INTERNAL MEDICINE

## 2022-10-11 PROCEDURE — 77010033678 HC OXYGEN DAILY

## 2022-10-11 PROCEDURE — 83605 ASSAY OF LACTIC ACID: CPT

## 2022-10-11 PROCEDURE — 74011000250 HC RX REV CODE- 250

## 2022-10-11 PROCEDURE — C1729 CATH, DRAINAGE: HCPCS

## 2022-10-11 PROCEDURE — 74011250637 HC RX REV CODE- 250/637: Performed by: INTERNAL MEDICINE

## 2022-10-11 PROCEDURE — 71045 X-RAY EXAM CHEST 1 VIEW: CPT

## 2022-10-11 PROCEDURE — 74011000250 HC RX REV CODE- 250: Performed by: INTERNAL MEDICINE

## 2022-10-11 PROCEDURE — 87635 SARS-COV-2 COVID-19 AMP PRB: CPT

## 2022-10-11 PROCEDURE — 77030012390 HC DRN CHST BTL GTNG -B

## 2022-10-11 PROCEDURE — 80048 BASIC METABOLIC PNL TOTAL CA: CPT

## 2022-10-11 PROCEDURE — 97530 THERAPEUTIC ACTIVITIES: CPT

## 2022-10-11 PROCEDURE — 87040 BLOOD CULTURE FOR BACTERIA: CPT

## 2022-10-11 PROCEDURE — 36415 COLL VENOUS BLD VENIPUNCTURE: CPT

## 2022-10-11 PROCEDURE — 0W9B30Z DRAINAGE OF LEFT PLEURAL CAVITY WITH DRAINAGE DEVICE, PERCUTANEOUS APPROACH: ICD-10-PCS | Performed by: STUDENT IN AN ORGANIZED HEALTH CARE EDUCATION/TRAINING PROGRAM

## 2022-10-11 PROCEDURE — 32554 ASPIRATE PLEURA W/O IMAGING: CPT

## 2022-10-11 PROCEDURE — C1894 INTRO/SHEATH, NON-LASER: HCPCS

## 2022-10-11 PROCEDURE — 74011250637 HC RX REV CODE- 250/637: Performed by: NURSE PRACTITIONER

## 2022-10-11 RX ORDER — WATER FOR INJECTION,STERILE
VIAL (ML) INJECTION
Status: DISPENSED
Start: 2022-10-11 | End: 2022-10-11

## 2022-10-11 RX ORDER — HYDROMORPHONE HYDROCHLORIDE 1 MG/ML
0.5 INJECTION, SOLUTION INTRAMUSCULAR; INTRAVENOUS; SUBCUTANEOUS
Status: DISCONTINUED | OUTPATIENT
Start: 2022-10-11 | End: 2022-10-18 | Stop reason: HOSPADM

## 2022-10-11 RX ORDER — LIDOCAINE HYDROCHLORIDE 20 MG/ML
INJECTION, SOLUTION INFILTRATION; PERINEURAL
Status: COMPLETED
Start: 2022-10-11 | End: 2022-10-11

## 2022-10-11 RX ORDER — SODIUM CHLORIDE 9 MG/ML
75 INJECTION, SOLUTION INTRAVENOUS CONTINUOUS
Status: DISPENSED | OUTPATIENT
Start: 2022-10-11 | End: 2022-10-12

## 2022-10-11 RX ADMIN — APIXABAN 2.5 MG: 2.5 TABLET, FILM COATED ORAL at 09:19

## 2022-10-11 RX ADMIN — SODIUM CHLORIDE, PRESERVATIVE FREE 10 ML: 5 INJECTION INTRAVENOUS at 14:00

## 2022-10-11 RX ADMIN — CEFEPIME 1 G: 1 INJECTION, POWDER, FOR SOLUTION INTRAMUSCULAR; INTRAVENOUS at 22:35

## 2022-10-11 RX ADMIN — Medication 3 MG: at 22:35

## 2022-10-11 RX ADMIN — SODIUM CHLORIDE, PRESERVATIVE FREE 2 G: 5 INJECTION INTRAVENOUS at 11:20

## 2022-10-11 RX ADMIN — APIXABAN 2.5 MG: 2.5 TABLET, FILM COATED ORAL at 17:40

## 2022-10-11 RX ADMIN — SODIUM CHLORIDE, PRESERVATIVE FREE 10 ML: 5 INJECTION INTRAVENOUS at 22:40

## 2022-10-11 RX ADMIN — OXYCODONE 2.5 MG: 5 TABLET ORAL at 05:44

## 2022-10-11 RX ADMIN — LIDOCAINE HYDROCHLORIDE: 20 INJECTION, SOLUTION INFILTRATION; PERINEURAL at 12:00

## 2022-10-11 RX ADMIN — OXYCODONE 2.5 MG: 5 TABLET ORAL at 13:40

## 2022-10-11 RX ADMIN — HYDROMORPHONE HYDROCHLORIDE 0.5 MG: 1 INJECTION, SOLUTION INTRAMUSCULAR; INTRAVENOUS; SUBCUTANEOUS at 11:19

## 2022-10-11 RX ADMIN — VANCOMYCIN HYDROCHLORIDE 1250 MG: 1.25 INJECTION, POWDER, LYOPHILIZED, FOR SOLUTION INTRAVENOUS at 13:40

## 2022-10-11 RX ADMIN — SODIUM CHLORIDE 75 ML/HR: 9 INJECTION, SOLUTION INTRAVENOUS at 13:41

## 2022-10-11 RX ADMIN — ACETAMINOPHEN 650 MG: 325 TABLET ORAL at 22:35

## 2022-10-11 NOTE — PROGRESS NOTES
1600: Received pt from previous RN Isacc. Pt Aox 1 (self). Assessment as follows:   Cardiac: paced on monitor with left chest pacemaker  RR: 3 liters NC  GI: incontinent; brief on. No BM  : incontinent. Brief  Skin: red bottom (lotion applied), purwick removed, left heel has unblanchable redness. Feet elevated. Neuro: AO x 1. Yells out. Is confused  Pain: States arms and neck hurt. PRN pain med given. 1930:  Bedside and Verbal shift change report given to Cj (oncoming nurse) by Percy Duval RN (offgoing nurse). Report included the following information SBAR, Kardex, Intake/Output, and MAR.

## 2022-10-11 NOTE — PROGRESS NOTES
[facility]                                                                                          Hospitalist Progress Note  Oliverio Sorto MD  Answering service: [phone] from in house phone        Date of Service:  10/11/2022  NAME:  Renita Roper  :  1932  MRN:  [id number]      Admission Summary:   Renita Roper is a 80 y.o. female with known past medical history of a flutter on Eliquis, recent pneumonia patient was admitted in the hospital and  and discharged in the beginning of August due to pneumonia and hypoxemia at that time he will blood pressure was low, after the last admission patient was discharged to rehab her condition improved and 2 weeks ago she was discharged home, per her  was not at the edge patient was weak and experienced on and off lightheadedness and unsteady gait, the patient had difficulty to ambulate, did not feel well, was brought into emergency department for evaluation. Most history was obtained through patient  and medical record as patient currently sleeping. Per patient  patient did not have any fever, shortness of breath, chest pain, nausea, vomiting, abdominal pain or diarrhea. She does take Eliquis. And she is supposed to see cardiologist as outpatient but did not have a chance to schedule appointment yet. In the emergency department patient was evaluated systolic blood pressure was on the low side, patient feeling lightheaded and weak, CT head was done was negative for acute intracranial pathology. Laboratory data was obtained creatinine was 1.6, stable, EKG revealed bigeminy with frequent cardiac pauses. Medicine was consulted for admission and further evaluation. Interval history / Subjective:   Patient is seen and examined at bedside this AM.   Febrile last night  - septic work up ordered. CXR showed left PTX.  IR and Pulm consulted  Pt still c/w pain on left chest - tried to explain CXR findings of PTX. Discussed with nursing and cm    Tried to reach , left voicemail. Spoke with  at 2pm - explained CXR findings of PTX and chest tube placement, she tolerated well, possible PNA- started on IV abx, dc likely Friday if chest tube is removed. Assessment & Plan:     Left Pneumothorax s/p chest tube 10/11  CXR: Moderate-sized left pneumothorax with maximum pleural separation of 4.1 cm. Mild infrahilar airspace disease may represent atelectasis. Difficult to exclude  pneumonia. - febrile on 10/10  - normal lactic, procal 0.7  - appreciate IR and pulm input  - chest tube placed on low continuous pressure  - started on broad spectrum abx IV Vanco, Cefepime     Symptomatic sp due to Mobitz II s/p PPM placement 10/6   Paroxsymal Aflutter   - pt presented with Lightheadedness, Unsteady gait  - hypotension improved   - appreciate Cardiology input - discussed with Dr. 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 Code status: DNR  Prophylaxis: Eliquis  Care Plan discussed with: patient and RN  Anticipated Disposition:  [facility]  Date Reviewed: 8/3/2022            Codes Class Noted POA    Pacemaker ICD-10-CM: Z95.0  ICD-9-CM: V45.01  10/6/2022 Unknown    Overview Signed 10/6/2022  7:00 AM by Joe Rico MD     10/6/22 [employer]             Third degree AV block [facility]) ICD-10-CM: I44.2  ICD-9-CM: 426.0  10/6/2022 Unknown        Weakness ICD-10-CM: R53.1  ICD-9-CM: 780.79  10/4/2022 Unknown       Review of Systems:   A comprehensive review of systems was negative except for that written in the HPI.        Vital Signs:    Last 24hrs VS reviewed since prior progress note. Most recent are:  Visit Vitals  BP (!) 145/48 (BP 1 Location: Right upper arm, BP Patient Position: At rest)   Pulse 96   Temp 98 °F (36.7 °C)   Resp 24   Ht 5' 1\" (1.549 m)   Wt 50.3 kg (111 lb)   SpO2 98%   BMI 20.97 kg/m²       No intake or output data in the 24 hours ending 10/11/22 1258       Physical Examination:     I had a face to face encounter with this patient and independently examined them on 10/11/2022 as outlined below:          Constitutional:   Mild distress    ENT:  Oral mucosa moist, oropharynx benign. Resp:  CTA bilaterally. No wheezing/rhonchi/rales. No accessory muscle use. CV:  NSR, no gallops, rubs    GI:  Soft, non distended, non tender. normoactive bowel sounds, no hepatosplenomegaly     Musculoskeletal:  No edema, warm, 2+ pulses throughout    Neurologic:  Moves all extremities. Data Review:    Review and/or order of clinical lab test      Labs:     Recent Labs     10/10/22  0321   WBC 12.1*   HGB 8.4*   HCT 27.3*          Recent Labs     10/11/22  0715 10/10/22  0321   * 138   K 4.1 4.1    106   CO2 26 25   BUN 40* 32*   CREA 1.33* 1.26*   * 112*   CA 8.5 8.6       No results for input(s): ALT, AP, TBIL, TBILI, TP, ALB, GLOB, GGT, AML, LPSE in the last 72 hours. No lab exists for component: SGOT, GPT, AMYP, HLPSE    No results for input(s): INR, PTP, APTT, INREXT, INREXT in the last 72 hours. No results for input(s): FE, TIBC, PSAT, FERR in the last 72 hours. No results found for: FOL, RBCF   No results for input(s): PH, PCO2, PO2 in the last 72 hours. No results for input(s): CPK, CKNDX, TROIQ in the last 72 hours.     No lab exists for component: CPKMB  No results found for: CHOL, CHOLX, CHLST, CHOLV, HDL, HDLP, LDL, LDLC, DLDLP, TGLX, TRIGL, TRIGP, CHHD, CHHDX  No results found for: [facility]  Lab Results   Component Value Date/Time    Color YELLOW/STRAW 07/30/2022 05:58 PM    Appearance CLOUDY (A) 07/30/2022 05:58 PM    Specific gravity 1.009 07/30/2022 05:58 PM    pH (UA) 5.5 07/30/2022 05:58 PM    Protein 30 (A) 07/30/2022 05:58 PM    Glucose Negative 07/30/2022 05:58 PM    Ketone Negative 07/30/2022 05:58 PM    Bilirubin Negative 07/30/2022 05:58 PM    Urobilinogen 0.2 07/30/2022 05:58 PM    Nitrites Negative 07/30/2022 05:58 PM    Leukocyte Esterase LARGE (A) 07/30/2022 05:58 PM    Epithelial cells MODERATE (A) 07/30/2022 05:58 PM    Bacteria Negative 07/30/2022 05:58 PM    WBC 20-50 07/30/2022 05:58 PM    RBC 5-10 07/30/2022 05:58 PM         Medications Reviewed:     Current Facility-Administered Medications   Medication Dose Route Frequency    HYDROmorphone (DILAUDID) injection 0.5 mg  0.5 mg IntraVENous Q4H PRN    cefepime (MAXIPIME) 1 g in 0.9% sodium chloride (MBP/ADV) 50 mL MBP  1 g IntraVENous Q12H    sterile water (preservative free) injection        lidocaine (XYLOCAINE) 20 mg/mL (2 %) injection        vancomycin (VANCOCIN) 1,250 mg in 0.9% sodium chloride 250 mL ([id number])  1,250 mg IntraVENous ONCE    [START ON 10/12/2022] vancomycin (VANCOCIN) 500 mg in 0.9% sodium chloride (MBP/ADV) 100 mL MBP  500 mg IntraVENous Q24H    Vancomycin pharmacy to dose   Other Rx Dosing/Monitoring    lidocaine 4 % patch 1 Patch  1 Patch TransDERmal Q24H    melatonin tablet 3 mg  3 mg Oral QHS PRN    sodium chloride (NS) flush 5-40 mL  5-40 mL IntraVENous Q8H    sodium chloride (NS) flush 5-40 mL  5-40 mL IntraVENous PRN    naloxone (NARCAN) injection 0.4 mg  0.4 mg IntraVENous PRN    oxyCODONE IR (ROXICODONE) tablet 2.5 mg  2.5 mg Oral Q4H PRN    sodium chloride (NS) flush 5-40 mL  5-40 mL IntraVENous Q8H    sodium chloride (NS) flush 5-40 mL  5-40 mL IntraVENous PRN    acetaminophen (TYLENOL) tablet 650 mg  650 mg Oral Q6H PRN    Or    acetaminophen (TYLENOL) suppository 650 mg  650 mg Rectal Q6H PRN    polyethylene glycol (MIRALAX) packet 17 g  17 g Oral DAILY PRN    ondansetron (ZOFRAN ODT) tablet 4 mg  4 mg Oral Q8H PRN    Or    ondansetron (ZOFRAN) injection 4 mg  4 mg IntraVENous Q6H PRN    [Held by provider] apixaban (ELIQUIS) tablet 2.5 mg  2.5 mg Oral BID     ______________________________________________________________________  EXPECTED LENGTH OF STAY: 3d 2h  ACTUAL LENGTH OF STAY:          7                 Jania Lua MD

## 2022-10-11 NOTE — CONSULTS
Pulmonary, Critical Care, and Sleep Medicine~Consult Note    Name: Shirley Neil MRN: [id number]   : 1932 Hospital: [facility]    Date: 10/11/2022 11:12 AM Admission: 10/4/2022     Impression Plan   Acute on chronic hypoxic resp failure  Large left PTX  Generalized weakness and unsteady gait--> bradycardia with AV block mobitz type II. S/p pacemaker placement on 10/6  A fib/flutter. On elquis   PAULO on CKD IR will be require for chest tube placement  Agree with empiric abx  Will get procal in am  Chest film in am  Please place chest tube on low continuous suction following chest tube placement   Eliquis on hold      Daily Progression:    Consult Note requested by hospitalist     Patient presented for admission on 10/4/22 for weakness and unsteady gait; found to be bradycardic with AV block mobitz type II.  PPM placement on 10/6. Patient spiked a fever of 100. 6F  which prompted a chest image prior to discharge. On that chest image she was noted to have a 4.1cm left PTX. Of note the chest image taken on 10/6 did not show a ptx. I have reviewed the labs and previous days notes. Review of systems not obtained due to patient factors. No past medical history on file. No past surgical history on file. Prior to Admission medications    Medication Sig Start Date End Date Taking? Authorizing Provider   apixaban (ELIQUIS) 2.5 mg tablet Take 1 Tablet by mouth two (2) times a day. 10/8/22  Yes Kamlesh Marshall MD   lidocaine 4 % patch Apply patch to back  . Apply patch to the affected area for 12 hours a day and remove for 12 hours a day. 10/9/22  Yes Kamlesh Marshall MD   oxyCODONE IR (ROXICODONE) 5 mg immediate release tablet Take 0.5 Tablets by mouth every four (4) hours as needed for Pain for up to 3 days. Max Daily Amount: 15 mg. 10/8/22 10/11/22 Yes Kamlesh Marshall MD   nystatin (MYCOSTATIN) 100,000 unit/gram ointment Apply  to affected area two (2) times a day.     Other, MD Madhavi   albuterol (PROVENTIL HFA, VENTOLIN HFA, PROAIR HFA) 90 mcg/actuation inhaler Take 1 Puff by inhalation every four (4) hours as needed. Madhavi Elena MD   albuterol-ipratropium (DUO-NEB) 2.5 mg-0.5 mg/3 ml nebu 3 mL by Nebulization route two (2) times a day. Madhavi Elena MD   acetaminophen (Tylenol Arthritis Pain) 650 mg TbER Take 650 mg by mouth every eight (8) hours. Madhavi Elena MD   guaiFENesin (ORGANIDIN) 200 mg tablet Take 600 mg by mouth every four (4) hours as needed for Congestion. Madhavi Elena MD   calcium carbonate (CALTREX) 600 mg calcium (1,500 mg) tablet Take 600 mg by mouth two (2) times a day. Madhavi Elena MD   aspirin 81 mg chewable tablet Take 81 mg by mouth in the morning. Madhavi Elena MD   polyethylene glycol (Miralax) 17 gram packet Take 17 g by mouth in the morning. Provider, Historical   famotidine (PEPCID) 10 mg tablet Take 10 mg by mouth two (2) times a day. Provider, Historical   melatonin 3 mg tablet Take 3 mg by mouth nightly as needed for Insomnia. Provider, Historical   simethicone (GAS-X) 125 mg chewable tablet Take 125 mg by mouth every six (6) hours as needed for Flatulence. Provider, Historical   ondansetron (ZOFRAN ODT) 4 mg disintegrating tablet Take 4 mg by mouth every eight (8) hours as needed for Nausea or Vomiting. Provider, Historical   bisacodyL (Dulcolax, bisacodyl,) 10 mg supp Insert 10 mg into rectum daily as needed for Constipation. Provider, Historical     Allergies   Allergen Reactions    Amoxicillin Unknown (comments)    Penicillins Unknown (comments)    Sulfa (Sulfonamide Antibiotics) Unknown (comments)      Social History     Tobacco Use    Smoking status: Every Day    Smokeless tobacco: Never   Substance Use Topics    Alcohol use: Not on file      No family history on file.   OBJECTIVE:     Vital Signs:     Visit Vitals  BP (!) 124/52 (BP 1 Location: Right upper arm, BP Patient Position: At rest)   Pulse 96   Temp 97.3 °F (36.3 °C)   Resp 20   Ht 5' 1\" (1.549 m)   Wt 50.3 kg (111 lb)   SpO2 97%   BMI 20.97 kg/m²      Temp (24hrs), Av.6 °F (37 °C), Min:97.3 °F (36.3 °C), Max:100.6 °F (38.1 °C)     Intake/Output:     Last shift: No intake/output data recorded.     Last 3 shifts: 10/09 1901 - 10/11 0700  In: 457 [P.O.:457]  Out: 300 [Urine:300]      No intake or output data in the 24 hours ending 10/11/22 1112    Physical Exam:                                        Exam Findings Other   General: No resp distress noted, appears stated age    [de-identified]:  No ulcers, JVD not elevated, no cervical LAD    Chest: No pectus deformity, normal chest rise b/l    HEART:  RRR, no murmurs/rubs/gallops    Lungs:  Diminished bs on left     ABD: Soft/NT, non rigid mildly distended    EXT: No cyanosis/clubbing/edema, normal peripheral pulses    Skin: No rashes or ulcers, no mottling    Neuro: Awake         Medications:  Current Facility-Administered Medications   Medication Dose Route Frequency    HYDROmorphone (DILAUDID) injection 0.5 mg  0.5 mg IntraVENous Q4H PRN    cefepime (MAXIPIME) 2 g in 0.9% sodium chloride 10 mL IV syringe  2 g IntraVENous NOW    Followed by    cefepime (MAXIPIME) 1 g in 0.9% sodium chloride (MBP/ADV) 50 mL MBP  1 g IntraVENous Q12H    lidocaine 4 % patch 1 Patch  1 Patch TransDERmal Q24H    melatonin tablet 3 mg  3 mg Oral QHS PRN    sodium chloride (NS) flush 5-40 mL  5-40 mL IntraVENous Q8H    sodium chloride (NS) flush 5-40 mL  5-40 mL IntraVENous PRN    naloxone (NARCAN) injection 0.4 mg  0.4 mg IntraVENous PRN    oxyCODONE IR (ROXICODONE) tablet 2.5 mg  2.5 mg Oral Q4H PRN    sodium chloride (NS) flush 5-40 mL  5-40 mL IntraVENous Q8H    sodium chloride (NS) flush 5-40 mL  5-40 mL IntraVENous PRN    acetaminophen (TYLENOL) tablet 650 mg  650 mg Oral Q6H PRN    Or    acetaminophen (TYLENOL) suppository 650 mg  650 mg Rectal Q6H PRN    polyethylene glycol (MIRALAX) packet 17 g  17 g Oral DAILY PRN    ondansetron (Cely Camron ODT) tablet 4 mg  4 mg Oral Q8H PRN    Or    ondansetron (ZOFRAN) injection 4 mg  4 mg IntraVENous Q6H PRN    [Held by provider] apixaban (ELIQUIS) tablet 2.5 mg  2.5 mg Oral BID       Labs:  ABG No results for input(s): PHI, PCO2I, PO2I, HCO3I, SO2I, FIO2I in the last 72 hours.      CBC Recent Labs     10/10/22  0321   WBC 12.1*   HGB 8.4*   HCT 27.3*      MCV 99.6*   MCH 42.9        Metabolic  Panel Recent Labs     10/11/22  0715 10/10/22  0321   * 138   K 4.1 4.1    106   CO2 26 25   * 112*   BUN 40* 32*   CREA 1.33* 1.26*   CA 8.5 8.6        Pertinent Labs                Silver Short PA-C  10/11/2022

## 2022-10-11 NOTE — PROGRESS NOTES
Pharmacy Note     Cefepime 1 gm IV q8h ordered for treatment of Pneumonia. Per [facility] THE [facility] Renal / Extended Infusion B Lactam Policy, Cefepime will be changed to 2 gm IV once then 1 gm IV q12h. Estimated Creatinine Clearance: Estimated Creatinine Clearance: 21.2 mL/min (A) (based on SCr of 1.33 mg/dL (H)). Dialysis Status, PAULO, CKD: CKD    BMI:  Body mass index is 20.97 kg/m². Recent Labs     10/10/22  0321   WBC 12.1*     Temp (24hrs), Av.6 °F (37 °C), Min:97.3 °F (36.3 °C), Max:100.6 °F (38.1 °C)      Pharmacy will continue to monitor and adjust dose as necessary. Please call with any questions.     Thank you,  Rogelio Hou, PHARMD

## 2022-10-11 NOTE — PROCEDURES
Interventional Radiology  Procedure Note        10/11/2022 12:26 PM    Patient: Jeremias Grijalva     Informed consent obtained    Diagnosis: Left pneumothorax    Procedure(s): Left side chest tube at bedside, 8Fr, without imaging guidance    Specimens removed:  none    Complications: None    Primary Physician: Ahmet Carrillo MD    Recommendations: Tube to low wall suction    Discharge Disposition: remain in room    Full dictated report to follow    Ahmet Carrillo MD  Interventional Radiology  [facility]  12:26 PM, 10/11/2022

## 2022-10-11 NOTE — PROGRESS NOTES
Transition of Care  RUR 21% High  Disposition  [place] when medically stable    DME Wheelchair, rollator, home oxygen, shower stool, raised toilet seat, grab bars    Transportation [facility] phone [phone], WILL CALL    Follow Up PCP, Specialist      Discharge discontinued due to fever resulting from a pneumothorax      Nurse to call [phone] ext [phone] or [phone] for report for room 108    Awaiting a call regarding discharge today from [address] [place] [address] noted Medicare Appeal completed and Beneficiary Liability starts today 10/11/22 at 12 noon. CM called [facility] for bed availability in order to discharge patient today. CM left a VM message for them to call this CM back. Sydni Bird MSA, RN,     10:25 am. [facility] will admitted patient today-    11:00 AM. CM notified [facility] of cancellation    .

## 2022-10-11 NOTE — PROGRESS NOTES
Problem: Mobility Impaired (Adult and Pediatric)  Goal: *Acute Goals and Plan of Care (Insert Text)  Description: FUNCTIONAL STATUS PRIOR TO ADMISSION: Patient was modified independent using a rollator vs no device? for functional mobility. Recently discharged from SNF to [facility] for respite care and was home for 1 day prior to this admission for fall. HOME SUPPORT PRIOR TO ADMISSION: The patient lived with her supportive and independent . Son lives in [place] but was coming into town to assist with care needs. Physical Therapy Goals  Revised 10/6/2022  1. Patient will move from supine to sit and sit to supine, scoot up and down, and roll side to side in bed with moderate assistance  within 7 day(s). 2.  Patient will transfer from bed to chair and chair to bed with max assistance using the least restrictive device within 7 day(s). 3.  Patient will perform sit to stand with max assistance within 7 day(s). 4.  Patient will completed seated LE therex x10 reps with CGA for balance within 7 days. 5.  PT to assess gait as appropriate. Physical Therapy Goals  Initiated 10/4/2022  1. Patient will move from supine to sit and sit to supine, scoot up and down, and roll side to side in bed with moderate assistance  within 7 day(s). 2.  Patient will transfer from bed to chair and chair to bed with moderate assistance  using the least restrictive device within 7 day(s). 3.  Patient will perform sit to stand with moderate assistance  within 7 day(s). 4.  Patient will ambulate with moderate assistance  for 15 feet with the least restrictive device within 7 day(s).        Outcome: Not Progressing Towards Goal   PHYSICAL THERAPY TREATMENT  Patient: Any Trammell (44 y.o. female)  Date: 10/11/2022  Diagnosis: Weakness [R53.1] <principal problem not specified>  Procedure(s) (LRB):  INSERT PPM DUAL (N/A) 5 Days Post-Op  Precautions: Fall (L sling, PM precautions)  Chart, physical therapy assessment, plan of care and goals were reviewed. ASSESSMENT  Patient continues with skilled PT services and is not progressing towards goals. Pt received supine in bed with sling in place to LUE. Pt moaning out in pain upon arrival to room with no stimulation and reported L shoulder pain even with just talking. Removed pillows from RUE and LEs in preparation for mobility, but pt refused due to pain. Pt educated on pacemaker precautions and expectation of some pain. Pt performed L hand squeezes for edema control and placed pillow under LUE for comfort and edema control as well. Pt continues to be limited by confusion, decreased functional mobility, decreased tolerance to activity, and functioning below baseline mobility status. Continue to recommend SNF placement upon discharge. Current Level of Function Impacting Discharge (mobility/balance): max-total A, limited by pain and refusal    Other factors to consider for discharge: previously mod I         PLAN :  Patient continues to benefit from skilled intervention to address the above impairments. Continue treatment per established plan of care. to address goals. Recommendation for discharge: (in order for the patient to meet his/her long term goals)  Therapy up to 5 days/week in SNF setting    This discharge recommendation:  Has been made in collaboration with the attending provider and/or case management    IF patient discharges home will need the following DME: to be determined (TBD)       SUBJECTIVE:   Patient stated OW! What are you going to do today? [place]    OBJECTIVE DATA SUMMARY:   Critical Behavior:  Neurologic State: Alert, Confused  Orientation Level: Oriented to person, Oriented to place, Oriented to situation  Cognition: Decreased attention/concentration  Safety/Judgement: Fall prevention  Functional Mobility Training:  Bed Mobility:      Max A repositioning in bed.  Pt refused seated EOB attempts due to increased pain          Balance:  Sitting: Impaired (supine in the bed, leans to the R)  Ambulation/Gait Training:               Therapeutic Exercises:   L hand squeezes to reduce edema  Pain Rating:  Pt reported significant L shoulder pain at rest and even when talking    Activity Tolerance:   Fair and Poor    After treatment patient left in no apparent distress:   Supine in bed, Call bell within reach, Bed / chair alarm activated, and Side rails x 3    COMMUNICATION/COLLABORATION:   The patients plan of care was discussed with: Registered nurse.      Deneen Mott, PT, DPT   Time Calculation: 8 mins

## 2022-10-11 NOTE — PROGRESS NOTES
Faculty or Preceptor Review of Student Work    10/11/2022  - Shift times - 0700 to 1300    The student documentation of patient care for Achilles Him has been reviewed and approved. All medications have been administered under the direct supervision of the faculty or preceptor.     Victor Hugo Martinez, RN

## 2022-10-11 NOTE — PROGRESS NOTES
2000 Received patient from  [place], [address] [place] [address].    9113 Bedside shift change report given to Cierra Ness RN (oncoming nurse) by Temo Walter RN. Report included the following information SBAR, Kardex, MAR, Recent Results, and Cardiac Rhythm v paced. Problem: Falls - Risk of  Goal: *Absence of Falls  Description: Document Joaquina Don Fall Risk and appropriate interventions in the flowsheet.   Outcome: Progressing Towards Goal  Note: Fall Risk Interventions:       Mentation Interventions: Bed/chair exit alarm    Medication Interventions: Assess postural VS orthostatic hypotension, Patient to call before getting OOB    Elimination Interventions: Elevated toilet seat, Call light in reach    History of Falls Interventions: Room close to nurse's station

## 2022-10-11 NOTE — PROGRESS NOTES
Pharmacist Note - Vancomycin Dosing    Consult provided for this 80 y.o. female for indication of HAP. Antibiotic regimen(s): vanc + cefepime  Patient on vancomycin PTA? NO     Recent Labs     10/11/22  0715 10/10/22  0321   WBC  --  12.1*   CREA 1.33* 1.26*   BUN 40* 32*     Frequency of BMP: daily through 10/14  Height: 154.9 cm  Weight: 50.3 kg  Est CrCl: 21 ml/min; UO: --- ml/kg/hr  Temp (24hrs), Av.7 °F (37.1 °C), Min:97.3 °F (36.3 °C), Max:100.6 °F (38.1 °C)    Cultures:  10/11 blood: in process    MRSA Swab ordered (if applicable)? YES    The plan below is expected to result in a target range of AUC/TOMAS 400-600    Therapy will be initiated with a loading dose of 1250 mg IV x 1 to be followed by a maintenance dose of 500 mg IV every 24 hours. Pharmacy to follow patient daily and order levels / make dose adjustments as appropriate. Procal ordered for tomorrow. *Vancomycin has been dosed used Bayesian kinetics software to target an AUC/TOMAS of 400-600, which provides adequate exposure for an assumed infection due to MRSA with an TOMAS of 1 or less while reducing the risk of nephrotoxicity as seen with traditional trough based dosing goals.

## 2022-10-12 ENCOUNTER — APPOINTMENT (OUTPATIENT)
Dept: GENERAL RADIOLOGY | Age: 87
DRG: 242 | End: 2022-10-12
Attending: PHYSICIAN ASSISTANT
Payer: MEDICARE

## 2022-10-12 LAB
ANION GAP SERPL CALC-SCNC: 8 MMOL/L (ref 5–15)
BUN SERPL-MCNC: 37 MG/DL (ref 6–20)
BUN/CREAT SERPL: 32 (ref 12–20)
CALCIUM SERPL-MCNC: 7.8 MG/DL (ref 8.5–10.1)
CHLORIDE SERPL-SCNC: 107 MMOL/L (ref 97–108)
CO2 SERPL-SCNC: 24 MMOL/L (ref 21–32)
CREAT SERPL-MCNC: 1.14 MG/DL (ref 0.55–1.02)
GLUCOSE SERPL-MCNC: 106 MG/DL (ref 65–100)
POTASSIUM SERPL-SCNC: 4.1 MMOL/L (ref 3.5–5.1)
PROCALCITONIN SERPL-MCNC: 0.42 NG/ML
SODIUM SERPL-SCNC: 139 MMOL/L (ref 136–145)

## 2022-10-12 PROCEDURE — 74011250637 HC RX REV CODE- 250/637: Performed by: NURSE PRACTITIONER

## 2022-10-12 PROCEDURE — 80048 BASIC METABOLIC PNL TOTAL CA: CPT

## 2022-10-12 PROCEDURE — 74011250637 HC RX REV CODE- 250/637: Performed by: INTERNAL MEDICINE

## 2022-10-12 PROCEDURE — 74011000250 HC RX REV CODE- 250: Performed by: INTERNAL MEDICINE

## 2022-10-12 PROCEDURE — 74011250636 HC RX REV CODE- 250/636: Performed by: INTERNAL MEDICINE

## 2022-10-12 PROCEDURE — 71045 X-RAY EXAM CHEST 1 VIEW: CPT

## 2022-10-12 PROCEDURE — 97110 THERAPEUTIC EXERCISES: CPT | Performed by: PHYSICAL THERAPIST

## 2022-10-12 PROCEDURE — 36415 COLL VENOUS BLD VENIPUNCTURE: CPT

## 2022-10-12 PROCEDURE — 74011000258 HC RX REV CODE- 258: Performed by: INTERNAL MEDICINE

## 2022-10-12 PROCEDURE — 65270000046 HC RM TELEMETRY

## 2022-10-12 PROCEDURE — 84145 PROCALCITONIN (PCT): CPT

## 2022-10-12 RX ORDER — BALSAM PERU/CASTOR OIL
OINTMENT (GRAM) TOPICAL EVERY 12 HOURS
Status: DISCONTINUED | OUTPATIENT
Start: 2022-10-12 | End: 2022-10-18 | Stop reason: HOSPADM

## 2022-10-12 RX ADMIN — Medication: at 22:12

## 2022-10-12 RX ADMIN — CEFEPIME 1 G: 1 INJECTION, POWDER, FOR SOLUTION INTRAMUSCULAR; INTRAVENOUS at 10:21

## 2022-10-12 RX ADMIN — Medication 3 MG: at 22:13

## 2022-10-12 RX ADMIN — SODIUM CHLORIDE 75 ML/HR: 9 INJECTION, SOLUTION INTRAVENOUS at 04:30

## 2022-10-12 RX ADMIN — SODIUM CHLORIDE, PRESERVATIVE FREE 10 ML: 5 INJECTION INTRAVENOUS at 06:43

## 2022-10-12 RX ADMIN — APIXABAN 2.5 MG: 2.5 TABLET, FILM COATED ORAL at 09:00

## 2022-10-12 RX ADMIN — SODIUM CHLORIDE, PRESERVATIVE FREE 10 ML: 5 INJECTION INTRAVENOUS at 22:18

## 2022-10-12 RX ADMIN — OXYCODONE 2.5 MG: 5 TABLET ORAL at 22:13

## 2022-10-12 RX ADMIN — OXYCODONE 2.5 MG: 5 TABLET ORAL at 09:00

## 2022-10-12 RX ADMIN — APIXABAN 2.5 MG: 2.5 TABLET, FILM COATED ORAL at 17:32

## 2022-10-12 RX ADMIN — SODIUM CHLORIDE, PRESERVATIVE FREE 10 ML: 5 INJECTION INTRAVENOUS at 14:00

## 2022-10-12 RX ADMIN — HYDROMORPHONE HYDROCHLORIDE 0.5 MG: 1 INJECTION, SOLUTION INTRAMUSCULAR; INTRAVENOUS; SUBCUTANEOUS at 17:32

## 2022-10-12 RX ADMIN — VANCOMYCIN HYDROCHLORIDE 500 MG: 500 INJECTION, POWDER, LYOPHILIZED, FOR SOLUTION INTRAVENOUS at 14:58

## 2022-10-12 RX ADMIN — CEFEPIME 1 G: 1 INJECTION, POWDER, FOR SOLUTION INTRAMUSCULAR; INTRAVENOUS at 22:12

## 2022-10-12 NOTE — PROGRESS NOTES
[address] visit attempted. Mrs. Galeano was asleep. No family was with her at the time. Prayer for spiritual communion offered at bedside.     DAV Sharma, RN, ACSW, LCSW   Page:  [phone]

## 2022-10-12 NOTE — PROGRESS NOTES
Comprehensive Nutrition Assessment    Type and Reason for Visit: Initial (LOS)    Nutrition Recommendations/Plan:   Continue with Regular diet order  Will order Ensure Enlive TID       Malnutrition Assessment:  Malnutrition Status:  Severe malnutrition (10/12/22 1428)    Context:  Acute illness     Findings of the 6 clinical characteristics of malnutrition:   Energy Intake:  50% or less of est energy requirements for 5 or more days  Weight Loss:  Greater than 5% over 1 month     Body Fat Loss:  Unable to assess,     Muscle Mass Loss:  Unable to assess,    Fluid Accumulation:  No significant fluid accumulation,     Strength:  Not performed        Nutrition Assessment:    81 yo female admitted for weakness. PMHx: pneumonia. S/P permanent pacemaker 10/6. Chest tube placed 10/11. Attempted to speak with pt at bedside, sleeping soundly. Lunch tray sitting on bedside table, pt ate about 50% meat. Spoke with RN who states pt is not eating much at all and does require some assistance with eating. Noted that pt was previously on Pureed diet and now has Regular diet ordered (no SLP eval this admission). RN does not think she is having any chewing or swallowing difficulty. During previous admission, pt was ordered Soft and Bite Sized diet per SLP. Noted from that admission that pt likes Ensure, will order TID. Weight hx in EMR indicates 20% weight loss over last 2 months which is significant for time frame. Labs reviewed.       Nutritionally Significant Medications:  NaCl at 75 ml/hr      Estimated Daily Nutrient Needs:  Energy Requirements Based On: Kcal/kg  Weight Used for Energy Requirements: Current  Energy (kcal/day): 4452-8446 (30-35 kcals/kg)  Weight Used for Protein Requirements: Current  Protein (g/day): 77 (1.5 gm/kg (20%))  Method Used for Fluid Requirements: 1 ml/kcal  Fluid (ml/day): 1550    Nutrition Related Findings:   Edema: none  Last BM: 10/08/22,      Wounds: Multiple (gluteal fold and left heel)      Current Nutrition Therapies:  Diet: Regular  Supplements: none  Meal intake: Patient Vitals for the past 168 hrs:   % Diet Eaten   10/11/22 1859 1 - 25%   10/10/22 1036 1 - 25%   10/08/22 1000 1 - 25%   10/07/22 1900 1 - 25%   10/07/22 1328 1 - 25%   10/07/22 1000 1 - 25%     Supplement intake: Patient Vitals for the past 168 hrs:   Supplement intake %   10/10/22 1036 0%     Nutrition Support: none      Anthropometric Measures:  Height: 5' 1\" (154.9 cm)  Ideal Body Weight (IBW): 105 lbs (48 kg)     Current Body Wt:  51.9 kg (114 lb 6.7 oz), 109 % IBW.  Bed scale  Current BMI (kg/m2): 21.6        Weight Adjustment: No adjustment                 BMI Category: Underweight (BMI less than 22) age over 72    Wt Readings from Last 10 Encounters:   10/12/22 51.9 kg (114 lb 8 oz)   08/03/22 65.2 kg (143 lb 11.8 oz)           Nutrition Diagnosis:   Inadequate oral intake related to inadequate protein-energy intake as evidenced by intake 0-25%    Nutrition Interventions:   Food and/or Nutrient Delivery: Continue current diet, Start oral nutrition supplement  Nutrition Education/Counseling: No recommendations at this time  Coordination of Nutrition Care: Continue to monitor while inpatient       Goals:     Goals: other (specify)  Specify Other Goals: PO intakes > 50% meals + ONS over next 5-7 days    Nutrition Monitoring and Evaluation:   Behavioral-Environmental Outcomes: None identified  Food/Nutrient Intake Outcomes: Food and nutrient intake, Supplement intake  Physical Signs/Symptoms Outcomes: Biochemical data, GI status, Weight    Discharge Planning:    Continue current diet, Continue oral nutrition supplement    Isis Shannon RD  Available via [employer]

## 2022-10-12 NOTE — PROGRESS NOTES
Pulmonary, Critical Care, and Sleep Medicine~Progress Note    Name: Shirley Neil MRN: [id number]   : 1932 [facility]   Date: 10/12/2022 11:12 AM Admission: 10/4/2022     Impression Plan   Acute on chronic hypoxic resp failure  Large left PTX  Generalized weakness and unsteady gait--> bradycardia with AV block mobitz type II. S/p pacemaker placement on 10/6  A fib/flutter. On elquis   PAULO on CKD Keep chest tube on wall suction today. If no ptx on tomorrows chest film we can transition to waterseal   Agree with empiric abx  Procal 0.72  Chest film in am  Eliquis     Daily Progression:    10/12  Chest tube in place   No ptx on chest image this am     10/11  Consult Note requested by hospitalist     Patient presented for admission on 10/4/22 for weakness and unsteady gait; found to be bradycardic with AV block mobitz type II.  PPM placement on 10/6. Patient spiked a fever of 100. 6F  which prompted a chest image prior to discharge. On that chest image she was noted to have a 4.1cm left PTX. Of note the chest image taken on 10/6 did not show a ptx. I have reviewed the labs and previous days notes. Review of systems not obtained due to patient factors. No past medical history on file. No past surgical history on file. Prior to Admission medications    Medication Sig Start Date End Date Taking? Authorizing Provider   apixaban (ELIQUIS) 2.5 mg tablet Take 1 Tablet by mouth two (2) times a day. 10/8/22  Yes Kamlesh Marshall MD   lidocaine 4 % patch Apply patch to back  . Apply patch to the affected area for 12 hours a day and remove for 12 hours a day. 10/9/22  Yes Kamlesh Marshall MD   oxyCODONE IR (ROXICODONE) 5 mg immediate release tablet Take 0.5 Tablets by mouth every four (4) hours as needed for Pain for up to 3 days.  Max Daily Amount: 15 mg. 10/8/22 10/11/22 Yes Kamlesh Marshall MD   nystatin (MYCOSTATIN) 100,000 unit/gram ointment Apply  to affected area two (2) times a day. Madhavi Elena MD   albuterol (PROVENTIL HFA, VENTOLIN HFA, PROAIR HFA) 90 mcg/actuation inhaler Take 1 Puff by inhalation every four (4) hours as needed. Madhavi Elena MD   albuterol-ipratropium (DUO-NEB) 2.5 mg-0.5 mg/3 ml nebu 3 mL by Nebulization route two (2) times a day. Madhavi Elena MD   acetaminophen (Tylenol Arthritis Pain) 650 mg TbER Take 650 mg by mouth every eight (8) hours. Madhavi Elena MD   guaiFENesin (ORGANIDIN) 200 mg tablet Take 600 mg by mouth every four (4) hours as needed for Congestion. Madhavi Elena MD   calcium carbonate (CALTREX) 600 mg calcium (1,500 mg) tablet Take 600 mg by mouth two (2) times a day. Madhavi Elena MD   aspirin 81 mg chewable tablet Take 81 mg by mouth in the morning. Madhavi Elena MD   polyethylene glycol (Miralax) 17 gram packet Take 17 g by mouth in the morning. Provider, Historical   famotidine (PEPCID) 10 mg tablet Take 10 mg by mouth two (2) times a day. Provider, Historical   melatonin 3 mg tablet Take 3 mg by mouth nightly as needed for Insomnia. Provider, Historical   simethicone (GAS-X) 125 mg chewable tablet Take 125 mg by mouth every six (6) hours as needed for Flatulence. Provider, Historical   ondansetron (ZOFRAN ODT) 4 mg disintegrating tablet Take 4 mg by mouth every eight (8) hours as needed for Nausea or Vomiting. Provider, Historical   bisacodyL (Dulcolax, bisacodyl,) 10 mg supp Insert 10 mg into rectum daily as needed for Constipation. Provider, Historical     Allergies   Allergen Reactions    Amoxicillin Unknown (comments)    Penicillins Unknown (comments)    Sulfa (Sulfonamide Antibiotics) Unknown (comments)      Social History     Tobacco Use    Smoking status: Every Day    Smokeless tobacco: Never   Substance Use Topics    Alcohol use: Not on file      No family history on file. OBJECTIVE:     Vital Signs:     Visit Vitals  BP (!) 144/54 (BP 1 Location: Left lower arm, BP Patient Position:  At rest)   Pulse 82   Temp 97.9 °F (36.6 °C)   Resp 23   Ht 5' 1\" (1.549 m)   Wt 51.9 kg (114 lb 8 oz)   SpO2 97%   BMI 21.63 kg/m²      Temp (24hrs), Av.9 °F (36.6 °C), Min:97.7 °F (36.5 °C), Max:98 °F (36.7 °C)     Intake/Output:     Last shift: No intake/output data recorded.     Last 3 shifts: 10/10 1901 - 10/12 0700  In: 118 [P.O.:118]  Out: -         Intake/Output Summary (Last 24 hours) at 10/12/2022 1024  Last data filed at 10/11/2022 1859  Gross per 24 hour   Intake 118 ml   Output --   Net 118 ml         Physical Exam:                                        Exam Findings Other   General: No resp distress noted, appears stated age    [de-identified]:  No ulcers, JVD not elevated, no cervical LAD    Chest: No pectus deformity, normal chest rise b/l    HEART:  RRR, no murmurs/rubs/gallops    Lungs:  Diminished bs on left     ABD: Soft/NT, non rigid mildly distended    EXT: No cyanosis/clubbing/edema, normal peripheral pulses    Skin: No rashes or ulcers, no mottling    Neuro: Awake         Medications:  Current Facility-Administered Medications   Medication Dose Route Frequency    [START ON 10/13/2022] Vancomycin level 10/13 with AM labs   Other ONCE    HYDROmorphone (DILAUDID) injection 0.5 mg  0.5 mg IntraVENous Q4H PRN    cefepime (MAXIPIME) 1 g in 0.9% sodium chloride (MBP/ADV) 50 mL MBP  1 g IntraVENous Q12H    vancomycin (VANCOCIN) 500 mg in 0.9% sodium chloride (MBP/ADV) 100 mL MBP  500 mg IntraVENous Q24H    Vancomycin pharmacy to dose   Other Rx Dosing/Monitoring    0.9% sodium chloride infusion  75 mL/hr IntraVENous CONTINUOUS    lidocaine 4 % patch 1 Patch  1 Patch TransDERmal Q24H    melatonin tablet 3 mg  3 mg Oral QHS PRN    sodium chloride (NS) flush 5-40 mL  5-40 mL IntraVENous Q8H    sodium chloride (NS) flush 5-40 mL  5-40 mL IntraVENous PRN    naloxone (NARCAN) injection 0.4 mg  0.4 mg IntraVENous PRN    oxyCODONE IR (ROXICODONE) tablet 2.5 mg  2.5 mg Oral Q4H PRN    sodium chloride (NS) flush 5-40 mL  5-40 mL IntraVENous Q8H    sodium chloride (NS) flush 5-40 mL  5-40 mL IntraVENous PRN    acetaminophen (TYLENOL) tablet 650 mg  650 mg Oral Q6H PRN    Or    acetaminophen (TYLENOL) suppository 650 mg  650 mg Rectal Q6H PRN    polyethylene glycol (MIRALAX) packet 17 g  17 g Oral DAILY PRN    ondansetron (ZOFRAN ODT) tablet 4 mg  4 mg Oral Q8H PRN    Or    ondansetron (ZOFRAN) injection 4 mg  4 mg IntraVENous Q6H PRN    apixaban (ELIQUIS) tablet 2.5 mg  2.5 mg Oral BID       Labs:  ABG No results for input(s): PHI, PCO2I, PO2I, HCO3I, SO2I, FIO2I in the last 72 hours.      CBC Recent Labs     10/10/22  0321   WBC 12.1*   HGB 8.4*   HCT 27.3*      MCV 99.6*   MCH 58.7          Metabolic  Panel Recent Labs     10/12/22  0212 10/11/22  0715 10/10/22  0321    135* 138   K 4.1 4.1 4.1    103 106   CO2 24 26 25   * 105* 112*   BUN 37* 40* 32*   CREA 1.14* 1.33* 1.26*   CA 7.8* 8.5 8.6          Pertinent Labs                Silver Card PA-C  10/12/2022

## 2022-10-12 NOTE — PROGRESS NOTES
Problem: Mobility Impaired (Adult and Pediatric)  Goal: *Acute Goals and Plan of Care (Insert Text)  Description: FUNCTIONAL STATUS PRIOR TO ADMISSION: Patient was modified independent using a rollator vs no device? for functional mobility. Recently discharged from SNF to [facility] for respite care and was home for 1 day prior to this admission for fall. HOME SUPPORT PRIOR TO ADMISSION: The patient lived with her supportive and independent . Son lives in [facility] but was coming into town to assist with care needs. Physical Therapy Goals  Revised 10/6/2022  1. Patient will move from supine to sit and sit to supine, scoot up and down, and roll side to side in bed with moderate assistance  within 7 day(s). 2.  Patient will transfer from bed to chair and chair to bed with max assistance using the least restrictive device within 7 day(s). 3.  Patient will perform sit to stand with max assistance within 7 day(s). 4.  Patient will completed seated LE therex x10 reps with CGA for balance within 7 days. 5.  PT to assess gait as appropriate. Physical Therapy Goals  Initiated 10/4/2022  1. Patient will move from supine to sit and sit to supine, scoot up and down, and roll side to side in bed with moderate assistance  within 7 day(s). 2.  Patient will transfer from bed to chair and chair to bed with moderate assistance  using the least restrictive device within 7 day(s). 3.  Patient will perform sit to stand with moderate assistance  within 7 day(s). 4.  Patient will ambulate with moderate assistance  for 15 feet with the least restrictive device within 7 day(s).        Outcome: Not Progressing Towards Goal     PHYSICAL THERAPY TREATMENT  Patient: Marlee Guthrie (50 y.o. female)  Date: 10/12/2022  Diagnosis: Weakness [R53.1] <principal problem not specified>  Procedure(s) (LRB):  INSERT PPM DUAL (N/A) 6 Days Post-Op  Precautions: Fall (L sling, PM precautions)  Chart, physical therapy assessment, plan of care and goals were reviewed. ASSESSMENT  Patient continues with skilled PT services and is not progressing towards goals. The patient is in the bed on arrival with the [facility] raised. She is able to communicate and do some AAROM of the RUE and left hand, wrist, and elbow. Initially she cries out in pain with any movement but after a repetition or 2 she is able to assist with moving the extremity. I was able to do some RLE ROM but on going to the E she asked if we could stop now. It appears her pain may be due to arthritis so any movement causes her to cry out. She is conversive and I was able to talk her through some of the discomfort but not enough to do much and definitely not enough for her to be able to sit EOB. Will continue to follow and try to progress her activity as tolerated. Current Level of Function Impacting Discharge (mobility/balance): Not able to sit EOB or stand. Other factors to consider for discharge: None         PLAN :  Patient continues to benefit from skilled intervention to address the above impairments. Continue treatment per established plan of care. to address goals. Recommendation for discharge: (in order for the patient to meet his/her long term goals)  Therapy up to 5 days/week in SNF setting    This discharge recommendation:  Has been made in collaboration with the attending provider and/or case management    IF patient discharges home will need the following DME: to be determined (TBD)       SUBJECTIVE:   Patient stated Can we stop now?     OBJECTIVE DATA SUMMARY:   Critical Behavior:  Neurologic State: Alert  Orientation Level: Disoriented to time, Disoriented to situation, Disoriented to place  Cognition: Poor safety awareness, Impaired decision making, Decreased attention/concentration  Safety/Judgement: Fall prevention  Functional Mobility Training:  Bed Mobility:      Total assist.                 Ambulation/Gait Training: Patient is not able to sit or stand. Therapeutic Exercises:   AAROM as tolerated. Activity Tolerance:   Cries out in pain with any movement. After treatment patient left in no apparent distress:   Same position-sitting upright in the bed. COMMUNICATION/COLLABORATION:   The patients plan of care was discussed with: Registered nurse.      Sherly Contreras, PT   Time Calculation: 15 mins

## 2022-10-12 NOTE — PROGRESS NOTES
[facility]  Hospitalist Group                                                                                          Hospitalist Progress Note  Marisela Begum MD  Answering service: [phone] from in house phone        Date of Service:  10/12/2022  NAME:  Beverly Ramesh  :  1932  MRN:  [id number]      Admission Summary:   Beverly Ramesh is a 80 y.o. female with known past medical history of a flutter on Eliquis, recent pneumonia patient was admitted in the hospital and  and discharged in the beginning of August due to pneumonia and hypoxemia at that time he will blood pressure was low, after the last admission patient was discharged to rehab her condition improved and 2 weeks ago she was discharged home, per her  was not at the edge patient was weak and experienced on and off lightheadedness and unsteady gait, the patient had difficulty to ambulate, did not feel well, was brought into emergency department for evaluation. Most history was obtained through patient  and medical record as patient currently sleeping. Per patient  patient did not have any fever, shortness of breath, chest pain, nausea, vomiting, abdominal pain or diarrhea. She does take Eliquis. And she is supposed to see cardiologist as outpatient but did not have a chance to schedule appointment yet. In the emergency department patient was evaluated systolic blood pressure was on the low side, patient feeling lightheaded and weak, CT head was done was negative for acute intracranial pathology. Laboratory data was obtained creatinine was 1.6, stable, EKG revealed bigeminy with frequent cardiac pauses. Medicine was consulted for admission and further evaluation. Interval history / Subjective:   Patient is seen and examined at bedside this AM. She is calm to me. Pain controlled.    Discussed with nursing and cm       Assessment & Plan:     Left Pneumothorax s/p chest tube 10/11  CXR: Moderate-sized left pneumothorax with maximum pleural separation of 4.1 cm. Mild infrahilar airspace disease may represent atelectasis. Difficult to exclude  pneumonia. - febrile on 10/10  - normal lactic, procal 0.7  - appreciate IR and pulm input  - chest tube placed on low continuous pressure  - c/w broad spectrum abx IV Vanco, Cefepime   - rpt CXR 10/12: no PTX   - chest tube management per Pulm     Symptomatic sp due to Mobitz II s/p PPM placement 10/6   Paroxsymal Aflutter   - pt presented with Lightheadedness, Unsteady gait  - hypotension improved   - appreciate Cardiology input - discussed with Dr. 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 Code status: DNR  Prophylaxis: Eliquis  Care Plan discussed with: patient and RN  Anticipated Disposition:  SNF- [place] in 2-3 days when chest tube removed        Hospital Problems  Date Reviewed: 8/3/2022            Codes Class Noted POA    Pacemaker ICD-10-CM: Z95.0  ICD-9-CM: V45.01  10/6/2022 Unknown    Overview Signed 10/6/2022  7:00 AM by Nicho Obregon MD     10/6/22 [employer]             Third degree AV block [facility]) ICD-10-CM: I44.2  ICD-9-CM: 426.0  10/6/2022 Unknown        Weakness ICD-10-CM: R53.1  ICD-9-CM: 780.79  10/4/2022 Unknown       Review of Systems:   A comprehensive review of systems was negative except for that written in the HPI. Vital Signs:    Last 24hrs VS reviewed since prior progress note.  Most recent are:  Visit Vitals  BP (!) 144/54 (BP 1 Location: Left lower arm, BP Patient Position: At rest)   Pulse 73   Temp 97.9 °F (36.6 °C)   Resp 23   Ht 5' 1\" (1.549 m)   Wt 51.9 kg (114 lb 8 oz)   SpO2 97%   BMI 21.63 kg/m²         Intake/Output Summary (Last 24 hours) at 10/12/2022 1237  Last data filed at 10/11/2022 1859  Gross per 24 hour   Intake 118 ml   Output --   Net 118 ml          Physical Examination:     I had a face to face encounter with this patient and independently examined them on 10/12/2022 as outlined below:          Constitutional:   No distress    ENT:  Oral mucosa moist, oropharynx benign. Resp:  CTA bilaterally. No wheezing/rhonchi/rales. No accessory muscle use. CV:  NSR, no gallops, rubs    GI:  Soft, non distended, non tender. normoactive bowel sounds, no hepatosplenomegaly     Musculoskeletal:  No edema, warm, 2+ pulses throughout    Neurologic:  Moves all extremities. Data Review:    Review and/or order of clinical lab test      Labs:     Recent Labs     10/10/22  0321   WBC 12.1*   HGB 8.4*   HCT 27.3*          Recent Labs     10/12/22  0212 10/11/22  0715 10/10/22  0321    135* 138   K 4.1 4.1 4.1    103 106   CO2 24 26 25   BUN 37* 40* 32*   CREA 1.14* 1.33* 1.26*   * 105* 112*   CA 7.8* 8.5 8.6       No results for input(s): ALT, AP, TBIL, TBILI, TP, ALB, GLOB, GGT, AML, LPSE in the last 72 hours. No lab exists for component: SGOT, GPT, AMYP, HLPSE    No results for input(s): INR, PTP, APTT, INREXT, INREXT in the last 72 hours. No results for input(s): FE, TIBC, PSAT, FERR in the last 72 hours. No results found for: FOL, RBCF   No results for input(s): PH, PCO2, PO2 in the last 72 hours. No results for input(s): CPK, CKNDX, TROIQ in the last 72 hours.     No lab exists for component: CPKMB  No results found for: CHOL, CHOLX, CHLST, CHOLV, HDL, HDLP, LDL, LDLC, DLDLP, TGLX, TRIGL, TRIGP, CHHD, CHHDX  No results found for: [facility]  Lab Results   Component Value Date/Time    Color YELLOW/STRAW 07/30/2022 05:58 PM    Appearance CLOUDY (A) 07/30/2022 05:58 PM    Specific gravity 1.009 07/30/2022 05:58 PM    pH (UA) 5.5 07/30/2022 05:58 PM    Protein 30 (A) 07/30/2022 05:58 PM    Glucose Negative 07/30/2022 05:58 PM    Ketone Negative 07/30/2022 05:58 PM    Bilirubin Negative 07/30/2022 05:58 PM    Urobilinogen 0.2 07/30/2022 05:58 PM    Nitrites Negative 07/30/2022 05:58 PM    Leukocyte Esterase LARGE (A) 07/30/2022 05:58 PM    Epithelial cells MODERATE (A) 07/30/2022 05:58 PM    Bacteria Negative 07/30/2022 05:58 PM    WBC 20-50 07/30/2022 05:58 PM    RBC 5-10 07/30/2022 05:58 PM         Medications Reviewed:     Current Facility-Administered Medications   Medication Dose Route Frequency    [START ON 10/13/2022] Vancomycin level 10/13 with AM labs   Other ONCE    HYDROmorphone (DILAUDID) injection 0.5 mg  0.5 mg IntraVENous Q4H PRN    cefepime (MAXIPIME) 1 g in 0.9% sodium chloride (MBP/ADV) 50 mL MBP  1 g IntraVENous Q12H    vancomycin (VANCOCIN) 500 mg in 0.9% sodium chloride (MBP/ADV) 100 mL MBP  500 mg IntraVENous Q24H    Vancomycin pharmacy to dose   Other Rx Dosing/Monitoring    0.9% sodium chloride infusion  75 mL/hr IntraVENous CONTINUOUS    lidocaine 4 % patch 1 Patch  1 Patch TransDERmal Q24H    melatonin tablet 3 mg  3 mg Oral QHS PRN    sodium chloride (NS) flush 5-40 mL  5-40 mL IntraVENous Q8H    sodium chloride (NS) flush 5-40 mL  5-40 mL IntraVENous PRN    naloxone (NARCAN) injection 0.4 mg  0.4 mg IntraVENous PRN    oxyCODONE IR (ROXICODONE) tablet 2.5 mg  2.5 mg Oral Q4H PRN    sodium chloride (NS) flush 5-40 mL  5-40 mL IntraVENous Q8H    sodium chloride (NS) flush 5-40 mL  5-40 mL IntraVENous PRN    acetaminophen (TYLENOL) tablet 650 mg  650 mg Oral Q6H PRN    Or    acetaminophen (TYLENOL) suppository 650 mg  650 mg Rectal Q6H PRN    polyethylene glycol (MIRALAX) packet 17 g  17 g Oral DAILY PRN    ondansetron (ZOFRAN ODT) tablet 4 mg  4 mg Oral Q8H PRN    Or    ondansetron (ZOFRAN) injection 4 mg  4 mg IntraVENous Q6H PRN    apixaban (ELIQUIS) tablet 2.5 mg  2.5 mg Oral BID ______________________________________________________________________  EXPECTED LENGTH OF STAY: 3d 2h  ACTUAL LENGTH OF STAY:          Mahesh Calvillo MD

## 2022-10-12 NOTE — WOUND CARE
Wound Care Note:     New consult placed by nurse request for blister on gluteal fold and blister/DTI on left heel    Chart shows:  Admitted for weakness, pacemaker and third degree AV block with a history of a flutter, recent PNA    WBC = 12.1 on 10/10/22  Admitted from home    Assessment:   Patient is alert and talking, confused, incontinent with moderate assistance needed in repositioning. Bed: [facility]  Patient wearing briefs for incontinence. Diet: Adult diet regular  Patient reports pain with movement. Right heel and sacral skin intact and without erythema. 1. Left medial heel with two areas of purple, non-blanchable tissue in an area measuring 2.5 cm x 2 cm with skin sparing in between, slight blistering starting, calixto-wound without erythema. Venelex ointment to be ordered. 2.  Left gluteal fold with sero/sang blister measuring 2.2 cm x 0.8 cm, wound edges are closed, calixto-wound intact without erythema. Venelex ointment to be ordered. 3.  Right lower buttock with linear line, slow to leila, measures 4 cm x 0.5 cm, no open area, calixto-wound intact. Venelex ointment to be ordered. 4.  Mons pubis with serous blister measuring 1 cm x  0.8 cm, no open area, calixto-wound with three areas of maroon hyperpigmentation, no open area. Venelex ointment to be ordered. Spoke with Dr. Jesús Barry, notified of new wounds; wound care orders obtained. Patient repositioned supine. Heels offloaded in Heel-[employer]ix boots. Recommendations:    Sacrum, bilateral heels, lower buttocks and mons pubis- Every 12 hours liberally apply Venelex ointment (BPCO)    Skin Care & Pressure Prevention:  Minimize layers of linen/pads under patient to optimize support surface. Turn/reposition approximately every 2 hours and offload heels.   Manage incontinence / promote continence   Nourishing Skin Cream to dry skin, minimize use of briefs when able    Discussed above plan with patient & Lisa Winters RN    Transition of Care: Plan to follow as needed while admitted to hospital.    ALICE PowellN, RN, [employer]  Certified Wound and Ostomy Nurse  office [phone]  Best way to contact me is through [facility]

## 2022-10-13 ENCOUNTER — APPOINTMENT (OUTPATIENT)
Dept: GENERAL RADIOLOGY | Age: 87
DRG: 242 | End: 2022-10-13
Attending: PHYSICIAN ASSISTANT
Payer: MEDICARE

## 2022-10-13 LAB
ANION GAP SERPL CALC-SCNC: 5 MMOL/L (ref 5–15)
BACTERIA SPEC CULT: NORMAL
BACTERIA SPEC CULT: NORMAL
BUN SERPL-MCNC: 30 MG/DL (ref 6–20)
BUN/CREAT SERPL: 32 (ref 12–20)
CALCIUM SERPL-MCNC: 8.3 MG/DL (ref 8.5–10.1)
CHLORIDE SERPL-SCNC: 110 MMOL/L (ref 97–108)
CO2 SERPL-SCNC: 24 MMOL/L (ref 21–32)
CREAT SERPL-MCNC: 0.94 MG/DL (ref 0.55–1.02)
ERYTHROCYTE [DISTWIDTH] IN BLOOD BY AUTOMATED COUNT: 15.2 % (ref 11.5–14.5)
GLUCOSE SERPL-MCNC: 98 MG/DL (ref 65–100)
HCT VFR BLD AUTO: 23.1 % (ref 35–47)
HGB BLD-MCNC: 7.1 G/DL (ref 11.5–16)
MCH RBC QN AUTO: 30.7 PG (ref 26–34)
MCHC RBC AUTO-ENTMCNC: 30.7 G/DL (ref 30–36.5)
MCV RBC AUTO: 100 FL (ref 80–99)
NRBC # BLD: 0 K/UL (ref 0–0.01)
NRBC BLD-RTO: 0 PER 100 WBC
PLATELET # BLD AUTO: 317 K/UL (ref 150–400)
PMV BLD AUTO: 9.1 FL (ref 8.9–12.9)
POTASSIUM SERPL-SCNC: 4 MMOL/L (ref 3.5–5.1)
RBC # BLD AUTO: 2.31 M/UL (ref 3.8–5.2)
SERVICE CMNT-IMP: NORMAL
SODIUM SERPL-SCNC: 139 MMOL/L (ref 136–145)
VANCOMYCIN SERPL-MCNC: 13.5 UG/ML
WBC # BLD AUTO: 8 K/UL (ref 3.6–11)

## 2022-10-13 PROCEDURE — 85027 COMPLETE CBC AUTOMATED: CPT

## 2022-10-13 PROCEDURE — 74011250637 HC RX REV CODE- 250/637: Performed by: INTERNAL MEDICINE

## 2022-10-13 PROCEDURE — 80202 ASSAY OF VANCOMYCIN: CPT

## 2022-10-13 PROCEDURE — 74011000250 HC RX REV CODE- 250: Performed by: INTERNAL MEDICINE

## 2022-10-13 PROCEDURE — 71045 X-RAY EXAM CHEST 1 VIEW: CPT

## 2022-10-13 PROCEDURE — 80048 BASIC METABOLIC PNL TOTAL CA: CPT

## 2022-10-13 PROCEDURE — 74011250636 HC RX REV CODE- 250/636: Performed by: INTERNAL MEDICINE

## 2022-10-13 PROCEDURE — 97110 THERAPEUTIC EXERCISES: CPT

## 2022-10-13 PROCEDURE — 65270000046 HC RM TELEMETRY

## 2022-10-13 PROCEDURE — 74011000258 HC RX REV CODE- 258: Performed by: INTERNAL MEDICINE

## 2022-10-13 PROCEDURE — 36415 COLL VENOUS BLD VENIPUNCTURE: CPT

## 2022-10-13 RX ADMIN — SODIUM CHLORIDE, PRESERVATIVE FREE 10 ML: 5 INJECTION INTRAVENOUS at 14:00

## 2022-10-13 RX ADMIN — VANCOMYCIN HYDROCHLORIDE 750 MG: 750 INJECTION, POWDER, LYOPHILIZED, FOR SOLUTION INTRAVENOUS at 15:24

## 2022-10-13 RX ADMIN — HYDROMORPHONE HYDROCHLORIDE 0.5 MG: 1 INJECTION, SOLUTION INTRAMUSCULAR; INTRAVENOUS; SUBCUTANEOUS at 03:16

## 2022-10-13 RX ADMIN — CEFEPIME 1 G: 1 INJECTION, POWDER, FOR SOLUTION INTRAMUSCULAR; INTRAVENOUS at 11:20

## 2022-10-13 RX ADMIN — CEFEPIME 1 G: 1 INJECTION, POWDER, FOR SOLUTION INTRAMUSCULAR; INTRAVENOUS at 22:28

## 2022-10-13 RX ADMIN — Medication: at 08:40

## 2022-10-13 RX ADMIN — APIXABAN 2.5 MG: 2.5 TABLET, FILM COATED ORAL at 17:51

## 2022-10-13 RX ADMIN — APIXABAN 2.5 MG: 2.5 TABLET, FILM COATED ORAL at 08:33

## 2022-10-13 RX ADMIN — HYDROMORPHONE HYDROCHLORIDE 0.5 MG: 1 INJECTION, SOLUTION INTRAMUSCULAR; INTRAVENOUS; SUBCUTANEOUS at 22:28

## 2022-10-13 RX ADMIN — Medication: at 20:23

## 2022-10-13 RX ADMIN — OXYCODONE 2.5 MG: 5 TABLET ORAL at 10:17

## 2022-10-13 RX ADMIN — SODIUM CHLORIDE, PRESERVATIVE FREE 10 ML: 5 INJECTION INTRAVENOUS at 20:23

## 2022-10-13 RX ADMIN — HYDROMORPHONE HYDROCHLORIDE 0.5 MG: 1 INJECTION, SOLUTION INTRAMUSCULAR; INTRAVENOUS; SUBCUTANEOUS at 15:15

## 2022-10-13 NOTE — PROGRESS NOTES
Problem: Self Care Deficits Care Plan (Adult)  Goal: *Acute Goals and Plan of Care (Insert Text)  Description: FUNCTIONAL STATUS PRIOR TO ADMISSION: Patient with recent admission 7/20-8/03 of this year. At that time, patient was admitted from [facility] but had limited progress at [facility] due to medical status. Patient was discharged 8/03/2022 to [facility] and was reportedly discharged to a respite care facility ~2 weeks prior to admission. Had an ED visit 10/1-10/2 for bradycardia and hypotension but declined admission to hospital at that time. Patient then was discharged back home with her  on 10/03 and presented to ED 10/4 S/p ground level fall. HOME SUPPORT: The patient lived with her , was hope for one day before presentation to ED for a fall. Per CM note, patient dependent for ADLs, no equipment reported at home. Occupational Therapy Goals  Weekly re-eval, goals modified due to poor progress 10/13/2022  1. Patient will perform grooming in supportd sitting with moderate assistance within 7 day(s). Downgraded   2. Patient will perform lower body dressing with maximal assistance within 7 day(s). Cont   3. Patient will perform anterior bathing from neck to thighs with maximal assistance within 7 day(s). Downgraded   4. Patient will perform toilet transfers to [facility] with maximal assistance within 7 day(s). Downgraded   5. Patient will perform all aspects of toileting with maximal assistance within 7 day(s). Downgraded  6. Patient will participate in upper extremity therapeutic exercise/activities with minimal assistance/contact guard assist for 5 minutes within 7 day(s). Cont   7. Patient will participate in upper extremity dressing to don/doff sling with minimal assistance within 7 day(s). Cont    Re-eval 10/6/2022 S/p pacemaker placement, goals modified as below  1. Patient will perform grooming in unsupported sitting with minimal assistance/contact guard assist within 7 day(s). Cont   2. Patient will perform lower body dressing with maximal assistance within 7 day(s). Cont   3. Patient will perform anterior bathing from neck to thighs with moderate assistance within 7 day(s). Downgraded   4. Patient will perform toilet transfers with maximal assistance  within 7 day(s). Downgraded   5. Patient will perform all aspects of toileting with moderate assistance  within 7 day(s). Cont   6. Patient will participate in upper extremity therapeutic exercise/activities with minimal assistance/contact guard assist for 5 minutes within 7 day(s). Cont   7. Patient will participate in upper extremity dressing to don/doff sling with minimal assistance within 7 day(s). Goal added 10/06    Initiated 10/4/2022  1. Patient will perform grooming in unsupported sitting with minimal assistance/contact guard assist within 7 day(s). 2.  Patient will perform lower body dressing with maximal assistance within 7 day(s). 3.  Patient will perform anterior bathing from neck to thighs with minimal assistance/contact guard assist within 7 day(s). 4.  Patient will perform toilet transfers with moderate assistance  within 7 day(s). 5.  Patient will perform all aspects of toileting with moderate assistance  within 7 day(s). 6.  Patient will participate in upper extremity therapeutic exercise/activities with minimal assistance/contact guard assist for 5 minutes within 7 day(s). Outcome: Not Progressing Towards Goal   OCCUPATIONAL THERAPY TREATMENT/WEEKLY RE-ASSESSMENT  Patient: Hi Canales (74 y.o. female)  Date: 10/13/2022  Diagnosis: Weakness [R53.1] <principal problem not specified>  Procedure(s) (LRB):  INSERT PPM DUAL (N/A) 7 Days Post-Op  Precautions: Fall (L sling, PM precautions)  Chart, occupational therapy assessment, plan of care, and goals were reviewed. ASSESSMENT  Patient continues with skilled OT services and is not progressing towards goals.  Patient overall hindered in progress due to complaints of significant pain with and without movement, confusion, and poor activity tolerance. Patient received semi supine in bed and agreeable to working with therapy, calling out about back pain prior to therapy arrival and requesting assistance to get repositioned. Patient initially agreeable to attempt edge of bed mobility but screaming in pain with removing pillow under left side, unable to tolerate edge of bed mobility this date. Patient requesting drink from soda can, initially reports being \"unable to hold can\" and requesting therapist to hold to mouth, however patient completed drink to mouth x2 with large water jug and small soda can during session and only requires setup, patient self-limiting throughout session. Patient performing limited upper extremity therapeutic exercise, see grid below for details, requiring significant cueing for participation. Attempted to reposition patient for comfort, adjusted towel roll under neck, and adjust bed settings to raise knees slightly. Patient reports comfort at end of session but calling out due to discomfort shortly after therapist left room. Patient overall did poorly with session and not progressing towards goals, very limited participation with therapy at this time. Patient would benefit from skilled OT services during admission to improve independence with self care and functional mobility/transfers. Recommend discharge to SNF at this time. Current Level of Function Impacting Discharge (ADLs): total A for repositioning in bed, setup drinking from cup with straw, total A lower extremity dressing    Other factors to consider for discharge: limited participation, poor cognition, significant pain, severity of deficits         PLAN :  Goals have been updated based on progression since last assessment. Patient continues to benefit from skilled intervention to address the above impairments. Continue to follow patient 3 times a week to address goals.     Recommend with staff: bed in chair position if able to tolerate, bedpan if able to communicate toileting needs    Recommend next OT session: continue POC, upper extremity therapeutic exercise    Recommendation for discharge: (in order for the patient to meet his/her long term goals)  Therapy up to 5 days/week in SNF setting    This discharge recommendation:  Has been made in collaboration with the attending provider and/or case management    IF patient discharges home will need the following DME: TBD, would require 24/7 total care and all DME (hospital bed, alfred lift, etc.)       SUBJECTIVE:   Patient stated My knees, my knees!  when pillow removed from under left arm. OBJECTIVE DATA SUMMARY:   Cognitive/Behavioral Status:  Neurologic State: Alert;Confused  Orientation Level: Disoriented to place; Disoriented to situation;Disoriented to time;Oriented to person  Cognition: Memory loss; Impaired decision making;Decreased attention/concentration             Functional Mobility and Transfers for ADLs:  Bed Mobility:  Scooting: Total assistance (to reposition in bed)    Transfers:             Balance:       ADL Intervention:  Feeding  Drink to Mouth: Set-up (requires encouragement to complete by herself)              Type of Bath: Chlorhexidine (CHG); Full              Lower Body Dressing Assistance  Socks: Total assistance (dependent)  Leg Crossed Method Used: No  Position Performed: Supine              Therapeutic Exercises:   Exercise: Sets: Reps:  Active range of motion: Active assist range of motion: Passive range of motion: Self range of motion: Comments:   Shoulder flexion/extension 1 1 [x] [] [] [] Attempted on right side only, unable to tolerate due to pain   Elbow flexion/extension 1 5 [x] [] [] [] Completed on LUE   Wrist flexion/extension 1 10 [x] [] [] [] Completed on LUE   Finger flexion 1 2 [x] [] [] [] Completed on LUE        Pain:  7/10 back pain initially upon entry, intermittent yelling out in pain    Activity Tolerance:   Poor and desaturates with exertion and requires oxygen    After treatment patient left in no apparent distress:   Supine in bed, Call bell within reach, Bed / chair alarm activated, and Side rails x 3    COMMUNICATION/COLLABORATION:   The patients plan of care was discussed with: Registered nurse.      Neftali Patterson OTR/L  Time Calculation: 11 mins

## 2022-10-13 NOTE — PROGRESS NOTES
Pulmonary, Critical Care, and Sleep Medicine~Progress Note    Name: Jenny Manjarrez MRN: [id number]   : 1932 [facility]    Date: 10/13/2022 11:12 AM Admission: 10/4/2022     Impression Plan   Acute on chronic hypoxic resp failure  Large left PTX  Generalized weakness and unsteady gait--> bradycardia with AV block mobitz type II. S/p pacemaker placement on 10/6  A fib/flutter. On elquis   PAULO on CKD Transition chest tube to waterseal today  Agree with empiric abx  O2 titration above 90%   Chest film in am, if stable then we can clamp for 4 hours. If continues to be stable then it can be removed tomorrow afternoon  Eliquis     Daily Progression:    10/13  Remains stable  No airleaks noted     10/12  Chest tube in place   No ptx on chest image this am     10/11  Consult Note requested by hospitalist     Patient presented for admission on 10/4/22 for weakness and unsteady gait; found to be bradycardic with AV block mobitz type II.  PPM placement on 10/6. Patient spiked a fever of 100. 6F  which prompted a chest image prior to discharge. On that chest image she was noted to have a 4.1cm left PTX. Of note the chest image taken on 10/6 did not show a ptx. I have reviewed the labs and previous days notes. Review of systems not obtained due to patient factors. No past medical history on file. Past Surgical History:   Procedure Laterality Date    IR THORACENTESIS/INSERT CHEST TUBE  10/11/2022      Prior to Admission medications    Medication Sig Start Date End Date Taking? Authorizing Provider   apixaban (ELIQUIS) 2.5 mg tablet Take 1 Tablet by mouth two (2) times a day. 10/8/22  Yes Artis York MD   lidocaine 4 % patch Apply patch to back  . Apply patch to the affected area for 12 hours a day and remove for 12 hours a day. 10/9/22  Yes Artis York MD   nystatin (MYCOSTATIN) 100,000 unit/gram ointment Apply  to affected area two (2) times a day.     Other, MD Madhavi   albuterol (PROVENTIL HFA, VENTOLIN HFA, PROAIR HFA) 90 mcg/actuation inhaler Take 1 Puff by inhalation every four (4) hours as needed. Madhavi Elena MD   albuterol-ipratropium (DUO-NEB) 2.5 mg-0.5 mg/3 ml nebu 3 mL by Nebulization route two (2) times a day. Madhavi Elena MD   acetaminophen (Tylenol Arthritis Pain) 650 mg TbER Take 650 mg by mouth every eight (8) hours. Madhavi Elena MD   guaiFENesin (ORGANIDIN) 200 mg tablet Take 600 mg by mouth every four (4) hours as needed for Congestion. Madhavi Elena MD   calcium carbonate (CALTREX) 600 mg calcium (1,500 mg) tablet Take 600 mg by mouth two (2) times a day. Madhavi Elena MD   aspirin 81 mg chewable tablet Take 81 mg by mouth in the morning. Madhavi Elena MD   polyethylene glycol (Miralax) 17 gram packet Take 17 g by mouth in the morning. Provider, Historical   famotidine (PEPCID) 10 mg tablet Take 10 mg by mouth two (2) times a day. Provider, Historical   melatonin 3 mg tablet Take 3 mg by mouth nightly as needed for Insomnia. Provider, Historical   simethicone (GAS-X) 125 mg chewable tablet Take 125 mg by mouth every six (6) hours as needed for Flatulence. Provider, Historical   ondansetron (ZOFRAN ODT) 4 mg disintegrating tablet Take 4 mg by mouth every eight (8) hours as needed for Nausea or Vomiting. Provider, Historical   bisacodyL (Dulcolax, bisacodyl,) 10 mg supp Insert 10 mg into rectum daily as needed for Constipation. Provider, Historical     Allergies   Allergen Reactions    Amoxicillin Unknown (comments)    Penicillins Unknown (comments)    Sulfa (Sulfonamide Antibiotics) Unknown (comments)      Social History     Tobacco Use    Smoking status: Every Day    Smokeless tobacco: Never   Substance Use Topics    Alcohol use: Not on file      No family history on file.   OBJECTIVE:     Vital Signs:     Visit Vitals  BP (!) 140/53 (BP 1 Location: Right upper arm, BP Patient Position: At rest)   Pulse 83   Temp 97.8 °F (36.6 °C)   Resp 20   Ht 5' 1\" (1.549 m)   Wt 50.6 kg (111 lb 9.6 oz)   SpO2 94%   BMI 21.09 kg/m²      Temp (24hrs), Av.9 °F (36.6 °C), Min:97.7 °F (36.5 °C), Max:98.2 °F (36.8 °C)     Intake/Output:     Last shift: No intake/output data recorded. Last 3 shifts: No intake/output data recorded.       No intake or output data in the 24 hours ending 10/13/22 0956      Physical Exam:                                        Exam Findings Other   General: No resp distress noted, appears stated age    [de-identified]:  No ulcers, JVD not elevated, no cervical LAD    Chest: No pectus deformity, normal chest rise b/l    HEART:  RRR, no murmurs/rubs/gallops    Lungs:  Diminished bs on left     ABD: Soft/NT, non rigid mildly distended    EXT: No cyanosis/clubbing/edema, normal peripheral pulses    Skin: No rashes or ulcers, no mottling    Neuro: Awake         Medications:  Current Facility-Administered Medications   Medication Dose Route Frequency    vancomycin (VANCOCIN) 750 mg in 0.9% sodium chloride 250 mL ([id number])  750 mg IntraVENous Q24H    balsam peru-castor oiL (VENELEX) ointment   Topical Q12H    HYDROmorphone (DILAUDID) injection 0.5 mg  0.5 mg IntraVENous Q4H PRN    cefepime (MAXIPIME) 1 g in 0.9% sodium chloride (MBP/ADV) 50 mL MBP  1 g IntraVENous Q12H    Vancomycin pharmacy to dose   Other Rx Dosing/Monitoring    lidocaine 4 % patch 1 Patch  1 Patch TransDERmal Q24H    melatonin tablet 3 mg  3 mg Oral QHS PRN    sodium chloride (NS) flush 5-40 mL  5-40 mL IntraVENous Q8H    sodium chloride (NS) flush 5-40 mL  5-40 mL IntraVENous PRN    naloxone (NARCAN) injection 0.4 mg  0.4 mg IntraVENous PRN    oxyCODONE IR (ROXICODONE) tablet 2.5 mg  2.5 mg Oral Q4H PRN    sodium chloride (NS) flush 5-40 mL  5-40 mL IntraVENous Q8H    sodium chloride (NS) flush 5-40 mL  5-40 mL IntraVENous PRN    acetaminophen (TYLENOL) tablet 650 mg  650 mg Oral Q6H PRN    Or    acetaminophen (TYLENOL) suppository 650 mg  650 mg Rectal Q6H PRN polyethylene glycol (MIRALAX) packet 17 g  17 g Oral DAILY PRN    ondansetron (ZOFRAN ODT) tablet 4 mg  4 mg Oral Q8H PRN    Or    ondansetron (ZOFRAN) injection 4 mg  4 mg IntraVENous Q6H PRN    apixaban (ELIQUIS) tablet 2.5 mg  2.5 mg Oral BID       Labs:  ABG No results for input(s): PHI, PCO2I, PO2I, HCO3I, SO2I, FIO2I in the last 72 hours.      CBC Recent Labs     10/13/22  0319   WBC 8.0   HGB 7.1*   HCT 23.1*      .0*   MCH 81.4          Metabolic  Panel Recent Labs     10/13/22  0319 10/12/22  0212 10/11/22  0715    139 135*   K 4.0 4.1 4.1   * 107 103   CO2 24 24 26   GLU 98 106* 105*   BUN 30* 37* 40*   CREA 0.94 1.14* 1.33*   CA 8.3* 7.8* 8.5          Pertinent Labs                Silver Yang PA-C  10/13/2022

## 2022-10-13 NOTE — PROGRESS NOTES
Bedside and Verbal shift change report given to Gretchen Ying RN (oncoming nurse) by Tasneem Gillis RN and Marta Lucero RN (offgoing nurse). Report included the following information SBAR, Kardex, Intake/Output, MAR, Recent Results, Cardiac Rhythm Vpaced, and Alarm Parameters .

## 2022-10-13 NOTE — PROGRESS NOTES
[facility] [facility]  Hospitalist Group                                                                                          Hospitalist Progress Note  Tania Wiley MD  Answering service: [phone] from in house phone        Date of Service:  10/13/2022  NAME:  Dave Reza  :  1932  MRN:  [id number]      Admission Summary:     Dave Reza is a 80 y.o. female with known past medical history of a flutter on Eliquis, recent pneumonia patient was admitted in the hospital and  and discharged in the beginning of August due to pneumonia and hypoxemia at that time and her blood pressure was low, after the last admission patient was discharged to rehab her condition improved and 2 weeks ago she was discharged home, per her  was not at the edge patient was weak and experienced on and off lightheadedness and unsteady gait, the patient had difficulty to ambulate, did not feel well, was brought into emergency department for evaluation. Most history was obtained through patient  and medical record as patient currently sleeping. Per patient  patient did not have any fever, shortness of breath, chest pain, nausea, vomiting, abdominal pain or diarrhea. She does take Eliquis. And she is supposed to see cardiologist as outpatient but did not have a chance to schedule appointment yet. In the emergency department patient was evaluated systolic blood pressure was on the low side, patient feeling lightheaded and weak, CT head was done was negative for acute intracranial pathology. Laboratory data was obtained creatinine was 1.6, stable, EKG revealed bigeminy with frequent cardiac pauses. Medicine was consulted for admission and further evaluation. Interval history / Subjective:     Patient is a poor historian, she said she is thirsty, pain all over, no left side chest pain or shortness of breath. Chest tube in place, left arm sling in place.   No family in the room. Assessment & Plan:     Left Pneumothorax s/p chest tube 10/11  -chest tube placed on low continuous pressure  -improving, repeated chest x-ray on 10/13 no pneumothorax   -repeat chest x ray on 10/13  -febrile on 10/10, on cefepime and vancomycin, fever now improved,   -normal lactic, procal 0.7  -blood cx on 10/11no growth so far  -Pulmonary and IR on board     Symptomatic sp due to Mobitz II s/p PPM placement on 10/6   Paroxsymal Aflutter  -pt presented with Lightheadedness, Unsteady gait  - hypotension improved   - PPM placed on 10/6  - restarted Eliquis 10/7  -cardiology on board, ok to DC her from their side       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  -improved, Creatinine normal   -avoid nephrotoxin  - monitor renal function      Anemia possible related to CKD  -Hgb 7.1 trending down,   -no evidence of active bleeding  -check stool hem occult as patient on Eliquis     Chronic respiratory failure with hypoxia  home oxygen dependent 2-3 l/min  -currently on 4 l/m, SpO2 94-97%  -continue oxygen support and monitor pulse ox    Hx of PE   -on eliquis    Debility with Fall at home   -PT/OT on board  -SNF on discharge     DNR: Palliative care team on board, high risk for decompensation             Code status: DNR  Prophylaxis: [address] [place] [address] discussed with: Patient, Nurse and CM  Anticipated Disposition: SNF- [facility] Problems  Date Reviewed: 8/3/2022            Codes Class Noted POA    Pacemaker ICD-10-CM: Z95.0  ICD-9-CM: V45.01  10/6/2022 Unknown    Overview Signed 10/6/2022  7:00 AM by Geoff Quiñonez MD     10/6/22 [employer]             Third degree AV block ([address] ICD-10-CM: K38.8  ICD-9-CM: 426.0  10/6/2022 Unknown        Weakness ICD-10-CM: R53.1  ICD-9-CM: 780.79  10/4/2022 Unknown               Vital Signs:    Last 24hrs VS reviewed since prior progress note.  Most recent are:  Visit Vitals  BP (!) 140/53 (BP 1 Location: Right upper arm, BP Patient Position: At rest)   Pulse 83 Temp 97.8 °F (36.6 °C)   Resp 20   Ht 5' 1\" (1.549 m)   Wt 50.6 kg (111 lb 9.6 oz)   SpO2 94%   BMI 21.09 kg/m²       No intake or output data in the 24 hours ending 10/13/22 0934     Physical Examination:     I had a face to face encounter with this patient and independently examined them on 10/13/2022 as outlined below:          Constitutional:  No acute respiratory distress, cooperative, pleasant    ENT:  Oral mucosa moist, oropharynx benign. Resp:  Left side chest tube in place, decrease bronchial breath sound bilaterally. No wheezing/rhonchi/rales. No accessory muscle use. CV:  Regular rhythm, normal rate, no murmurs, gallops, rubs    GI:  Soft, non distended, non tender. normoactive bowel sounds, no hepatosplenomegaly     Musculoskeletal:  No edema,      Neurologic:  Conscious and alert, oriented to person and place, moves upper extremities, lower extremities on protective boot, CN II-XII reviewed            Data Review:    Review and/or order of clinical lab test  Review and/or order of tests in the radiology section of CPT  Review and/or order of tests in the medicine section of CPT      Labs:     Recent Labs     10/13/22  0319   WBC 8.0   HGB 7.1*   HCT 23.1*        Recent Labs     10/13/22  0319 10/12/22  0212 10/11/22  0715    139 135*   K 4.0 4.1 4.1   * 107 103   CO2 24 24 26   BUN 30* 37* 40*   CREA 0.94 1.14* 1.33*   GLU 98 106* 105*   CA 8.3* 7.8* 8.5     No results for input(s): ALT, AP, TBIL, TBILI, TP, ALB, GLOB, GGT, AML, LPSE in the last 72 hours. No lab exists for component: SGOT, GPT, AMYP, HLPSE  No results for input(s): INR, PTP, APTT, INREXT in the last 72 hours. No results for input(s): FE, TIBC, PSAT, FERR in the last 72 hours. No results found for: FOL, RBCF   No results for input(s): PH, PCO2, PO2 in the last 72 hours. No results for input(s): CPK, CKNDX, TROIQ in the last 72 hours.     No lab exists for component: CPKMB  No results found for: CHOL, CHOLX, CHLST, CHOLV, HDL, HDLP, LDL, LDLC, DLDLP, TGLX, TRIGL, TRIGP, CHHD, CHHDX  No results found for: [facility]  Lab Results   Component Value Date/Time    Color YELLOW/STRAW 07/30/2022 05:58 PM    Appearance CLOUDY (A) 07/30/2022 05:58 PM    Specific gravity 1.009 07/30/2022 05:58 PM    pH (UA) 5.5 07/30/2022 05:58 PM    Protein 30 (A) 07/30/2022 05:58 PM    Glucose Negative 07/30/2022 05:58 PM    Ketone Negative 07/30/2022 05:58 PM    Bilirubin Negative 07/30/2022 05:58 PM    Urobilinogen 0.2 07/30/2022 05:58 PM    Nitrites Negative 07/30/2022 05:58 PM    Leukocyte Esterase LARGE (A) 07/30/2022 05:58 PM    Epithelial cells MODERATE (A) 07/30/2022 05:58 PM    Bacteria Negative 07/30/2022 05:58 PM    WBC 20-50 07/30/2022 05:58 PM    RBC 5-10 07/30/2022 05:58 PM         Medications Reviewed:     Current Facility-Administered Medications   Medication Dose Route Frequency    vancomycin (VANCOCIN) 750 mg in 0.9% sodium chloride 250 mL ([id number])  750 mg IntraVENous Q24H    balsam peru-castor oiL (VENELEX) ointment   Topical Q12H    HYDROmorphone (DILAUDID) injection 0.5 mg  0.5 mg IntraVENous Q4H PRN    cefepime (MAXIPIME) 1 g in 0.9% sodium chloride (MBP/ADV) 50 mL MBP  1 g IntraVENous Q12H    Vancomycin pharmacy to dose   Other Rx Dosing/Monitoring    lidocaine 4 % patch 1 Patch  1 Patch TransDERmal Q24H    melatonin tablet 3 mg  3 mg Oral QHS PRN    sodium chloride (NS) flush 5-40 mL  5-40 mL IntraVENous Q8H    sodium chloride (NS) flush 5-40 mL  5-40 mL IntraVENous PRN    naloxone (NARCAN) injection 0.4 mg  0.4 mg IntraVENous PRN    oxyCODONE IR (ROXICODONE) tablet 2.5 mg  2.5 mg Oral Q4H PRN    sodium chloride (NS) flush 5-40 mL  5-40 mL IntraVENous Q8H    sodium chloride (NS) flush 5-40 mL  5-40 mL IntraVENous PRN    acetaminophen (TYLENOL) tablet 650 mg  650 mg Oral Q6H PRN    Or    acetaminophen (TYLENOL) suppository 650 mg  650 mg Rectal Q6H PRN    polyethylene glycol (MIRALAX) packet 17 g  17 g Oral DAILY PRN ondansetron (ZOFRAN ODT) tablet 4 mg  4 mg Oral Q8H PRN    Or    ondansetron (ZOFRAN) injection 4 mg  4 mg IntraVENous Q6H PRN    apixaban (ELIQUIS) tablet 2.5 mg  2.5 mg Oral BID     ______________________________________________________________________  EXPECTED LENGTH OF STAY: 3d 2h  ACTUAL LENGTH OF STAY:          9                 Cash York MD

## 2022-10-13 NOTE — PROGRESS NOTES
Transition of Care  RUR 18% Moderate  Disposition [facility] pending bed availability  DME Wheelchair, rollator, home oxygen, shower stool, raised toilet seat, and grab bars. Transportation BLS  Follow Up PCP, Specialist    CM placed message in [place] to [facility] and follow up call to admissions. CM spoke with [facility] [phone] who will follow up with CM today regarding bed availability. CM to monitor.      Lea Marques, MS

## 2022-10-13 NOTE — PROGRESS NOTES
Pharmacist Note - Vancomycin Dosing  Therapy day 3  Indication: HAP  Current regimen: 500 mg IV Q 24hrs    Recent Labs     10/13/22  0319 10/12/22  0212 10/11/22  0715   WBC 8.0  --   --    CREA 0.94 1.14* 1.33*   BUN 30* 37* 40*     A random vancomycin level of 13.5 mcg/mL was obtained and from this level, the patient's AUC24 is calculated to be 327 with the current regimen. Goal target range AUC/TOMAS 400-600      Assessment/Plan: Leuks now WNL; patient afebrile overnight. SCr has improved significantly; will adjust current regimen. Change to 750 mg IV Q24hr for a predicted AUC of 472 mg/L*hr . Pharmacy will continue to monitor this patient daily for changes in clinical status and renal function. Ramón Sanhcez, PharmD  Clinical Pharmacist, Orthopedics and [facility] ()    -----------  *Random vancomycin levels are used to calculate AUC/TOMAS, this level should not be interpreted as a trough. Vancomycin has been dosed using Bayesian kinetics software to target an AUC24:TOMAS of 400-600, which provides adequate exposure for as assumed infection due to MRSA with an TOMAS of 1 or less while reducing the risk of nephrotoxicity as seen with traditional trough based dosing goals.

## 2022-10-13 NOTE — PROGRESS NOTES
Provided pastoral care visit to [facility] patient. Did not include sacramental care.      Kamaljit Castro

## 2022-10-14 ENCOUNTER — APPOINTMENT (OUTPATIENT)
Dept: GENERAL RADIOLOGY | Age: 87
DRG: 242 | End: 2022-10-14
Attending: PHYSICIAN ASSISTANT
Payer: MEDICARE

## 2022-10-14 LAB
ANION GAP SERPL CALC-SCNC: 7 MMOL/L (ref 5–15)
BUN SERPL-MCNC: 20 MG/DL (ref 6–20)
BUN/CREAT SERPL: 28 (ref 12–20)
CALCIUM SERPL-MCNC: 8.5 MG/DL (ref 8.5–10.1)
CHLORIDE SERPL-SCNC: 112 MMOL/L (ref 97–108)
CO2 SERPL-SCNC: 23 MMOL/L (ref 21–32)
CREAT SERPL-MCNC: 0.72 MG/DL (ref 0.55–1.02)
ERYTHROCYTE [DISTWIDTH] IN BLOOD BY AUTOMATED COUNT: 15 % (ref 11.5–14.5)
GLUCOSE SERPL-MCNC: 102 MG/DL (ref 65–100)
HCT VFR BLD AUTO: 23.4 % (ref 35–47)
HGB BLD-MCNC: 7.2 G/DL (ref 11.5–16)
MCH RBC QN AUTO: 30.3 PG (ref 26–34)
MCHC RBC AUTO-ENTMCNC: 30.8 G/DL (ref 30–36.5)
MCV RBC AUTO: 98.3 FL (ref 80–99)
NRBC # BLD: 0 K/UL (ref 0–0.01)
NRBC BLD-RTO: 0 PER 100 WBC
PLATELET # BLD AUTO: 323 K/UL (ref 150–400)
PMV BLD AUTO: 8.9 FL (ref 8.9–12.9)
POTASSIUM SERPL-SCNC: 4.1 MMOL/L (ref 3.5–5.1)
RBC # BLD AUTO: 2.38 M/UL (ref 3.8–5.2)
SODIUM SERPL-SCNC: 142 MMOL/L (ref 136–145)
WBC # BLD AUTO: 7.9 K/UL (ref 3.6–11)

## 2022-10-14 PROCEDURE — 74011250637 HC RX REV CODE- 250/637: Performed by: NURSE PRACTITIONER

## 2022-10-14 PROCEDURE — 74011000250 HC RX REV CODE- 250: Performed by: INTERNAL MEDICINE

## 2022-10-14 PROCEDURE — 74011250636 HC RX REV CODE- 250/636: Performed by: INTERNAL MEDICINE

## 2022-10-14 PROCEDURE — 65270000046 HC RM TELEMETRY

## 2022-10-14 PROCEDURE — 71045 X-RAY EXAM CHEST 1 VIEW: CPT

## 2022-10-14 PROCEDURE — 74011000258 HC RX REV CODE- 258: Performed by: INTERNAL MEDICINE

## 2022-10-14 PROCEDURE — 77010033678 HC OXYGEN DAILY

## 2022-10-14 PROCEDURE — 97110 THERAPEUTIC EXERCISES: CPT

## 2022-10-14 PROCEDURE — 36415 COLL VENOUS BLD VENIPUNCTURE: CPT

## 2022-10-14 PROCEDURE — 80048 BASIC METABOLIC PNL TOTAL CA: CPT

## 2022-10-14 PROCEDURE — 74011250637 HC RX REV CODE- 250/637: Performed by: INTERNAL MEDICINE

## 2022-10-14 PROCEDURE — 85027 COMPLETE CBC AUTOMATED: CPT

## 2022-10-14 RX ORDER — PANTOPRAZOLE SODIUM 20 MG/1
20 TABLET, DELAYED RELEASE ORAL
Status: DISCONTINUED | OUTPATIENT
Start: 2022-10-15 | End: 2022-10-18 | Stop reason: HOSPADM

## 2022-10-14 RX ADMIN — VANCOMYCIN HYDROCHLORIDE 750 MG: 750 INJECTION, POWDER, LYOPHILIZED, FOR SOLUTION INTRAVENOUS at 09:54

## 2022-10-14 RX ADMIN — HYDROMORPHONE HYDROCHLORIDE 0.5 MG: 1 INJECTION, SOLUTION INTRAMUSCULAR; INTRAVENOUS; SUBCUTANEOUS at 16:01

## 2022-10-14 RX ADMIN — HYDROMORPHONE HYDROCHLORIDE 0.5 MG: 1 INJECTION, SOLUTION INTRAMUSCULAR; INTRAVENOUS; SUBCUTANEOUS at 11:22

## 2022-10-14 RX ADMIN — APIXABAN 2.5 MG: 2.5 TABLET, FILM COATED ORAL at 17:13

## 2022-10-14 RX ADMIN — CEFEPIME 1 G: 1 INJECTION, POWDER, FOR SOLUTION INTRAMUSCULAR; INTRAVENOUS at 11:22

## 2022-10-14 RX ADMIN — Medication: at 10:05

## 2022-10-14 RX ADMIN — CEFEPIME 2 G: 2 INJECTION, POWDER, FOR SOLUTION INTRAVENOUS at 23:54

## 2022-10-14 RX ADMIN — APIXABAN 2.5 MG: 2.5 TABLET, FILM COATED ORAL at 09:53

## 2022-10-14 RX ADMIN — SODIUM CHLORIDE, PRESERVATIVE FREE 10 ML: 5 INJECTION INTRAVENOUS at 20:19

## 2022-10-14 RX ADMIN — HYDROMORPHONE HYDROCHLORIDE 0.5 MG: 1 INJECTION, SOLUTION INTRAMUSCULAR; INTRAVENOUS; SUBCUTANEOUS at 20:15

## 2022-10-14 RX ADMIN — SODIUM CHLORIDE, PRESERVATIVE FREE 10 ML: 5 INJECTION INTRAVENOUS at 14:40

## 2022-10-14 RX ADMIN — Medication: at 20:16

## 2022-10-14 RX ADMIN — Medication 3 MG: at 20:16

## 2022-10-14 RX ADMIN — SODIUM CHLORIDE, PRESERVATIVE FREE 10 ML: 5 INJECTION INTRAVENOUS at 14:41

## 2022-10-14 RX ADMIN — HYDROMORPHONE HYDROCHLORIDE 0.5 MG: 1 INJECTION, SOLUTION INTRAMUSCULAR; INTRAVENOUS; SUBCUTANEOUS at 05:00

## 2022-10-14 NOTE — PROGRESS NOTES
Bedside and Verbal shift change report given to [place] and Fatuma Marley RN and Debora RN (oncoming nurse) by Romeo Woods RN and Apple Dozier RN  (offgoing nurse). Report included the following information SBAR, Kardex, Intake/Output, MAR, Med Rec Status, Cardiac Rhythm . , and Alarm Parameters .

## 2022-10-14 NOTE — CONSULTS
[address] [place] [address] NOTE  [facility]  [phone] NP in-hospital cell phone M-F until 4:30  After 5pm or on weekends, please call  for physician on call        NAME:  Any Trammell   :   1932   MRN:   [id number]       Referring Physician: Beverley Martin Date: 10/14/2022 12:14 PM    Chief Complaint: anemia     History of Present Illness:  Patient is a 80 y.o. who is seen in consultation at the request of Dr. Jone Billings for anemia. Medical history as listed below includes on Eliquis, CKD. She presented for failure to thrive found to have pneumothorax. She has progressive anemia without signs of GI blood loss. She is unable to provide information. Discussed with RN, no GI concerns. No history of EGD/colonoscopy. I have reviewed the emergency room note, hospital admission note, notes by all other clinicians who have seen the patient during this hospitalization to date. I have reviewed the problem list and the reason for this hospitalization. I have reviewed the allergies and the medications the patient was taking at home prior to this hospitalization. PMH:  No past medical history on file. PSH:  Past Surgical History:   Procedure Laterality Date    IR THORACENTESIS/INSERT CHEST TUBE  10/11/2022       Allergies: Allergies   Allergen Reactions    Amoxicillin Unknown (comments)    Penicillins Unknown (comments)    Sulfa (Sulfonamide Antibiotics) Unknown (comments)       Home Medications:  Prior to Admission Medications   Prescriptions Last Dose Informant Patient Reported? Taking?   acetaminophen (Tylenol Arthritis Pain) 650 mg TbER   Yes No   Sig: Take 650 mg by mouth every eight (8) hours. albuterol (PROVENTIL HFA, VENTOLIN HFA, PROAIR HFA) 90 mcg/actuation inhaler   Yes No   Sig: Take 1 Puff by inhalation every four (4) hours as needed.    albuterol-ipratropium (DUO-NEB) 2.5 mg-0.5 mg/3 ml nebu   Yes No   Sig: 3 mL by Nebulization route two (2) times a day. aspirin 81 mg chewable tablet   Yes No   Sig: Take 81 mg by mouth in the morning. bisacodyL (Dulcolax, bisacodyl,) 10 mg supp   Yes No   Sig: Insert 10 mg into rectum daily as needed for Constipation. calcium carbonate (CALTREX) 600 mg calcium (1,500 mg) tablet   Yes No   Sig: Take 600 mg by mouth two (2) times a day. famotidine (PEPCID) 10 mg tablet   Yes No   Sig: Take 10 mg by mouth two (2) times a day. guaiFENesin (ORGANIDIN) 200 mg tablet   Yes No   Sig: Take 600 mg by mouth every four (4) hours as needed for Congestion. melatonin 3 mg tablet   Yes No   Sig: Take 3 mg by mouth nightly as needed for Insomnia. nystatin (MYCOSTATIN) 100,000 unit/gram ointment   Yes No   Sig: Apply  to affected area two (2) times a day. ondansetron (ZOFRAN ODT) 4 mg disintegrating tablet   Yes No   Sig: Take 4 mg by mouth every eight (8) hours as needed for Nausea or Vomiting. polyethylene glycol (Miralax) 17 gram packet   Yes No   Sig: Take 17 g by mouth in the morning. simethicone (GAS-X) 125 mg chewable tablet   Yes No   Sig: Take 125 mg by mouth every six (6) hours as needed for Flatulence.       Facility-Administered Medications: None       Hospital Medications:  Current Facility-Administered Medications   Medication Dose Route Frequency    vancomycin (VANCOCIN) 750 mg in 0.9% sodium chloride 250 mL ([id number])  750 mg IntraVENous Q18H    balsam peru-castor oiL (VENELEX) ointment   Topical Q12H    HYDROmorphone (DILAUDID) injection 0.5 mg  0.5 mg IntraVENous Q4H PRN    cefepime (MAXIPIME) 1 g in 0.9% sodium chloride (MBP/ADV) 50 mL MBP  1 g IntraVENous Q12H    Vancomycin pharmacy to dose   Other Rx Dosing/Monitoring    lidocaine 4 % patch 1 Patch  1 Patch TransDERmal Q24H    melatonin tablet 3 mg  3 mg Oral QHS PRN    sodium chloride (NS) flush 5-40 mL  5-40 mL IntraVENous Q8H    sodium chloride (NS) flush 5-40 mL  5-40 mL IntraVENous PRN    naloxone (NARCAN) injection 0.4 mg  0.4 mg IntraVENous PRN    oxyCODONE IR (ROXICODONE) tablet 2.5 mg  2.5 mg Oral Q4H PRN    sodium chloride (NS) flush 5-40 mL  5-40 mL IntraVENous Q8H    sodium chloride (NS) flush 5-40 mL  5-40 mL IntraVENous PRN    acetaminophen (TYLENOL) tablet 650 mg  650 mg Oral Q6H PRN    Or    acetaminophen (TYLENOL) suppository 650 mg  650 mg Rectal Q6H PRN    polyethylene glycol (MIRALAX) packet 17 g  17 g Oral DAILY PRN    ondansetron (ZOFRAN ODT) tablet 4 mg  4 mg Oral Q8H PRN    Or    ondansetron (ZOFRAN) injection 4 mg  4 mg IntraVENous Q6H PRN    apixaban (ELIQUIS) tablet 2.5 mg  2.5 mg Oral BID       Social History:  Social History     Tobacco Use    Smoking status: Every Day    Smokeless tobacco: Never   Substance Use Topics    Alcohol use: Not on file       Family History:  No family history on file. Review of Systems:  Unable to obtain due to patient condition     Objective:   Patient Vitals for the past 8 hrs:   BP Temp Pulse Resp SpO2 Weight   10/14/22 1205 (!) 148/56 97.8 °F (36.6 °C) 90 22 93 % --   10/14/22 1000 -- -- 80 -- -- --   10/14/22 0949 (!) 134/49 99.9 °F (37.7 °C) 81 26 93 % --   10/14/22 0619 -- -- 81 -- -- --   10/14/22 0430 (!) 156/52 98 °F (36.7 °C) 87 23 96 % 46.7 kg (102 lb 15.3 oz)     No intake/output data recorded. No intake/output data recorded.     EXAM:     CONST:  no acute distress   NEURO:  drowsy   HEENT: EOMI, no scleral icterus   LUNGS: Chest tube, oxygen   CARD:  Regular rate   ABD:  soft, refuses exam, no rebound, no masses, non distended   EXT:  no edema, warm   PSYCH: agitated     Data Review     Recent Labs     10/14/22  0505 10/13/22  0319   WBC 7.9 8.0   HGB 7.2* 7.1*   HCT 23.4* 23.1*    317     Recent Labs     10/14/22  0505 10/13/22  0319    139   K 4.1 4.0   * 110*   CO2 23 24   BUN 20 30*   CREA 0.72 0.94   * 98   CA 8.5 8.3*     No results for input(s): AP, TBIL, TP, ALB, GLOB, GGT, AML, LPSE in the last 72 hours. No lab exists for component: SGOT, GPT, AMYP, HLPSE  No results for input(s): INR, PTP, APTT, INREXT in the last 72 hours.        Assessment:     Anemia: likely multifactorial, no signs of GI blood loss  CKD  Pneumothorax  History of PE on Eliquis      Patient Active Problem List   Diagnosis Code    Sepsis ([facility] A41.9    HCAP (healthcare-associated pneumonia) J18.9    Pulmonary edema M38.6    Metabolic encephalopathy H36.47    PAULO (acute kidney injury) ([facility] [address] N17.9    Urinary retention R33.9    Hypernatremia E87.0    Atrial flutter (HCC) I48.92    Weakness R53.1    Pacemaker Z95.0    Third degree AV block (HCC) I44.2     Plan:     Diet as tolerated  PPI  Iron studies, replete if low  Trend CBC, transfuse as necessary  If pt begins to bleed briskly, order CTA GI bleed protocol and immediately consult IR for intervention if the CTA is positive  Would not recommend endoscopic evaluation at this time given age and co morbidities and lack of GI symptoms/signs of blood loss  Patient discussed with Dr. Fabiana Sauceda  Thank you for allowing me to participate in care of Ilya Coburn     Signed By: Alfredo Ray NP     10/14/2022  12:14 PM

## 2022-10-14 NOTE — PROGRESS NOTES
Problem: Pain  Goal: *Control of Pain  Outcome: Progressing Towards Goal     Problem: Impaired Skin Integrity/Pressure Injury Treatment  Goal: *Improvement of Existing Pressure Injury  Outcome: Progressing Towards Goal

## 2022-10-14 NOTE — PROGRESS NOTES
[address] [facility] Adult  Hospitalist Group                                                                                          Hospitalist Progress Note  Adali Bautista MD  Answering service: [phone] OR [phone] from in house phone        Date of Service:  10/14/2022  NAME:  Jeromy Jones  :  1932  MRN:  [id number]      Admission Summary:     Jeromy Jones is a 80 y.o. female with known past medical history of a flutter on Eliquis, recent pneumonia patient was admitted in the hospital and  and discharged in the beginning of August due to pneumonia and hypoxemia at that time and her blood pressure was low, after the last admission patient was discharged to rehab her condition improved and 2 weeks ago she was discharged home, per her  was not at the edge patient was weak and experienced on and off lightheadedness and unsteady gait, the patient had difficulty to ambulate, did not feel well, was brought into emergency department for evaluation. Most history was obtained through patient  and medical record as patient currently sleeping. Per patient  patient did not have any fever, shortness of breath, chest pain, nausea, vomiting, abdominal pain or diarrhea. She does take Eliquis. And she is supposed to see cardiologist as outpatient but did not have a chance to schedule appointment yet. In the emergency department patient was evaluated systolic blood pressure was on the low side, patient feeling lightheaded and weak, CT head was done was negative for acute intracranial pathology. Laboratory data was obtained creatinine was 1.6, stable, EKG revealed bigeminy with frequent cardiac pauses. Medicine was consulted for admission and further evaluation. Interval history / Subjective: Follow up Pneumothorax  Getting wound care  Complaining of pain  Chest tube in place, left arm sling in place  On 3l NC  No family in the room.       Assessment & Plan:     Left Pneumothorax s/p chest tube 10/11  Chronic hypoxic respiratory failure with 3l of home oxygen  History of PE on Eliquis  -chest tube placed on low continuous pressure  -improving, repeated chest x-ray on 10/13 no pneumothorax   -febrile on 10/10, on cefepime and vancomycin, fever now improved,   -normal lactic, procal 0.7  -blood cx on 10/11no growth so far  -CXR 10/14 pending  -Pulmonary and IR on board, probable chest tube removal today     Symptomatic sp due to Mobitz II s/p PPM placement on 10/6   Paroxsymal Aflutter  -pt presented with Lightheadedness, Unsteady gait  - hypotension improved   - PPM placed on 10/6  - restarted Eliquis 10/7  -cardiology on board, ok to DC her from their side       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 Eliquis  -Follow work up  -May need to consult GI, but I doubt she would qualify for intervention    Debility with Fall at home   -PT/OT on board  -SNF on discharge        Code status: DNR  Prophylaxis: Eliquis     Plan: Chest tube in place, discharge probably to [place] once chest tube is out and stable  Care Plan discussed with: Patient, Nurse and CM  Anticipated Disposition: SNF- [facility] Problems  Date Reviewed: 8/3/2022            Codes Class Noted POA    Pacemaker ICD-10-CM: Z95.0  ICD-9-CM: V45.01  10/6/2022 Unknown    Overview Signed 10/6/2022  7:00 AM by Joe Rico MD     10/6/22 [employer]             Third degree AV block ([facility] [address] ICD-10-CM: C80.9  ICD-9-CM: 426.0  10/6/2022 Unknown        Weakness ICD-10-CM: R53.1  ICD-9-CM: 780.79  10/4/2022 Unknown             Vital Signs:    Last 24hrs VS reviewed since prior progress note.  Most recent are:  Visit Vitals  BP (!) 156/52   Pulse 81   Temp 98 °F (36.7 °C)   Resp 23   Ht 5' 1\" (1.549 m)   Wt 46.7 kg (102 lb 15.3 oz)   SpO2 96%   BMI 19.45 kg/m²       No intake or output data in the 24 hours ending 10/14/22 8417     Physical Examination:     I had a face to face encounter with this patient and independently examined them on 10/14/2022 as outlined below:          Constitutional:  No acute respiratory distress, cooperative, pleasant    ENT:  Oral mucosa moist, oropharynx benign. Resp:  Left side chest tube in place, decrease bronchial breath sound bilaterally. No wheezing/rhonchi/rales. No accessory muscle use. CV:  Regular rhythm, normal rate, no murmurs, gallops, rubs    GI:  Soft, non distended, non tender. normoactive bowel sounds, no hepatosplenomegaly     Musculoskeletal:  No edema,      Neurologic:  Conscious and alert, oriented to person and place, moves upper extremities, lower extremities on protective boot, CN II-XII reviewed            Data Review:    Review and/or order of clinical lab test  Review and/or order of tests in the radiology section of CPT  Review and/or order of tests in the medicine section of CPT      Labs:     Recent Labs     10/14/22  0505 10/13/22  0319   WBC 7.9 8.0   HGB 7.2* 7.1*   HCT 23.4* 23.1*    317       Recent Labs     10/14/22  0505 10/13/22  0319 10/12/22  0212    139 139   K 4.1 4.0 4.1   * 110* 107   CO2 23 24 24   BUN 20 30* 37*   CREA 0.72 0.94 1.14*   * 98 106*   CA 8.5 8.3* 7.8*       No results for input(s): ALT, AP, TBIL, TBILI, TP, ALB, GLOB, GGT, AML, LPSE in the last 72 hours. No lab exists for component: SGOT, GPT, AMYP, HLPSE  No results for input(s): INR, PTP, APTT, INREXT, INREXT in the last 72 hours. No results for input(s): FE, TIBC, PSAT, FERR in the last 72 hours. No results found for: FOL, RBCF   No results for input(s): PH, PCO2, PO2 in the last 72 hours. No results for input(s): CPK, CKNDX, TROIQ in the last 72 hours.     No lab exists for component: CPKMB  No results found for: CHOL, CHOLX, CHLST, CHOLV, HDL, HDLP, LDL, LDLC, DLDLP, TGLX, TRIGL, TRIGP, CHHD, CHHDX  No results found for: [facility]  Lab Results   Component Value Date/Time    Color YELLOW/STRAW 07/30/2022 05:58 PM    Appearance CLOUDY (A) 07/30/2022 05:58 PM    Specific gravity 1.009 07/30/2022 05:58 PM    pH (UA) 5.5 07/30/2022 05:58 PM    Protein 30 (A) 07/30/2022 05:58 PM    Glucose Negative 07/30/2022 05:58 PM    Ketone Negative 07/30/2022 05:58 PM    Bilirubin Negative 07/30/2022 05:58 PM    Urobilinogen 0.2 07/30/2022 05:58 PM    Nitrites Negative 07/30/2022 05:58 PM    Leukocyte Esterase LARGE (A) 07/30/2022 05:58 PM    Epithelial cells MODERATE (A) 07/30/2022 05:58 PM    Bacteria Negative 07/30/2022 05:58 PM    WBC 20-50 07/30/2022 05:58 PM    RBC 5-10 07/30/2022 05:58 PM         Medications Reviewed:     Current Facility-Administered Medications   Medication Dose Route Frequency    vancomycin (VANCOCIN) 750 mg in 0.9% sodium chloride 250 mL ([id number])  750 mg IntraVENous Q24H    balsam peru-castor oiL (VENELEX) ointment   Topical Q12H    HYDROmorphone (DILAUDID) injection 0.5 mg  0.5 mg IntraVENous Q4H PRN    cefepime (MAXIPIME) 1 g in 0.9% sodium chloride (MBP/ADV) 50 mL MBP  1 g IntraVENous Q12H    Vancomycin pharmacy to dose   Other Rx Dosing/Monitoring    lidocaine 4 % patch 1 Patch  1 Patch TransDERmal Q24H    melatonin tablet 3 mg  3 mg Oral QHS PRN    sodium chloride (NS) flush 5-40 mL  5-40 mL IntraVENous Q8H    sodium chloride (NS) flush 5-40 mL  5-40 mL IntraVENous PRN    naloxone (NARCAN) injection 0.4 mg  0.4 mg IntraVENous PRN    oxyCODONE IR (ROXICODONE) tablet 2.5 mg  2.5 mg Oral Q4H PRN    sodium chloride (NS) flush 5-40 mL  5-40 mL IntraVENous Q8H    sodium chloride (NS) flush 5-40 mL  5-40 mL IntraVENous PRN    acetaminophen (TYLENOL) tablet 650 mg  650 mg Oral Q6H PRN    Or    acetaminophen (TYLENOL) suppository 650 mg  650 mg Rectal Q6H PRN    polyethylene glycol (MIRALAX) packet 17 g  17 g Oral DAILY PRN    ondansetron (ZOFRAN ODT) tablet 4 mg  4 mg Oral Q8H PRN    Or    ondansetron (ZOFRAN) injection 4 mg  4 mg IntraVENous Q6H PRN    apixaban (ELIQUIS) tablet 2.5 mg  2.5 mg Oral BID     ______________________________________________________________________  EXPECTED LENGTH OF STAY: 3d 2h  ACTUAL LENGTH OF STAY:          Pennie Salazar MD

## 2022-10-14 NOTE — PROGRESS NOTES
Bedside and Verbal shift change report given to Johanna Bansal RN (oncoming nurse) by [employer], RN (offgoing nurse). Report included the following information SBAR, Kardex, MAR, and Cardiac Rhythm   .

## 2022-10-14 NOTE — PROGRESS NOTES
Clinical Pharmacy Note - Extended Infusion Cefepime Dosing    Current cefepime maintenance regimen: 1 gram IV every 12 hours. Indication: HAP    CrCl: 38.3 mL/min    Per P&T protocol, the cefepime maintenance regimen will be adjusted due to improved renal function (30-59 mL/min) to 2 grams Q 12 hours (each dose will be infused over 4 hours). Pharmacy will continue to monitor this patient daily for changes in clinical status and renal function. Please call pharmacy with any questions.     Jessica Mora, PharmD  Clinical Pharmacist, Orthopedics and Med/Surg  [id number] [facility] ()

## 2022-10-14 NOTE — PROGRESS NOTES
Problem: Mobility Impaired (Adult and Pediatric)  Goal: *Acute Goals and Plan of Care (Insert Text)  Description: FUNCTIONAL STATUS PRIOR TO ADMISSION: Patient was modified independent using a rollator vs no device? for functional mobility. Recently discharged from SNF to [facility] for respite care and was home for 1 day prior to this admission for fall. HOME SUPPORT PRIOR TO ADMISSION: The patient lived with her supportive and independent . Son lives in [place] but was coming into town to assist with care needs. Physical Therapy Goals  Revised 10/6/2022; reassessed 10/14/22 and appropriate for carryover  1. Patient will move from supine to sit and sit to supine, scoot up and down, and roll side to side in bed with moderate assistance  within 7 day(s). 2.  Patient will transfer from bed to chair and chair to bed with max assistance using the least restrictive device within 7 day(s). 3.  Patient will perform sit to stand with max assistance within 7 day(s). 4.  Patient will completed seated LE therex x10 reps with CGA for balance within 7 days. 5.  PT to assess gait as appropriate. Physical Therapy Goals  Initiated 10/4/2022  1. Patient will move from supine to sit and sit to supine, scoot up and down, and roll side to side in bed with moderate assistance  within 7 day(s). 2.  Patient will transfer from bed to chair and chair to bed with moderate assistance  using the least restrictive device within 7 day(s). 3.  Patient will perform sit to stand with moderate assistance  within 7 day(s). 4.  Patient will ambulate with moderate assistance  for 15 feet with the least restrictive device within 7 day(s).        Outcome: Not Progressing Towards Goal    PHYSICAL THERAPY TREATMENT: WEEKLY REASSESSMENT  Patient: Elver Chavez (62 y.o. female)  Date: 10/14/2022  Primary Diagnosis: Weakness [R53.1]  Procedure(s) (LRB):  INSERT PPM DUAL (N/A) 8 Days Post-Op   Precautions:   Fall (L sling, PM precautions)      ASSESSMENT  Patient continues with skilled PT services and is not progressing towards goals. She remains most limited by global weakness, anxiety/fear with any movement, c/o significant global pain, and decreased volition leading to increased falls risk and dependency from baseline level. Received pt supine in bed. Total A to reposition per nursing. Pt with extremely poor tolerance to any/all activity and continuously screams out in pain. With max encouragement, she was able to actively participate in B LE AAROM (heel slides and APs) for 3 reps each before adamantly refusing anything further. With adjustment of the covers, pt then screams out \"STOOOP doing this to ME!\". Further activity aborted. Recommend discharge to SNF once medically cleared. Patient's progression toward goals since last assessment: none. Current Level of Function Impacting Discharge (mobility/balance): infer total A      Other factors to consider for discharge: total A; poor tolerance to any mobility         PLAN :  Goals have been updated based on progression since last assessment. Patient continues to benefit from skilled intervention to address the above impairments. Recommendations and Planned Interventions: bed mobility training, transfer training, gait training, therapeutic exercises, neuromuscular re-education, patient and family training/education, and therapeutic activities      Frequency/Duration: Patient will be followed by physical therapy:  3 times a week to address goals. Recommendation for discharge: (in order for the patient to meet his/her long term goals)  Therapy up to 5 days/week in SNF setting    This discharge recommendation:  A follow-up discussion with the attending provider and/or case management is planned    IF patient discharges home will need the following DME: to be determined (TBD)         SUBJECTIVE:   Patient stated You are crucifying me!     OBJECTIVE DATA SUMMARY:   HISTORY: No past medical history on file. Past Surgical History:   Procedure Laterality Date    IR THORACENTESIS/INSERT CHEST TUBE  10/11/2022       Personal factors and/or comorbidities impacting plan of care: PMHx    Home Situation  Home Environment: Private residence  # Steps to Enter: 3  One/Two Story Residence: One story  Living Alone: No  Support Systems: Spouse/Significant Other  Patient Expects to be Discharged to[de-identified] Skilled nursing facility  Current DME Used/Available at Home: None    EXAMINATION/PRESENTATION/DECISION MAKING:   Critical Behavior:  Neurologic State: Confused  Orientation Level: Oriented to person  Cognition: Memory loss  Safety/Judgement: Fall prevention  Hearing: Auditory  Auditory Impairment: Hard of hearing, bilateral     Functional Mobility:  Bed Mobility:    Scooting: Total assistance      Pain Rating:  Seemingly all over. Pt unable to locate, describe, or rate despite multiple attempts    Activity Tolerance:   Poor    After treatment patient left in no apparent distress:   Supine in bed, Heels elevated for pressure relief, Call bell within reach, Bed / chair alarm activated, and Side rails x 3    COMMUNICATION/EDUCATION:   The patients plan of care was discussed with: Registered nurse. Fall prevention education was provided and the patient/caregiver indicated understanding. and Patient is unable to participate in goal setting and plan of care.     Thank you for this referral.  Carla Fairbanks, PT, DPT   Time Calculation: 8 mins

## 2022-10-14 NOTE — PROGRESS NOTES
Day #4 of Vancomycin  Indication:  HAP  Current regimen:  750mg IV Q24H  Abx regimen:  Vanc + cefepime  Recent Labs     10/14/22  0505 10/13/22  0319 10/12/22  0212   WBC 7.9 8.0  --    CREA 0.72 0.94 1.14*   BUN 20 30* 37*   Est CrCl: 38.3 ml/min; UO: --- ml/kg/hr  Temp (24hrs), Av.9 °F (36.6 °C), Min:97.6 °F (36.4 °C), Max:98 °F (36.7 °C)    Cultures:   10/11 Blood: NGTD, prelim  10/11 MRSA, nares - Not present - Final    Goal target range AUC/TOMAS 400-600    Recent level history:  Date/Time Dose & Interval Measured Level (mcg/mL) Associated AUC/TOMAS Dose Adjustment    [email or web address] 500 mg IV Q 24hrs 13.5  327 750 mg IV Q 24hrs             Assessment/Plan: MRSA Cx negative however patient with pneumothorax (chest tube in place w/plan to remove today). Discussed case with provider; current IV ABX to continue for now. Leuks remain WNL; still afebrile. SCr continues to improve; will empirically adjust regimen. Change to 750 mg IV Q18hr for a predicted AUC of 507 mg/L*hr. Pharmacy will continue to monitor this patient daily for changes in clinical status and renal function.     Veda Peterson, PharmD  Clinical Pharmacist, Orthopedics and Med/Surg  [id number] [employer] ()

## 2022-10-14 NOTE — PROGRESS NOTES
Pulmonary, Critical Care, and Sleep Medicine~Progress Note    Name: Allen Quinteros MRN: [id number]   : 1932 Hospital: [facility]    Date: 10/14/2022 11:12 AM Admission: 10/4/2022     Impression Plan   Acute on chronic hypoxic resp failure  Large left PTX  Generalized weakness and unsteady gait--> bradycardia with AV block mobitz type II. S/p pacemaker placement on 10/6  A fib/flutter. On elquis   PAULO on CKD Agree with empiric abx  O2 titration above 90%   Chest tube is clamped. if stable then we can clamp for 4 hours; early afternoon. Discussed with RN  Repeat chest film this afternoon   Eliquis     Daily Progression:    10/14  No ptx following 24 hours of waterseal on this am chest x-ray   No WOB noted     10/13  Remains stable  No airleaks noted     10/12  Chest tube in place   No ptx on chest image this am     10/11  Consult Note requested by hospitalist     Patient presented for admission on 10/4/22 for weakness and unsteady gait; found to be bradycardic with AV block mobitz type II.  PPM placement on 10/6. Patient spiked a fever of 100. 6F  which prompted a chest image prior to discharge. On that chest image she was noted to have a 4.1cm left PTX. Of note the chest image taken on 10/6 did not show a ptx. I have reviewed the labs and previous days notes. Review of systems not obtained due to patient factors. No past medical history on file. Past Surgical History:   Procedure Laterality Date    IR THORACENTESIS/INSERT CHEST TUBE  10/11/2022      Prior to Admission medications    Medication Sig Start Date End Date Taking? Authorizing Provider   apixaban (ELIQUIS) 2.5 mg tablet Take 1 Tablet by mouth two (2) times a day. 10/8/22  Yes Yves Chung MD   lidocaine 4 % patch Apply patch to back  . Apply patch to the affected area for 12 hours a day and remove for 12 hours a day.  10/9/22  Yes Yves Chung MD   nystatin (MYCOSTATIN) 100,000 unit/gram ointment Apply to affected area two (2) times a day. Madhavi Elena MD   albuterol (PROVENTIL HFA, VENTOLIN HFA, PROAIR HFA) 90 mcg/actuation inhaler Take 1 Puff by inhalation every four (4) hours as needed. Madhavi Elena MD   albuterol-ipratropium (DUO-NEB) 2.5 mg-0.5 mg/3 ml nebu 3 mL by Nebulization route two (2) times a day. Madhavi Elena MD   acetaminophen (Tylenol Arthritis Pain) 650 mg TbER Take 650 mg by mouth every eight (8) hours. Madhavi Elena MD   guaiFENesin (ORGANIDIN) 200 mg tablet Take 600 mg by mouth every four (4) hours as needed for Congestion. Madhavi Elena MD   calcium carbonate (CALTREX) 600 mg calcium (1,500 mg) tablet Take 600 mg by mouth two (2) times a day. Madhavi Elena MD   aspirin 81 mg chewable tablet Take 81 mg by mouth in the morning. Madhavi Elena MD   polyethylene glycol (Miralax) 17 gram packet Take 17 g by mouth in the morning. Provider, Historical   famotidine (PEPCID) 10 mg tablet Take 10 mg by mouth two (2) times a day. Provider, Historical   melatonin 3 mg tablet Take 3 mg by mouth nightly as needed for Insomnia. Provider, Historical   simethicone (GAS-X) 125 mg chewable tablet Take 125 mg by mouth every six (6) hours as needed for Flatulence. Provider, Historical   ondansetron (ZOFRAN ODT) 4 mg disintegrating tablet Take 4 mg by mouth every eight (8) hours as needed for Nausea or Vomiting. Provider, Historical   bisacodyL (Dulcolax, bisacodyl,) 10 mg supp Insert 10 mg into rectum daily as needed for Constipation. Provider, Historical     Allergies   Allergen Reactions    Amoxicillin Unknown (comments)    Penicillins Unknown (comments)    Sulfa (Sulfonamide Antibiotics) Unknown (comments)      Social History     Tobacco Use    Smoking status: Every Day    Smokeless tobacco: Never   Substance Use Topics    Alcohol use: Not on file      No family history on file.   OBJECTIVE:     Vital Signs:     Visit Vitals  BP (!) 134/49 (BP 1 Location: Right upper arm, BP Patient Position: At rest)   Pulse 80   Temp 99.9 °F (37.7 °C)   Resp 26   Ht 5' 1\" (1.549 m)   Wt 46.7 kg (102 lb 15.3 oz)   SpO2 93%   BMI 19.45 kg/m²      Temp (24hrs), Av.2 °F (36.8 °C), Min:97.6 °F (36.4 °C), Max:99.9 °F (37.7 °C)     Intake/Output:     Last shift: No intake/output data recorded. Last 3 shifts: No intake/output data recorded.         Intake/Output Summary (Last 24 hours) at 10/14/2022 1111  Last data filed at 10/14/2022 0954  Gross per 24 hour   Intake --   Output 0 ml   Net 0 ml         Physical Exam:                                        Exam Findings Other   General: No resp distress noted, appears stated age    [de-identified]:  No ulcers, JVD not elevated, no cervical LAD    Chest: No pectus deformity, normal chest rise b/l    HEART:  No visible thrills     Lungs:  Normal expansion     ABD: Soft/NT, non rigid mildly distended    EXT: No cyanosis/clubbing/edema, normal peripheral pulses    Skin: No rashes or ulcers, no mottling    Neuro: Awake         Medications:  Current Facility-Administered Medications   Medication Dose Route Frequency    vancomycin (VANCOCIN) 750 mg in 0.9% sodium chloride 250 mL ([id number])  750 mg IntraVENous Q18H    balsam peru-castor oiL (VENELEX) ointment   Topical Q12H    HYDROmorphone (DILAUDID) injection 0.5 mg  0.5 mg IntraVENous Q4H PRN    cefepime (MAXIPIME) 1 g in 0.9% sodium chloride (MBP/ADV) 50 mL MBP  1 g IntraVENous Q12H    Vancomycin pharmacy to dose   Other Rx Dosing/Monitoring    lidocaine 4 % patch 1 Patch  1 Patch TransDERmal Q24H    melatonin tablet 3 mg  3 mg Oral QHS PRN    sodium chloride (NS) flush 5-40 mL  5-40 mL IntraVENous Q8H    sodium chloride (NS) flush 5-40 mL  5-40 mL IntraVENous PRN    naloxone (NARCAN) injection 0.4 mg  0.4 mg IntraVENous PRN    oxyCODONE IR (ROXICODONE) tablet 2.5 mg  2.5 mg Oral Q4H PRN    sodium chloride (NS) flush 5-40 mL  5-40 mL IntraVENous Q8H    sodium chloride (NS) flush 5-40 mL  5-40 mL IntraVENous PRN acetaminophen (TYLENOL) tablet 650 mg  650 mg Oral Q6H PRN    Or    acetaminophen (TYLENOL) suppository 650 mg  650 mg Rectal Q6H PRN    polyethylene glycol (MIRALAX) packet 17 g  17 g Oral DAILY PRN    ondansetron (ZOFRAN ODT) tablet 4 mg  4 mg Oral Q8H PRN    Or    ondansetron (ZOFRAN) injection 4 mg  4 mg IntraVENous Q6H PRN    apixaban (ELIQUIS) tablet 2.5 mg  2.5 mg Oral BID       Labs:  ABG No results for input(s): PHI, PCO2I, PO2I, HCO3I, SO2I, FIO2I in the last 72 hours.      CBC Recent Labs     10/14/22  0505 10/13/22  0319   WBC 7.9 8.0   HGB 7.2* 7.1*   HCT 23.4* 23.1*    317   MCV 98.3 100.0*   MCH 30.3 65.4          Metabolic  Panel Recent Labs     10/14/22  0505 10/13/22  0319 10/12/22  0212    139 139   K 4.1 4.0 4.1   * 110* 107   CO2 23 24 24   * 98 106*   BUN 20 30* 37*   CREA 0.72 0.94 1.14*   CA 8.5 8.3* 7.8*          Pertinent Labs                Silver Caruso PA-C  10/14/2022

## 2022-10-14 NOTE — PROGRESS NOTES
Transition of Care  RUR 18% Moderate  Disposition [place]  DME Wheelchair, rollator, home oxygen, shower stool, raised toilet seat, and grab bars. Transportation BLS  Follow Up PCP, Specialist    Patient DC pending removal of chest tube. Plan for [place]. Call attempted to [facility] admissions director, left voice message. CM to monitor.      Sonia Trejo MS

## 2022-10-15 LAB
ANION GAP SERPL CALC-SCNC: 4 MMOL/L (ref 5–15)
BUN SERPL-MCNC: 18 MG/DL (ref 6–20)
BUN/CREAT SERPL: 23 (ref 12–20)
CALCIUM SERPL-MCNC: 8.8 MG/DL (ref 8.5–10.1)
CHLORIDE SERPL-SCNC: 111 MMOL/L (ref 97–108)
CO2 SERPL-SCNC: 27 MMOL/L (ref 21–32)
CREAT SERPL-MCNC: 0.8 MG/DL (ref 0.55–1.02)
GLUCOSE SERPL-MCNC: 112 MG/DL (ref 65–100)
IRON SATN MFR SERPL: 5 % (ref 20–50)
IRON SERPL-MCNC: 9 UG/DL (ref 35–150)
POTASSIUM SERPL-SCNC: 3.9 MMOL/L (ref 3.5–5.1)
SODIUM SERPL-SCNC: 142 MMOL/L (ref 136–145)
TIBC SERPL-MCNC: 189 UG/DL (ref 250–450)
VIT B12 SERPL-MCNC: >2000 PG/ML (ref 193–986)

## 2022-10-15 PROCEDURE — 74011000250 HC RX REV CODE- 250: Performed by: INTERNAL MEDICINE

## 2022-10-15 PROCEDURE — 74011250637 HC RX REV CODE- 250/637: Performed by: NURSE PRACTITIONER

## 2022-10-15 PROCEDURE — 83540 ASSAY OF IRON: CPT

## 2022-10-15 PROCEDURE — 65270000046 HC RM TELEMETRY

## 2022-10-15 PROCEDURE — 80048 BASIC METABOLIC PNL TOTAL CA: CPT

## 2022-10-15 PROCEDURE — 74011000258 HC RX REV CODE- 258: Performed by: INTERNAL MEDICINE

## 2022-10-15 PROCEDURE — 82607 VITAMIN B-12: CPT

## 2022-10-15 PROCEDURE — 36415 COLL VENOUS BLD VENIPUNCTURE: CPT

## 2022-10-15 PROCEDURE — 74011250637 HC RX REV CODE- 250/637: Performed by: INTERNAL MEDICINE

## 2022-10-15 PROCEDURE — 74011250636 HC RX REV CODE- 250/636: Performed by: INTERNAL MEDICINE

## 2022-10-15 RX ADMIN — HYDROMORPHONE HYDROCHLORIDE 0.5 MG: 1 INJECTION, SOLUTION INTRAMUSCULAR; INTRAVENOUS; SUBCUTANEOUS at 04:44

## 2022-10-15 RX ADMIN — Medication: at 09:15

## 2022-10-15 RX ADMIN — Medication: at 20:31

## 2022-10-15 RX ADMIN — HYDROMORPHONE HYDROCHLORIDE 0.5 MG: 1 INJECTION, SOLUTION INTRAMUSCULAR; INTRAVENOUS; SUBCUTANEOUS at 09:35

## 2022-10-15 RX ADMIN — HYDROMORPHONE HYDROCHLORIDE 0.5 MG: 1 INJECTION, SOLUTION INTRAMUSCULAR; INTRAVENOUS; SUBCUTANEOUS at 22:51

## 2022-10-15 RX ADMIN — SODIUM CHLORIDE, PRESERVATIVE FREE 10 ML: 5 INJECTION INTRAVENOUS at 22:59

## 2022-10-15 RX ADMIN — VANCOMYCIN HYDROCHLORIDE 750 MG: 750 INJECTION, POWDER, LYOPHILIZED, FOR SOLUTION INTRAVENOUS at 04:43

## 2022-10-15 RX ADMIN — PANTOPRAZOLE SODIUM 20 MG: 20 TABLET, DELAYED RELEASE ORAL at 09:14

## 2022-10-15 RX ADMIN — HYDROMORPHONE HYDROCHLORIDE 0.5 MG: 1 INJECTION, SOLUTION INTRAMUSCULAR; INTRAVENOUS; SUBCUTANEOUS at 00:06

## 2022-10-15 RX ADMIN — VANCOMYCIN HYDROCHLORIDE 750 MG: 750 INJECTION, POWDER, LYOPHILIZED, FOR SOLUTION INTRAVENOUS at 22:52

## 2022-10-15 RX ADMIN — CEFEPIME 2 G: 2 INJECTION, POWDER, FOR SOLUTION INTRAVENOUS at 11:12

## 2022-10-15 RX ADMIN — APIXABAN 2.5 MG: 2.5 TABLET, FILM COATED ORAL at 09:14

## 2022-10-15 RX ADMIN — APIXABAN 2.5 MG: 2.5 TABLET, FILM COATED ORAL at 17:02

## 2022-10-15 RX ADMIN — HYDROMORPHONE HYDROCHLORIDE 0.5 MG: 1 INJECTION, SOLUTION INTRAMUSCULAR; INTRAVENOUS; SUBCUTANEOUS at 17:02

## 2022-10-15 NOTE — PROGRESS NOTES
Bedside and Verbal shift change report given to Eva Fournier RN (oncoming nurse) by KEISHA Cristobal LPN (offgoing nurse). Report included the following information SBAR, Kardex, MAR, Recent Results, and Cardiac Rhythm   .

## 2022-10-15 NOTE — PROGRESS NOTES
[address] [facility] Adult  Hospitalist Group                                                                                          Hospitalist Progress Note  Carlos Lott MD  Answering service: [phone] from in house phone        Date of Service:  10/15/2022  NAME:  Abhi Anthony  :  1932  MRN:  [id number]      Admission Summary:     Abhi Anthony is a 80 y.o. female with known past medical history of a flutter on Eliquis, recent pneumonia patient was admitted in the hospital and  and discharged in the beginning of August due to pneumonia and hypoxemia at that time and her blood pressure was low, after the last admission patient was discharged to rehab her condition improved and 2 weeks ago she was discharged home, per her  was not at the edge patient was weak and experienced on and off lightheadedness and unsteady gait, the patient had difficulty to ambulate, did not feel well, was brought into emergency department for evaluation. Most history was obtained through patient  and medical record as patient currently sleeping. Per patient  patient did not have any fever, shortness of breath, chest pain, nausea, vomiting, abdominal pain or diarrhea. She does take Eliquis. And she is supposed to see cardiologist as outpatient but did not have a chance to schedule appointment yet. In the emergency department patient was evaluated systolic blood pressure was on the low side, patient feeling lightheaded and weak, CT head was done was negative for acute intracranial pathology. Laboratory data was obtained creatinine was 1.6, stable, EKG revealed bigeminy with frequent cardiac pauses. Medicine was consulted for admission and further evaluation.     Interval history / Subjective:   Pt seen earlier today and again this afternoon  Complaining of pain in back and right leg  Chest tube in place  On 3l NC  D/w  at bedside     Assessment & Plan:     Left Pneumothorax   Chronic hypoxic respiratory failure with 3l of home oxygen  History of PE on Eliquis  -chest tube placed on low continuous pressure  -improving, repeated chest x-ray on 10/14 no pneumothorax   -febrile on 10/10, on cefepime and vancomycin, fever now improved,   -normal lactic, procal 0.7  -blood cx on 10/11 no growth so far  -CXR 10/14 improved  -Pulmonary following, chest tube removal as per pulmonary/IR     Symptomatic sp due to Mobitz II s/p PPM placement on 10/6   Paroxsymal Aflutter  -pt presented with Lightheadedness, unsteady gait  - hypotension improved   - PPM placed on 10/6  - restarted Eliquis 10/7    Altered mental state, suspect metabolic/toxic encephalopathy (medications, infection, pain) - continue supportive care; non-focal neuro exam      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    Anemia, continuous decline since 7/2022   -on Eliquis  -Follow work up  -Seen by GI  No evidence of active bleeding  Monitor       Debility with Fall at home   -PT/OT on board  -SNF on discharge      Severe deconditioning   Needs assistance with feeding    Code status: DNR  Prophylaxis: Eliquis     Plan:  discharge probably to [place] once chest tube is out and stable  Care Plan discussed with: Patient, Nurse and CM  Anticipated Disposition: SNF- [facility]  Date Reviewed: 8/3/2022            Codes Class Noted POA    Pacemaker ICD-10-CM: Z95.0  ICD-9-CM: V45.01  10/6/2022 Unknown    Overview Signed 10/6/2022  7:00 AM by Tamar Chung MD     10/6/22 [employer]             Third degree AV block ([address] ICD-10-CM: Y07.0  ICD-9-CM: 426.0  10/6/2022 Unknown        Weakness ICD-10-CM: R53.1  ICD-9-CM: 780.79  10/4/2022 Unknown           Vital Signs:    Last 24hrs VS reviewed since prior progress note.  Most recent are:  Visit Vitals  BP (!) 147/71 (BP 1 Location: Right upper arm, BP Patient Position: At rest)   Pulse 86   Temp 97.8 °F (36.6 °C)   Resp 24   Ht 5' 1\" (1.549 m)   Wt 46.7 kg (102 lb 15.3 oz)   SpO2 93%   BMI 19.45 kg/m²       No intake or output data in the 24 hours ending 10/15/22 1631     Physical Examination:     I had a face to face encounter with this patient and independently examined them on 10/15/2022 as outlined below:          Constitutional:  No acute respiratory distress, a bit restless   ENT:  Oral mucosa moist, oropharynx benign. Resp:  Left side chest tube in place,  No wheezing/rhonchi/rales. No accessory muscle use. CV:  Regular rhythm    GI:  Soft, non distended, no focal tenderness. normoactive bowel sounds, no hepatosplenomegaly; no pulsatile mass    Musculoskeletal:  No edema; no calf tenderness     Neurologic: Alert, confused, follows commands, no focal weakness            Data Review:    Review and/or order of clinical lab test  Review and/or order of tests in the radiology section of CPT  Review and/or order of tests in the medicine section of CPT      Labs:     Recent Labs     10/14/22  0505 10/13/22  0319   WBC 7.9 8.0   HGB 7.2* 7.1*   HCT 23.4* 23.1*    317       Recent Labs     10/15/22  0203 10/14/22  0505 10/13/22  0319    142 139   K 3.9 4.1 4.0   * 112* 110*   CO2 27 23 24   BUN 18 20 30*   CREA 0.80 0.72 0.94   * 102* 98   CA 8.8 8.5 8.3*       No results for input(s): ALT, AP, TBIL, TBILI, TP, ALB, GLOB, GGT, AML, LPSE in the last 72 hours. No lab exists for component: SGOT, GPT, AMYP, HLPSE  No results for input(s): INR, PTP, APTT, INREXT, INREXT in the last 72 hours. Recent Labs     10/15/22  0203   TIBC 189*   PSAT 5*        No results found for: FOL, RBCF   No results for input(s): PH, PCO2, PO2 in the last 72 hours. No results for input(s): CPK, CKNDX, TROIQ in the last 72 hours.     No lab exists for component: CPKMB  No results found for: CHOL, CHOLX, CHLST, CHOLV, HDL, HDLP, LDL, LDLC, DLDLP, TGLX, TRIGL, TRIGP, CHHD, CHHDX  No results found for: [address]  [facility]  Lab Results   Component Value Date/Time    Color YELLOW/STRAW 07/30/2022 05:58 PM    Appearance CLOUDY (A) 07/30/2022 05:58 PM    Specific gravity 1.009 07/30/2022 05:58 PM    pH (UA) 5.5 07/30/2022 05:58 PM    Protein 30 (A) 07/30/2022 05:58 PM    Glucose Negative 07/30/2022 05:58 PM    Ketone Negative 07/30/2022 05:58 PM    Bilirubin Negative 07/30/2022 05:58 PM    Urobilinogen 0.2 07/30/2022 05:58 PM    Nitrites Negative 07/30/2022 05:58 PM    Leukocyte Esterase LARGE (A) 07/30/2022 05:58 PM    Epithelial cells MODERATE (A) 07/30/2022 05:58 PM    Bacteria Negative 07/30/2022 05:58 PM    WBC 20-50 07/30/2022 05:58 PM    RBC 5-10 07/30/2022 05:58 PM         Medications Reviewed:     Current Facility-Administered Medications   Medication Dose Route Frequency    [START ON 10/16/2022] Vancomycin random level - due 10/16 with am labs. Thanks!    Other ONCE    vancomycin (VANCOCIN) 750 mg in 0.9% sodium chloride 250 mL ([id number])  750 mg IntraVENous Q18H    pantoprazole (PROTONIX) tablet 20 mg  20 mg Oral ACB    cefepime (MAXIPIME) 2 g in 0.9% sodium chloride (MBP/ADV) 100 mL MBP  2 g IntraVENous Q12H    balsam peru-castor oiL (VENELEX) ointment   Topical Q12H    HYDROmorphone (DILAUDID) injection 0.5 mg  0.5 mg IntraVENous Q4H PRN    Vancomycin pharmacy to dose   Other Rx Dosing/Monitoring    lidocaine 4 % patch 1 Patch  1 Patch TransDERmal Q24H    melatonin tablet 3 mg  3 mg Oral QHS PRN    sodium chloride (NS) flush 5-40 mL  5-40 mL IntraVENous Q8H    sodium chloride (NS) flush 5-40 mL  5-40 mL IntraVENous PRN    naloxone (NARCAN) injection 0.4 mg  0.4 mg IntraVENous PRN    oxyCODONE IR (ROXICODONE) tablet 2.5 mg  2.5 mg Oral Q4H PRN    sodium chloride (NS) flush 5-40 mL  5-40 mL IntraVENous Q8H    sodium chloride (NS) flush 5-40 mL  5-40 mL IntraVENous PRN    acetaminophen (TYLENOL) tablet 650 mg  650 mg Oral Q6H PRN    Or    acetaminophen (TYLENOL) suppository 650 mg  650 mg Rectal Q6H PRN    polyethylene glycol (MIRALAX) packet 17 g  17 g Oral DAILY PRN    ondansetron (ZOFRAN ODT) tablet 4 mg  4 mg Oral Q8H PRN    Or    ondansetron (ZOFRAN) injection 4 mg  4 mg IntraVENous Q6H PRN    apixaban (ELIQUIS) tablet 2.5 mg  2.5 mg Oral BID     ______________________________________________________________________  EXPECTED LENGTH OF STAY: 3d 2h  ACTUAL LENGTH OF STAY:          Miguelina Bahena MD

## 2022-10-16 ENCOUNTER — APPOINTMENT (OUTPATIENT)
Dept: GENERAL RADIOLOGY | Age: 87
DRG: 242 | End: 2022-10-16
Attending: INTERNAL MEDICINE
Payer: MEDICARE

## 2022-10-16 LAB
ANION GAP SERPL CALC-SCNC: 4 MMOL/L (ref 5–15)
BASOPHILS # BLD: 0.1 K/UL (ref 0–0.1)
BASOPHILS NFR BLD: 1 % (ref 0–1)
BUN SERPL-MCNC: 20 MG/DL (ref 6–20)
BUN/CREAT SERPL: 24 (ref 12–20)
CALCIUM SERPL-MCNC: 8.9 MG/DL (ref 8.5–10.1)
CHLORIDE SERPL-SCNC: 111 MMOL/L (ref 97–108)
CO2 SERPL-SCNC: 27 MMOL/L (ref 21–32)
CREAT SERPL-MCNC: 0.82 MG/DL (ref 0.55–1.02)
DIFFERENTIAL METHOD BLD: ABNORMAL
EOSINOPHIL # BLD: 0.2 K/UL (ref 0–0.4)
EOSINOPHIL NFR BLD: 2 % (ref 0–7)
ERYTHROCYTE [DISTWIDTH] IN BLOOD BY AUTOMATED COUNT: 15 % (ref 11.5–14.5)
GLUCOSE SERPL-MCNC: 110 MG/DL (ref 65–100)
HCT VFR BLD AUTO: 25.4 % (ref 35–47)
HGB BLD-MCNC: 7.9 G/DL (ref 11.5–16)
IMM GRANULOCYTES # BLD AUTO: 0.1 K/UL (ref 0–0.04)
IMM GRANULOCYTES NFR BLD AUTO: 1 % (ref 0–0.5)
LYMPHOCYTES # BLD: 1.6 K/UL (ref 0.8–3.5)
LYMPHOCYTES NFR BLD: 17 % (ref 12–49)
MCH RBC QN AUTO: 30.3 PG (ref 26–34)
MCHC RBC AUTO-ENTMCNC: 31.1 G/DL (ref 30–36.5)
MCV RBC AUTO: 97.3 FL (ref 80–99)
MONOCYTES # BLD: 1.3 K/UL (ref 0–1)
MONOCYTES NFR BLD: 14 % (ref 5–13)
NEUTS SEG # BLD: 5.9 K/UL (ref 1.8–8)
NEUTS SEG NFR BLD: 65 % (ref 32–75)
NRBC # BLD: 0 K/UL (ref 0–0.01)
NRBC BLD-RTO: 0 PER 100 WBC
PLATELET # BLD AUTO: 394 K/UL (ref 150–400)
PMV BLD AUTO: 9 FL (ref 8.9–12.9)
POTASSIUM SERPL-SCNC: 4 MMOL/L (ref 3.5–5.1)
RBC # BLD AUTO: 2.61 M/UL (ref 3.8–5.2)
SODIUM SERPL-SCNC: 142 MMOL/L (ref 136–145)
VANCOMYCIN SERPL-MCNC: 23.6 UG/ML
WBC # BLD AUTO: 9.2 K/UL (ref 3.6–11)

## 2022-10-16 PROCEDURE — 80048 BASIC METABOLIC PNL TOTAL CA: CPT

## 2022-10-16 PROCEDURE — 36415 COLL VENOUS BLD VENIPUNCTURE: CPT

## 2022-10-16 PROCEDURE — 74011250636 HC RX REV CODE- 250/636: Performed by: INTERNAL MEDICINE

## 2022-10-16 PROCEDURE — 74011250637 HC RX REV CODE- 250/637: Performed by: NURSE PRACTITIONER

## 2022-10-16 PROCEDURE — 65270000046 HC RM TELEMETRY

## 2022-10-16 PROCEDURE — 74011000258 HC RX REV CODE- 258: Performed by: INTERNAL MEDICINE

## 2022-10-16 PROCEDURE — 77010033678 HC OXYGEN DAILY

## 2022-10-16 PROCEDURE — 74011250637 HC RX REV CODE- 250/637: Performed by: INTERNAL MEDICINE

## 2022-10-16 PROCEDURE — 74011000250 HC RX REV CODE- 250: Performed by: INTERNAL MEDICINE

## 2022-10-16 PROCEDURE — 71045 X-RAY EXAM CHEST 1 VIEW: CPT

## 2022-10-16 PROCEDURE — 85025 COMPLETE CBC W/AUTO DIFF WBC: CPT

## 2022-10-16 PROCEDURE — 80202 ASSAY OF VANCOMYCIN: CPT

## 2022-10-16 RX ADMIN — CEFEPIME 2 G: 2 INJECTION, POWDER, FOR SOLUTION INTRAVENOUS at 10:01

## 2022-10-16 RX ADMIN — APIXABAN 2.5 MG: 2.5 TABLET, FILM COATED ORAL at 18:58

## 2022-10-16 RX ADMIN — Medication 3 MG: at 22:57

## 2022-10-16 RX ADMIN — SODIUM CHLORIDE, PRESERVATIVE FREE 10 ML: 5 INJECTION INTRAVENOUS at 15:47

## 2022-10-16 RX ADMIN — HYDROMORPHONE HYDROCHLORIDE 0.5 MG: 1 INJECTION, SOLUTION INTRAMUSCULAR; INTRAVENOUS; SUBCUTANEOUS at 05:04

## 2022-10-16 RX ADMIN — SODIUM CHLORIDE, PRESERVATIVE FREE 10 ML: 5 INJECTION INTRAVENOUS at 22:55

## 2022-10-16 RX ADMIN — Medication: at 09:44

## 2022-10-16 RX ADMIN — HYDROMORPHONE HYDROCHLORIDE 0.5 MG: 1 INJECTION, SOLUTION INTRAMUSCULAR; INTRAVENOUS; SUBCUTANEOUS at 20:14

## 2022-10-16 RX ADMIN — Medication: at 20:11

## 2022-10-16 RX ADMIN — SODIUM CHLORIDE, PRESERVATIVE FREE 10 ML: 5 INJECTION INTRAVENOUS at 22:54

## 2022-10-16 RX ADMIN — HYDROMORPHONE HYDROCHLORIDE 0.5 MG: 1 INJECTION, SOLUTION INTRAMUSCULAR; INTRAVENOUS; SUBCUTANEOUS at 15:47

## 2022-10-16 RX ADMIN — PANTOPRAZOLE SODIUM 20 MG: 20 TABLET, DELAYED RELEASE ORAL at 09:44

## 2022-10-16 RX ADMIN — CEFEPIME 2 G: 2 INJECTION, POWDER, FOR SOLUTION INTRAVENOUS at 22:54

## 2022-10-16 RX ADMIN — APIXABAN 2.5 MG: 2.5 TABLET, FILM COATED ORAL at 09:44

## 2022-10-16 RX ADMIN — HYDROMORPHONE HYDROCHLORIDE 0.5 MG: 1 INJECTION, SOLUTION INTRAMUSCULAR; INTRAVENOUS; SUBCUTANEOUS at 10:23

## 2022-10-16 RX ADMIN — CEFEPIME 2 G: 2 INJECTION, POWDER, FOR SOLUTION INTRAVENOUS at 00:13

## 2022-10-16 NOTE — PROGRESS NOTES
[address] [facility] Adult  Hospitalist Group                                                                                          Hospitalist Progress Note  Kashif Ortiz MD  Answering service: [phone] from in house phone        Date of Service:  10/16/2022  NAME:  Cindi Girard  :  1932  MRN:  [id number]      Admission Summary:     Cindi Girard is a 80 y.o. female with known past medical history of a flutter on Eliquis, recent pneumonia patient was admitted in the hospital and  and discharged in the beginning of August due to pneumonia and hypoxemia at that time and her blood pressure was low, after the last admission patient was discharged to rehab her condition improved and 2 weeks ago she was discharged home, per her  was not at the edge patient was weak and experienced on and off lightheadedness and unsteady gait, the patient had difficulty to ambulate, did not feel well, was brought into emergency department for evaluation. Most history was obtained through patient  and medical record as patient currently sleeping. Per patient  patient did not have any fever, shortness of breath, chest pain, nausea, vomiting, abdominal pain or diarrhea. She does take Eliquis. And she is supposed to see cardiologist as outpatient but did not have a chance to schedule appointment yet. In the emergency department patient was evaluated systolic blood pressure was on the low side, patient feeling lightheaded and weak, CT head was done was negative for acute intracranial pathology. Laboratory data was obtained creatinine was 1.6, stable, EKG revealed bigeminy with frequent cardiac pauses. Medicine was consulted for admission and further evaluation.     Interval history / Subjective:     No new complaints  Chest tube in place  On 3l NC     Assessment & Plan:     Left Pneumothorax   Chronic hypoxic respiratory failure with 3l of home oxygen  History of PE on Eliquis  -chest tube placed on low continuous pressure, now clamped  -improving, repeated chest x-ray on 10/15 no pneumothorax   -febrile on 10/10, on cefepime and vancomycin, fever now improved  -normal lactic, procal 0.7  -blood cx on 10/11 no growth so far  -CXR 10/14 improved  -Pulmonary following, chest tube removal as per pulmonary/IR, order placed     Symptomatic sp due to Mobitz II s/p PPM placement on 10/6   Paroxsymal Aflutter  -pt presented with Lightheadedness, unsteady gait  - hypotension improved   - PPM placed on 10/6  - restarted Eliquis 10/7    Altered mental state, suspect metabolic/toxic encephalopathy (medications, infection, pain) - continue supportive care; non-focal neuro exam; monitor closely, minimize sedation as able      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  -now resolved    Anemia, continuous decline since 7/2022   -on Eliquis  -Seen by GI  No evidence of active bleeding  Monitor       Debility with Fall at home   -PT/OT on board  -SNF on discharge      Severe deconditioning   Needs assistance with feeding    Code status: DNR  Prophylaxis: Eliquis     Plan:  discharge probably to [place] once chest tube is out and stable  Care Plan discussed with: Patient, Nurse and CM  Anticipated Disposition: SNF- [facility]  Date Reviewed: 8/3/2022            Codes Class Noted POA    Pacemaker ICD-10-CM: Z95.0  ICD-9-CM: V45.01  10/6/2022 Unknown    Overview Signed 10/6/2022  7:00 AM by Nicolette Franklin MD     10/6/22 [employer]             Third degree AV block ([facility] [address] ICD-10-CM: I66.9  ICD-9-CM: 426.0  10/6/2022 Unknown        Weakness ICD-10-CM: R53.1  ICD-9-CM: 780.79  10/4/2022 Unknown         Vital Signs:    Last 24hrs VS reviewed since prior progress note.  Most recent are:  Visit Vitals  BP (!) 118/44 (BP 1 Location: Right upper arm, BP Patient Position: At rest)   Pulse 89   Temp 97.8 °F (36.6 °C)   Resp 24   Ht 5' 1\" (1.549 m)   Wt 51.4 kg (113 lb 6.3 oz)   SpO2 96%   BMI 21.43 kg/m² Intake/Output Summary (Last 24 hours) at 10/16/2022 1517  Last data filed at 10/16/2022 1001  Gross per 24 hour   Intake 350 ml   Output --   Net 350 ml          Physical Examination:     I had a face to face encounter with this patient and independently examined them on 10/16/2022 as outlined below:          Constitutional:  No acute respiratory distress, a bit restless   ENT:  Oral mucosa moist, oropharynx benign. Resp:  Left side chest tube in place,  No wheezing/rhonchi/rales. No accessory muscle use. CV:  Regular rhythm    GI:  Soft, non distended, no focal tenderness. normoactive bowel sounds, no hepatosplenomegaly; no pulsatile mass    Musculoskeletal:  No edema; no calf tenderness     Neurologic: Alert, confused, follows commands, no focal weakness            Data Review:    Review and/or order of clinical lab test  Review and/or order of tests in the radiology section of CPT  Review and/or order of tests in the medicine section of CPT      Labs:     Recent Labs     10/16/22  0307 10/14/22  0505   WBC 9.2 7.9   HGB 7.9* 7.2*   HCT 25.4* 23.4*    323       Recent Labs     10/16/22  0307 10/15/22  0203 10/14/22  0505    142 142   K 4.0 3.9 4.1   * 111* 112*   CO2 27 27 23   BUN 20 18 20   CREA 0.82 0.80 0.72   * 112* 102*   CA 8.9 8.8 8.5       No results for input(s): ALT, AP, TBIL, TBILI, TP, ALB, GLOB, GGT, AML, LPSE in the last 72 hours. No lab exists for component: SGOT, GPT, AMYP, HLPSE  No results for input(s): INR, PTP, APTT, INREXT, INREXT in the last 72 hours. Recent Labs     10/15/22  0203   TIBC 189*   PSAT 5*        No results found for: FOL, RBCF   No results for input(s): PH, PCO2, PO2 in the last 72 hours. No results for input(s): CPK, CKNDX, TROIQ in the last 72 hours.     No lab exists for component: CPKMB  No results found for: CHOL, CHOLX, CHLST, CHOLV, HDL, HDLP, LDL, LDLC, DLDLP, TGLX, TRIGL, TRIGP, CHHD, CHHDX  No results found for: [facility]  Lab Results   Component Value Date/Time    Color YELLOW/STRAW 07/30/2022 05:58 PM    Appearance CLOUDY (A) 07/30/2022 05:58 PM    Specific gravity 1.009 07/30/2022 05:58 PM    pH (UA) 5.5 07/30/2022 05:58 PM    Protein 30 (A) 07/30/2022 05:58 PM    Glucose Negative 07/30/2022 05:58 PM    Ketone Negative 07/30/2022 05:58 PM    Bilirubin Negative 07/30/2022 05:58 PM    Urobilinogen 0.2 07/30/2022 05:58 PM    Nitrites Negative 07/30/2022 05:58 PM    Leukocyte Esterase LARGE (A) 07/30/2022 05:58 PM    Epithelial cells MODERATE (A) 07/30/2022 05:58 PM    Bacteria Negative 07/30/2022 05:58 PM    WBC 20-50 07/30/2022 05:58 PM    RBC 5-10 07/30/2022 05:58 PM         Medications Reviewed:     Current Facility-Administered Medications   Medication Dose Route Frequency    vancomycin (VANCOCIN) 750 mg in 0.9% sodium chloride 250 mL ([id number])  750 mg IntraVENous Q24H    pantoprazole (PROTONIX) tablet 20 mg  20 mg Oral ACB    cefepime (MAXIPIME) 2 g in 0.9% sodium chloride (MBP/ADV) 100 mL MBP  2 g IntraVENous Q12H    balsam peru-castor oiL (VENELEX) ointment   Topical Q12H    HYDROmorphone (DILAUDID) injection 0.5 mg  0.5 mg IntraVENous Q4H PRN    Vancomycin pharmacy to dose   Other Rx Dosing/Monitoring    lidocaine 4 % patch 1 Patch  1 Patch TransDERmal Q24H    melatonin tablet 3 mg  3 mg Oral QHS PRN    sodium chloride (NS) flush 5-40 mL  5-40 mL IntraVENous Q8H    sodium chloride (NS) flush 5-40 mL  5-40 mL IntraVENous PRN    naloxone (NARCAN) injection 0.4 mg  0.4 mg IntraVENous PRN    oxyCODONE IR (ROXICODONE) tablet 2.5 mg  2.5 mg Oral Q4H PRN    sodium chloride (NS) flush 5-40 mL  5-40 mL IntraVENous Q8H    sodium chloride (NS) flush 5-40 mL  5-40 mL IntraVENous PRN    acetaminophen (TYLENOL) tablet 650 mg  650 mg Oral Q6H PRN    Or    acetaminophen (TYLENOL) suppository 650 mg  650 mg Rectal Q6H PRN    polyethylene glycol (MIRALAX) packet 17 g  17 g Oral DAILY PRN    ondansetron (ZOFRAN ODT) tablet 4 mg  4 mg Oral Q8H PRN    Or    ondansetron (ZOFRAN) injection 4 mg  4 mg IntraVENous Q6H PRN    apixaban (ELIQUIS) tablet 2.5 mg  2.5 mg Oral BID     ______________________________________________________________________  EXPECTED LENGTH OF STAY: 3d 2h  ACTUAL LENGTH OF STAY:          Lori Pires MD

## 2022-10-16 NOTE — PROGRESS NOTES
Day #6 of Vancomycin - Level/Dosing Update  Indication:  HAP  Current regimen:  750mg IV Q18H  Abx regimen:  Vanc + cefepime  ID Following ?: NO   Concomitant nephrotoxic drugs (requires more frequent monitoring): None  Frequency of BMP?: Daily through 10/18    Recent Labs     10/16/22  0307 10/15/22  0203 10/14/22  0505   WBC 9.2  --  7.9   CREA 0.82 0.80 0.72   BUN 20 18 20     Est CrCl: ~30-40 ml/min; UO: --- ml/kg/hr  Temp (24hrs), Av.1 °F (36.7 °C), Min:97.4 °F (36.3 °C), Max:98.7 °F (37.1 °C)    Cultures:   10/11 Blood: NGTD  10/11 MRSA, nares - not present - final    MRSA Swab ordered (if applicable)? YES    Goal target range AUC/TOMAS 400-600    Recent level history:  Date/Time Dose & Interval Measured Level (mcg/mL) Associated AUC/TOMAS Dose Adjustment    10/13 @ 0319 500 mg IV Q 24hrs 13.5  327 750 mg IV Q 24hrs   10/16 @ 0307 750 mg IV Q 18 hr 23.6 (drawn ~4.25 hrs post-dose) 545 Change back to 750 mg IV Q 24 hr                                  Assessment/Plan: The random vancomycin level drawn this morning correlates with an AUC/TOMAS of 545, which is at the higher-end of the goal range. Plan will be to adjust the dose back to 750 mg IV Q 24 hr for an estimated AUC/TOMAS of 430. Pharmacy will continue to monitor patient daily and will make dosage adjustments based upon changing renal function. *Random vancomycin levels are used to calculate AUC/TOMAS, this level should not be interpreted as a trough. Vancomycin has been dosed using Bayesian kinetics software to target an AUC24:TOMAS of 400-600, which provides adequate exposure for as assumed infection due to MRSA with an TOMAS of 1 or less while reducing the risk of nephrotoxicity as seen with traditional trough based dosing goals.

## 2022-10-16 NOTE — PROGRESS NOTES
Bedside and Verbal shift change report given to Chetna Lo LPN (oncoming nurse) by Patrice Simmons RN (offgoing nurse).  Report included the following information SBAR, Kardex, ED Summary, Intake/Output, MAR, Recent Results, and Cardiac Rhythm SR .

## 2022-10-17 ENCOUNTER — APPOINTMENT (OUTPATIENT)
Dept: INTERVENTIONAL RADIOLOGY/VASCULAR | Age: 87
DRG: 242 | End: 2022-10-17
Attending: INTERNAL MEDICINE
Payer: MEDICARE

## 2022-10-17 ENCOUNTER — APPOINTMENT (OUTPATIENT)
Dept: GENERAL RADIOLOGY | Age: 87
DRG: 242 | End: 2022-10-17
Attending: STUDENT IN AN ORGANIZED HEALTH CARE EDUCATION/TRAINING PROGRAM
Payer: MEDICARE

## 2022-10-17 LAB
ANION GAP SERPL CALC-SCNC: 4 MMOL/L (ref 5–15)
BACTERIA SPEC CULT: NORMAL
BUN SERPL-MCNC: 20 MG/DL (ref 6–20)
BUN/CREAT SERPL: 25 (ref 12–20)
CALCIUM SERPL-MCNC: 9.1 MG/DL (ref 8.5–10.1)
CHLORIDE SERPL-SCNC: 113 MMOL/L (ref 97–108)
CO2 SERPL-SCNC: 26 MMOL/L (ref 21–32)
CREAT SERPL-MCNC: 0.81 MG/DL (ref 0.55–1.02)
GLUCOSE SERPL-MCNC: 109 MG/DL (ref 65–100)
POTASSIUM SERPL-SCNC: 3.7 MMOL/L (ref 3.5–5.1)
SERVICE CMNT-IMP: NORMAL
SODIUM SERPL-SCNC: 143 MMOL/L (ref 136–145)

## 2022-10-17 PROCEDURE — 80048 BASIC METABOLIC PNL TOTAL CA: CPT

## 2022-10-17 PROCEDURE — 74011000250 HC RX REV CODE- 250: Performed by: INTERNAL MEDICINE

## 2022-10-17 PROCEDURE — 71045 X-RAY EXAM CHEST 1 VIEW: CPT

## 2022-10-17 PROCEDURE — 36415 COLL VENOUS BLD VENIPUNCTURE: CPT

## 2022-10-17 PROCEDURE — 74011250636 HC RX REV CODE- 250/636: Performed by: INTERNAL MEDICINE

## 2022-10-17 PROCEDURE — 65270000046 HC RM TELEMETRY

## 2022-10-17 PROCEDURE — 74011250637 HC RX REV CODE- 250/637: Performed by: NURSE PRACTITIONER

## 2022-10-17 PROCEDURE — 74011000258 HC RX REV CODE- 258: Performed by: INTERNAL MEDICINE

## 2022-10-17 PROCEDURE — 74011250637 HC RX REV CODE- 250/637: Performed by: INTERNAL MEDICINE

## 2022-10-17 PROCEDURE — 99211 OFF/OP EST MAY X REQ PHY/QHP: CPT

## 2022-10-17 PROCEDURE — 97530 THERAPEUTIC ACTIVITIES: CPT

## 2022-10-17 PROCEDURE — 97110 THERAPEUTIC EXERCISES: CPT

## 2022-10-17 RX ADMIN — SODIUM CHLORIDE, PRESERVATIVE FREE 10 ML: 5 INJECTION INTRAVENOUS at 07:03

## 2022-10-17 RX ADMIN — SODIUM CHLORIDE, PRESERVATIVE FREE 10 ML: 5 INJECTION INTRAVENOUS at 22:05

## 2022-10-17 RX ADMIN — HYDROMORPHONE HYDROCHLORIDE 0.5 MG: 1 INJECTION, SOLUTION INTRAMUSCULAR; INTRAVENOUS; SUBCUTANEOUS at 03:17

## 2022-10-17 RX ADMIN — HYDROMORPHONE HYDROCHLORIDE 0.5 MG: 1 INJECTION, SOLUTION INTRAMUSCULAR; INTRAVENOUS; SUBCUTANEOUS at 12:25

## 2022-10-17 RX ADMIN — CEFEPIME 2 G: 2 INJECTION, POWDER, FOR SOLUTION INTRAVENOUS at 22:05

## 2022-10-17 RX ADMIN — VANCOMYCIN HYDROCHLORIDE 750 MG: 750 INJECTION, POWDER, LYOPHILIZED, FOR SOLUTION INTRAVENOUS at 03:19

## 2022-10-17 RX ADMIN — Medication: at 22:05

## 2022-10-17 RX ADMIN — Medication 3 MG: at 22:05

## 2022-10-17 RX ADMIN — APIXABAN 2.5 MG: 2.5 TABLET, FILM COATED ORAL at 18:36

## 2022-10-17 RX ADMIN — Medication: at 10:04

## 2022-10-17 RX ADMIN — HYDROMORPHONE HYDROCHLORIDE 0.5 MG: 1 INJECTION, SOLUTION INTRAMUSCULAR; INTRAVENOUS; SUBCUTANEOUS at 18:37

## 2022-10-17 RX ADMIN — APIXABAN 2.5 MG: 2.5 TABLET, FILM COATED ORAL at 10:03

## 2022-10-17 RX ADMIN — OXYCODONE 2.5 MG: 5 TABLET ORAL at 01:45

## 2022-10-17 RX ADMIN — CEFEPIME 2 G: 2 INJECTION, POWDER, FOR SOLUTION INTRAVENOUS at 12:25

## 2022-10-17 RX ADMIN — PANTOPRAZOLE SODIUM 20 MG: 20 TABLET, DELAYED RELEASE ORAL at 10:14

## 2022-10-17 RX ADMIN — OXYCODONE 2.5 MG: 5 TABLET ORAL at 10:13

## 2022-10-17 NOTE — PROGRESS NOTES
Physician Progress Note      PATIENT:               Zohreh Hou  CSN #:                  [id number]  :                       1932  ADMIT DATE:       10/4/2022 11:16 AM  DISCH DATE:  RESPONDING  PROVIDER #:        Heladio Villanueva MD          QUERY TEXT:    Patient admitted with symptomatic bradycardia and AVB, s/p dual chamber PPM during admission. The Registered Dietician has documented severe malnutrition, noting a 20 percent weight loss over last 2 months, and the Hospitalist has noted severe deconditioning, frailty, GI noted failure to thrive. The pt is receiving nutritional supplements with all meals. If possible, please document if you are evaluating and/or treating any of the following: The medical record reflects the following:    Risk Factors: 80 y.o. female with PMH of hypertension, CKD, atrial flutter on Eliquis, recent pneumonia    Clinical Indicators:    -- Registered Dietician Malnutrition Assessment:  Malnutrition Status:  Severe malnutrition (10/12/22 1428)  Context:  Acute illness  Findings of the 6 clinical characteristics of malnutrition:  Energy Intake:  50% or less of est energy requirements for 5 or more days  Weight Loss:  Greater than 5% over 1 month  Body Fat Loss:  Unable to assess,  Muscle Mass Loss:  Unable to assess,  Fluid Accumulation:  No significant fluid accumulation,   Strength:  Not performed  -- Registered Dietician documented: Lunch tray sitting on bedside table, pt ate about 50% meat. Spoke with RN who states pt is not eating much at all and does require some assistance with eating. - Noted from that admission that pt likes Ensure, will order TID. - Weight hx in EMR indicates 20% weight loss over last 2 months which is significant for time frame.     -- Hospitalist documented 10/17:  Debility with Fall at home  Severe deconditioning, frailty    -- GI documented 10/14: She presented for failure to thrive    -- Albumin = 3.2 (10/4), 2.7 (10/5)    Treatment: Registered Dietician consult, nutritional supplements with all meals, regular adult diet, I&O, weight measurement, albumin testing    Thank-you,  Shanna Saldivar RN, [facility]  Clinical   [email or web address] [email or web address]  [phone]  You can also contact me via [address] [place] [address]. ASPEN Criteria:  https://[email or web address] [email or web address] sharma. com/doi/full/10.1177/8149607429298715  Options provided:  -- Severe protein calorie malnutrition  -- Other malnutrition, please specify, Please specify type of malnutrition. -- Other - I will add my own diagnosis  -- Disagree - Not applicable / Not valid  -- Disagree - Clinically unable to determine / Unknown  -- Refer to Clinical Documentation Reviewer    PROVIDER RESPONSE TEXT:    This patient has severe protein calorie malnutrition. Query created by: Shamar Licona on 10/17/2022 1:32 PM      QUERY TEXT:    Patient admitted with symptomatic bradycardia and AVB, s/p dual chamber PPM during admission. Pulmonology has documented acute on chronic hypoxic respiratory failure in notes. Pt is on chronic O2 at 3 LPM at home. Pt was noted to have pulse oximetry of  percent on 3-4 LPM during admission. There were no ABG results noted. Physician notes consistently documented no accessory muscle use. No documentation of acute respiratory distress was found and there does not appear to be documentation of the pt exhibiting a struggle to breathe or having impaired gas exchange per criteria below. In order to support the diagnosis of acute respiratory failure, please include additional clinical indicators in your documentation. Or please document if the diagnosis of acute respiratory failure has been ruled out after further study.     The medical record reflects the following:    Risk Factors: 80 y.o. female with PMH of hypertension, CKD, atrial flutter on Eliquis, recent pneumonia    Clinical Indicators: Pulmonology has documented acute on chronic hypoxic respiratory failure in notes 10/11-10/17. Pt is on chronic O2 at 3 LPM at home. Pt was noted to have pulse oximetry of  percent on 3-4 LPM during admission. There were no ABG results noted. Physician notes consistently documented no accessory muscle use. No documentation of acute respiratory distress was found and there does not appear to be documentation of the pt exhibiting a struggle to breathe or having impaired gas exchange per criteria below. None of the following were found to be documented in the medical record: increased work of breathing, respiratory distress, tachypnea, dyspnea, accessory muscle use, extreme anxiety, inability to speak in complete sentences. Treatment: Pulmonology consult, supplemental O2 up to 4 LPM, pulse oximetry monitoring    Thank-you,  Juliet Jaimes RN, [facility]  Clinical   Cara [email or web address]. [email or web address]  [phone]  You can also contact me via [address] [place] [address]. Acute Respiratory Failure Clinical Indicators per [employer] MS-DRG Training Guide and Quick Reference Guide:  pO2 < 60 mmHg or SpO2 (pulse oximetry) < 91% breathing room air  pCO2 > 50 and pH < 7.35  P/F ratio (pO2 / FIO2) < 300  pO2 decrease or pCO2 increase by 10 mmHg from baseline (if known)  Supplemental oxygen of 40% or more  Presence of respiratory distress, tachypnea, dyspnea, shortness of breath, wheezing  Unable to speak in complete sentences  Use of accessory muscles to breathe  Extreme anxiety and feeling of impending doom  Tripod position  Confusion/altered mental status/obtunded  Options provided:  -- Acute hypoxic respiratory failure as evidenced by, Please document evidence.   -- Acute hypoxic respiratory failure ruled out after study and chronic hypoxic respiratory failure confirmed  -- Other - I will add my own diagnosis  -- Disagree - Not applicable / Not valid  -- Disagree - Clinically unable to determine / Unknown  -- Refer to Clinical Documentation Reviewer    PROVIDER RESPONSE TEXT:    This patient is in acute hypoxic respiratory failure as evidenced by as documented by pulmonologist, as result of hypoxia and SOB    Query created by: Katherin Ramirez on 10/17/2022 1:57 PM      QUERY TEXT:    Patient admitted with symptomatic bradycardia and AVB, s/p dual chamber PPM during admission. Hospitalist began documenting altered mental state, suspect metabolic/toxic encephalopathy on 10/15/22. There does not appear to be any documentation of an acute mental status change at that time, and neither altered mental state nor metabolic encephalopathy were previously noted. In order to support the diagnosis of metabolic encephalopathy, please provide additional clinical indicators including a deviation from and subsequent return to a baseline mental state in your documentation. Or please document if the diagnosis of metabolic encephalopathy has been ruled out after further study. The medical record reflects the following:    Risk Factors: 80 y.o. female with PMH of hypertension, CKD, atrial flutter on Eliquis, recent pneumonia    Clinical Indicators: Hospitalist has documented altered mental state, suspect metabolic/toxic encephalopathy, non-focal neuro exam from 10/15/22-10/17/22. There does not appear to be any documentation of an acute mental status change, and neither altered mental state nor metabolic encephalopathy were previously noted. Treatment: Supportive care, neuro exam, monitor closely, minimize sedation as able    Thank-you,  Arnel Dash RN, [facility]  Clinical   Jimena [email or web address]. [email or web address]  [phone]  You can also contact me via [address] [place] [address]. Options provided:  -- Metabolic encephalopathy present as evidenced by, Please document evidence, including a deviation from and subsequent return to a baseline mental state.   -- Metabolic encephalopathy was ruled out  -- Other - I will add my own diagnosis  -- Disagree - Not applicable / Not valid  -- Disagree - Clinically unable to determine / Unknown  -- Refer to Clinical Documentation Reviewer    PROVIDER RESPONSE TEXT:    Metabolic encephalopathy is present as evidenced by PAULO, hypoxia    Query created by: Eliz Morel on 10/17/2022 2:11 PM      Electronically signed by:  Uvaldo Jauregui MD 10/17/2022 3:30 PM

## 2022-10-17 NOTE — PROGRESS NOTES
Bedside and Verbal shift change report given to [facility], RN (oncoming nurse) by Nirmal Mcmullen RN (offgoing nurse). Report included the following information SBAR, Kardex, ED Summary, Intake/Output, MAR, Recent Results, and Cardiac Rhythm V-paced .

## 2022-10-17 NOTE — PROGRESS NOTES
Problem: Mobility Impaired (Adult and Pediatric)  Goal: *Acute Goals and Plan of Care (Insert Text)  Description: FUNCTIONAL STATUS PRIOR TO ADMISSION: Patient was modified independent using a rollator vs no device? for functional mobility. Recently discharged from SNF to [facility] for respite care and was home for 1 day prior to this admission for fall. HOME SUPPORT PRIOR TO ADMISSION: The patient lived with her supportive and independent . Son lives in [place] but was coming into town to assist with care needs. Physical Therapy Goals  Revised 10/6/2022; reassessed 10/14/22 and appropriate for carryover  1. Patient will move from supine to sit and sit to supine, scoot up and down, and roll side to side in bed with moderate assistance  within 7 day(s). 2.  Patient will transfer from bed to chair and chair to bed with max assistance using the least restrictive device within 7 day(s). 3.  Patient will perform sit to stand with max assistance within 7 day(s). 4.  Patient will completed seated LE therex x10 reps with CGA for balance within 7 days. 5.  PT to assess gait as appropriate. Physical Therapy Goals  Initiated 10/4/2022  1. Patient will move from supine to sit and sit to supine, scoot up and down, and roll side to side in bed with moderate assistance  within 7 day(s). 2.  Patient will transfer from bed to chair and chair to bed with moderate assistance  using the least restrictive device within 7 day(s). 3.  Patient will perform sit to stand with moderate assistance  within 7 day(s). 4.  Patient will ambulate with moderate assistance  for 15 feet with the least restrictive device within 7 day(s).        Outcome: Not Progressing Towards Goal   PHYSICAL THERAPY TREATMENT  Patient: Marely Richards (14 y.o. female)  Date: 10/17/2022  Diagnosis: Weakness [R53.1] <principal problem not specified>  Procedure(s) (LRB):  INSERT PPM DUAL (N/A) 11 Days Post-Op  Precautions: Fall (L sling, PM precautions)  Chart, physical therapy assessment, plan of care and goals were reviewed. ASSESSMENT  Pt seen following RN clearance. She continues with skilled PT services and is not progressing towards goals. Ms Tammi Acuña continues to be calling out for \"help\" throughout the day and states \"Oh god please help me, please, please, someone help me\" though is unable to specify what she needs when asked. She is confused and hesitant to move as she appears to anticipate pain and cries out with LT or discussion of movement. This author attempted bed therex and repositioning out of heel pressure relief boots for rapport-building and acclimation to movement. Pt minimally participatory with visual cues and demo for exercises. Pt then reported being agreeable to sitting EOB, PT left to obtain socks, upon return pt refusing EOB. Pt then repositioned into chair position in bed with 1 trial pull fwd with RUE and pillows placed behind pt for anterior pelvic tilt and postural extension in prep for lunch. Pt is making little progress and she is not able to cognitively participate and resists movement due to pain- unusual pain pattern noted and likely partially a perseveration. RN in to provide pain medication following session (Dilaudid). Next session: 2 person assist for supine to sit (likely dependently) and seated balance work; LE therex as able/appropriate    Current Level of Function Impacting Discharge (mobility/balance): upwards of total A for mobility    Other factors to consider for discharge: pt from home with  (previous admission to SNF)         PLAN :  Patient continues to benefit from skilled intervention to address the above impairments. Continue treatment per established plan of care. to address goals.     Recommendation for discharge: (in order for the patient to meet his/her long term goals)  Therapy up to 5 days/week in SNF setting    This discharge recommendation:  Has been made in collaboration with the attending provider and/or case management    IF patient discharges home will need the following DME: to be determined (TBD)       SUBJECTIVE:   Patient stated Please, help me, help me, Oh god. ..    OBJECTIVE DATA SUMMARY:   Critical Behavior:  Neurologic State: (P) Irritable, Confused, Agitated  Orientation Level: (P) Oriented to person, Disoriented to time, Disoriented to situation, Disoriented to place  Cognition: (P) Decreased command following, Impaired decision making, Memory loss  Safety/Judgement: Fall prevention  Functional Mobility Training:  Bed Mobility:  Supine to Sit: Total assistance; Additional time  Sit to Supine:  (NT; pt left in bed in chair position for lunch)  Scooting:  (NT)  Balance:  Sitting: Impaired  Sitting - Static: Fair (occasional)  Sitting - Dynamic: Poor (constant support)  Standing:  (NT)    Therapeutic Exercises:   Supine: APx 10 reps, quad sets x5, heel slides attempted with AAROM x10 reps    Pain Rating:  Pt expressing significant pain with movement and discussion of movement: back LEs, neck, shoulders    Activity Tolerance:   Poor and limited by pain    After treatment patient left in no apparent distress:   Heels elevated for pressure relief, Call bell within reach, Bed / chair alarm activated, Side rails x 3, and bed in chair position     COMMUNICATION/COLLABORATION:   The patients plan of care was discussed with: Registered nurse.      Jodie Arroyo   Time Calculation: 29 mins

## 2022-10-17 NOTE — PROGRESS NOTES
Bedside and Verbal shift change report given to ERIKA Ruiz (oncoming nurse) by KEISHA Cristobal LPN (offgoing nurse). Report included the following information SBAR, Kardex, MAR, Recent Results, and Cardiac Rhythm   .

## 2022-10-17 NOTE — PROGRESS NOTES
Problem: Falls - Risk of  Goal: *Absence of Falls  Description: Document Atul Grayson Fall Risk and appropriate interventions in the flowsheet. Outcome: Progressing Towards Goal  Note: Fall Risk Interventions:  Mobility Interventions: Bed/chair exit alarm, Communicate number of staff needed for ambulation/transfer, OT consult for ADLs, Patient to call before getting OOB, PT Consult for mobility concerns, PT Consult for assist device competence    Mentation Interventions: Bed/chair exit alarm, Evaluate medications/consider consulting pharmacy, More frequent rounding, Reorient patient    Medication Interventions: Evaluate medications/consider consulting pharmacy, Bed/chair exit alarm, Patient to call before getting OOB, Teach patient to arise slowly    Elimination Interventions: Call light in reach, Bed/chair exit alarm, Elevated toilet seat, Patient to call for help with toileting needs, Stay With Me (per policy), Toilet paper/wipes in reach, Toileting schedule/hourly rounds    History of Falls Interventions: Bed/chair exit alarm, Door open when patient unattended, Evaluate medications/consider consulting pharmacy         Problem: Patient Education: Go to Patient Education Activity  Goal: Patient/Family Education  Outcome: Progressing Towards Goal     Problem: Pain  Goal: *Control of Pain  Outcome: Progressing Towards Goal  Goal: *PALLIATIVE CARE:  Alleviation of Pain  Outcome: Progressing Towards Goal     Problem: Patient Education: Go to Patient Education Activity  Goal: Patient/Family Education  Outcome: Progressing Towards Goal     Problem: Impaired Skin Integrity/Pressure Injury Treatment  Goal: *Improvement of Existing Pressure Injury  Outcome: Progressing Towards Goal  Goal: *Prevention of pressure injury  Description: Document Fly Scale and appropriate interventions in the flowsheet.   Outcome: Progressing Towards Goal  Note: Pressure Injury Interventions:  Sensory Interventions: Assess changes in LOC, Assess need for specialty bed, Avoid rigorous massage over bony prominences, Discuss PT/OT consult with provider, Keep linens dry and wrinkle-free, Maintain/enhance activity level, Minimize linen layers, Monitor skin under medical devices, Pad between skin to skin    Moisture Interventions: Absorbent underpads, Apply protective barrier, creams and emollients, Assess need for specialty bed, Limit adult briefs, Maintain skin hydration (lotion/cream), Minimize layers, Moisture barrier    Activity Interventions: Increase time out of bed, Pressure redistribution bed/mattress(bed type), PT/OT evaluation    Mobility Interventions: HOB 30 degrees or less, Pressure redistribution bed/mattress (bed type), PT/OT evaluation    Nutrition Interventions: Document food/fluid/supplement intake, Discuss nutritional consult with provider, Offer support with meals,snacks and hydration    Friction and Shear Interventions: Apply protective barrier, creams and emollients, HOB 30 degrees or less, Lift sheet, Minimize layers

## 2022-10-17 NOTE — PROGRESS NOTES
[address] [facility] Adult  Hospitalist Group                                                                                          Hospitalist Progress Note  Asiya Kim MD  Answering service: [phone] from in house phone        Date of Service:  10/17/2022  NAME:  Cindi Girard  :  1932  MRN:  [id number]      Admission Summary:     Cindi Girard is a 80 y.o. female with known past medical history of a flutter on Eliquis, recent pneumonia patient was admitted in the hospital and  and discharged in the beginning of August due to pneumonia and hypoxemia at that time and her blood pressure was low, after the last admission patient was discharged to rehab her condition improved and 2 weeks ago she was discharged home, per her  was not at the edge patient was weak and experienced on and off lightheadedness and unsteady gait, the patient had difficulty to ambulate, did not feel well, was brought into emergency department for evaluation. Most history was obtained through patient  and medical record as patient currently sleeping. Per patient  patient did not have any fever, shortness of breath, chest pain, nausea, vomiting, abdominal pain or diarrhea. She does take Eliquis. And she is supposed to see cardiologist as outpatient but did not have a chance to schedule appointment yet. In the emergency department patient was evaluated systolic blood pressure was on the low side, patient feeling lightheaded and weak, CT head was done was negative for acute intracranial pathology. Laboratory data was obtained creatinine was 1.6, stable, EKG revealed bigeminy with frequent cardiac pauses. Medicine was consulted for admission and further evaluation. Interval history / Subjective:    Follow up pt with sp, s/p pacemaker and Pneumothorax, s/p left sided CT  No new complaints  Chest tube in place in am, removed later  On 3-4 l NC  No fever, completing abx on 10/18  Pulmonary input appreciated     Assessment & Plan:     Left Pneumothorax   Chronic hypoxic respiratory failure with 3l of home oxygen  History of PE on Eliquis  -chest tube placed on low continuous pressure, now clamped  -improving, repeated chest x-ray on 10/15 no pneumothorax   -febrile on 10/10, on cefepime and vancomycin, fever now improved  -normal lactic, procal 0.7  -blood cx on 10/11 no growth so far  -CXR 10/14 improved  -Pulmonary following, input appreciated,   chest tube was removed 10/17 as per pulmonary/IR,   Follow up CXR  Continue Abx, last day 10/18/2022     Symptomatic sp due to Mobitz II s/p PPM placement on 10/6   Paroxsymal Aflutter  -pt presented with Lightheadedness, unsteady gait  - hypotension improved   - PPM placed on 10/6  - restarted Eliquis 10/7    Pneumothorax, s/p left sided CT placement  CT was removed on 10/17    Altered mental state, suspect metabolic/toxic encephalopathy (medications, infection, pain) - continue supportive care; non-focal neuro exam; monitor closely, minimize sedation as able      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  -now resolved    Anemia, continuous decline since 7/2022   -on Eliquis  -Seen by GI  No evidence of active bleeding  Monitor       Debility with Fall at home   Severe deconditioning, frailty  pain  -PT/OT on board  -SNF on discharge   Needs assistance with feeding, all ADL  Pain control  Consult palliative care      Code status: DNR  Prophylaxis: Eliquis   Plan:  discharge probably to [place], [facility] discussed with: Patient, Nurse and CM  Anticipated Disposition: SNF- [address] [place] [address] Problems  Date Reviewed: 8/3/2022            Codes Class Noted POA    Pacemaker ICD-10-CM: Z95.0  ICD-9-CM: V45.01  10/6/2022 Unknown    Overview Signed 10/6/2022  7:00 AM by Raissa Buchanan MD     10/6/22 [employer]             Third degree AV block ([address] ICD-10-CM: N86.2  ICD-9-CM: 426.0  10/6/2022 Unknown        Weakness ICD-10-CM: R53.1  ICD-9-CM: 780.79  10/4/2022 Unknown         Vital Signs:    Last 24hrs VS reviewed since prior progress note. Most recent are:  Visit Vitals  BP (!) 152/58 (BP 1 Location: Left upper arm, BP Patient Position: At rest)   Pulse 91   Temp 98.3 °F (36.8 °C)   Resp 18   Ht 5' 1\" (1.549 m)   Wt 52.2 kg (115 lb)   SpO2 99%   BMI 21.73 kg/m²         Intake/Output Summary (Last 24 hours) at 10/17/2022 1138  Last data filed at 10/17/2022 1000  Gross per 24 hour   Intake 120 ml   Output --   Net 120 ml          Physical Examination:     I had a face to face encounter with this patient and independently examined them on 10/17/2022 as outlined below:          Constitutional:  On/off screening for help bit restless   ENT:  Oral mucosa moist, oropharynx benign. Resp:  Left side chest tube in place,  No wheezing/rhonchi/rales. No accessory muscle use. CV:  Regular rhythm    GI:  Soft, non distended, no focal tenderness. normoactive bowel sounds, no hepatosplenomegaly; no pulsatile mass    Musculoskeletal:  No edema; no calf tenderness     Neurologic: Alert, confused, follows commands, no focal weakness, generalized weakness  Pacemaker in place left upper chest, dressing applied            Data Review:    Review and/or order of clinical lab test  Review and/or order of tests in the radiology section of CPT  Review and/or order of tests in the medicine section of CPT      Labs:     Recent Labs     10/16/22  0307   WBC 9.2   HGB 7.9*   HCT 25.4*          Recent Labs     10/17/22  0219 10/16/22  0307 10/15/22  0203    142 142   K 3.7 4.0 3.9   * 111* 111*   CO2 26 27 27   BUN 20 20 18   CREA 0.81 0.82 0.80   * 110* 112*   CA 9.1 8.9 8.8       No results for input(s): ALT, AP, TBIL, TBILI, TP, ALB, GLOB, GGT, AML, LPSE in the last 72 hours. No lab exists for component: SGOT, GPT, AMYP, HLPSE  No results for input(s): INR, PTP, APTT, INREXT, INREXT in the last 72 hours.    Recent Labs 10/15/22  0203   TIBC 189*   PSAT 5*        No results found for: FOL, RBCF   No results for input(s): PH, PCO2, PO2 in the last 72 hours. No results for input(s): CPK, CKNDX, TROIQ in the last 72 hours.     No lab exists for component: CPKMB  No results found for: CHOL, CHOLX, CHLST, CHOLV, HDL, HDLP, LDL, LDLC, DLDLP, TGLX, TRIGL, TRIGP, CHHD, CHHDX  No results found for: [facility]  Lab Results   Component Value Date/Time    Color YELLOW/STRAW 07/30/2022 05:58 PM    Appearance CLOUDY (A) 07/30/2022 05:58 PM    Specific gravity 1.009 07/30/2022 05:58 PM    pH (UA) 5.5 07/30/2022 05:58 PM    Protein 30 (A) 07/30/2022 05:58 PM    Glucose Negative 07/30/2022 05:58 PM    Ketone Negative 07/30/2022 05:58 PM    Bilirubin Negative 07/30/2022 05:58 PM    Urobilinogen 0.2 07/30/2022 05:58 PM    Nitrites Negative 07/30/2022 05:58 PM    Leukocyte Esterase LARGE (A) 07/30/2022 05:58 PM    Epithelial cells MODERATE (A) 07/30/2022 05:58 PM    Bacteria Negative 07/30/2022 05:58 PM    WBC 20-50 07/30/2022 05:58 PM    RBC 5-10 07/30/2022 05:58 PM         Medications Reviewed:     Current Facility-Administered Medications   Medication Dose Route Frequency    vancomycin (VANCOCIN) 750 mg in 0.9% sodium chloride 250 mL ([id number])  750 mg IntraVENous Q24H    pantoprazole (PROTONIX) tablet 20 mg  20 mg Oral ACB    cefepime (MAXIPIME) 2 g in 0.9% sodium chloride (MBP/ADV) 100 mL MBP  2 g IntraVENous Q12H    balsam peru-castor oiL (VENELEX) ointment   Topical Q12H    HYDROmorphone (DILAUDID) injection 0.5 mg  0.5 mg IntraVENous Q4H PRN    Vancomycin pharmacy to dose   Other Rx Dosing/Monitoring    lidocaine 4 % patch 1 Patch  1 Patch TransDERmal Q24H    melatonin tablet 3 mg  3 mg Oral QHS PRN    sodium chloride (NS) flush 5-40 mL  5-40 mL IntraVENous Q8H    sodium chloride (NS) flush 5-40 mL  5-40 mL IntraVENous PRN    naloxone (NARCAN) injection 0.4 mg  0.4 mg IntraVENous PRN    oxyCODONE IR (ROXICODONE) tablet 2.5 mg  2.5 mg Oral Q4H PRN    sodium chloride (NS) flush 5-40 mL  5-40 mL IntraVENous Q8H    sodium chloride (NS) flush 5-40 mL  5-40 mL IntraVENous PRN    acetaminophen (TYLENOL) tablet 650 mg  650 mg Oral Q6H PRN    Or    acetaminophen (TYLENOL) suppository 650 mg  650 mg Rectal Q6H PRN    polyethylene glycol (MIRALAX) packet 17 g  17 g Oral DAILY PRN    ondansetron (ZOFRAN ODT) tablet 4 mg  4 mg Oral Q8H PRN    Or    ondansetron (ZOFRAN) injection 4 mg  4 mg IntraVENous Q6H PRN    apixaban (ELIQUIS) tablet 2.5 mg  2.5 mg Oral BID     ______________________________________________________________________  EXPECTED LENGTH OF STAY: 3d 2h  ACTUAL LENGTH OF STAY:          13                 Elia Johnson MD

## 2022-10-17 NOTE — PROGRESS NOTES
Pulmonary, Critical Care, and Sleep Medicine~Progress Note    Name: Hi Canales MRN: [id number]   : 1932 Hospital: [facility]    Date: 10/17/2022 11:12 AM Admission: 10/4/2022     Impression Plan   Acute on chronic hypoxic resp failure  Large left PTX  Generalized weakness and unsteady gait--> bradycardia with AV block mobitz type II. S/p pacemaker placement on 10/6  A fib/flutter. On elquis   PAULO on CKD Agree with empiric abx  O2 titration above 90%   Chest tube can come out. Discussed with RN and hospitalist   Jackie  We will be available again to see if needed      Daily Progression:    10/17  Chest x-ray on 10/16   IMPRESSION     Unchanged small left pleural effusion. No visualized pneumothorax. 10/14  No ptx following 24 hours of waterseal on this am chest x-ray   No WOB noted     10/13  Remains stable  No airleaks noted     10/12  Chest tube in place   No ptx on chest image this am     10/11  Consult Note requested by hospitalist     Patient presented for admission on 10/4/22 for weakness and unsteady gait; found to be bradycardic with AV block mobitz type II.  PPM placement on 10/6. Patient spiked a fever of 100. 6F  which prompted a chest image prior to discharge. On that chest image she was noted to have a 4.1cm left PTX. Of note the chest image taken on 10/6 did not show a ptx. I have reviewed the labs and previous days notes. Review of systems not obtained due to patient factors. No past medical history on file. Past Surgical History:   Procedure Laterality Date    IR THORACENTESIS/INSERT CHEST TUBE  10/11/2022      Prior to Admission medications    Medication Sig Start Date End Date Taking? Authorizing Provider   apixaban (ELIQUIS) 2.5 mg tablet Take 1 Tablet by mouth two (2) times a day. 10/8/22  Yes Arabella Patel MD   lidocaine 4 % patch Apply patch to back  . Apply patch to the affected area for 12 hours a day and remove for 12 hours a day. 10/9/22  Yes Kamlesh Marshall MD   nystatin (MYCOSTATIN) 100,000 unit/gram ointment Apply  to affected area two (2) times a day. Madhavi Elena MD   albuterol (PROVENTIL HFA, VENTOLIN HFA, PROAIR HFA) 90 mcg/actuation inhaler Take 1 Puff by inhalation every four (4) hours as needed. Madhavi Elena MD   albuterol-ipratropium (DUO-NEB) 2.5 mg-0.5 mg/3 ml nebu 3 mL by Nebulization route two (2) times a day. Madhavi Elena MD   acetaminophen (Tylenol Arthritis Pain) 650 mg TbER Take 650 mg by mouth every eight (8) hours. Madhavi Elena MD   guaiFENesin (ORGANIDIN) 200 mg tablet Take 600 mg by mouth every four (4) hours as needed for Congestion. Madhavi Elena MD   calcium carbonate (CALTREX) 600 mg calcium (1,500 mg) tablet Take 600 mg by mouth two (2) times a day. Madhavi Elena MD   aspirin 81 mg chewable tablet Take 81 mg by mouth in the morning. Madhavi Elena MD   polyethylene glycol (Miralax) 17 gram packet Take 17 g by mouth in the morning. Provider, Historical   famotidine (PEPCID) 10 mg tablet Take 10 mg by mouth two (2) times a day. Provider, Historical   melatonin 3 mg tablet Take 3 mg by mouth nightly as needed for Insomnia. Provider, Historical   simethicone (GAS-X) 125 mg chewable tablet Take 125 mg by mouth every six (6) hours as needed for Flatulence. Provider, Historical   ondansetron (ZOFRAN ODT) 4 mg disintegrating tablet Take 4 mg by mouth every eight (8) hours as needed for Nausea or Vomiting. Provider, Historical   bisacodyL (Dulcolax, bisacodyl,) 10 mg supp Insert 10 mg into rectum daily as needed for Constipation.     Provider, Historical     Allergies   Allergen Reactions    Amoxicillin Unknown (comments)    Penicillins Unknown (comments)    Sulfa (Sulfonamide Antibiotics) Unknown (comments)      Social History     Tobacco Use    Smoking status: Every Day    Smokeless tobacco: Never   Substance Use Topics    Alcohol use: Not on file      No family history on file.  OBJECTIVE:     Vital Signs:     Visit Vitals  BP (!) 152/58 (BP 1 Location: Left upper arm, BP Patient Position: At rest)   Pulse 91   Temp 98.3 °F (36.8 °C)   Resp 18   Ht 5' 1\" (1.549 m)   Wt 52.2 kg (115 lb)   SpO2 99%   BMI 21.73 kg/m²      Temp (24hrs), Av °F (36.7 °C), Min:97.5 °F (36.4 °C), Max:98.3 °F (36.8 °C)     Intake/Output:     Last shift: No intake/output data recorded. Last 3 shifts: 10/15 1901 - 10/17 0700  In: 350 [P.O.:250;  I.V.:100]  Out: -       No intake or output data in the 24 hours ending 10/17/22 1036      Physical Exam:                                        Exam Findings Other   General: No resp distress noted, appears stated age    [de-identified]:  No ulcers, JVD not elevated, no cervical LAD    Chest: No pectus deformity, normal chest rise b/l    HEART:  No visible thrills     Lungs:  Normal expansion     ABD: Soft/NT, non rigid mildly distended    EXT: No cyanosis/clubbing/edema, normal peripheral pulses    Skin: No rashes or ulcers, no mottling    Neuro: Awake         Medications:  Current Facility-Administered Medications   Medication Dose Route Frequency    vancomycin (VANCOCIN) 750 mg in 0.9% sodium chloride 250 mL ([id number])  750 mg IntraVENous Q24H    pantoprazole (PROTONIX) tablet 20 mg  20 mg Oral ACB    cefepime (MAXIPIME) 2 g in 0.9% sodium chloride (MBP/ADV) 100 mL MBP  2 g IntraVENous Q12H    balsam peru-castor oiL (VENELEX) ointment   Topical Q12H    HYDROmorphone (DILAUDID) injection 0.5 mg  0.5 mg IntraVENous Q4H PRN    Vancomycin pharmacy to dose   Other Rx Dosing/Monitoring    lidocaine 4 % patch 1 Patch  1 Patch TransDERmal Q24H    melatonin tablet 3 mg  3 mg Oral QHS PRN    sodium chloride (NS) flush 5-40 mL  5-40 mL IntraVENous Q8H    sodium chloride (NS) flush 5-40 mL  5-40 mL IntraVENous PRN    naloxone (NARCAN) injection 0.4 mg  0.4 mg IntraVENous PRN    oxyCODONE IR (ROXICODONE) tablet 2.5 mg  2.5 mg Oral Q4H PRN    sodium chloride (NS) flush 5-40 mL  5-40 mL IntraVENous Q8H    sodium chloride (NS) flush 5-40 mL  5-40 mL IntraVENous PRN    acetaminophen (TYLENOL) tablet 650 mg  650 mg Oral Q6H PRN    Or    acetaminophen (TYLENOL) suppository 650 mg  650 mg Rectal Q6H PRN    polyethylene glycol (MIRALAX) packet 17 g  17 g Oral DAILY PRN    ondansetron (ZOFRAN ODT) tablet 4 mg  4 mg Oral Q8H PRN    Or    ondansetron (ZOFRAN) injection 4 mg  4 mg IntraVENous Q6H PRN    apixaban (ELIQUIS) tablet 2.5 mg  2.5 mg Oral BID       Labs:  ABG No results for input(s): PHI, PCO2I, PO2I, HCO3I, SO2I, FIO2I in the last 72 hours.      CBC Recent Labs     10/16/22  0307   WBC 9.2   HGB 7.9*   HCT 25.4*      MCV 97.3   MCH 34.0          Metabolic  Panel Recent Labs     10/17/22  0219 10/16/22  0307 10/15/22  0203    142 142   K 3.7 4.0 3.9   * 111* 111*   CO2 26 27 27   * 110* 112*   BUN 20 20 18   CREA 0.81 0.82 0.80   CA 9.1 8.9 8.8          Pertinent Labs                Silver Enamorado PA-C  10/17/2022

## 2022-10-17 NOTE — PROGRESS NOTES
Faculty or Preceptor Review of Student Work    10/17/2022  - Shift times - 0700 to 1300    The student documentation of patient care for Sho Molina has been reviewed and approved. All medications have been administered under the direct supervision of the faculty or preceptor.     Tali Hassan RN

## 2022-10-18 VITALS
HEART RATE: 88 BPM | TEMPERATURE: 97.9 F | OXYGEN SATURATION: 91 % | SYSTOLIC BLOOD PRESSURE: 143 MMHG | HEIGHT: 61 IN | RESPIRATION RATE: 19 BRPM | WEIGHT: 108.8 LBS | DIASTOLIC BLOOD PRESSURE: 55 MMHG | BODY MASS INDEX: 20.54 KG/M2

## 2022-10-18 LAB
ANION GAP SERPL CALC-SCNC: 5 MMOL/L (ref 5–15)
BUN SERPL-MCNC: 24 MG/DL (ref 6–20)
BUN/CREAT SERPL: 29 (ref 12–20)
CALCIUM SERPL-MCNC: 9.5 MG/DL (ref 8.5–10.1)
CHLORIDE SERPL-SCNC: 112 MMOL/L (ref 97–108)
CO2 SERPL-SCNC: 26 MMOL/L (ref 21–32)
CREAT SERPL-MCNC: 0.84 MG/DL (ref 0.55–1.02)
GLUCOSE SERPL-MCNC: 105 MG/DL (ref 65–100)
POTASSIUM SERPL-SCNC: 3.7 MMOL/L (ref 3.5–5.1)
SODIUM SERPL-SCNC: 143 MMOL/L (ref 136–145)

## 2022-10-18 PROCEDURE — 36415 COLL VENOUS BLD VENIPUNCTURE: CPT

## 2022-10-18 PROCEDURE — 97530 THERAPEUTIC ACTIVITIES: CPT

## 2022-10-18 PROCEDURE — 74011250637 HC RX REV CODE- 250/637: Performed by: NURSE PRACTITIONER

## 2022-10-18 PROCEDURE — 74011250636 HC RX REV CODE- 250/636: Performed by: INTERNAL MEDICINE

## 2022-10-18 PROCEDURE — 74011000258 HC RX REV CODE- 258: Performed by: INTERNAL MEDICINE

## 2022-10-18 PROCEDURE — 74011250637 HC RX REV CODE- 250/637: Performed by: INTERNAL MEDICINE

## 2022-10-18 PROCEDURE — 80048 BASIC METABOLIC PNL TOTAL CA: CPT

## 2022-10-18 PROCEDURE — 74011000250 HC RX REV CODE- 250: Performed by: INTERNAL MEDICINE

## 2022-10-18 RX ORDER — PANTOPRAZOLE SODIUM 20 MG/1
20 TABLET, DELAYED RELEASE ORAL
Qty: 30 TABLET | Refills: 0 | Status: SHIPPED
Start: 2022-10-19 | End: 2022-11-18

## 2022-10-18 RX ORDER — OXYCODONE HYDROCHLORIDE 5 MG/1
2.5 TABLET ORAL
Qty: 10 TABLET | Refills: 0 | Status: SHIPPED | OUTPATIENT
Start: 2022-10-18 | End: 2022-10-21

## 2022-10-18 RX ORDER — BALSAM PERU/CASTOR OIL
OINTMENT (GRAM) TOPICAL EVERY 12 HOURS
Qty: 1 G | Refills: 0 | Status: SHIPPED
Start: 2022-10-18 | End: 2022-11-17

## 2022-10-18 RX ADMIN — HYDROMORPHONE HYDROCHLORIDE 0.5 MG: 1 INJECTION, SOLUTION INTRAMUSCULAR; INTRAVENOUS; SUBCUTANEOUS at 08:11

## 2022-10-18 RX ADMIN — CEFEPIME 2 G: 2 INJECTION, POWDER, FOR SOLUTION INTRAVENOUS at 11:42

## 2022-10-18 RX ADMIN — APIXABAN 2.5 MG: 2.5 TABLET, FILM COATED ORAL at 08:11

## 2022-10-18 RX ADMIN — VANCOMYCIN HYDROCHLORIDE 750 MG: 750 INJECTION, POWDER, LYOPHILIZED, FOR SOLUTION INTRAVENOUS at 03:47

## 2022-10-18 RX ADMIN — HYDROMORPHONE HYDROCHLORIDE 0.5 MG: 1 INJECTION, SOLUTION INTRAMUSCULAR; INTRAVENOUS; SUBCUTANEOUS at 00:39

## 2022-10-18 RX ADMIN — Medication: at 08:12

## 2022-10-18 NOTE — PROGRESS NOTES
Attempted follow up visit for patient with a new palliative consult. Unable to visit patient at this time. Patient was sleeping and did not awake. No family present. Patient's chart was consulted.   Chaplain Edmondson MDiv, MS, [facility]

## 2022-10-18 NOTE — PROGRESS NOTES
[address] [facility] Adult  Hospitalist Group                                                                                          Hospitalist Progress Note  Blade Novak MD  Answering service: [phone] OR [phone] from in house phone        Date of Service:  10/18/2022  NAME:  Beverly Ramesh  :  1932  MRN:  [id number]      Admission Summary:     Beverly Ramesh is a 80 y.o. female with known past medical history of a flutter on Eliquis, recent pneumonia patient was admitted in the hospital and  and discharged in the beginning of August due to pneumonia and hypoxemia at that time and her blood pressure was low, after the last admission patient was discharged to rehab her condition improved and 2 weeks ago she was discharged home, per her  was not at the edge patient was weak and experienced on and off lightheadedness and unsteady gait, the patient had difficulty to ambulate, did not feel well, was brought into emergency department for evaluation. Most history was obtained through patient  and medical record as patient currently sleeping. Per patient  patient did not have any fever, shortness of breath, chest pain, nausea, vomiting, abdominal pain or diarrhea. She does take Eliquis. And she is supposed to see cardiologist as outpatient but did not have a chance to schedule appointment yet. In the emergency department patient was evaluated systolic blood pressure was on the low side, patient feeling lightheaded and weak, CT head was done was negative for acute intracranial pathology. Laboratory data was obtained creatinine was 1.6, stable, EKG revealed bigeminy with frequent cardiac pauses. Medicine was consulted for admission and further evaluation. Interval history / Subjective:    Follow up pt with sp, s/p pacemaker and Pneumothorax, s/p left sided CT  No new complaints  Chest tube in place in am, removed later  On 2-4 L/min NC  No fever, completing abx on 10/18  Pulmonary input appreciated  Refusing lidocaine patch  Palliative care evaluation is pending     Assessment & Plan:     Left Pneumothorax, s/p left CT placement  Chronic hypoxic respiratory failure with 3l of home oxygen  History of PE on Eliquis  -chest tube placed on low continuous pressure, now clamped  -improving, repeated chest x-ray on 10/15 no pneumothorax   -febrile on 10/10, on cefepime and vancomycin, fever now improved  -normal lactic, procal 0.7  -blood cx on 10/11 no growth so far  -CXR 10/14 improved  -Pulmonary consulted, input appreciated,   chest tube was removed 10/17 as per pulmonary/IR,   Follow up CXR no PTX  Continue Abx, last day 10/18/2022     Symptomatic sp due to Mobitz II s/p PPM placement on 10/6   Paroxsymal Aflutter  -pt presented with Lightheadedness, unsteady gait  - hypotension improved   - PPM placed on 10/6  - restarted Eliquis 10/7    Pneumothorax, s/p left sided CT placement  CT was removed on 10/17    Altered mental state, suspect metabolic/toxic encephalopathy (medications, infection, pain) - continue supportive care; non-focal neuro exam; monitor closely, minimize sedation as able  AO x name, knows in hospital, oriented to month and year      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  -now resolved    Anemia, continuous decline since 7/2022   -on Eliquis  -Seen by GI  No evidence of active bleeding  Monitor       Debility with Fall at home   Severe deconditioning, frailty  pain  -PT/OT on board  -SNF on discharge   Needs assistance with feeding, all ADL  Pain control  Consult palliative care, evaluation is pending     Back pain,   Declining lidocaine patch  Continue Oxy PRN  Refusing on/off pain medications      Code status: DNR  Prophylaxis: Eliquis   Plan:  discharge probably to [facility], completing Abx IV  Care Plan discussed with: Patient, Nurse and CM  Anticipated Disposition: SNF- [facility], today, pending palliative care consult       Hospital Problems  Date Reviewed: 8/3/2022            Codes Class Noted POA    Pacemaker ICD-10-CM: Z95.0  ICD-9-CM: V45.01  10/6/2022 Unknown    Overview Signed 10/6/2022  7:00 AM by Nadja Griggs MD     10/6/22 [employer]             Third degree AV block [facility]) ICD-10-CM: I44.2  ICD-9-CM: 426.0  10/6/2022 Unknown        Weakness ICD-10-CM: R53.1  ICD-9-CM: 780.79  10/4/2022 Unknown       Vital Signs:    Last 24hrs VS reviewed since prior progress note. Most recent are:  Visit Vitals  BP (!) 150/80 (BP 1 Location: Right upper arm, BP Patient Position: Sitting)   Pulse (!) 102   Temp 98.8 °F (37.1 °C)   Resp 18   Ht 5' 1\" (1.549 m)   Wt 49.4 kg (108 lb 12.8 oz)   SpO2 92%   BMI 20.56 kg/m²         Intake/Output Summary (Last 24 hours) at 10/18/2022 1132  Last data filed at 10/18/2022 0754  Gross per 24 hour   Intake 1040 ml   Output --   Net 1040 ml          Physical Examination:     I had a face to face encounter with this patient and independently examined them on 10/18/2022 as outlined below:          Constitutional:  On/off asking for help   ENT:  Oral mucosa moist, oropharynx benign. Resp:  Left side chest tube in place,  No wheezing/rhonchi/rales. No accessory muscle use. CV:  Regular rhythm    GI:  Soft, non distended, no focal tenderness.  normoactive bowel sounds, no hepatosplenomegaly; no pulsatile mass    Musculoskeletal:  No edema; no calf tenderness     Neurologic: Alert, oriented to name, month, year, knows she in hospital, confused, follows commands, no focal weakness, generalized weakness  Pacemaker in place left upper chest, dressing applied            Data Review:    Review and/or order of clinical lab test  Review and/or order of tests in the radiology section of CPT  Review and/or order of tests in the medicine section of CPT      Labs:     Recent Labs     10/16/22  0307   WBC 9.2   HGB 7.9*   HCT 25.4*          Recent Labs     10/18/22  0040 10/17/22  0219 10/16/22  0307    143 142   K 3.7 3.7 4.0   CL 112* 113* 111*   CO2 26 26 27   BUN 24* 20 20   CREA 0.84 0.81 0.82   * 109* 110*   CA 9.5 9.1 8.9       No results for input(s): ALT, AP, TBIL, TBILI, TP, ALB, GLOB, GGT, AML, LPSE in the last 72 hours. No lab exists for component: SGOT, GPT, AMYP, HLPSE  No results for input(s): INR, PTP, APTT, INREXT, INREXT in the last 72 hours. No results for input(s): FE, TIBC, PSAT, FERR in the last 72 hours. No results found for: FOL, RBCF   No results for input(s): PH, PCO2, PO2 in the last 72 hours. No results for input(s): CPK, CKNDX, TROIQ in the last 72 hours.     No lab exists for component: CPKMB  No results found for: CHOL, CHOLX, CHLST, CHOLV, HDL, HDLP, LDL, LDLC, DLDLP, TGLX, TRIGL, TRIGP, CHHD, CHHDX  No results found for: [facility]  Lab Results   Component Value Date/Time    Color YELLOW/STRAW 07/30/2022 05:58 PM    Appearance CLOUDY (A) 07/30/2022 05:58 PM    Specific gravity 1.009 07/30/2022 05:58 PM    pH (UA) 5.5 07/30/2022 05:58 PM    Protein 30 (A) 07/30/2022 05:58 PM    Glucose Negative 07/30/2022 05:58 PM    Ketone Negative 07/30/2022 05:58 PM    Bilirubin Negative 07/30/2022 05:58 PM    Urobilinogen 0.2 07/30/2022 05:58 PM    Nitrites Negative 07/30/2022 05:58 PM    Leukocyte Esterase LARGE (A) 07/30/2022 05:58 PM    Epithelial cells MODERATE (A) 07/30/2022 05:58 PM    Bacteria Negative 07/30/2022 05:58 PM    WBC 20-50 07/30/2022 05:58 PM    RBC 5-10 07/30/2022 05:58 PM         Medications Reviewed:     Current Facility-Administered Medications   Medication Dose Route Frequency    vancomycin (VANCOCIN) 750 mg in 0.9% sodium chloride 250 mL ([id number])  750 mg IntraVENous Q24H    pantoprazole (PROTONIX) tablet 20 mg  20 mg Oral ACB    cefepime (MAXIPIME) 2 g in 0.9% sodium chloride (MBP/ADV) 100 mL MBP  2 g IntraVENous Q12H    balsam peru-castor oiL (VENELEX) ointment   Topical Q12H    HYDROmorphone (DILAUDID) injection 0.5 mg  0.5 mg IntraVENous Q4H PRN    Vancomycin pharmacy to dose Other Rx Dosing/Monitoring    lidocaine 4 % patch 1 Patch  1 Patch TransDERmal Q24H    melatonin tablet 3 mg  3 mg Oral QHS PRN    sodium chloride (NS) flush 5-40 mL  5-40 mL IntraVENous Q8H    sodium chloride (NS) flush 5-40 mL  5-40 mL IntraVENous PRN    naloxone (NARCAN) injection 0.4 mg  0.4 mg IntraVENous PRN    oxyCODONE IR (ROXICODONE) tablet 2.5 mg  2.5 mg Oral Q4H PRN    sodium chloride (NS) flush 5-40 mL  5-40 mL IntraVENous Q8H    sodium chloride (NS) flush 5-40 mL  5-40 mL IntraVENous PRN    acetaminophen (TYLENOL) tablet 650 mg  650 mg Oral Q6H PRN    Or    acetaminophen (TYLENOL) suppository 650 mg  650 mg Rectal Q6H PRN    polyethylene glycol (MIRALAX) packet 17 g  17 g Oral DAILY PRN    ondansetron (ZOFRAN ODT) tablet 4 mg  4 mg Oral Q8H PRN    Or    ondansetron (ZOFRAN) injection 4 mg  4 mg IntraVENous Q6H PRN    apixaban (ELIQUIS) tablet 2.5 mg  2.5 mg Oral BID     ______________________________________________________________________  EXPECTED LENGTH OF STAY: 5d 4h  ACTUAL LENGTH OF STAY:          Neri Chan 93 Mounika Lui MD

## 2022-10-18 NOTE — PROGRESS NOTES
Problem: Mobility Impaired (Adult and Pediatric)  Goal: *Acute Goals and Plan of Care (Insert Text)  Description: FUNCTIONAL STATUS PRIOR TO ADMISSION: Patient was modified independent using a rollator vs no device? for functional mobility. Recently discharged from SNF to [facility] for respite care and was home for 1 day prior to this admission for fall. HOME SUPPORT PRIOR TO ADMISSION: The patient lived with her supportive and independent . Son lives in [place] but was coming into town to assist with care needs. Physical Therapy Goals  Revised 10/6/2022; reassessed 10/14/22 and appropriate for carryover  1. Patient will move from supine to sit and sit to supine, scoot up and down, and roll side to side in bed with moderate assistance  within 7 day(s). 2.  Patient will transfer from bed to chair and chair to bed with max assistance using the least restrictive device within 7 day(s). 3.  Patient will perform sit to stand with max assistance within 7 day(s). 4.  Patient will completed seated LE therex x10 reps with CGA for balance within 7 days. 5.  PT to assess gait as appropriate. Physical Therapy Goals  Initiated 10/4/2022  1. Patient will move from supine to sit and sit to supine, scoot up and down, and roll side to side in bed with moderate assistance  within 7 day(s). 2.  Patient will transfer from bed to chair and chair to bed with moderate assistance  using the least restrictive device within 7 day(s). 3.  Patient will perform sit to stand with moderate assistance  within 7 day(s). 4.  Patient will ambulate with moderate assistance  for 15 feet with the least restrictive device within 7 day(s).        Outcome: Progressing Towards Goal   PHYSICAL THERAPY TREATMENT  Patient: Iwona Britt (82 y.o. female)  Date: 10/18/2022  Diagnosis: Weakness [R53.1] <principal problem not specified>  Procedure(s) (LRB):  INSERT PPM DUAL (N/A) 12 Days Post-Op  Precautions: Fall (L sling, PM precautions)  Chart, physical therapy assessment, plan of care and goals were reviewed. ASSESSMENT  Pt seen following RN clearance. Pt resting in bed and continues to call out for \"help. \" Pt attempting to defer bed mobility today though educated on benefits and this therapist noted that she has not sat on EOB since re-eval 10/6. Total A x2 provided for supine to sit; once seated, pt able to sit for approx 10 minutes with max posterior assist for balance (pt with intermittent anterior weight shift, though fleeting and poor tolerance). Pt assisted with drinking some Ensure at EOB (RUE) and noted to have significant burping thereafter (no choking noted). MD then arrived to speak with pt during seated balance work: pt oriented to self and year as well as \"[facility]. \" Pt unable to conceptualize why we are encouraging her to move/sit-up. With pt's current cognition, she is hesitant to move and cries out in pain; fearful of mobility and it is hard for her to understand why therapy is asking her to proceed despite her resistance. Patient continues with skilled PT services and is progressing little towards goals. She remains appropriate for discharge to SNF once medically stable.     Recommend: OOB to recliner with alarm via alfred lift with lift team    Next session: supine to sit; LE therex in supine vs sitting; may trial sit to stand next session as able    Of note: pt receptive to male PT tech and confuses him/associates him with her  \"Rindge\"; pt does desensitize to ROM/movement with increased activity completed     Current Level of Function Impacting Discharge (mobility/balance): upwards of total A x2 today    Other factors to consider for discharge: pt with AMS and attempted refusals to mobility; poor carryover of education and reasons for encouragement-pt's mental state is greatest limiting factor for functional mobility progression at this time         PLAN :  Patient continues to benefit from skilled intervention to address the above impairments. Continue treatment per established plan of care. to address goals. Recommendation for discharge: (in order for the patient to meet his/her long term goals)  Therapy up to 5 days/week in SNF setting    This discharge recommendation:  Has been made in collaboration with the attending provider and/or case management    IF patient discharges home will need the following DME: to be determined (TBD)       SUBJECTIVE:   Patient stated I want to die. .. Oh, oh, why are you doing this? My back, my back. ..    OBJECTIVE DATA SUMMARY:   Critical Behavior:  Neurologic State: Agitated, Confused  Orientation Level: Oriented to person, Disoriented to place, Disoriented to situation, Disoriented to time  Cognition: Decreased command following, Decreased attention/concentration, Memory loss  Safety/Judgement: Fall prevention  Functional Mobility Training:  Bed Mobility:  Supine to Sit: Total assistance;Assist x2  Sit to Supine: Total assistance;Assist x2  Scooting: Maximum assistance; Additional time;Assist x2  Balance:  Sitting: Impaired  Sitting - Static: Poor (constant support); Fair (occasional)  Sitting - Dynamic: Poor (constant support)  Standing:  (NT)  Pain Rating:  Pt expressing back pain and LE pain throughout session    Activity Tolerance:   Poor; BP stable in sitting    After treatment patient left in no apparent distress:   Supine in bed, Heels elevated for pressure relief, Call bell within reach, Bed / chair alarm activated, and Side rails x 3    COMMUNICATION/COLLABORATION:   The patients plan of care was discussed with: Registered nurseLuis Fernando Sibley   Time Calculation: 28 mins

## 2022-10-18 NOTE — PROGRESS NOTES
Transition of Care  RUR 19% Moderate  Disposition [facility]   DME Wheelchair, rollator, home oxygen, shower stool, raised toilet seat, and grab bars  Transportation [facility] phone [phone], 2pm  Follow Up PCP, Specialist    CM placed call to [facility] with [address] Patient can be admitted today to facility. UAI requested again. [facility] will inform CM of call report and room assignment. CM updated [facility] transport request to 1pm today. CM attempted call to patient's  but no answer, left voice message. CM to monitor. Cony Hunter MS    Transition of Care Plan to SNF/Rehab    SNF/Rehab Transition:  Patient has been accepted to [facility] and meets criteria for admission. Patient will transported by [facility] ([facility] phone [phone], and expected to leave at, 2pm.    Communication to Patient/Family:  Met with patient and  (identified care giver) and they are agreeable to the transition plan. Communication to SNF/Rehab:  Bedside RN has been notified to update the transition plan to the facility and call report Discharge information has been updated on the AVS.     Discharge instructions to be fax'd to facility via careport. Nursing Please include all hard scripts for controlled substances, med rec and dc summary, and AVS in packet.      Reviewed and confirmed with [facility], facility can manage the patient care needs for the following:     SNF/Rehab Transition:  Patient to follow-up with PCP/Specialist    Major Revering with (X) only those applicable:    Medication:  [x]  Medications will be available at the facility  []  IV Antibiotics   []  Controlled Substance - hard copy to be sent with patient   []  Weekly Labs   Documents:  [x] Hard RX  [x] MAR  [x] Kardex  [x] AVS  [x]Transfer Summary  [x]Discharge   Equipment:  []  CPAP/BiPAP  []  Wound Vacuum  []  Mares or Urinary Device  []  PICC/Central Line  []  Nebulizer  []  Ventilator Treatment:  []Isolation (for MRSA, VRE, etc.)  []Surgical Drain Management  []Tracheostomy Care  []Dressing Changes  []Dialysis with transportation and chair time   []PEG Care  []Oxygen  []Daily Weights for Heart Failure   Dietary:  []Any diet limitations  []Tube Feedings   []Total Parenteral Management (TPN)   Eligible for Medicaid Long Term Services and Supports  Yes:  [] Eligible for medical assistance or will become eligible within 180 days and UAI completed. [] Provider/Patient and/or support system has requested screening. [] UAI copy provided to patient or responsible party  [x] UAI unavailable at discharge will send once processed to SNF provider. [] UAI unavailable at discharged mailed to patient  No:   [] Private pay and is not financially eligible for Medicaid within the next 180 days. [] Reside out-of-state.   [] A residents of a state owned/operated facility that is licensed  by [place] [address] [employer] and [employer] [facility] or [facility]  [] Enrollment in [facility] hospice services  []  [address]  [] Patient /Family declines to have screening completed or provide financial information for screening     Financial Resources:  Medicaid    [] Initiated and application pending   [] Full coverage     Advanced Care Plan:  []Surrogate Decision Maker of Care  []POA  []Communicated Code Status DNR    Other     Garrett Eid MS

## 2022-10-18 NOTE — PROGRESS NOTES
Problem: Falls - Risk of  Goal: *Absence of Falls  Description: Document Nelly Brown Fall Risk and appropriate interventions in the flowsheet.   10/18/2022 1640 by Medardo Padgett RN  Outcome: Resolved/Met  Note: Fall Risk Interventions:  Mobility Interventions: Bed/chair exit alarm, Strengthening exercises (ROM-active/passive), Utilize walker, cane, or other assistive device    Mentation Interventions: Bed/chair exit alarm, Door open when patient unattended    Medication Interventions: Bed/chair exit alarm, Evaluate medications/consider consulting pharmacy    Elimination Interventions: Bed/chair exit alarm, Call light in reach    History of Falls Interventions: Bed/chair exit alarm, Door open when patient unattended      10/18/2022 1640 by Medardo Padgett RN  Outcome: Progressing Towards Goal  Note: Fall Risk Interventions:  Mobility Interventions: Bed/chair exit alarm, Strengthening exercises (ROM-active/passive), Utilize walker, cane, or other assistive device    Mentation Interventions: Bed/chair exit alarm, Door open when patient unattended    Medication Interventions: Bed/chair exit alarm, Evaluate medications/consider consulting pharmacy    Elimination Interventions: Bed/chair exit alarm, Call light in reach    History of Falls Interventions: Bed/chair exit alarm, Door open when patient unattended         Problem: Patient Education: Go to Patient Education Activity  Goal: Patient/Family Education  10/18/2022 1640 by Medardo Padgett RN  Outcome: Resolved/Met  10/18/2022 1640 by Medardo Padgett RN  Outcome: Progressing Towards Goal     Problem: Pain  Goal: *Control of Pain  Outcome: Resolved/Met  Goal: *PALLIATIVE CARE:  Alleviation of Pain  Outcome: Resolved/Met     Problem: Patient Education: Go to Patient Education Activity  Goal: Patient/Family Education  Outcome: Resolved/Met     Problem: Impaired Skin Integrity/Pressure Injury Treatment  Goal: *Improvement of Existing Pressure Injury  Outcome: Resolved/Met  Goal: *Prevention of pressure injury  Description: Document Fly Scale and appropriate interventions in the flowsheet.   Outcome: Resolved/Met  Note: Pressure Injury Interventions:  Sensory Interventions: Assess changes in LOC, Maintain/enhance activity level, Minimize linen layers    Moisture Interventions: Absorbent underpads, Internal/External urinary devices    Activity Interventions: Increase time out of bed, PT/OT evaluation    Mobility Interventions: PT/OT evaluation, HOB 30 degrees or less    Nutrition Interventions: Document food/fluid/supplement intake    Friction and Shear Interventions: Lift team/patient mobility team, Minimize layers                Problem: Patient Education: Go to Patient Education Activity  Goal: Patient/Family Education  Outcome: Resolved/Met

## 2022-10-18 NOTE — PROGRESS NOTES
Bedside and Verbal shift change report given to Loan Juarez RN (oncoming nurse) by Alena Neil RN (offgoing nurse). Report included the following information SBAR, Kardex, ED Summary, Procedure Summary, Intake/Output, MAR, Recent Results, and Cardiac Rhythm V Paced .

## 2022-10-18 NOTE — PROGRESS NOTES
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 S/p pacemaker placement on 10/6  A fib/flutter. On elquis   PAULO on CKD Agree with empiric abx  O2 titration above 90%   Eliquis  We will be available again to see if needed      Daily Progression:    10/18  Chest tube is out  O2 requirements are down     10/17  Chest x-ray on 10/16   IMPRESSION     Unchanged small left pleural effusion. No visualized pneumothorax. 10/14  No ptx following 24 hours of waterseal on this am chest x-ray   No WOB noted     10/13  Remains stable  No airleaks noted     10/12  Chest tube in place   No ptx on chest image this am     10/11  Consult Note requested by hospitalist     Patient presented for admission on 10/4/22 for weakness and unsteady gait; found to be bradycardic with AV block mobitz type II.  PPM placement on 10/6. Patient spiked a fever of 100. 6F  which prompted a chest image prior to discharge. On that chest image she was noted to have a 4.1cm left PTX. Of note the chest image taken on 10/6 did not show a ptx. I have reviewed the labs and previous days notes. Review of systems not obtained due to patient factors. No past medical history on file. Past Surgical History:   Procedure Laterality Date    IR THORACENTESIS/INSERT CHEST TUBE  10/11/2022      Prior to Admission medications    Medication Sig Start Date End Date Taking? Authorizing Provider   apixaban (ELIQUIS) 2.5 mg tablet Take 1 Tablet by mouth two (2) times a day. 10/8/22  Yes Renee Gandara MD   lidocaine 4 % patch Apply patch to back  . Apply patch to the affected area for 12 hours a day and remove for 12 hours a day.  10/9/22 Yes Tani Stockton MD   nystatin (MYCOSTATIN) 100,000 unit/gram ointment Apply  to affected area two (2) times a day. Madhavi Elena MD   albuterol (PROVENTIL HFA, VENTOLIN HFA, PROAIR HFA) 90 mcg/actuation inhaler Take 1 Puff by inhalation every four (4) hours as needed. Madhavi Elena MD   albuterol-ipratropium (DUO-NEB) 2.5 mg-0.5 mg/3 ml nebu 3 mL by Nebulization route two (2) times a day. Madhavi Elena MD   acetaminophen (Tylenol Arthritis Pain) 650 mg TbER Take 650 mg by mouth every eight (8) hours. Madhavi Elena MD   guaiFENesin (ORGANIDIN) 200 mg tablet Take 600 mg by mouth every four (4) hours as needed for Congestion. Madhavi Elena MD   calcium carbonate (CALTREX) 600 mg calcium (1,500 mg) tablet Take 600 mg by mouth two (2) times a day. Madhavi Elena MD   aspirin 81 mg chewable tablet Take 81 mg by mouth in the morning. Madhavi Elena MD   polyethylene glycol (Miralax) 17 gram packet Take 17 g by mouth in the morning. Provider, Historical   famotidine (PEPCID) 10 mg tablet Take 10 mg by mouth two (2) times a day. Provider, Historical   melatonin 3 mg tablet Take 3 mg by mouth nightly as needed for Insomnia. Provider, Historical   simethicone (GAS-X) 125 mg chewable tablet Take 125 mg by mouth every six (6) hours as needed for Flatulence. Provider, Historical   ondansetron (ZOFRAN ODT) 4 mg disintegrating tablet Take 4 mg by mouth every eight (8) hours as needed for Nausea or Vomiting. Provider, Historical   bisacodyL (Dulcolax, bisacodyl,) 10 mg supp Insert 10 mg into rectum daily as needed for Constipation.     Provider, Historical     Allergies   Allergen Reactions    Amoxicillin Unknown (comments)    Penicillins Unknown (comments)    Sulfa (Sulfonamide Antibiotics) Unknown (comments)      Social History     Tobacco Use    Smoking status: Every Day    Smokeless tobacco: Never   Substance Use Topics    Alcohol use: Not on file      No family history on file.  OBJECTIVE:     Vital Signs:     Visit Vitals  BP (!) 150/80 (BP 1 Location: Right upper arm, BP Patient Position: Sitting)   Pulse (!) 102   Temp 98.8 °F (37.1 °C)   Resp 18   Ht 5' 1\" (1.549 m)   Wt 49.4 kg (108 lb 12.8 oz)   SpO2 92%   BMI 20.56 kg/m²      Temp (24hrs), Av.8 °F (36.6 °C), Min:97.1 °F (36.2 °C), Max:98.8 °F (37.1 °C)     Intake/Output:     Last shift: 10/18 0701 - 10/18 1900  In: 120 [P.O.:120]  Out: -     Last 3 shifts: 10/16 1901 - 10/18 07  In: 1160 [P.O.:360; I.V.:800]  Out: -         Intake/Output Summary (Last 24 hours) at 10/18/2022 1045  Last data filed at 10/18/2022 0754  Gross per 24 hour   Intake 1040 ml   Output --   Net 1040 ml         Physical Exam:                                        Exam Findings Other   General: No resp distress noted, appears stated age    HEENT:  No ulcers, JVD not elevated, no cervical LAD    Chest: No pectus deformity, normal chest rise b/l    HEART:  No visible thrills     Lungs:  Normal expansion     ABD: Soft/NT, non rigid mildly distended    EXT: No cyanosis/clubbing/edema, normal peripheral pulses    Skin: No rashes or ulcers, no mottling    Neuro: Awake         Medications:  Current Facility-Administered Medications   Medication Dose Route Frequency    vancomycin (VANCOCIN) 750 mg in 0.9% sodium chloride 250 mL ([id number])  750 mg IntraVENous Q24H    pantoprazole (PROTONIX) tablet 20 mg  20 mg Oral ACB    cefepime (MAXIPIME) 2 g in 0.9% sodium chloride (MBP/ADV) 100 mL MBP  2 g IntraVENous Q12H    balsam peru-castor oiL (VENELEX) ointment   Topical Q12H    HYDROmorphone (DILAUDID) injection 0.5 mg  0.5 mg IntraVENous Q4H PRN    Vancomycin pharmacy to dose   Other Rx Dosing/Monitoring    lidocaine 4 % patch 1 Patch  1 Patch TransDERmal Q24H    melatonin tablet 3 mg  3 mg Oral QHS PRN    sodium chloride (NS) flush 5-40 mL  5-40 mL IntraVENous Q8H    sodium chloride (NS) flush 5-40 mL  5-40 mL IntraVENous PRN    naloxone (NARCAN) injection 0.4 mg  0.4 mg IntraVENous PRN    oxyCODONE IR (ROXICODONE) tablet 2.5 mg  2.5 mg Oral Q4H PRN    sodium chloride (NS) flush 5-40 mL  5-40 mL IntraVENous Q8H    sodium chloride (NS) flush 5-40 mL  5-40 mL IntraVENous PRN    acetaminophen (TYLENOL) tablet 650 mg  650 mg Oral Q6H PRN    Or    acetaminophen (TYLENOL) suppository 650 mg  650 mg Rectal Q6H PRN    polyethylene glycol (MIRALAX) packet 17 g  17 g Oral DAILY PRN    ondansetron (ZOFRAN ODT) tablet 4 mg  4 mg Oral Q8H PRN    Or    ondansetron (ZOFRAN) injection 4 mg  4 mg IntraVENous Q6H PRN    apixaban (ELIQUIS) tablet 2.5 mg  2.5 mg Oral BID       Labs:  ABG No results for input(s): PHI, PCO2I, PO2I, HCO3I, SO2I, FIO2I in the last 72 hours.      CBC Recent Labs     10/16/22  0307   WBC 9.2   HGB 7.9*   HCT 25.4*      MCV 97.3   MCH 07.6          Metabolic  Panel Recent Labs     10/18/22  0040 10/17/22  0219 10/16/22  0307    143 142   K 3.7 3.7 4.0   * 113* 111*   CO2 26 26 27   * 109* 110*   BUN 24* 20 20   CREA 0.84 0.81 0.82   CA 9.5 9.1 8.9          Pertinent Labs                Silver Bergman PA-C  10/18/2022

## 2022-10-18 NOTE — DISCHARGE SUMMARY
Physician Discharge Summary     Patient ID:    Sho Molina  [id number]  [unfilled]  5/14/1932    Admit date: 10/4/2022    Discharge date and time: 10/18/2022    Hospital Diagnoses and Treatment Rendered:   Sho Molina is a 80 y.o. female with known past medical history of a flutter on Eliquis, recent pneumonia patient was admitted in the hospital and 12 July and discharged in the beginning of August due to pneumonia and hypoxemia at that time and her blood pressure was low, after the last admission patient was discharged to rehab her condition improved and 2 weeks ago she was discharged home, per her  was not at the edge patient was weak and experienced on and off lightheadedness and unsteady gait, the patient had difficulty to ambulate, did not feel well, was brought into emergency department for evaluation. Most history was obtained through patient  and medical record as patient currently sleeping. Per patient  patient did not have any fever, shortness of breath, chest pain, nausea, vomiting, abdominal pain or diarrhea. She does take Eliquis. And she is supposed to see cardiologist as outpatient but did not have a chance to schedule appointment yet. In the emergency department patient was evaluated systolic blood pressure was on the low side, patient feeling lightheaded and weak, CT head was done was negative for acute intracranial pathology. Laboratory data was obtained creatinine was 1.6, stable, EKG revealed bigeminy with frequent cardiac pauses. Medicine was consulted for admission and further evaluation. The patient was admitted to medicine, cardiologist was consulted, due to symptomatic bradycardia the patient underwent pacemaker placement, tolerated procedure well, after procedure patient had pneumothorax left-sided, requiring chest tube placement. IR and pulmonologist was consulted. As soon as pneumothorax resolved, chest tube was removed. Follow-up chest x-ray was negative for pneumothorax. Due to febrile illness, possible pneumonia, healthcare associated pneumonia, antibiotic cefepime and vancomycin were initiated, blood cultures were negative for growth, lactate was within normal limits. Acute metabolic and toxic encephalopathy improved, patient was alert oriented to name, month, year, she knew she was in the hospital.  The patient was experience pain on and off in her back but she would refuse on multiple occasion pain medication including oxycodone and lidocaine patch. Due to frailty and deconditioning palliative care was consulted, and patient  was in agreement, but did not have a chance to see patient in the hospital.   Eliquis was reinstated. Acute kidney injury resolved. The patient does have chronic hypoxic respiratory failure and she requires supplemental oxygen 2 to 4 L/min via nasal cannula. Due to anemia, the patient was evaluated by her gastroenterologist, per gastroenterologist was concerned anemia was multifactorial, no procedure or intervention was recommended, during hospital stay H&H remained stable. The patient has iron deficient anemia. On current medical management condition of patient improved, bradycardia was controlled after pacemaker placement, acute respiratory failure resolved, pneumonia resolved, chest x-ray repeat was negative for pneumonia. Rest of hospital stay patient remained hemodynamically stable, afebrile, was able to tolerate p.o. diet, she is very weak and unable to ambulate, PT OT evaluated the patient and recommended continue treatment at rehab facility, case management was involved, and the soonest place was arranged, the patient was discharged to rehab facility in stable condition.     Left Pneumothorax, s/p left CT placement  Chronic hypoxic respiratory failure with 3l of home oxygen  History of PE on Eliquis  -chest tube placed on low continuous pressure, now clamped  -improving, repeated chest x-ray on 10/15 no pneumothorax   -febrile on 10/10, on cefepime and vancomycin, fever now improved  -normal lactic, procal 0.7  -blood cx on 10/11 no growth so far  -CXR 10/14 improved  -Pulmonary consulted, input appreciated,   chest tube was removed 10/17 as per pulmonary/IR,   Follow up CXR no PTX  Continue Abx, last day 10/18/2022     Symptomatic sp due to Mobitz II s/p PPM placement on 10/6   Paroxsymal Aflutter  -pt presented with Lightheadedness, unsteady gait  - hypotension improved   - PPM placed on 10/6  - restarted Eliquis 10/7     Pneumothorax, s/p left sided CT placement  CT was removed on 10/17     Altered mental state, suspect metabolic/toxic encephalopathy (medications, infection, pain) - continue supportive care; non-focal neuro exam; monitor closely, minimize sedation as able  AO x name, knows in hospital, oriented to month and year      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  -now resolved     Anemia, continuous decline since 7/2022   -on Eliquis  -Seen by GI  No evidence of active bleeding  Monitor        Debility with Fall at home   Severe deconditioning, frailty  pain  -PT/OT on board  -SNF on discharge   Needs assistance with feeding, all ADL  Pain control  Consult palliative care, evaluation is pending      Back pain,   Declining lidocaine patch  Continue Oxy PRN  Refusing on/off pain medications    If patient continue experience pain, and declined pain medication palliative care should be consulted, if condition of patient continues to decline she might benefit hospice evaluation. Consider iron supplement. Follow-up cardiologist Dr. Manju Dinh for pacemaker check.       Chronic Diagnoses:    Problem List as of 10/18/2022 Date Reviewed: 8/3/2022            Codes Class Noted - Resolved    Pacemaker ICD-10-CM: Z95.0  ICD-9-CM: V45.01  10/6/2022 - Present    Overview Signed 10/6/2022  7:00 AM by Nadja Griggs MD     10/6/22 [employer]             Third degree AV block ([address] ICD-10-CM: Y02.6  ICD-9-CM: 426.0 10/6/2022 - Present        Weakness ICD-10-CM: R53.1  ICD-9-CM: 780.79  10/4/2022 - Present        HCAP (healthcare-associated pneumonia) ICD-10-CM: J18.9  ICD-9-CM: 820  8/3/2022 - Present        Pulmonary edema ICD-10-CM: J81.1  ICD-9-CM: 949  8/3/2022 - Present        Metabolic encephalopathy EJC-10-MT: G93.41  ICD-9-CM: 348.31  8/3/2022 - Present        PAULO (acute kidney injury) ([address] ICD-10-CM: N17.9  ICD-9-CM: 584.9  8/3/2022 - Present        Urinary retention ICD-10-CM: R33.9  ICD-9-CM: 788.20  8/3/2022 - Present        Hypernatremia ICD-10-CM: E87.0  ICD-9-CM: 276.0  8/3/2022 - Present        Atrial flutter ([address] ICD-10-CM: I48.92  ICD-9-CM: 427.32  8/3/2022 - Present        Sepsis ([address] ICD-10-CM: A41.9  ICD-9-CM: 038.9, 995.91  7/20/2022 - Present           Discharge Medications:   Current Discharge Medication List        START taking these medications    Details   balsam peru-castor oiL (VENELEX) ointment Apply  to affected area every twelve (12) hours for 30 days. Heals and buttock area  Qty: 1 g, Refills: 0  Start date: 10/18/2022, End date: 11/17/2022      pantoprazole (PROTONIX) 20 mg tablet Take 1 Tablet by mouth Daily (before breakfast) for 30 days. Qty: 30 Tablet, Refills: 0  Start date: 10/19/2022, End date: 11/18/2022      oxyCODONE IR (ROXICODONE) 5 mg immediate release tablet Take 0.5 Tablets by mouth every six (6) hours as needed for Pain for up to 3 days. Max Daily Amount: 10 mg. Hold for sedation  Qty: 10 Tablet, Refills: 0  Start date: 10/18/2022, End date: 10/21/2022    Associated Diagnoses: Pacemaker      apixaban (ELIQUIS) 2.5 mg tablet Take 1 Tablet by mouth two (2) times a day. Qty: 60 Tablet, Refills: 0  Start date: 10/8/2022      lidocaine 4 % patch Apply patch to back  . Apply patch to the affected area for 12 hours a day and remove for 12 hours a day.   Qty: 10 Each, Refills: 0  Start date: 10/9/2022           CONTINUE these medications which have NOT CHANGED    Details nystatin (MYCOSTATIN) 100,000 unit/gram ointment Apply  to affected area two (2) times a day. albuterol (PROVENTIL HFA, VENTOLIN HFA, PROAIR HFA) 90 mcg/actuation inhaler Take 1 Puff by inhalation every four (4) hours as needed. albuterol-ipratropium (DUO-NEB) 2.5 mg-0.5 mg/3 ml nebu 3 mL by Nebulization route two (2) times a day. acetaminophen (Tylenol Arthritis Pain) 650 mg TbER Take 650 mg by mouth every eight (8) hours. guaiFENesin (ORGANIDIN) 200 mg tablet Take 600 mg by mouth every four (4) hours as needed for Congestion. calcium carbonate (CALTREX) 600 mg calcium (1,500 mg) tablet Take 600 mg by mouth two (2) times a day. polyethylene glycol (Miralax) 17 gram packet Take 17 g by mouth in the morning. famotidine (PEPCID) 10 mg tablet Take 10 mg by mouth two (2) times a day. melatonin 3 mg tablet Take 3 mg by mouth nightly as needed for Insomnia.      simethicone (GAS-X) 125 mg chewable tablet Take 125 mg by mouth every six (6) hours as needed for Flatulence. ondansetron (ZOFRAN ODT) 4 mg disintegrating tablet Take 4 mg by mouth every eight (8) hours as needed for Nausea or Vomiting.      bisacodyL (Dulcolax, bisacodyl,) 10 mg supp Insert 10 mg into rectum daily as needed for Constipation. STOP taking these medications       aspirin 81 mg chewable tablet Comments:   Reason for Stopping: Follow up Care:    1. Minda Daniel MD in 1-2 weeks. Please call to set up an appointment shortly after discharge. Diet:  Cardiac Diet    Disposition:  SNF. Advanced Directive:   FULL    DNR X     Discharge Exam:  See today's note. CONSULTATIONS: Cardiology, pulmonology, GI, palliative care    Significant Diagnostic Studies:   10/4/2022: BUN 44 MG/DL (H; Ref range: 6 - 20 MG/DL); Calcium 10.7 MG/DL (H; Ref range: 8.5 - 10.1 MG/DL); CO2 27 mmol/L (Ref range: 21 - 32 mmol/L); Creatinine 1.68 MG/DL (H; Ref range: 0.55 - 1.02 MG/DL);  Glucose 88 mg/dL (Ref range: 65 - 100 mg/dL); HCT 28.9 % (L; Ref range: 35.0 - 47.0 %); HGB 8.8 g/dL (L; Ref range: 11.5 - 16.0 g/dL); Potassium 4.5 mmol/L (Ref range: 3.5 - 5.1 mmol/L); Sodium 140 mmol/L (Ref range: 136 - 145 mmol/L)  10/5/2022: BUN 40 MG/DL (H; Ref range: 6 - 20 MG/DL); Calcium 9.6 MG/DL (Ref range: 8.5 - 10.1 MG/DL); CO2 27 mmol/L (Ref range: 21 - 32 mmol/L); Creatinine 1.42 MG/DL (H; Ref range: 0.55 - 1.02 MG/DL); Glucose 93 mg/dL (Ref range: 65 - 100 mg/dL); HCT 25.6 % (L; Ref range: 35.0 - 47.0 %); HGB 7.9 g/dL (L; Ref range: 11.5 - 16.0 g/dL); Potassium 4.0 mmol/L (Ref range: 3.5 - 5.1 mmol/L); Sodium 142 mmol/L (Ref range: 136 - 145 mmol/L)  Recent Labs     10/16/22  0307   WBC 9.2   HGB 7.9*   HCT 25.4*        Recent Labs     10/18/22  0040 10/17/22  0219 10/16/22  0307    143 142   K 3.7 3.7 4.0   * 113* 111*   CO2 26 26 27   BUN 24* 20 20   CREA 0.84 0.81 0.82   * 109* 110*   CA 9.5 9.1 8.9     No results for input(s): AP, TBIL, TP, ALB, GLOB, GGT, AML, LPSE in the last 72 hours. No lab exists for component: SGOT, GPT, AMYP, HLPSE  No results for input(s): INR, PTP, APTT, INREXT in the last 72 hours. No results for input(s): FE, TIBC, PSAT, FERR in the last 72 hours. No results for input(s): PH, PCO2, PO2 in the last 72 hours. No results for input(s): CPK, CKMB in the last 72 hours. No lab exists for component: TROPONINI  No components found for: [place]      Total time to discharge patient was 31 minutes more than 50% of time was spent for counseling and coordination of care.     Signed:  Irene Ho MD  10/18/2022  11:59 AM

## 2022-10-18 NOTE — PROGRESS NOTES
Faculty or Preceptor Review of Student Work    10/18/2022  - Shift times - 0700 to 1200    The student documentation of patient care for Daphnie Pachceo has been reviewed and approved. All medications have been administered under the direct supervision of the faculty or preceptor.     Destiny Byers RN

## 2022-10-21 ENCOUNTER — CLINICAL SUPPORT (OUTPATIENT)
Dept: CARDIOLOGY CLINIC | Age: 87
End: 2022-10-21

## 2022-10-21 ENCOUNTER — TELEPHONE (OUTPATIENT)
Dept: CARDIOLOGY CLINIC | Age: 87
End: 2022-10-21

## 2022-10-21 ENCOUNTER — CLINICAL SUPPORT (OUTPATIENT)
Dept: CARDIOLOGY CLINIC | Age: 87
End: 2022-10-21
Payer: MEDICARE

## 2022-10-21 DIAGNOSIS — Z95.818 STATUS POST PLACEMENT OF IMPLANTABLE LOOP RECORDER: Primary | ICD-10-CM

## 2022-10-21 DIAGNOSIS — Z95.0 PACEMAKER: Primary | ICD-10-CM

## 2022-10-21 PROCEDURE — 93288 INTERROG EVL PM/LDLS PM IP: CPT | Performed by: INTERNAL MEDICINE

## 2022-10-21 NOTE — PROGRESS NOTES
Cardiac Electrophysiology Wound Check Note      Wound Check   Patient is here for wound check. No fever, drainage   Physical Exam   Constitutional: well-developed and well-nourished. Skin: Left side pocket is healing without redness, drainage, hematoma. The wound is intact with skin glue. ASSESSMENT and PLAN   The incision is healing without redness, drainage, hematoma. Intact with skin glue. The patient has been compliant with arm restrictions. They will continue arms restrictions for 2 more weeks.   current treatment plan is effective, no change in therapy   Device check shows proper lead and generator functions    Follow-up Disposition:  Return 3 months I will check via device clinic or remote monitoring in the future

## 2022-10-21 NOTE — PROGRESS NOTES
C/ post 2 week DC PM clinic check. Wound check by Obinna Kaiser RN. Device functioning appropriately as programmed.    See scanned documents

## 2022-10-21 NOTE — TELEPHONE ENCOUNTER
ID with two patient identifiers. Spoke with Mr. Antonino Celaya about meeting a [employer] Rep at [facility] on [employer] on Monday 41/37/25 @ 15:30 to set up in home monitoring for the patient. He agreed and all communications has been forwarded to [employer] to ensure this is set up.

## 2023-07-13 NOTE — PROGRESS NOTES
Spiritual Care Assessment/Progress Note  [facility]      NAME: Diamond Koo      MRN: [id number]  AGE: 80 y.o.  SEX: female  Mormon Affiliation: Zoroastrianism   Language: English     7/25/2022     Total Time (in minutes): 5     Spiritual Assessment begun in [facility] CV SERVICES UNIT through conversation with:         [x]Patient        [] Family    [] Friend(s)        Reason for Consult: [facility]     Spiritual beliefs: (Please include comment if needed)     [x] Identifies with a chris tradition:  Zoroastrianism       [] Supported by a chris community:            [] Claims no spiritual orientation:           [] Seeking spiritual identity:                [] Adheres to an individual form of spirituality:           [] Not able to assess:                           Identified resources for coping:      [x] Prayer                               [x] Music                  [] Guided Imagery     [x] Family/friends                 [] Pet visits     [] Devotional reading                         [] Unknown     [] Other:                                               Interventions offered during this visit: (See comments for more details)    Patient Interventions: Affirmation of chris, Communion (Zoroastrianism), Initial/Spiritual assessment, patient floor, Prayer (actual), Prayer (assurance of)           Plan of Care:     [x] Support spiritual and/or cultural needs    [] Support AMD and/or advance care planning process      [] Support grieving process   [] Coordinate Rites and/or Rituals    [] Coordination with community clergy   [] No spiritual needs identified at this time   [] Detailed Plan of Care below (See Comments)  [] Make referral to Music Therapy  [] Make referral to Pet Therapy     [] Make referral to Addiction services  [] Make referral to [facility]  [] Make referral to Spiritual Care Partner  [] No future visits requested        [] Contact Spiritual Care for further referrals     Comments: Mrs. Beauchamp Screen was in bed.  She had the TV vol. Up. No family was present at the time of the visit. Mrs. Carlos Penny is slow in speaking and is alert to  what is going on. She asked for communion. Prayer and communion offered  Assured of continued prayers.     DAV Mccoy, RN, ACSW, LCSW   Page:  [phone] You can access the FollowMyHealth Patient Portal offered by [facility] by registering at the following website: [email or web address]. By joining [employer]’s FollowMyHealth portal, you will also be able to view your health information using other applications (apps) compatible with our system.

## 2023-11-06 NOTE — PROGRESS NOTES
Problem: Falls - Risk of  Goal: *Absence of Falls  Description: Document Ignacio Lovett Fall Risk and appropriate interventions in the flowsheet.   Outcome: Progressing Towards Goal  Note: Fall Risk Interventions:  Mobility Interventions: Bed/chair exit alarm    Mentation Interventions: Bed/chair exit alarm    Medication Interventions: Bed/chair exit alarm    Elimination Interventions: Call light in reach, Bed/chair exit alarm    History of Falls Interventions: Bed/chair exit alarm         Problem: Pain  Goal: *Control of Pain  Outcome: Progressing Towards Goal nuchal cord

## 2024-01-12 NOTE — CONSULTS
[address]    Name:  Libby Stallworth  MR#:  [id number]  :  1932  ACCOUNT #:  [de-identified]  DATE OF SERVICE:  2022      RENAL CONSULTATION    REQUESTED BY:  Allyson Abreu MD    REASON FOR CONSULTATION:  For evaluation and management of acute kidney injury. The patient seen and examined in ED. HISTORY OF PRESENT ILLNESS:  The patient is a 27-year-old female with past medical history significant for hypertension; COPD, on home O2; atrial flutter, who was recently at [facility] after a ground-level fall sustaining metatarsal fracture. Hospital course was complicated by pneumonia and PE. She was treated with antibiotics and Eliquis. She was discharged to sheltering and rehab on . She developed worsening shortness of breath, was found to have elevated white count with negative COVID and flu testing, and was sent to ED for further evaluation. She was also borderline hypotensive. She is found to have PAULO with a creatinine of 1.42. ProBNP is elevated at 16,000 and chest x-ray with evidence of pneumonia versus interstitial edema. The patient denies any recent vomiting or diarrhea. Oral intake has been poor. No recent baseline creatinine available to compare. Troponin I is elevated. UA shows only trace protein and small blood with 5-10 rbc's. CBC with elevated white count of 38, no anemia, and normal platelets. REVIEW OF SYSTEMS:  As noted in HPI. Remainder review of systems obtained and negative. History reviewed. No pertinent past medical history. No past surgical history on file. Allergies   Allergen Reactions    Amoxicillin Unknown (comments)    Penicillins Unknown (comments)    Sulfa (Sulfonamide Antibiotics) Unknown (comments)     Prior to Admission Medications   Prescriptions Last Dose Informant Patient Reported?  Taking?   acetaminophen (Tylenol Arthritis Pain) 650 mg TbER 2022  Yes Yes   Sig: Take 650 mg by mouth every Addended by: NIKI RAMACHANDRAN on: 1/12/2024 10:12 AM     Modules accepted: Orders     eight (8) hours. albuterol (PROVENTIL HFA, VENTOLIN HFA, PROAIR HFA) 90 mcg/actuation inhaler 7/13/2022  Yes Yes   Sig: Take 1 Puff by inhalation every four (4) hours as needed. albuterol-ipratropium (DUO-NEB) 2.5 mg-0.5 mg/3 ml nebu 7/13/2022  Yes Yes   Sig: 3 mL by Nebulization route two (2) times a day. apixaban (ELIQUIS) 5 mg tablet 7/19/2022  Yes Yes   Sig: Take 5 mg by mouth two (2) times a day. aspirin 81 mg chewable tablet 6/20/2022  Yes Yes   Sig: Take 81 mg by mouth in the morning. bisacodyL (Dulcolax, bisacodyl,) 10 mg supp   Yes Yes   Sig: Insert 10 mg into rectum daily as needed for Constipation. calcium carbonate (CALTREX) 600 mg calcium (1,500 mg) tablet 7/19/2022  Yes Yes   Sig: Take 600 mg by mouth two (2) times a day. cefdinir (OMNICEF) 300 mg capsule 7/19/2022  Yes Yes   Sig: Take 300 mg by mouth two (2) times a day. dilTIAZem ER (DILACOR XR) 120 mg capsule 7/19/2022  Yes Yes   Sig: Take 120 mg by mouth in the morning. famotidine (PEPCID) 10 mg tablet   Yes Yes   Sig: Take 10 mg by mouth two (2) times a day. guaiFENesin (ORGANIDIN) 200 mg tablet   Yes Yes   Sig: Take 600 mg by mouth every four (4) hours as needed for Congestion. melatonin 3 mg tablet   Yes Yes   Sig: Take 3 mg by mouth nightly as needed for Insomnia. nystatin (MYCOSTATIN) 100,000 unit/gram ointment 7/19/2022  Yes Yes   Sig: Apply  to affected area two (2) times a day. ondansetron (ZOFRAN ODT) 4 mg disintegrating tablet   Yes Yes   Sig: Take 4 mg by mouth every eight (8) hours as needed for Nausea or Vomiting. oxyCODONE IR (ROXICODONE) 5 mg immediate release tablet 7/13/2022  Yes Yes   Sig: Take 5 mg by mouth every four (4) hours as needed for Pain.   polyethylene glycol (Miralax) 17 gram packet   Yes Yes   Sig: Take 17 g by mouth in the morning. simethicone (GAS-X) 125 mg chewable tablet   Yes Yes   Sig: Take 125 mg by mouth every six (6) hours as needed for Flatulence.       Facility-Administered Medications: None     Social History     Tobacco Use    Smoking status: Every Day    Smokeless tobacco: Never     History reviewed. No pertinent family history. PHYSICAL EXAMINATION:  GENERAL:  Elderly female, frail, in no acute distress. VITAL SIGNS:  She is afebrile. Pulse 102, /56, respirations of 24, and saturating 95% on 6 L nasal cannula. Weight is 72 kg. HEENT:  Head is atraumatic, normocephalic. Mucous membranes are moist.  Sclerae are anicteric. LUNGS:  Coarse rhonchi bilaterally with mild tachypnea. HEART:  Irregular rhythm with borderline mild tachycardia. Unable to appreciate any murmur or rub. ABDOMEN:  Soft, nontender, active bowel sounds. EXTREMITIES:  No significant edema. CNS:  Alert, awake. LABORATORY DATA:  Creatinine 1.42. Other electrolytes are acceptable. LFTs are pretty much normal.  Troponin I 439. CBC and UA as reviewed in HPI. Chest x-ray showed interstitial edema or atypical pneumonia. ASSESSMENT AND PLAN:  Please refer to my note in Epic.       David Marks MD      [id number]  D:  07/20/2022 16:31  T:  07/20/2022 18:50  JOB #:  [id number]

## 2024-01-25 ENCOUNTER — OFFICE VISIT (OUTPATIENT)
Age: 89
End: 2024-01-25

## 2024-01-25 DIAGNOSIS — I48.0 PAROXYSMAL ATRIAL FIBRILLATION (HCC): Primary | ICD-10-CM

## 2024-12-11 ENCOUNTER — TELEPHONE (OUTPATIENT)
Age: 88
End: 2024-12-11

## 2025-02-16 NOTE — PROGRESS NOTES
Bedside and Verbal shift change report given to Suzan Medley 41 (oncoming nurse) by Jeniffer Pacheco RN (offgoing nurse). Report included the following information SBAR, Kardex, Intake/Output, MAR, Recent Results, Cardiac Rhythm V-paced, and Alarm Parameters . Writer assumed care at 0700. A&Ox4. No complaints of pain. Around 1300, patient did not feel so well, reported feeling lightheaded, diaphoretic, and feeling like \"passing out.\" Vitals were checked, were WNL. BS was checked and was 122. Afternoon PT session was held, pt. felt better after laying down in bed-thinks it was an allergic response to the preservatives used in some of the food items she got at lunch as she had a similar reaction before. However, patient had the same symptoms again when she was transferred to the commode and back to bed.     Oncoming RN updated.

## (undated) DEVICE — SUTURE ETHBND EXCEL SZ 2 L30IN NONABSORBABLE GRN L40MM V-37 MX69G

## (undated) DEVICE — ADHESIVE SKIN CLSR 1ML TISS HI VISC EXOFIN

## (undated) DEVICE — SUTURE DEV SZ 3-0 V-LOC 90 L12IN TO L18IN CV-23 VLT VLOCM0844

## (undated) DEVICE — AGENT HEMSTAT 3GM PURIFIED PLNT STARCH PWD ABSRB ARISTA AH

## (undated) DEVICE — Z DUPLICATE USE 2275497 DRSG POSTOP PRMSL AG 3.5X6IN

## (undated) DEVICE — REM POLYHESIVE ADULT PATIENT RETURN ELECTRODE: Brand: VALLEYLAB

## (undated) DEVICE — PACEMAKER SETUP: Brand: MEDLINE INDUSTRIES, INC.

## (undated) DEVICE — SUTURE V-LOC 180 SZ 2-0 L9IN ABSRB VLT GS-21 L37MM 1/2 CIR VLOCM0345

## (undated) DEVICE — INTRO PEELWY HEMVLV 7F 13CM -- SHRT PRELUDE SNAP

## (undated) DEVICE — INTRO SHTH HEMO 9FR 18G 13CM -- PRELUDE SNAP PEEL-AWAY

## (undated) DEVICE — STIFFEN MICRO-INTRODUCER KIT: Brand: STIFFEN MICRO-INTRODUCER KIT

## (undated) DEVICE — HANDLE LT SNAP ON ULT DURABLE LENS FOR TRUMPF ALC DISPOSABLE